# Patient Record
Sex: FEMALE | Race: WHITE | NOT HISPANIC OR LATINO | Employment: OTHER | URBAN - METROPOLITAN AREA
[De-identification: names, ages, dates, MRNs, and addresses within clinical notes are randomized per-mention and may not be internally consistent; named-entity substitution may affect disease eponyms.]

---

## 2017-12-07 ENCOUNTER — TRANSCRIBE ORDERS (OUTPATIENT)
Dept: ADMINISTRATIVE | Facility: HOSPITAL | Age: 67
End: 2017-12-07

## 2017-12-07 ENCOUNTER — APPOINTMENT (OUTPATIENT)
Dept: LAB | Facility: HOSPITAL | Age: 67
End: 2017-12-07
Payer: MEDICARE

## 2017-12-07 DIAGNOSIS — R11.2 NAUSEA AND VOMITING, INTRACTABILITY OF VOMITING NOT SPECIFIED, UNSPECIFIED VOMITING TYPE: ICD-10-CM

## 2017-12-07 DIAGNOSIS — R11.2 NAUSEA AND VOMITING, INTRACTABILITY OF VOMITING NOT SPECIFIED, UNSPECIFIED VOMITING TYPE: Primary | ICD-10-CM

## 2017-12-07 LAB
ALBUMIN SERPL BCP-MCNC: 3.7 G/DL (ref 3.5–5)
ALP SERPL-CCNC: 67 U/L (ref 46–116)
ALT SERPL W P-5'-P-CCNC: 29 U/L (ref 12–78)
ANION GAP SERPL CALCULATED.3IONS-SCNC: 7 MMOL/L (ref 4–13)
AST SERPL W P-5'-P-CCNC: 17 U/L (ref 5–45)
BASOPHILS # BLD AUTO: 0.01 THOUSANDS/ΜL (ref 0–0.1)
BASOPHILS NFR BLD AUTO: 0 % (ref 0–1)
BILIRUB SERPL-MCNC: 0.4 MG/DL (ref 0.2–1)
BUN SERPL-MCNC: 13 MG/DL (ref 5–25)
CALCIUM SERPL-MCNC: 8.9 MG/DL (ref 8.3–10.1)
CHLORIDE SERPL-SCNC: 105 MMOL/L (ref 100–108)
CHOLEST SERPL-MCNC: 276 MG/DL (ref 50–200)
CO2 SERPL-SCNC: 30 MMOL/L (ref 21–32)
CREAT SERPL-MCNC: 0.8 MG/DL (ref 0.6–1.3)
EOSINOPHIL # BLD AUTO: 0.09 THOUSAND/ΜL (ref 0–0.61)
EOSINOPHIL NFR BLD AUTO: 2 % (ref 0–6)
ERYTHROCYTE [DISTWIDTH] IN BLOOD BY AUTOMATED COUNT: 13 % (ref 11.6–15.1)
FOLATE SERPL-MCNC: >20 NG/ML (ref 3.1–17.5)
GFR SERPL CREATININE-BSD FRML MDRD: 77 ML/MIN/1.73SQ M
GLUCOSE P FAST SERPL-MCNC: 95 MG/DL (ref 65–99)
HCT VFR BLD AUTO: 41.9 % (ref 34.8–46.1)
HDLC SERPL-MCNC: 70 MG/DL (ref 40–60)
HGB BLD-MCNC: 13.6 G/DL (ref 11.5–15.4)
LDLC SERPL CALC-MCNC: 188 MG/DL (ref 0–100)
LYMPHOCYTES # BLD AUTO: 1.65 THOUSANDS/ΜL (ref 0.6–4.47)
LYMPHOCYTES NFR BLD AUTO: 31 % (ref 14–44)
MCH RBC QN AUTO: 29.9 PG (ref 26.8–34.3)
MCHC RBC AUTO-ENTMCNC: 32.5 G/DL (ref 31.4–37.4)
MCV RBC AUTO: 92 FL (ref 82–98)
MONOCYTES # BLD AUTO: 0.42 THOUSAND/ΜL (ref 0.17–1.22)
MONOCYTES NFR BLD AUTO: 8 % (ref 4–12)
NEUTROPHILS # BLD AUTO: 3.09 THOUSANDS/ΜL (ref 1.85–7.62)
NEUTS SEG NFR BLD AUTO: 59 % (ref 43–75)
PLATELET # BLD AUTO: 205 THOUSANDS/UL (ref 149–390)
PMV BLD AUTO: 10.7 FL (ref 8.9–12.7)
POTASSIUM SERPL-SCNC: 4.1 MMOL/L (ref 3.5–5.3)
PROT SERPL-MCNC: 7.1 G/DL (ref 6.4–8.2)
RBC # BLD AUTO: 4.55 MILLION/UL (ref 3.81–5.12)
SODIUM SERPL-SCNC: 142 MMOL/L (ref 136–145)
TRIGL SERPL-MCNC: 90 MG/DL
TSH SERPL DL<=0.05 MIU/L-ACNC: 1.87 UIU/ML (ref 0.36–3.74)
VIT B12 SERPL-MCNC: 497 PG/ML (ref 100–900)
WBC # BLD AUTO: 5.26 THOUSAND/UL (ref 4.31–10.16)

## 2017-12-07 PROCEDURE — 82607 VITAMIN B-12: CPT

## 2017-12-07 PROCEDURE — 82306 VITAMIN D 25 HYDROXY: CPT

## 2017-12-07 PROCEDURE — 36415 COLL VENOUS BLD VENIPUNCTURE: CPT | Performed by: FAMILY MEDICINE

## 2017-12-07 PROCEDURE — 80061 LIPID PANEL: CPT

## 2017-12-07 PROCEDURE — 85025 COMPLETE CBC W/AUTO DIFF WBC: CPT

## 2017-12-07 PROCEDURE — 82746 ASSAY OF FOLIC ACID SERUM: CPT | Performed by: FAMILY MEDICINE

## 2017-12-07 PROCEDURE — 80053 COMPREHEN METABOLIC PANEL: CPT

## 2017-12-07 PROCEDURE — 84443 ASSAY THYROID STIM HORMONE: CPT

## 2017-12-11 LAB
25(OH)D2 SERPL-MCNC: <1 NG/ML
25(OH)D3 SERPL-MCNC: 68 NG/ML
25(OH)D3+25(OH)D2 SERPL-MCNC: 68 NG/ML

## 2018-05-01 ENCOUNTER — OFFICE VISIT (OUTPATIENT)
Dept: FAMILY MEDICINE CLINIC | Facility: HOSPITAL | Age: 68
End: 2018-05-01
Payer: MEDICARE

## 2018-05-01 VITALS
BODY MASS INDEX: 34.07 KG/M2 | TEMPERATURE: 97.6 F | OXYGEN SATURATION: 96 % | HEIGHT: 65 IN | SYSTOLIC BLOOD PRESSURE: 130 MMHG | WEIGHT: 204.5 LBS | HEART RATE: 85 BPM | DIASTOLIC BLOOD PRESSURE: 80 MMHG

## 2018-05-01 DIAGNOSIS — R42 VERTIGO: ICD-10-CM

## 2018-05-01 DIAGNOSIS — G89.29 CHRONIC NECK PAIN: ICD-10-CM

## 2018-05-01 DIAGNOSIS — S90.812A ABRASION OF LEFT FOOT, INITIAL ENCOUNTER: ICD-10-CM

## 2018-05-01 DIAGNOSIS — M79.10 MYALGIA: ICD-10-CM

## 2018-05-01 DIAGNOSIS — K21.9 GASTROESOPHAGEAL REFLUX DISEASE WITHOUT ESOPHAGITIS: ICD-10-CM

## 2018-05-01 DIAGNOSIS — M79.672 LEFT FOOT PAIN: ICD-10-CM

## 2018-05-01 DIAGNOSIS — E78.2 MIXED HYPERLIPIDEMIA: ICD-10-CM

## 2018-05-01 DIAGNOSIS — M54.2 CHRONIC NECK PAIN: ICD-10-CM

## 2018-05-01 DIAGNOSIS — M54.42 CHRONIC BILATERAL LOW BACK PAIN WITH BILATERAL SCIATICA: Primary | ICD-10-CM

## 2018-05-01 DIAGNOSIS — G89.29 CHRONIC BILATERAL LOW BACK PAIN WITH BILATERAL SCIATICA: Primary | ICD-10-CM

## 2018-05-01 DIAGNOSIS — M54.41 CHRONIC BILATERAL LOW BACK PAIN WITH BILATERAL SCIATICA: Primary | ICD-10-CM

## 2018-05-01 DIAGNOSIS — N39.41 URGE INCONTINENCE OF URINE: ICD-10-CM

## 2018-05-01 PROCEDURE — 99204 OFFICE O/P NEW MOD 45 MIN: CPT | Performed by: INTERNAL MEDICINE

## 2018-05-01 RX ORDER — HYDROCODONE BITARTRATE AND ACETAMINOPHEN 5; 325 MG/1; MG/1
1 TABLET ORAL EVERY 6 HOURS PRN
COMMUNITY
End: 2018-05-03 | Stop reason: SDUPTHER

## 2018-05-01 RX ORDER — OXYBUTYNIN CHLORIDE 5 MG/1
5 TABLET ORAL 2 TIMES DAILY
COMMUNITY
End: 2018-05-03 | Stop reason: SDUPTHER

## 2018-05-01 RX ORDER — ASCORBIC ACID 500 MG
500 TABLET ORAL 2 TIMES DAILY
COMMUNITY
End: 2018-05-01 | Stop reason: ALTCHOICE

## 2018-05-01 RX ORDER — MULTIVIT WITH MINERALS/LUTEIN
1000 TABLET ORAL DAILY
COMMUNITY

## 2018-05-01 RX ORDER — CELECOXIB 100 MG/1
100 CAPSULE ORAL 2 TIMES DAILY
Qty: 60 CAPSULE | Refills: 5 | Status: SHIPPED | OUTPATIENT
Start: 2018-05-01 | End: 2018-05-02 | Stop reason: SDUPTHER

## 2018-05-01 NOTE — ASSESSMENT & PLAN NOTE
Had labs in December with high cholesterol- eats from [facility] and gained 15 lbs since this winter

## 2018-05-01 NOTE — PROGRESS NOTES
Assessment/Plan:         Problem List Items Addressed This Visit        Digestive    Gastroesophageal reflux disease without esophagitis     Longstanding problem- prevacid controls it if she watches what she eats  Relevant Orders    CBC and differential       Musculoskeletal and Integument    Abrasion of left foot    Relevant Orders    XR foot 3+ vw left       Other    Mixed hyperlipidemia (Chronic)     Had labs in December with high cholesterol- eats from [facility] and gained 15 lbs since this winter         Relevant Orders    Comprehensive metabolic panel    Lipid Panel with Direct LDL reflex    Chronic neck pain     Now has trouble with turning head to right         Chronic bilateral low back pain with bilateral sciatica - Primary     Has wood fireplace and she carries wood for that-  is requiring care  She is using cbd oil and she feels it helps         Relevant Medications    celecoxib (CeleBREX) 100 mg capsule    Left foot pain     In past few weeks has increased pain at base of left  4th metatarsal         Myalgia    Relevant Orders    Vitamin D 25 hydroxy    FRIEDA Screen w/ Reflex to Titer/Pattern    Sedimentation rate, automated    Uric acid    Urge incontinence of urine     Will check urine and have pt see Dr Christianne Kahn         Relevant Orders    Ambulatory referral to Urogynecology            Subjective:      Patient ID: Cookie Sawyer is a 79 y o  female    HPI    The following portions of the patient's history were reviewed and updated as appropriate: allergies, current medications and problem list      Review of Systems   Constitutional: Positive for fatigue  Negative for diaphoresis and fever  HENT: Negative for ear discharge, postnasal drip, sinus pain and sinus pressure  Respiratory: Positive for wheezing  Negative for apnea and shortness of breath  Not recently- some seasonal component- discussed using allegra   Gastrointestinal: Negative for abdominal pain  Genitourinary: Positive for difficulty urinating  Musculoskeletal: Positive for arthralgias, back pain, joint swelling and neck stiffness  Neurological: Positive for headaches  With seasonal alelrgies   All other systems reviewed and are negative  Objective:      Current Outpatient Prescriptions:     Ascorbic Acid (VITAMIN C) 1000 MG tablet, Take 1,000 mg by mouth daily, Disp: , Rfl:     celecoxib (CeleBREX) 100 mg capsule, Take 1 capsule (100 mg total) by mouth 2 (two) times a day, Disp: 60 capsule, Rfl: 5    HYDROcodone-acetaminophen (NORCO) 5-325 mg per tablet, Take 1 tablet by mouth every 6 (six) hours as needed for pain, Disp: , Rfl:     Lansoprazole (PREVACID PO), Take by mouth , Disp: , Rfl:     Meclizine HCl (ANTIVERT PO), Take by mouth , Disp: , Rfl:     oxybutynin (DITROPAN) 5 mg tablet, Take 5 mg by mouth 2 (two) times a day, Disp: , Rfl:     /72   Pulse 90   Resp 16   Ht 5' 3" (1 6 m)   Wt 65 3 kg (144 lb)   BMI 25 51 kg/m²          Physical Exam   Constitutional: She is oriented to person, place, and time  She appears well-developed and well-nourished  HENT:   Head: Normocephalic and atraumatic  Right Ear: External ear normal    Left Ear: External ear normal    Mouth/Throat: Oropharyngeal exudate present  Eyes: Right eye exhibits discharge  Left eye exhibits discharge  Neck: No JVD present  Cardiovascular: Normal rate and regular rhythm  Exam reveals no friction rub  No murmur heard  Pulmonary/Chest: Effort normal and breath sounds normal  No respiratory distress  She has no wheezes  Abdominal: Soft  Bowel sounds are normal    oveweight   Musculoskeletal: She exhibits edema  Some trace ankle edema   Neurological: She is alert and oriented to person, place, and time  She displays normal reflexes  No cranial nerve deficit  She exhibits normal muscle tone  Coordination normal    Skin: Skin is warm and dry     Psychiatric: She has a normal mood and affect  Her behavior is normal  Thought content normal    Nursing note and vitals reviewed

## 2018-05-01 NOTE — ASSESSMENT & PLAN NOTE
Has wood fireplace and she carries wood for that-  is requiring care    She is using cbd oil and she feels it helps

## 2018-05-02 ENCOUNTER — APPOINTMENT (OUTPATIENT)
Dept: LAB | Facility: HOSPITAL | Age: 68
End: 2018-05-02
Attending: INTERNAL MEDICINE
Payer: MEDICARE

## 2018-05-02 ENCOUNTER — HOSPITAL ENCOUNTER (OUTPATIENT)
Dept: RADIOLOGY | Facility: HOSPITAL | Age: 68
Discharge: HOME | End: 2018-05-02
Attending: INTERNAL MEDICINE
Payer: MEDICARE

## 2018-05-02 DIAGNOSIS — M79.10 MYALGIA: ICD-10-CM

## 2018-05-02 DIAGNOSIS — K21.9 GASTROESOPHAGEAL REFLUX DISEASE WITHOUT ESOPHAGITIS: ICD-10-CM

## 2018-05-02 DIAGNOSIS — E78.2 MIXED HYPERLIPIDEMIA: ICD-10-CM

## 2018-05-02 DIAGNOSIS — S90.812A ABRASION OF LEFT FOOT, INITIAL ENCOUNTER: ICD-10-CM

## 2018-05-02 LAB
25(OH)D3 SERPL-MCNC: 100.6 NG/ML (ref 30–100)
ALBUMIN SERPL BCP-MCNC: 3.7 G/DL (ref 3.5–5)
ALP SERPL-CCNC: 63 U/L (ref 46–116)
ALT SERPL W P-5'-P-CCNC: 25 U/L (ref 12–78)
ANION GAP SERPL CALCULATED.3IONS-SCNC: 9 MMOL/L (ref 4–13)
AST SERPL W P-5'-P-CCNC: 19 U/L (ref 5–45)
BASOPHILS # BLD AUTO: 0.01 THOUSANDS/ΜL (ref 0–0.1)
BASOPHILS NFR BLD AUTO: 0 % (ref 0–1)
BILIRUB SERPL-MCNC: 0.5 MG/DL (ref 0.2–1)
BUN SERPL-MCNC: 12 MG/DL (ref 5–25)
CALCIUM SERPL-MCNC: 8.7 MG/DL (ref 8.3–10.1)
CHLORIDE SERPL-SCNC: 105 MMOL/L (ref 100–108)
CHOLEST SERPL-MCNC: 248 MG/DL (ref 50–200)
CO2 SERPL-SCNC: 28 MMOL/L (ref 21–32)
CREAT SERPL-MCNC: 0.88 MG/DL (ref 0.6–1.3)
EOSINOPHIL # BLD AUTO: 0.04 THOUSAND/ΜL (ref 0–0.61)
EOSINOPHIL NFR BLD AUTO: 1 % (ref 0–6)
ERYTHROCYTE [DISTWIDTH] IN BLOOD BY AUTOMATED COUNT: 13.2 % (ref 11.6–15.1)
ERYTHROCYTE [SEDIMENTATION RATE] IN BLOOD: 18 MM/HOUR (ref 0–15)
GFR SERPL CREATININE-BSD FRML MDRD: 68 ML/MIN/1.73SQ M
GLUCOSE P FAST SERPL-MCNC: 105 MG/DL (ref 65–99)
HCT VFR BLD AUTO: 41.1 % (ref 34.8–46.1)
HDLC SERPL-MCNC: 62 MG/DL (ref 40–60)
HGB BLD-MCNC: 13.7 G/DL (ref 11.5–15.4)
LDLC SERPL CALC-MCNC: 172 MG/DL (ref 0–100)
LYMPHOCYTES # BLD AUTO: 1.34 THOUSANDS/ΜL (ref 0.6–4.47)
LYMPHOCYTES NFR BLD AUTO: 26 % (ref 14–44)
MCH RBC QN AUTO: 31.2 PG (ref 26.8–34.3)
MCHC RBC AUTO-ENTMCNC: 33.3 G/DL (ref 31.4–37.4)
MCV RBC AUTO: 94 FL (ref 82–98)
MONOCYTES # BLD AUTO: 0.42 THOUSAND/ΜL (ref 0.17–1.22)
MONOCYTES NFR BLD AUTO: 8 % (ref 4–12)
NEUTROPHILS # BLD AUTO: 3.41 THOUSANDS/ΜL (ref 1.85–7.62)
NEUTS SEG NFR BLD AUTO: 65 % (ref 43–75)
PLATELET # BLD AUTO: 201 THOUSANDS/UL (ref 149–390)
PMV BLD AUTO: 10.9 FL (ref 8.9–12.7)
POTASSIUM SERPL-SCNC: 3.8 MMOL/L (ref 3.5–5.3)
PROT SERPL-MCNC: 7.2 G/DL (ref 6.4–8.2)
RBC # BLD AUTO: 4.39 MILLION/UL (ref 3.81–5.12)
SODIUM SERPL-SCNC: 142 MMOL/L (ref 136–145)
TRIGL SERPL-MCNC: 68 MG/DL
URATE SERPL-MCNC: 5.4 MG/DL (ref 2–6.8)
WBC # BLD AUTO: 5.22 THOUSAND/UL (ref 4.31–10.16)

## 2018-05-02 PROCEDURE — 80061 LIPID PANEL: CPT

## 2018-05-02 PROCEDURE — 84550 ASSAY OF BLOOD/URIC ACID: CPT

## 2018-05-02 PROCEDURE — 73630 X-RAY EXAM OF FOOT: CPT

## 2018-05-02 PROCEDURE — 85025 COMPLETE CBC W/AUTO DIFF WBC: CPT

## 2018-05-02 PROCEDURE — 36415 COLL VENOUS BLD VENIPUNCTURE: CPT

## 2018-05-02 PROCEDURE — 80053 COMPREHEN METABOLIC PANEL: CPT

## 2018-05-02 PROCEDURE — 86038 ANTINUCLEAR ANTIBODIES: CPT

## 2018-05-02 PROCEDURE — 85652 RBC SED RATE AUTOMATED: CPT

## 2018-05-02 PROCEDURE — 82306 VITAMIN D 25 HYDROXY: CPT

## 2018-05-02 RX ORDER — MECLIZINE HYDROCHLORIDE 25 MG/1
25 TABLET ORAL EVERY 8 HOURS PRN
Qty: 180 TABLET | Refills: 3 | Status: SHIPPED | OUTPATIENT
Start: 2018-05-02 | End: 2019-07-29 | Stop reason: SDUPTHER

## 2018-05-02 RX ORDER — CELECOXIB 100 MG/1
100 CAPSULE ORAL 2 TIMES DAILY
Qty: 180 CAPSULE | Refills: 3 | Status: SHIPPED | OUTPATIENT
Start: 2018-05-02 | End: 2019-07-30 | Stop reason: SDUPTHER

## 2018-05-03 DIAGNOSIS — K21.9 GASTROESOPHAGEAL REFLUX DISEASE, ESOPHAGITIS PRESENCE NOT SPECIFIED: Primary | ICD-10-CM

## 2018-05-03 DIAGNOSIS — R52 PAIN: ICD-10-CM

## 2018-05-03 DIAGNOSIS — IMO0002 URINARY COMPLICATION: Primary | ICD-10-CM

## 2018-05-03 RX ORDER — OXYBUTYNIN CHLORIDE 5 MG/1
5 TABLET ORAL 2 TIMES DAILY
Qty: 90 TABLET | Refills: 1 | Status: SHIPPED | OUTPATIENT
Start: 2018-05-03 | End: 2019-07-29 | Stop reason: SDUPTHER

## 2018-05-03 RX ORDER — HYDROCODONE BITARTRATE AND ACETAMINOPHEN 5; 325 MG/1; MG/1
1 TABLET ORAL EVERY 6 HOURS PRN
Qty: 120 TABLET | Refills: 0 | Status: SHIPPED | OUTPATIENT
Start: 2018-05-03 | End: 2018-08-22 | Stop reason: SDUPTHER

## 2018-05-04 LAB — RYE IGE QN: NEGATIVE

## 2018-05-04 RX ORDER — ESOMEPRAZOLE MAGNESIUM 40 MG/1
CAPSULE, DELAYED RELEASE ORAL
Qty: 90 CAPSULE | Refills: 1 | Status: SHIPPED | OUTPATIENT
Start: 2018-05-04 | End: 2018-11-27 | Stop reason: SDUPTHER

## 2018-05-10 ENCOUNTER — TELEPHONE (OUTPATIENT)
Dept: FAMILY MEDICINE CLINIC | Facility: HOSPITAL | Age: 68
End: 2018-05-10

## 2018-05-11 ENCOUNTER — TELEPHONE (OUTPATIENT)
Dept: FAMILY MEDICINE CLINIC | Facility: HOSPITAL | Age: 68
End: 2018-05-11

## 2018-05-11 NOTE — TELEPHONE ENCOUNTER
PT called for a refill of her Shir[facility] Lands Dr Julio Gomez did refill - but could not find PT's name on the PA reg   - I spoke with PT and she said that Dr Gabriela Juarez had prescribed this in the past - and she had it filled at [facility] - [phone]  I called [facility] and verified this and she last got it filled on Feb 26,2018 - at their store for 90 pills and it was the 300/25 - prescribed by Dr Gabriela Juarez  They checked the PA reg  And the also could not find PT on it

## 2018-08-22 DIAGNOSIS — R52 PAIN: ICD-10-CM

## 2018-08-22 RX ORDER — HYDROCODONE BITARTRATE AND ACETAMINOPHEN 5; 325 MG/1; MG/1
1 TABLET ORAL EVERY 6 HOURS PRN
Qty: 120 TABLET | Refills: 0 | Status: SHIPPED | OUTPATIENT
Start: 2018-08-22 | End: 2018-10-31 | Stop reason: SDUPTHER

## 2018-10-31 DIAGNOSIS — R52 PAIN: ICD-10-CM

## 2018-11-01 RX ORDER — HYDROCODONE BITARTRATE AND ACETAMINOPHEN 5; 325 MG/1; MG/1
1 TABLET ORAL EVERY 6 HOURS PRN
Qty: 120 TABLET | Refills: 0 | Status: SHIPPED | OUTPATIENT
Start: 2018-11-01 | End: 2018-11-29 | Stop reason: SDUPTHER

## 2018-11-27 DIAGNOSIS — K21.9 GASTROESOPHAGEAL REFLUX DISEASE, ESOPHAGITIS PRESENCE NOT SPECIFIED: ICD-10-CM

## 2018-11-27 RX ORDER — ESOMEPRAZOLE MAGNESIUM 40 MG/1
CAPSULE, DELAYED RELEASE ORAL
Qty: 90 CAPSULE | Refills: 3 | Status: SHIPPED | OUTPATIENT
Start: 2018-11-27 | End: 2019-07-29 | Stop reason: SDUPTHER

## 2018-11-29 DIAGNOSIS — R52 PAIN: ICD-10-CM

## 2018-12-03 RX ORDER — HYDROCODONE BITARTRATE AND ACETAMINOPHEN 5; 325 MG/1; MG/1
1 TABLET ORAL EVERY 6 HOURS PRN
Qty: 120 TABLET | Refills: 0 | Status: SHIPPED | OUTPATIENT
Start: 2018-12-03 | End: 2019-01-22 | Stop reason: SDUPTHER

## 2018-12-05 ENCOUNTER — DOCUMENTATION (OUTPATIENT)
Dept: FAMILY MEDICINE CLINIC | Facility: HOSPITAL | Age: 68
End: 2018-12-05

## 2018-12-19 ENCOUNTER — HOSPITAL ENCOUNTER (EMERGENCY)
Facility: HOSPITAL | Age: 68
Discharge: HOME | End: 2018-12-19
Attending: EMERGENCY MEDICINE | Admitting: EMERGENCY MEDICINE
Payer: MEDICARE

## 2018-12-19 ENCOUNTER — APPOINTMENT (EMERGENCY)
Dept: CT IMAGING | Facility: HOSPITAL | Age: 68
End: 2018-12-19
Payer: MEDICARE

## 2018-12-19 ENCOUNTER — APPOINTMENT (EMERGENCY)
Dept: NON INVASIVE DIAGNOSTICS | Facility: HOSPITAL | Age: 68
End: 2018-12-19
Payer: MEDICARE

## 2018-12-19 VITALS
OXYGEN SATURATION: 97 % | RESPIRATION RATE: 20 BRPM | HEIGHT: 65 IN | BODY MASS INDEX: 32.99 KG/M2 | TEMPERATURE: 98.3 F | WEIGHT: 198 LBS | HEART RATE: 85 BPM | SYSTOLIC BLOOD PRESSURE: 129 MMHG | DIASTOLIC BLOOD PRESSURE: 69 MMHG

## 2018-12-19 DIAGNOSIS — R20.2 NUMBNESS AND TINGLING IN LEFT HAND: ICD-10-CM

## 2018-12-19 DIAGNOSIS — M79.609 PAIN IN LIMB: ICD-10-CM

## 2018-12-19 DIAGNOSIS — R20.0 NUMBNESS AND TINGLING IN LEFT HAND: ICD-10-CM

## 2018-12-19 DIAGNOSIS — M79.605 LEFT LEG PAIN: Primary | ICD-10-CM

## 2018-12-19 LAB
ALBUMIN SERPL BCP-MCNC: 3.4 G/DL (ref 3.5–5)
ALP SERPL-CCNC: 70 U/L (ref 46–116)
ALT SERPL W P-5'-P-CCNC: 27 U/L (ref 12–78)
ANION GAP SERPL CALCULATED.3IONS-SCNC: 7 MMOL/L (ref 4–13)
APTT PPP: 25 SECONDS (ref 26–38)
AST SERPL W P-5'-P-CCNC: 20 U/L (ref 5–45)
BASOPHILS # BLD AUTO: 0.03 THOUSANDS/ΜL (ref 0–0.1)
BASOPHILS NFR BLD AUTO: 0 % (ref 0–1)
BILIRUB SERPL-MCNC: 0.4 MG/DL (ref 0.2–1)
BUN SERPL-MCNC: 13 MG/DL (ref 5–25)
CALCIUM SERPL-MCNC: 8.5 MG/DL (ref 8.3–10.1)
CHLORIDE SERPL-SCNC: 103 MMOL/L (ref 100–108)
CO2 SERPL-SCNC: 29 MMOL/L (ref 21–32)
CREAT SERPL-MCNC: 0.97 MG/DL (ref 0.6–1.3)
EOSINOPHIL # BLD AUTO: 0.08 THOUSAND/ΜL (ref 0–0.61)
EOSINOPHIL NFR BLD AUTO: 1 % (ref 0–6)
ERYTHROCYTE [DISTWIDTH] IN BLOOD BY AUTOMATED COUNT: 12.7 % (ref 11.6–15.1)
GFR SERPL CREATININE-BSD FRML MDRD: 60 ML/MIN/1.73SQ M
GLUCOSE SERPL-MCNC: 93 MG/DL (ref 65–140)
HCT VFR BLD AUTO: 39.7 % (ref 34.8–46.1)
HGB BLD-MCNC: 13.2 G/DL (ref 11.5–15.4)
IMM GRANULOCYTES # BLD AUTO: 0.01 THOUSAND/UL (ref 0–0.2)
IMM GRANULOCYTES NFR BLD AUTO: 0 % (ref 0–2)
INR PPP: 1.07 (ref 0.86–1.17)
LYMPHOCYTES # BLD AUTO: 1.75 THOUSANDS/ΜL (ref 0.6–4.47)
LYMPHOCYTES NFR BLD AUTO: 24 % (ref 14–44)
MCH RBC QN AUTO: 30.8 PG (ref 26.8–34.3)
MCHC RBC AUTO-ENTMCNC: 33.2 G/DL (ref 31.4–37.4)
MCV RBC AUTO: 93 FL (ref 82–98)
MONOCYTES # BLD AUTO: 0.61 THOUSAND/ΜL (ref 0.17–1.22)
MONOCYTES NFR BLD AUTO: 8 % (ref 4–12)
NEUTROPHILS # BLD AUTO: 4.81 THOUSANDS/ΜL (ref 1.85–7.62)
NEUTS SEG NFR BLD AUTO: 67 % (ref 43–75)
NRBC BLD AUTO-RTO: 0 /100 WBCS
PLATELET # BLD AUTO: 189 THOUSANDS/UL (ref 149–390)
PMV BLD AUTO: 10.1 FL (ref 8.9–12.7)
POTASSIUM SERPL-SCNC: 3.9 MMOL/L (ref 3.5–5.3)
PROT SERPL-MCNC: 7.1 G/DL (ref 6.4–8.2)
PROTHROMBIN TIME: 13.3 SECONDS (ref 11.8–14.2)
RBC # BLD AUTO: 4.28 MILLION/UL (ref 3.81–5.12)
SODIUM SERPL-SCNC: 139 MMOL/L (ref 136–145)
TROPONIN I SERPL-MCNC: <0.02 NG/ML
WBC # BLD AUTO: 7.29 THOUSAND/UL (ref 4.31–10.16)

## 2018-12-19 PROCEDURE — 85730 THROMBOPLASTIN TIME PARTIAL: CPT | Performed by: PHYSICIAN ASSISTANT

## 2018-12-19 PROCEDURE — 93971 EXTREMITY STUDY: CPT

## 2018-12-19 PROCEDURE — 99285 EMERGENCY DEPT VISIT HI MDM: CPT

## 2018-12-19 PROCEDURE — 70450 CT HEAD/BRAIN W/O DYE: CPT

## 2018-12-19 PROCEDURE — 93005 ELECTROCARDIOGRAM TRACING: CPT

## 2018-12-19 PROCEDURE — 93971 EXTREMITY STUDY: CPT | Performed by: INTERNAL MEDICINE

## 2018-12-19 PROCEDURE — 84484 ASSAY OF TROPONIN QUANT: CPT | Performed by: PHYSICIAN ASSISTANT

## 2018-12-19 PROCEDURE — 85025 COMPLETE CBC W/AUTO DIFF WBC: CPT | Performed by: PHYSICIAN ASSISTANT

## 2018-12-19 PROCEDURE — 80053 COMPREHEN METABOLIC PANEL: CPT | Performed by: PHYSICIAN ASSISTANT

## 2018-12-19 PROCEDURE — 36415 COLL VENOUS BLD VENIPUNCTURE: CPT | Performed by: PHYSICIAN ASSISTANT

## 2018-12-19 PROCEDURE — 85610 PROTHROMBIN TIME: CPT | Performed by: PHYSICIAN ASSISTANT

## 2018-12-19 RX ORDER — GINKGO BILOBA LEAF EXTRACT 60 MG
CAPSULE ORAL
COMMUNITY

## 2018-12-19 NOTE — ED NOTES
Patient transported to [facility] Nick Jasmine, [address] [place] [address]  12/19/18 1474

## 2018-12-19 NOTE — ED PROVIDER NOTES
History  Chief Complaint   Patient presents with    CVA/TIA-like Symptoms     Patient c/o left leg and left hand numbness for 1 week  Patient is a 75 y/o F with a h/o chronic low back pain that presents to the ED with left leg pain and weakness for 1 week  She states 1 week ago she thought she twisted her leg getting firewood because she developed pain in her lower leg  She states the pain is worsening and now travels behind her left knee  Movement makes the pain worse  She states today she developed tingling in her left hand  She denies weakness in her left arm  No facial numbness or weakness  She states she does have a headache  No visual changes  No bowel/bladder dysfunction  History provided by:  Patient  CVA/TIA-like Symptoms   Presenting symptoms: headaches and weakness (left leg  )    Presenting symptoms: no change in level of consciousness, no confusion, no disturbances in coordination, no language symptoms, no focal sensory loss and no visual change    Date/time of last known well:  12/12/2018 2:49 PM  Onset quality:  Gradual  Timing:  Constant  Progression:  Worsening  Similar to previous episodes: no    Associated symptoms: paresthesias (left hand)    Associated symptoms: no chest pain, no trouble swallowing, no dizziness, no facial pain, no fall, no fever, no nausea, no neck pain and no vertigo        Prior to Admission Medications   Prescriptions Last Dose Informant Patient Reported? Taking?    Ascorbic Acid (VITAMIN C) 1000 MG tablet   Yes Yes   Sig: Take 1,000 mg by mouth daily   Ginseng 100 MG CAPS   Yes Yes   Sig: Take by mouth   HYDROcodone-acetaminophen (NORCO) 5-325 mg per tablet   No Yes   Sig: Take 1 tablet by mouth every 6 (six) hours as needed for pain Max Daily Amount: 4 tablets   celecoxib (CeleBREX) 100 mg capsule   No Yes   Sig: Take 1 capsule (100 mg total) by mouth 2 (two) times a day   esomeprazole (NexIUM) 40 MG capsule   No Yes   Sig: Take 1 tab daily meclizine (ANTIVERT) 25 mg tablet   No Yes   Sig: Take 1 tablet (25 mg total) by mouth every 8 (eight) hours as needed for dizziness for up to 90 days   oxybutynin (DITROPAN) 5 mg tablet   No Yes   Sig: Take 1 tablet (5 mg total) by mouth 2 (two) times a day      Facility-Administered Medications: None       Past Medical History:   Diagnosis Date    Arthritis     Chronic pain     Diverticulitis     GERD (gastroesophageal reflux disease)     Hypertension     Vertigo        Past Surgical History:   Procedure Laterality Date    TUBAL LIGATION         Family History   Problem Relation Age of Onset    Stroke Mother     Diabetes Mother     Hypertension Mother     Heart disease Father     Hypertension Father     Heart disease Brother     Diabetes Brother     Hypertension Brother      I have reviewed and agree with the history as documented  Social History   Substance Use Topics    Smoking status: Never Smoker    Smokeless tobacco: Never Used    Alcohol use Yes      Comment: 3 times a week        Review of Systems   Constitutional: Negative for chills and fever  HENT: Negative for facial swelling and trouble swallowing  Respiratory: Negative for cough and shortness of breath  Cardiovascular: Negative for chest pain  Gastrointestinal: Negative for nausea  Musculoskeletal: Negative for neck pain  Skin: Negative for color change and wound  Neurological: Positive for weakness (left leg  ), numbness (left hand), headaches and paresthesias (left hand)  Negative for dizziness, vertigo, tremors, syncope, facial asymmetry, speech difficulty, light-headedness and disturbances in coordination  Psychiatric/Behavioral: Negative for confusion  All other systems reviewed and are negative  Physical Exam  Physical Exam   Constitutional: She is oriented to person, place, and time  She appears well-developed and well-nourished  She is cooperative  She does not appear ill  No distress     HENT: Head: Normocephalic and atraumatic  Right Ear: Hearing normal    Left Ear: Hearing normal    Nose: Nose normal    Mouth/Throat: Oropharynx is clear and moist and mucous membranes are normal    Eyes: Pupils are equal, round, and reactive to light  Conjunctivae and EOM are normal    Neck: Normal range of motion  Cardiovascular: Normal rate, regular rhythm and normal heart sounds  No murmur heard  Pulmonary/Chest: Effort normal and breath sounds normal  She has no wheezes  She has no rhonchi  She has no rales  Abdominal: Soft  Normal appearance and bowel sounds are normal  There is no tenderness  Musculoskeletal:        Left knee: Tenderness (posterior left knee) found  Left lower leg: She exhibits tenderness and swelling  She exhibits no deformity  Neurological: She is alert and oriented to person, place, and time  She has normal strength and normal reflexes  She displays no tremor  A sensory deficit (decreased sensation left hand  ) is present  No cranial nerve deficit  She displays a negative Romberg sign  Coordination and gait normal  GCS eye subscore is 4  GCS verbal subscore is 5  GCS motor subscore is 6  Finger to nose normal    Heel to shin limited with left leg due to pain  Patient has pain when lifting left leg up  She is able to keep it raised for over 5 seconds  Skin: Skin is warm and dry  No bruising and no rash noted  She is not diaphoretic  No pallor  Nursing note and vitals reviewed        Vital Signs  ED Triage Vitals   Temperature Pulse Respirations Blood Pressure SpO2   12/19/18 1432 12/19/18 1431 12/19/18 1431 12/19/18 1431 12/19/18 1431   98 3 °F (36 8 °C) 96 20 170/77 97 %      Temp Source Heart Rate Source Patient Position - Orthostatic VS BP Location FiO2 (%)   12/19/18 1432 12/19/18 1431 12/19/18 1431 12/19/18 1431 --   Temporal Monitor Lying Right arm       Pain Score       12/19/18 1433       5           Vitals:    12/19/18 1431 12/19/18 1445 12/19/18 1514 BP: 170/77  129/69   Pulse: 96  85   Patient Position - Orthostatic VS: Lying Lying        Visual Acuity  Visual Acuity      Most Recent Value   L Pupil Size (mm)  2   R Pupil Size (mm)  2          ED Medications  Medications - No data to display    Diagnostic Studies  Results Reviewed     Procedure Component Value Units Date/Time    Troponin I [[id number]]  (Normal) Collected:  12/19/18 1448    Lab Status:  Final result Specimen:  Blood from Arm, Left Updated:  12/19/18 1516     Troponin I <0 02 ng/mL     Protime-INR [[id number]]  (Normal) Collected:  12/19/18 1448    Lab Status:  Final result Specimen:  Blood from Arm, Left Updated:  12/19/18 1514     Protime 13 3 seconds      INR 1 07    APTT [[id number]]  (Abnormal) Collected:  12/19/18 1448    Lab Status:  Final result Specimen:  Blood from Arm, Left Updated:  12/19/18 1514     PTT 25 (L) seconds     Comprehensive metabolic panel [[id number]]  (Abnormal) Collected:  12/19/18 1448    Lab Status:  Final result Specimen:  Blood from Arm, Left Updated:  12/19/18 1514     Sodium 139 mmol/L      Potassium 3 9 mmol/L      Chloride 103 mmol/L      CO2 29 mmol/L      ANION GAP 7 mmol/L      BUN 13 mg/dL      Creatinine 0 97 mg/dL      Glucose 93 mg/dL      Calcium 8 5 mg/dL      AST 20 U/L      ALT 27 U/L      Alkaline Phosphatase 70 U/L      Total Protein 7 1 g/dL      Albumin 3 4 (L) g/dL      Total Bilirubin 0 40 mg/dL      eGFR 60 ml/min/1 73sq m     Narrative:         National Kidney Disease Education Program recommendations are as follows:  GFR calculation is accurate only with a steady state creatinine  Chronic Kidney disease less than 60 ml/min/1 73 sq  meters  Kidney failure less than 15 ml/min/1 73 sq  meters      CBC and differential [[id number]] Collected:  12/19/18 1448    Lab Status:  Final result Specimen:  Blood from Arm, Left Updated:  12/19/18 1457     WBC 7 29 Thousand/uL      RBC 4 28 Million/uL      Hemoglobin 13 2 g/dL      Hematocrit 39 7 %      MCV 93 fL      MCH 30 8 pg      MCHC 33 2 g/dL      RDW 12 7 %      MPV 10 1 fL      Platelets 113 Thousands/uL      nRBC 0 /100 WBCs      Neutrophils Relative 67 %      Immat GRANS % 0 %      Lymphocytes Relative 24 %      Monocytes Relative 8 %      Eosinophils Relative 1 %      Basophils Relative 0 %      Neutrophils Absolute 4 81 Thousands/µL      Immature Grans Absolute 0 01 Thousand/uL      Lymphocytes Absolute 1 75 Thousands/µL      Monocytes Absolute 0 61 Thousand/µL      Eosinophils Absolute 0 08 Thousand/µL      Basophils Absolute 0 03 Thousands/µL                  CT head without contrast   Final Result by Stevie Alonzo DO (12/19 1533)      No acute intracranial abnormality  Workstation performed: [id number]         VAS lower limb venous duplex study, unilateral/limited    (Results Pending)              Procedures  ECG 12 Lead Documentation  Date/Time: 12/19/2018 2:52 PM  Performed by: Emmanuel  by: Shannon Ramirez     Indications / Diagnosis:  Left leg pain and weakness  Patient location:  ED  Previous ECG:     Previous ECG:  Unavailable  Rate:     ECG rate:  89  Rhythm:     Rhythm: sinus rhythm    Conduction:     Conduction: normal    ST segments:     ST segments:  Normal  T waves:     T waves: normal             Phone Contacts  ED Phone Contact    ED Course  ED Course as of Dec 19 1639   Wed Dec 19, 2018   1608 Doppler LLE negative for DVT  No bakers cyst                                  MDM  Number of Diagnoses or Management Options  Left leg pain: established and worsening  Numbness and tingling in left hand: new and requires workup  Diagnosis management comments: Patient with left leg pain and weakness, will order CT scan to r/o CVA, sxs are over 3week old  Patient has no neuro deficits except for decreased sensation to left hand  She has pain and swelling left leg, will order doppler left leg to r/o DVT     Advised patient to f/u with ortho concerning left leg pain  CritCare Time    Disposition  Final diagnoses:   Left leg pain   Numbness and tingling in left hand     Time reflects when diagnosis was documented in both MDM as applicable and the Disposition within this note     Time User Action Codes Description Comment    12/19/2018  4:09 PM Abi Torres Add [M79 609] Pain in limb     12/19/2018  4:19 PM Jose Miguel Draper Left leg pain     12/19/2018  4:19 PM Linda Gold Add [R20 0,  R20 2] Numbness and tingling in left hand       ED Disposition     ED Disposition Condition Comment    Discharge  [address] discharge to home/self care      Condition at discharge: Stable        Follow-up Information     Follow up With Specialties Details Why Yocasta Mckeon [address], DO Internal Medicine In 1 day For recheck [place]  [address] [facility]  [id number] [place] [address] [facility]      Chrystal Spatz, MD Orthopedic Surgery In 1 week For recheck Samreen Callejas [address] [facility]  [phone]            Discharge Medication List as of 12/19/2018  4:21 PM      CONTINUE these medications which have NOT CHANGED    Details   Ascorbic Acid (VITAMIN C) 1000 MG tablet Take 1,000 mg by mouth daily, Historical Med      celecoxib (CeleBREX) 100 mg capsule Take 1 capsule (100 mg total) by mouth 2 (two) times a day, Starting Wed 5/2/2018, Print      esomeprazole (NexIUM) 40 MG capsule Take 1 tab daily, Normal      Ginseng 100 MG CAPS Take by mouth, Historical Med      HYDROcodone-acetaminophen (NORCO) 5-325 mg per tablet Take 1 tablet by mouth every 6 (six) hours as needed for pain Max Daily Amount: 4 tablets, Starting Mon 12/3/2018, Normal      meclizine (ANTIVERT) 25 mg tablet Take 1 tablet (25 mg total) by mouth every 8 (eight) hours as needed for dizziness for up to 90 days, Starting Wed 5/2/2018, Until Wed 12/19/2018, Normal      oxybutynin (DITROPAN) 5 mg tablet Take 1 tablet (5 mg total) by mouth 2 (two) times a day, Starting Thu 5/3/2018, Normal No discharge procedures on file      ED Provider  Electronically Signed by           Kari Kim PA-C  12/19/18 8384

## 2018-12-19 NOTE — DISCHARGE INSTRUCTIONS
Follow up with family doctor in 1-2 days for recheck  Take aspirin daily  Follow up with ortho in 3-5 days for further evaluation of left leg pain  Return to ER if symptoms worsen  Tylenol/motrin for discomfort  Leg Pain   WHAT YOU NEED TO KNOW:   Leg pain may be caused by a variety of health conditions  Your tests did not show any broken bones or blood clots  DISCHARGE INSTRUCTIONS:   Return to the emergency department if:   · You have a fever  · Your leg starts to swell  · Your leg pain gets worse  · You have numbness or tingling in your leg or toes  · You cannot put any weight on or move your leg  Contact your healthcare provider if:   · Your pain does not decrease, even after treatment  · You have questions or concerns about your condition or care  Medicines:   · NSAIDs , such as ibuprofen, help decrease swelling, pain, and fever  This medicine is available with or without a doctor's order  NSAIDs can cause stomach bleeding or kidney problems in certain people  If you take blood thinner medicine, always ask your healthcare provider if NSAIDs are safe for you  Always read the medicine label and follow directions  · Take your medicine as directed  Contact your healthcare provider if you think your medicine is not helping or if you have side effects  Tell him of her if you are allergic to any medicine  Keep a list of the medicines, vitamins, and herbs you take  Include the amounts, and when and why you take them  Bring the list or the pill bottles to follow-up visits  Carry your medicine list with you in case of an emergency  Follow up with your healthcare provider as directed: You may need more tests to find the cause of your leg pain  You may need to see an orthopedic specialist or a physical therapist  Write down your questions so you remember to ask them during your visits    Manage your leg pain:   · Rest  your injured leg so that it can heal  You may need an immobilizer, brace, or splint to limit the movement of your leg  You may need to avoid putting any weight on your leg for at least 48 hours  Return to normal activities as directed  · Ice  the injury for 20 minutes every 4 hours for up to 24 hours, or as directed  Use an ice pack, or put crushed ice in a plastic bag  Cover it with a towel to protect your skin  Ice helps prevent tissue damage and decreases swelling and pain  · Elevate  your injured leg above the level of your heart as often as you can  This will help decrease swelling and pain  If possible, prop your leg on pillows or blankets to keep the area elevated comfortably  · Use assistive devices as directed  You may need to use a cane or crutches  Assistive devices help decrease pain and pressure on your leg when you walk  Ask your healthcare provider for more information about assistive devices and how to use them correctly  · Maintain a healthy weight  Extra body weight can cause pressure and pain in your hip, knee, and ankle joints  Ask your healthcare provider how much you should weigh  Ask him to help you create a weight loss plan if you are overweight  © 2017 [facility] Information is for End User's use only and may not be sold, redistributed or otherwise used for commercial purposes  All illustrations and images included in CareNotes® are the copyrighted property of A D A M , Inc  or Ezequiel Moser  The above information is an  only  It is not intended as medical advice for individual conditions or treatments  Talk to your doctor, nurse or pharmacist before following any medical regimen to see if it is safe and effective for you  Paresthesia   WHAT YOU NEED TO KNOW:   Paresthesia is numbness and tingling  It can happen in any part of your body, but usually occurs in your legs, feet, arms, or hands  There are a large number of conditions that can cause paresthesia   It affects the nerves that provide sensation and happens when there are changes in nerves or nerve pathways  These changes can be temporary, such as if you take certain medicines or you are not getting enough vitamin B  Paresthesia can become permanent when there is nerve damage  Conditions that may cause nerve damage include diabetes, carpel tunnel syndrome, stroke, and multiple sclerosis  The exact cause of your paresthesia may be unknown  DISCHARGE INSTRUCTIONS:   Medicines:  Ask for more information about medicines you may need to treat the condition causing your paresthesias  · NSAIDs  help decrease swelling and pain or fever  This medicine is available with or without a doctor's order  NSAIDs can cause stomach bleeding or kidney problems in certain people  If you take blood thinner medicine, always ask your healthcare provider if NSAIDs are safe for you  Always read the medicine label and follow directions  · Take your medicine as directed  Contact your healthcare provider if you think your medicine is not helping or if you have side effects  Tell him or her if you are allergic to any medicine  Keep a list of the medicines, vitamins, and herbs you take  Include the amounts, and when and why you take them  Bring the list or the pill bottles to follow-up visits  Carry your medicine list with you in case of an emergency  Follow up with your healthcare provider or neurologist as directed:  Write down your questions so you remember to ask them during your visits  Contact your healthcare provider or neurologist if:   · Your symptoms do not improve  · You have symptoms in more than one part of your body  · You have questions about your condition or care    Return to the emergency department if:   · You have any of the following signs of a stroke:      ¨ Numbness or drooping on one side of your face     ¨ Weakness in an arm or leg    ¨ Confusion or difficulty speaking    ¨ Dizziness, a severe headache, or vision loss    · You are not able to control your urine or bowel movements  · You have severe pain along with numbness and tingling  · Your legs suddenly become cold  You have trouble moving your legs, and they ache  · You have increased weakness in a part of your body  · You have uncontrolled movements, or you have a seizure  © 2017 [employer] [employer] Information is for End User's use only and may not be sold, redistributed or otherwise used for commercial purposes  All illustrations and images included in CareNotes® are the copyrighted property of A D A M , Inc  or Ezequiel Moser  The above information is an  only  It is not intended as medical advice for individual conditions or treatments  Talk to your doctor, nurse or pharmacist before following any medical regimen to see if it is safe and effective for you

## 2018-12-21 LAB
ATRIAL RATE: 89 BPM
P AXIS: 65 DEGREES
PR INTERVAL: 176 MS
QRS AXIS: 14 DEGREES
QRSD INTERVAL: 78 MS
QT INTERVAL: 376 MS
QTC INTERVAL: 457 MS
T WAVE AXIS: 31 DEGREES
VENTRICULAR RATE: 89 BPM

## 2018-12-21 PROCEDURE — 93010 ELECTROCARDIOGRAM REPORT: CPT | Performed by: INTERNAL MEDICINE

## 2019-01-02 ENCOUNTER — OFFICE VISIT (OUTPATIENT)
Dept: FAMILY MEDICINE CLINIC | Facility: HOSPITAL | Age: 69
End: 2019-01-02
Payer: MEDICARE

## 2019-01-02 VITALS
WEIGHT: 207 LBS | HEIGHT: 65 IN | TEMPERATURE: 98.1 F | SYSTOLIC BLOOD PRESSURE: 130 MMHG | HEART RATE: 81 BPM | BODY MASS INDEX: 34.49 KG/M2 | DIASTOLIC BLOOD PRESSURE: 82 MMHG

## 2019-01-02 DIAGNOSIS — Z13.820 SCREENING FOR OSTEOPOROSIS: ICD-10-CM

## 2019-01-02 DIAGNOSIS — G89.29 CHRONIC PAIN OF BOTH KNEES: ICD-10-CM

## 2019-01-02 DIAGNOSIS — E78.2 MIXED HYPERLIPIDEMIA: Chronic | ICD-10-CM

## 2019-01-02 DIAGNOSIS — Z12.39 SCREENING FOR BREAST CANCER: ICD-10-CM

## 2019-01-02 DIAGNOSIS — M25.562 CHRONIC PAIN OF BOTH KNEES: ICD-10-CM

## 2019-01-02 DIAGNOSIS — M79.10 MYALGIA: ICD-10-CM

## 2019-01-02 DIAGNOSIS — M54.42 CHRONIC BILATERAL LOW BACK PAIN WITH BILATERAL SCIATICA: ICD-10-CM

## 2019-01-02 DIAGNOSIS — M25.561 CHRONIC PAIN OF BOTH KNEES: ICD-10-CM

## 2019-01-02 DIAGNOSIS — M94.9 DISORDER OF CARTILAGE: ICD-10-CM

## 2019-01-02 DIAGNOSIS — G89.29 CHRONIC BILATERAL LOW BACK PAIN WITH BILATERAL SCIATICA: ICD-10-CM

## 2019-01-02 DIAGNOSIS — M54.41 CHRONIC BILATERAL LOW BACK PAIN WITH BILATERAL SCIATICA: ICD-10-CM

## 2019-01-02 DIAGNOSIS — Z00.00 MEDICARE ANNUAL WELLNESS VISIT, INITIAL: Primary | ICD-10-CM

## 2019-01-02 DIAGNOSIS — R21 RASH OF HANDS: ICD-10-CM

## 2019-01-02 DIAGNOSIS — K21.9 GASTROESOPHAGEAL REFLUX DISEASE WITHOUT ESOPHAGITIS: ICD-10-CM

## 2019-01-02 PROBLEM — E66.811 OBESITY (BMI 30.0-34.9): Chronic | Status: ACTIVE | Noted: 2019-01-02

## 2019-01-02 PROBLEM — E66.9 OBESITY (BMI 30.0-34.9): Chronic | Status: ACTIVE | Noted: 2019-01-02

## 2019-01-02 PROCEDURE — G0438 PPPS, INITIAL VISIT: HCPCS | Performed by: INTERNAL MEDICINE

## 2019-01-02 RX ORDER — CLOBETASOL PROPIONATE 0.5 MG/G
CREAM TOPICAL 2 TIMES DAILY
Qty: 30 G | Refills: 0 | Status: SHIPPED | OUTPATIENT
Start: 2019-01-02 | End: 2020-07-22 | Stop reason: ALTCHOICE

## 2019-01-02 NOTE — ASSESSMENT & PLAN NOTE
Had difficulty with a generic form  That was pink in color and not effective and switched back to white formulation from [employer]

## 2019-01-02 NOTE — ASSESSMENT & PLAN NOTE
Started in 2006 when working at [employer] and used a chemical to [employer]- had seen derm with dr Author Qureshi in past and was on steroid creams

## 2019-01-02 NOTE — PROGRESS NOTES
Assessment and Plan:  Problem List Items Addressed This Visit     None        Health Maintenance Due   Topic Date Due    Hepatitis C Screening  1950    Medicare Annual Wellness Visit (AWV)  1950    CRC Screening: Colonoscopy  1950    DTaP,Tdap,and Td Vaccines (1 - Tdap) 07/01/1971    Pneumococcal PPSV23/PCV13 65+ Years / Low and Medium Risk (1 of 2 - PCV13) 07/01/2015    INFLUENZA VACCINE  07/01/2018         HPI:  Patient Active Problem List   Diagnosis    Chronic neck pain    Chronic bilateral low back pain with bilateral sciatica    Gastroesophageal reflux disease without esophagitis    Mixed hyperlipidemia    Left foot pain    Myalgia    Abrasion of left foot    Urge incontinence of urine     Past Medical History:   Diagnosis Date    Arthritis     Chronic pain     Diverticulitis     GERD (gastroesophageal reflux disease)     Hypertension     Vertigo      Past Surgical History:   Procedure Laterality Date    TUBAL LIGATION       Family History   Problem Relation Age of Onset    Stroke Mother     Diabetes Mother     Hypertension Mother     Heart disease Father     Hypertension Father     Heart disease Brother     Diabetes Brother     Hypertension Brother      History   Smoking Status    Never Smoker   Smokeless Tobacco    Never Used     History   Alcohol Use    Yes     Comment: 3 times a week      History   Drug Use    Types: Marijuana     Comment: rarely         Current Outpatient Prescriptions   Medication Sig Dispense Refill    Ascorbic Acid (VITAMIN C) 1000 MG tablet Take 1,000 mg by mouth daily      celecoxib (CeleBREX) 100 mg capsule Take 1 capsule (100 mg total) by mouth 2 (two) times a day 180 capsule 3    esomeprazole (NexIUM) 40 MG capsule Take 1 tab daily 90 capsule 3    Ginseng 100 MG CAPS Take by mouth      HYDROcodone-acetaminophen (NORCO) 5-325 mg per tablet Take 1 tablet by mouth every 6 (six) hours as needed for pain Max Daily Amount: 4 tablets 120 tablet 0    meclizine (ANTIVERT) 25 mg tablet Take 1 tablet (25 mg total) by mouth every 8 (eight) hours as needed for dizziness for up to 90 days 180 tablet 3    oxybutynin (DITROPAN) 5 mg tablet Take 1 tablet (5 mg total) by mouth 2 (two) times a day 90 tablet 1     No current facility-administered medications for this visit  Allergies   Allergen Reactions    Tramadol        There is no immunization history on file for this patient  Patient Care Team:  Coco Hurtado DO as PCP - General (Internal Medicine)    Medicare Screening Tests and Risk Assessments:  Deana Merida is here for her Subsequent Wellness visit  Last Medicare Wellness visit information reviewed, patient interviewed and updates made to the record today  Health Risk Assessment:  Patient rates overall health as fair  Patient feels that their physical health rating is Slightly worse  Eyesight was rated as Slightly worse  Hearing was rated as Same  Patient feels that their emotional and mental health rating is Same  Pain experienced by patient in the last 7 days has been Some  Patient's pain rating has been 5/10  Patient states that she has experienced weight loss or gain in last 6 months  Emotional/Mental Health:  Patient has been feeling nervous/anxious  PHQ-9 Depression Screening:    Frequency of the following problems over the past two weeks:      1  Little interest or pleasure in doing things: 0 - not at all      2  Feeling down, depressed, or hopeless: 0 - not at all  PHQ-2 Score: 0          Broken Bones/Falls: Fall Risk Assessment:    In the past year, patient has experienced: No history of falling in past year          Bladder/Bowel:  Patient has leaked urine accidently in the last six months  Patient reports loss of bowel control  Immunizations:  Patient has not had a flu vaccination within the last year  Patient has not received a pneumonia shot  Patient has not received a shingles shot    Patient has not received tetanus/diphtheria shot  Home Safety:  Patient does not have trouble with stairs inside or outside of their home  Patient currently reports that there are no safety hazards present in home, working smoke alarms, working carbon monoxide detectors  Preventative Screenings:   No breast cancer screening performed, no colon cancer screen completed, cholesterol screen completed, 5/1/2018        Nutrition:  Current diet: No Added Salt, Regular and Frequent junk food with servings of the following:    Medications:  Patient is currently taking over-the-counter supplements  List of OTC medications includes: vitamins  Patient is able to manage medications  Lifestyle Choices:  Patient reports no tobacco use  Patient has not smoked or used tobacco in the past   Patient reports alcohol use  Alcohol use per week: on occasion  Patient does not drive a vehicle  Patient wears seat belt  Current level of exercise of physical activity described by patient as: low  Activities of Daily Living:  Can get out of bed by his or her self, able to dress self, able to make own meals, able to do own shopping, able to bathe self, can do own laundry/housekeeping, can manage own money, pay bills and track expenses    Previous Hospitalizations:  Hospitalization or ED visit in past 12 months  Number of hospitalizations within the last year: 1-2  Additional Comments: due to leg pain    Advanced Directives:  Patient has decided on a power of   Patient has spoken to designated power of   Patient has not completed advanced directive          Preventative Screening/Counseling:      Cardiovascular:      General: Risks and Benefits Discussed      Counseling: Healthy Weight and Improve Cholesterol     Due for Labs/Analytes/Optional EKG: Lipid Panel          Diabetes:      General: Risks and Benefits Discussed      Due for labs: Blood Glucose          Colorectal Cancer:      General: Risks and Benefits Discussed and Patient Declines          Breast Cancer:      General: Risks and Benefits Discussed      Comments: Has not had for years        Cervical Cancer:      General: Risks and Benefits Discussed      Comments: Never had abnormal ones        Osteoporosis:      General: Risks and Benefits Discussed      Due for studies: DXA Appendicular          AAA:      General: Patient Declines      Comments: No family hx of aneurysm  monther had pacer        Glaucoma:      General: Risks and Benefits Discussed      Referrals: Ophthalmology      Comments: Has been a while since having  appt        HIV:      General: Screening Not Indicated and Risks and Benefits Discussed          Hepatitis C:      General: Risks and Benefits Discussed and Screening Current      Additional Comments: Reports negative screening    Advanced Directives:   Patient has no living will for healthcare, 5 wishes given  No exam data present    Physical Exam:  Review of Systems   Respiratory: Negative for apnea and shortness of breath  Cardiovascular: Negative for chest pain and palpitations  Gastrointestinal: Positive for abdominal pain, bowel incontinence and nausea  Negative for abdominal distention  Midepigastric pain   Psychiatric/Behavioral: The patient is not nervous/anxious  All other systems reviewed and are negative  Vitals:    01/02/19 1313   Weight: 93 9 kg (207 lb)   Height: 5' 5" (1 651 m)   Body mass index is 34 45 kg/m²  Physical Exam   Constitutional: She is oriented to person, place, and time  She appears well-developed and well-nourished  HENT:   Head: Normocephalic and atraumatic  Right Ear: External ear normal    Left Ear: External ear normal    Mouth/Throat: Oropharyngeal exudate present  Eyes: Right eye exhibits discharge  Left eye exhibits discharge  Neck: No JVD present  Cardiovascular: Normal rate and regular rhythm  Exam reveals no friction rub      No murmur heard   Pulmonary/Chest: Effort normal and breath sounds normal  No respiratory distress  She has no wheezes  Abdominal: Soft  Bowel sounds are normal  There is no guarding  oveweight   Musculoskeletal: She exhibits tenderness  She exhibits no edema  Some trace ankle edema   bilateral knee trendenress- mild swelling   Neurological: She is alert and oriented to person, place, and time  She displays normal reflexes  No cranial nerve deficit  She exhibits normal muscle tone  Coordination normal    Skin: Skin is warm and dry  Rash noted  psoriasis appearing miguel on hands with scaling and cracking   Psychiatric: She has a normal mood and affect  Her behavior is normal  Thought content normal    Nursing note and vitals reviewed

## 2019-01-02 NOTE — ASSESSMENT & PLAN NOTE
Has pain for past 3 weeks-- from knees on down- legs felt weak and had to sit down on steps to go into basement  Was forcing fluids- also had left hand felt asleep- went to Er- did CT and had us  On 12/19  And sent home from ER  Was told to see orthopedic physician  Takes celebrex daily but felt it did not help

## 2019-01-02 NOTE — ASSESSMENT & PLAN NOTE
Has been on nexxium for about 2 years- switched to generic and now having episodes of loose stools     never had egd done

## 2019-01-22 DIAGNOSIS — R52 PAIN: ICD-10-CM

## 2019-01-22 RX ORDER — HYDROCODONE BITARTRATE AND ACETAMINOPHEN 5; 325 MG/1; MG/1
1 TABLET ORAL EVERY 6 HOURS PRN
Qty: 120 TABLET | Refills: 0 | Status: SHIPPED | OUTPATIENT
Start: 2019-01-22 | End: 2019-05-08 | Stop reason: SDUPTHER

## 2019-02-11 ENCOUNTER — HOSPITAL ENCOUNTER (OUTPATIENT)
Dept: BONE DENSITY | Facility: IMAGING CENTER | Age: 69
Discharge: HOME | End: 2019-02-11
Payer: MEDICARE

## 2019-02-11 ENCOUNTER — APPOINTMENT (OUTPATIENT)
Dept: LAB | Facility: HOSPITAL | Age: 69
End: 2019-02-11
Attending: INTERNAL MEDICINE
Payer: MEDICARE

## 2019-02-11 ENCOUNTER — HOSPITAL ENCOUNTER (OUTPATIENT)
Dept: RADIOLOGY | Facility: HOSPITAL | Age: 69
Discharge: HOME | End: 2019-02-11
Attending: INTERNAL MEDICINE
Payer: MEDICARE

## 2019-02-11 VITALS — HEIGHT: 66 IN | WEIGHT: 200 LBS | BODY MASS INDEX: 32.14 KG/M2

## 2019-02-11 DIAGNOSIS — M25.562 CHRONIC PAIN OF BOTH KNEES: ICD-10-CM

## 2019-02-11 DIAGNOSIS — M94.9 DISORDER OF CARTILAGE: ICD-10-CM

## 2019-02-11 DIAGNOSIS — M25.561 CHRONIC PAIN OF BOTH KNEES: ICD-10-CM

## 2019-02-11 DIAGNOSIS — Z13.820 SCREENING FOR OSTEOPOROSIS: ICD-10-CM

## 2019-02-11 DIAGNOSIS — E78.2 MIXED HYPERLIPIDEMIA: Chronic | ICD-10-CM

## 2019-02-11 DIAGNOSIS — M79.10 MYALGIA: ICD-10-CM

## 2019-02-11 DIAGNOSIS — Z12.39 SCREENING FOR BREAST CANCER: ICD-10-CM

## 2019-02-11 DIAGNOSIS — G89.29 CHRONIC PAIN OF BOTH KNEES: ICD-10-CM

## 2019-02-11 LAB
25(OH)D3 SERPL-MCNC: 57.6 NG/ML (ref 30–100)
ALBUMIN SERPL BCP-MCNC: 3.9 G/DL (ref 3.5–5)
ALP SERPL-CCNC: 66 U/L (ref 46–116)
ALT SERPL W P-5'-P-CCNC: 21 U/L (ref 12–78)
ANION GAP SERPL CALCULATED.3IONS-SCNC: 10 MMOL/L (ref 4–13)
AST SERPL W P-5'-P-CCNC: 20 U/L (ref 5–45)
BASOPHILS # BLD AUTO: 0.02 THOUSANDS/ΜL (ref 0–0.1)
BASOPHILS NFR BLD AUTO: 0 % (ref 0–1)
BILIRUB SERPL-MCNC: 0.4 MG/DL (ref 0.2–1)
BUN SERPL-MCNC: 15 MG/DL (ref 5–25)
CALCIUM SERPL-MCNC: 9.1 MG/DL (ref 8.3–10.1)
CHLORIDE SERPL-SCNC: 104 MMOL/L (ref 100–108)
CHOLEST SERPL-MCNC: 255 MG/DL (ref 50–200)
CO2 SERPL-SCNC: 28 MMOL/L (ref 21–32)
CREAT SERPL-MCNC: 0.94 MG/DL (ref 0.6–1.3)
EOSINOPHIL # BLD AUTO: 0.04 THOUSAND/ΜL (ref 0–0.61)
EOSINOPHIL NFR BLD AUTO: 1 % (ref 0–6)
ERYTHROCYTE [DISTWIDTH] IN BLOOD BY AUTOMATED COUNT: 12.5 % (ref 11.6–15.1)
ERYTHROCYTE [SEDIMENTATION RATE] IN BLOOD: 13 MM/HOUR (ref 0–15)
GFR SERPL CREATININE-BSD FRML MDRD: 63 ML/MIN/1.73SQ M
GLUCOSE P FAST SERPL-MCNC: 96 MG/DL (ref 65–99)
HCT VFR BLD AUTO: 43.2 % (ref 34.8–46.1)
HDLC SERPL-MCNC: 65 MG/DL (ref 40–60)
HGB BLD-MCNC: 14.3 G/DL (ref 11.5–15.4)
IMM GRANULOCYTES # BLD AUTO: 0.01 THOUSAND/UL (ref 0–0.2)
IMM GRANULOCYTES NFR BLD AUTO: 0 % (ref 0–2)
LDLC SERPL CALC-MCNC: 176 MG/DL (ref 0–100)
LYMPHOCYTES # BLD AUTO: 1.55 THOUSANDS/ΜL (ref 0.6–4.47)
LYMPHOCYTES NFR BLD AUTO: 24 % (ref 14–44)
MCH RBC QN AUTO: 30.8 PG (ref 26.8–34.3)
MCHC RBC AUTO-ENTMCNC: 33.1 G/DL (ref 31.4–37.4)
MCV RBC AUTO: 93 FL (ref 82–98)
MONOCYTES # BLD AUTO: 0.53 THOUSAND/ΜL (ref 0.17–1.22)
MONOCYTES NFR BLD AUTO: 8 % (ref 4–12)
NEUTROPHILS # BLD AUTO: 4.25 THOUSANDS/ΜL (ref 1.85–7.62)
NEUTS SEG NFR BLD AUTO: 67 % (ref 43–75)
NRBC BLD AUTO-RTO: 0 /100 WBCS
PLATELET # BLD AUTO: 211 THOUSANDS/UL (ref 149–390)
PMV BLD AUTO: 10.6 FL (ref 8.9–12.7)
POTASSIUM SERPL-SCNC: 4.3 MMOL/L (ref 3.5–5.3)
PROT SERPL-MCNC: 7.8 G/DL (ref 6.4–8.2)
RBC # BLD AUTO: 4.65 MILLION/UL (ref 3.81–5.12)
SODIUM SERPL-SCNC: 142 MMOL/L (ref 136–145)
TRIGL SERPL-MCNC: 69 MG/DL
TSH SERPL DL<=0.05 MIU/L-ACNC: 1.6 UIU/ML (ref 0.36–3.74)
WBC # BLD AUTO: 6.4 THOUSAND/UL (ref 4.31–10.16)

## 2019-02-11 PROCEDURE — 77067 SCR MAMMO BI INCL CAD: CPT

## 2019-02-11 PROCEDURE — 77080 DXA BONE DENSITY AXIAL: CPT

## 2019-02-11 PROCEDURE — 80061 LIPID PANEL: CPT

## 2019-02-11 PROCEDURE — 85652 RBC SED RATE AUTOMATED: CPT

## 2019-02-11 PROCEDURE — 36415 COLL VENOUS BLD VENIPUNCTURE: CPT

## 2019-02-11 PROCEDURE — 86430 RHEUMATOID FACTOR TEST QUAL: CPT

## 2019-02-11 PROCEDURE — 84443 ASSAY THYROID STIM HORMONE: CPT

## 2019-02-11 PROCEDURE — 85025 COMPLETE CBC W/AUTO DIFF WBC: CPT

## 2019-02-11 PROCEDURE — 73562 X-RAY EXAM OF KNEE 3: CPT

## 2019-02-11 PROCEDURE — 82306 VITAMIN D 25 HYDROXY: CPT

## 2019-02-11 PROCEDURE — 86038 ANTINUCLEAR ANTIBODIES: CPT

## 2019-02-11 PROCEDURE — 77063 BREAST TOMOSYNTHESIS BI: CPT

## 2019-02-11 PROCEDURE — 80053 COMPREHEN METABOLIC PANEL: CPT

## 2019-02-12 LAB — RHEUMATOID FACT SER QL LA: NEGATIVE

## 2019-02-13 ENCOUNTER — TELEPHONE (OUTPATIENT)
Dept: FAMILY MEDICINE CLINIC | Facility: HOSPITAL | Age: 69
End: 2019-02-13

## 2019-02-13 LAB — RYE IGE QN: NEGATIVE

## 2019-02-15 DIAGNOSIS — E78.2 MIXED HYPERLIPIDEMIA: Primary | Chronic | ICD-10-CM

## 2019-05-08 DIAGNOSIS — R52 PAIN: ICD-10-CM

## 2019-05-09 RX ORDER — HYDROCODONE BITARTRATE AND ACETAMINOPHEN 5; 325 MG/1; MG/1
1 TABLET ORAL EVERY 6 HOURS PRN
Qty: 120 TABLET | Refills: 0 | Status: SHIPPED | OUTPATIENT
Start: 2019-05-09 | End: 2019-08-12 | Stop reason: SDUPTHER

## 2019-07-29 DIAGNOSIS — M54.41 CHRONIC BILATERAL LOW BACK PAIN WITH BILATERAL SCIATICA: ICD-10-CM

## 2019-07-29 DIAGNOSIS — M54.42 CHRONIC BILATERAL LOW BACK PAIN WITH BILATERAL SCIATICA: ICD-10-CM

## 2019-07-29 DIAGNOSIS — G89.29 CHRONIC BILATERAL LOW BACK PAIN WITH BILATERAL SCIATICA: ICD-10-CM

## 2019-07-29 DIAGNOSIS — IMO0002 URINARY COMPLICATION: ICD-10-CM

## 2019-07-29 DIAGNOSIS — R42 VERTIGO: ICD-10-CM

## 2019-07-29 DIAGNOSIS — K21.9 GASTROESOPHAGEAL REFLUX DISEASE, ESOPHAGITIS PRESENCE NOT SPECIFIED: ICD-10-CM

## 2019-07-29 RX ORDER — OXYBUTYNIN CHLORIDE 5 MG/1
TABLET ORAL
Qty: 90 TABLET | Refills: 1 | Status: SHIPPED | OUTPATIENT
Start: 2019-07-29 | End: 2021-04-22 | Stop reason: SDUPTHER

## 2019-07-29 RX ORDER — MECLIZINE HYDROCHLORIDE 25 MG/1
TABLET ORAL
Qty: 180 TABLET | Refills: 3 | Status: SHIPPED | OUTPATIENT
Start: 2019-07-29 | End: 2021-04-22 | Stop reason: SDUPTHER

## 2019-07-30 RX ORDER — CELECOXIB 100 MG/1
100 CAPSULE ORAL 2 TIMES DAILY
Qty: 180 CAPSULE | Refills: 3 | Status: SHIPPED | OUTPATIENT
Start: 2019-07-30 | End: 2020-11-18 | Stop reason: SDUPTHER

## 2019-07-30 RX ORDER — ESOMEPRAZOLE MAGNESIUM 40 MG/1
CAPSULE, DELAYED RELEASE ORAL
Qty: 90 CAPSULE | Refills: 3 | Status: SHIPPED | OUTPATIENT
Start: 2019-07-30 | End: 2020-09-21 | Stop reason: SDUPTHER

## 2019-07-31 NOTE — TELEPHONE ENCOUNTER
PT called - she doesn't get a flu shot - will call back in Sept to schedule - she has to work around her 's doctor appts

## 2019-08-12 DIAGNOSIS — R52 PAIN: ICD-10-CM

## 2019-08-12 RX ORDER — HYDROCODONE BITARTRATE AND ACETAMINOPHEN 5; 325 MG/1; MG/1
1 TABLET ORAL EVERY 6 HOURS PRN
Qty: 120 TABLET | Refills: 0 | Status: SHIPPED | OUTPATIENT
Start: 2019-08-12 | End: 2019-10-18 | Stop reason: SDUPTHER

## 2019-10-18 DIAGNOSIS — R52 PAIN: ICD-10-CM

## 2019-10-18 RX ORDER — HYDROCODONE BITARTRATE AND ACETAMINOPHEN 5; 325 MG/1; MG/1
1 TABLET ORAL EVERY 6 HOURS PRN
Qty: 120 TABLET | Refills: 0 | Status: SHIPPED | OUTPATIENT
Start: 2019-10-18 | End: 2020-03-09 | Stop reason: SDUPTHER

## 2020-03-09 DIAGNOSIS — R52 PAIN: ICD-10-CM

## 2020-03-10 RX ORDER — HYDROCODONE BITARTRATE AND ACETAMINOPHEN 5; 325 MG/1; MG/1
1 TABLET ORAL EVERY 6 HOURS PRN
Qty: 120 TABLET | Refills: 0 | Status: SHIPPED | OUTPATIENT
Start: 2020-03-10 | End: 2020-07-22 | Stop reason: SDUPTHER

## 2020-07-10 DIAGNOSIS — R52 PAIN: ICD-10-CM

## 2020-07-10 RX ORDER — HYDROCODONE BITARTRATE AND ACETAMINOPHEN 5; 325 MG/1; MG/1
1 TABLET ORAL EVERY 6 HOURS PRN
Qty: 120 TABLET | Refills: 0 | Status: CANCELLED | OUTPATIENT
Start: 2020-07-10

## 2020-07-22 ENCOUNTER — OFFICE VISIT (OUTPATIENT)
Dept: FAMILY MEDICINE CLINIC | Facility: HOSPITAL | Age: 70
End: 2020-07-22
Payer: MEDICARE

## 2020-07-22 VITALS
RESPIRATION RATE: 16 BRPM | BODY MASS INDEX: 30.86 KG/M2 | HEART RATE: 72 BPM | DIASTOLIC BLOOD PRESSURE: 80 MMHG | WEIGHT: 192 LBS | SYSTOLIC BLOOD PRESSURE: 126 MMHG | HEIGHT: 66 IN | TEMPERATURE: 98.5 F

## 2020-07-22 DIAGNOSIS — M19.042 PRIMARY OSTEOARTHRITIS OF LEFT HAND: Primary | ICD-10-CM

## 2020-07-22 DIAGNOSIS — I83.93 VARICOSE VEINS OF BOTH LOWER EXTREMITIES WITHOUT ULCER OR INFLAMMATION: ICD-10-CM

## 2020-07-22 DIAGNOSIS — M54.41 CHRONIC BILATERAL LOW BACK PAIN WITH BILATERAL SCIATICA: ICD-10-CM

## 2020-07-22 DIAGNOSIS — G89.29 CHRONIC BILATERAL LOW BACK PAIN WITH BILATERAL SCIATICA: ICD-10-CM

## 2020-07-22 DIAGNOSIS — M54.42 CHRONIC BILATERAL LOW BACK PAIN WITH BILATERAL SCIATICA: ICD-10-CM

## 2020-07-22 PROBLEM — M79.672 LEFT FOOT PAIN: Status: RESOLVED | Noted: 2018-05-01 | Resolved: 2020-07-22

## 2020-07-22 PROBLEM — R21 RASH OF HANDS: Status: RESOLVED | Noted: 2019-01-02 | Resolved: 2020-07-22

## 2020-07-22 PROBLEM — S90.812A ABRASION OF LEFT FOOT: Status: RESOLVED | Noted: 2018-05-01 | Resolved: 2020-07-22

## 2020-07-22 PROCEDURE — 1036F TOBACCO NON-USER: CPT | Performed by: INTERNAL MEDICINE

## 2020-07-22 PROCEDURE — 99214 OFFICE O/P EST MOD 30 MIN: CPT | Performed by: INTERNAL MEDICINE

## 2020-07-22 PROCEDURE — 1160F RVW MEDS BY RX/DR IN RCRD: CPT | Performed by: INTERNAL MEDICINE

## 2020-07-22 PROCEDURE — 3008F BODY MASS INDEX DOCD: CPT | Performed by: INTERNAL MEDICINE

## 2020-07-22 RX ORDER — HYDROCODONE BITARTRATE AND ACETAMINOPHEN 5; 325 MG/1; MG/1
1 TABLET ORAL EVERY 8 HOURS PRN
Qty: 60 TABLET | Refills: 0 | Status: SHIPPED | OUTPATIENT
Start: 2020-07-22 | End: 2021-01-07 | Stop reason: SDUPTHER

## 2020-07-22 NOTE — ASSESSMENT & PLAN NOTE
Pt has worsened varicose veins likely due to heat  I recommended elevating the legs and using knee high compression stocking

## 2020-07-22 NOTE — PROGRESS NOTES
Assessment/Plan:    Primary osteoarthritis of left hand  Pt has DIP joint bony enlargement on the left hand  Will check xr to confirm    Varicose veins of both lower extremities without ulcer or inflammation  Pt has worsened varicose veins likely due to heat  I recommended elevating the legs and using knee high compression stocking  Chronic bilateral low back pain with bilateral sciatica  Pt has intermittent low back pain  She takes hydrocodone prn  I [place]MP and pt was not in the system  I spoke with [facility] pharmacy pharmacy[facility] who confirmed that pt last filled hydrocodone in 03/2020! She request refill       Diagnoses and all orders for this visit:    Primary osteoarthritis of left hand  -     XR hand 3+ vw left; Future    Varicose veins of both lower extremities without ulcer or inflammation    Chronic bilateral low back pain with bilateral sciatica  -     HYDROcodone-acetaminophen (NORCO) 5-325 mg per tablet; Take 1 tablet by mouth every 8 (eight) hours as needed for painMax Daily Amount: 3 tablets          Subjective:      Patient ID: Gala Alvarez is a 79 y o  female  Pt noticed enalrgement of the DIP joints of the left hand worsening for months, not much pain    She also noticed swelling of the lower legs worse aver the last 48 hrs        The following portions of the patient's history were reviewed and updated as appropriate: current medications, past family history, past medical history, past social history, past surgical history and problem list     Review of Systems   Constitutional: Negative for fever  Respiratory: Negative for cough and shortness of breath  Cardiovascular: Negative for chest pain  Musculoskeletal: Positive for joint swelling  Skin: Negative for wound           Objective:    /80   Pulse 72   Temp 98 5 °F (36 9 °C) (Tympanic)   Resp 16   Ht 5' 5 5" (1 664 m)   Wt 87 1 kg (192 lb)   BMI 31 46 kg/m²      Physical Exam   Constitutional: She appears well-developed  HENT:   Head: Normocephalic  Cardiovascular: Normal rate and regular rhythm  No murmur heard  Pulmonary/Chest: Effort normal and breath sounds normal  She has no wheezes  She has no rales  Skin: Skin is warm and dry  Varicose veins without cellulitis or ulcers     Psychiatric: She has a normal mood and affect             Juancarlos Lennon MD

## 2020-07-22 NOTE — ASSESSMENT & PLAN NOTE
Pt has intermittent low back pain  She takes hydrocodone prn  I checked [place] and pt was not in the system  I spoke with [facility] pharmacy pharmacyst who confirmed that pt last filled hydrocodone in 03/2020!   She request refill

## 2020-07-30 ENCOUNTER — HOSPITAL ENCOUNTER (OUTPATIENT)
Dept: RADIOLOGY | Facility: HOSPITAL | Age: 70
Discharge: HOME | End: 2020-07-30
Attending: INTERNAL MEDICINE
Payer: MEDICARE

## 2020-07-30 DIAGNOSIS — M19.042 PRIMARY OSTEOARTHRITIS OF LEFT HAND: ICD-10-CM

## 2020-07-30 PROCEDURE — 73130 X-RAY EXAM OF HAND: CPT

## 2020-09-21 DIAGNOSIS — K21.9 GASTROESOPHAGEAL REFLUX DISEASE, ESOPHAGITIS PRESENCE NOT SPECIFIED: ICD-10-CM

## 2020-09-21 RX ORDER — ESOMEPRAZOLE MAGNESIUM 40 MG/1
CAPSULE, DELAYED RELEASE ORAL
Qty: 90 CAPSULE | Refills: 3 | Status: SHIPPED | OUTPATIENT
Start: 2020-09-21 | End: 2021-12-03 | Stop reason: SDUPTHER

## 2020-11-18 DIAGNOSIS — M54.41 CHRONIC BILATERAL LOW BACK PAIN WITH BILATERAL SCIATICA: ICD-10-CM

## 2020-11-18 DIAGNOSIS — G89.29 CHRONIC BILATERAL LOW BACK PAIN WITH BILATERAL SCIATICA: ICD-10-CM

## 2020-11-18 DIAGNOSIS — M54.42 CHRONIC BILATERAL LOW BACK PAIN WITH BILATERAL SCIATICA: ICD-10-CM

## 2020-11-18 RX ORDER — CELECOXIB 100 MG/1
100 CAPSULE ORAL 2 TIMES DAILY
Qty: 180 CAPSULE | Refills: 3 | Status: SHIPPED | OUTPATIENT
Start: 2020-11-18 | End: 2022-05-23 | Stop reason: SDUPTHER

## 2021-01-07 DIAGNOSIS — M54.41 CHRONIC BILATERAL LOW BACK PAIN WITH BILATERAL SCIATICA: ICD-10-CM

## 2021-01-07 DIAGNOSIS — G89.29 CHRONIC BILATERAL LOW BACK PAIN WITH BILATERAL SCIATICA: ICD-10-CM

## 2021-01-07 DIAGNOSIS — M54.42 CHRONIC BILATERAL LOW BACK PAIN WITH BILATERAL SCIATICA: ICD-10-CM

## 2021-01-08 RX ORDER — HYDROCODONE BITARTRATE AND ACETAMINOPHEN 5; 325 MG/1; MG/1
1 TABLET ORAL EVERY 8 HOURS PRN
Qty: 60 TABLET | Refills: 0 | Status: SHIPPED | OUTPATIENT
Start: 2021-01-08 | End: 2021-03-15 | Stop reason: SDUPTHER

## 2021-03-09 DIAGNOSIS — Z23 ENCOUNTER FOR IMMUNIZATION: ICD-10-CM

## 2021-03-11 ENCOUNTER — OFFICE VISIT (OUTPATIENT)
Dept: FAMILY MEDICINE CLINIC | Facility: HOSPITAL | Age: 71
End: 2021-03-11
Payer: MEDICARE

## 2021-03-11 VITALS
OXYGEN SATURATION: 95 % | WEIGHT: 200.2 LBS | DIASTOLIC BLOOD PRESSURE: 90 MMHG | SYSTOLIC BLOOD PRESSURE: 140 MMHG | HEART RATE: 82 BPM | HEIGHT: 66 IN | BODY MASS INDEX: 32.17 KG/M2

## 2021-03-11 DIAGNOSIS — R19.4 CHANGE IN BOWEL HABITS: ICD-10-CM

## 2021-03-11 DIAGNOSIS — R19.4 CHANGE IN BOWEL HABIT: ICD-10-CM

## 2021-03-11 DIAGNOSIS — E78.2 MIXED HYPERLIPIDEMIA: Chronic | ICD-10-CM

## 2021-03-11 DIAGNOSIS — E66.9 OBESITY (BMI 30-39.9): ICD-10-CM

## 2021-03-11 DIAGNOSIS — K21.9 GASTROESOPHAGEAL REFLUX DISEASE WITHOUT ESOPHAGITIS: Primary | ICD-10-CM

## 2021-03-11 DIAGNOSIS — R10.84 GENERALIZED ABDOMINAL PAIN: ICD-10-CM

## 2021-03-11 DIAGNOSIS — R42 VERTIGO: ICD-10-CM

## 2021-03-11 DIAGNOSIS — E66.9 OBESITY (BMI 30.0-34.9): Chronic | ICD-10-CM

## 2021-03-11 DIAGNOSIS — M79.10 MYALGIA: ICD-10-CM

## 2021-03-11 PROCEDURE — 99214 OFFICE O/P EST MOD 30 MIN: CPT | Performed by: PHYSICIAN ASSISTANT

## 2021-03-11 NOTE — PATIENT INSTRUCTIONS
Recommend otc probiotics, ex  Align, or [employer]  Will order US of abd  Wall and abdomen  Weight Management   AMBULATORY CARE:   Why it is important to manage your weight:  Being overweight increases your risk of health conditions such as heart disease, high blood pressure, type 2 diabetes, and certain types of cancer  It can also increase your risk for osteoarthritis, sleep apnea, and other respiratory problems  Aim for a slow, steady weight loss  Even a small amount of weight loss can lower your risk of health problems  How to lose weight safely:  A safe and healthy way to lose weight is to eat fewer calories and get regular exercise  · You can lose up about 1 pound a week by decreasing the number of calories you eat by 500 calories each day  You can decrease calories by eating smaller portion sizes or by cutting out high-calorie foods  Read labels to find out how many calories are in the foods you eat  · You can also burn calories with exercise such as walking, swimming, or biking  You will be more likely to keep weight off if you make these changes part of your lifestyle  Exercise at least 30 minutes per day on most days of the week  You can also fit in more physical activity by taking the stairs instead of the elevator or parking farther away from stores  Ask your healthcare provider about the best exercise plan for you  Healthy meal plan for weight management:  A healthy meal plan includes a variety of foods, contains fewer calories, and helps you stay healthy  A healthy meal plan includes the following:     · Eat whole-grain foods more often  A healthy meal plan should contain fiber  Fiber is the part of grains, fruits, and vegetables that is not broken down by your body  Whole-grain foods are healthy and provide extra fiber in your diet  Some examples of whole-grain foods are whole-wheat breads and pastas, oatmeal, brown rice, and bulgur  · Eat a variety of vegetables every day  Include dark, leafy greens such as spinach, kale, smitha greens, and mustard greens  Eat yellow and orange vegetables such as carrots, sweet potatoes, and winter squash  · Eat a variety of fruits every day  Choose fresh or canned fruit (canned in its own juice or light syrup) instead of juice  Fruit juice has very little or no fiber  · Eat low-fat dairy foods  Drink fat-free (skim) milk or 1% milk  Eat fat-free yogurt and low-fat cottage cheese  Try low-fat cheeses such as mozzarella and other reduced-fat cheeses  · Choose meat and other protein foods that are low in fat  Choose beans or other legumes such as split peas or lentils  Choose fish, skinless poultry (chicken or turkey), or lean cuts of red meat (beef or pork)  Before you cook meat or poultry, cut off any visible fat  · Use less fat and oil  Try baking foods instead of frying them  Add less fat, such as margarine, sour cream, regular salad dressing and mayonnaise to foods  Eat fewer high-fat foods  Some examples of high-fat foods include french fries, doughnuts, ice cream, and cakes  · Eat fewer sweets  Limit foods and drinks that are high in sugar  This includes candy, cookies, regular soda, and sweetened drinks  Ways to decrease calories:   · Eat smaller portions  ? Use a small plate with smaller servings  ? Do not eat second helpings  ? When you eat at a restaurant, ask for a box and place half of your meal in the box before you eat  ? Share an entrée with someone else  · Replace high-calorie snacks with healthy, low-calorie snacks  ? Choose fresh fruit, vegetables, fat-free rice cakes, or air-popped popcorn instead of potato chips, nuts, or chocolate  ? Choose water or calorie-free drinks instead of soda or sweetened drinks  · Do not shop for groceries when you are hungry  You may be more likely to make unhealthy food choices   Take a grocery list of healthy foods and shop after you have eaten     · Eat regular meals  Do not skip meals  Skipping meals can lead to overeating later in the day  This can make it harder for you to lose weight  Eat a healthy snack in place of a meal if you do not have time to eat a regular meal  Talk with a dietitian to help you create a meal plan and schedule that is right for you  Other things to consider as you try to lose weight:   · Be aware of situations that may give you the urge to overeat, such as eating while watching television  Find ways to avoid these situations  For example, read a book, go for a walk, or do crafts  · Meet with a weight loss support group or friends who are also trying to lose weight  This may help you stay motivated to continue working on your weight loss goals  © Copyright [address] Information is for End User's use only and may not be sold, redistributed or otherwise used for commercial purposes  All illustrations and images included in CareNotes® are the copyrighted property of A D A M , Inc  or [facility] Meghan Foley   The above information is an  only  It is not intended as medical advice for individual conditions or treatments  Talk to your doctor, nurse or pharmacist before following any medical regimen to see if it is safe and effective for you  Monitor blood pressure, and get complete/fasting blood test, then return to office in 4 weeks

## 2021-03-11 NOTE — PROGRESS NOTES
Assessment/Plan:         Problem List Items Addressed This Visit        Digestive    Gastroesophageal reflux disease without esophagitis - Primary    Relevant Orders    Ambulatory referral to Gastroenterology    CBC and differential    Comprehensive metabolic panel    Lipid panel    TSH, 3rd generation with Free T4 reflex       Other    Mixed hyperlipidemia (Chronic)    Myalgia    Relevant Orders    US abdomen complete    US abdominal wall    Ambulatory referral to Gastroenterology    Obesity (BMI 30 0-34 9) (Chronic)      Other Visit Diagnoses     Vertigo        Relevant Orders    CBC and differential    Comprehensive metabolic panel    Lipid panel    TSH, 3rd generation with Free T4 reflex    Obesity (BMI 30-39  9)        Relevant Orders    CBC and differential    Comprehensive metabolic panel    Lipid panel    TSH, 3rd generation with Free T4 reflex    Generalized abdominal pain        Relevant Orders    US abdomen complete    US abdominal wall    Ambulatory referral to Gastroenterology    Change in bowel habits        Relevant Orders    Ambulatory referral to Gastroenterology    Change in bowel habit         Relevant Orders    TSH, 3rd generation with Free T4 reflex          Sent to get complete blood test, US of abd  And abd  Wall to   Check for hernia's, and recommend follow up with GI  Monitor blood pressure - due to elevated reading in office    Subjective:      Patient ID: Goldie Urbina is a 79 y o  female  Presents with c/o bloating, and abdominal pain/swelling past few weeks, changes in bowel habits  Was dx  With umbilical hernia??? (no testing or evaluations done),  by Dr Hermila Mcintyre  Caring for ill , easy to take care of, but does have to pull him up, and he gets paracentesis  Taking Nexium regularly  Review of Systems   Constitutional: Positive for fatigue  Negative for chills, diaphoresis and fever  Respiratory: Negative for cough, chest tightness and shortness of breath  Gastrointestinal: Positive for abdominal distention, abdominal pain, constipation and diarrhea  Negative for nausea and vomiting  Has alternating loose stool and constipation  Objective:      /90 (BP Location: Left arm, Patient Position: Sitting, Cuff Size: Standard)   Pulse 82   Ht 5' 5 5" (1 664 m)   Wt 90 8 kg (200 lb 3 2 oz)   SpO2 95%   BMI 32 81 kg/m²          Physical Exam  Vitals signs and nursing note reviewed  Constitutional:       General: She is not in acute distress  Appearance: She is obese  She is not ill-appearing, toxic-appearing or diaphoretic  Cardiovascular:      Rate and Rhythm: Normal rate and regular rhythm  Pulses: Normal pulses  Heart sounds: Normal heart sounds  No murmur  No friction rub  No gallop  Pulmonary:      Effort: Pulmonary effort is normal  No respiratory distress  Breath sounds: Normal breath sounds  No stridor  No wheezing, rhonchi or rales  Chest:      Chest wall: No tenderness  Abdominal:      General: Bowel sounds are normal  There is distension  Palpations: There is no mass  Tenderness: There is abdominal tenderness  There is no right CVA tenderness, left CVA tenderness, guarding or rebound  Hernia: No hernia is present  Comments: Somewhat hard, tender throughout  Musculoskeletal: Normal range of motion  General: No swelling, tenderness, deformity or signs of injury  Right lower leg: No edema  Left lower leg: No edema  Neurological:      General: No focal deficit present  Mental Status: She is alert and oriented to person, place, and time  Cranial Nerves: No cranial nerve deficit  Sensory: No sensory deficit  Motor: No weakness  Coordination: Coordination normal          BMI Counseling: Body mass index is 32 81 kg/m²  The BMI is above normal  Nutrition recommendations include 3-5 servings of fruits/vegetables daily

## 2021-03-15 DIAGNOSIS — M54.42 CHRONIC BILATERAL LOW BACK PAIN WITH BILATERAL SCIATICA: ICD-10-CM

## 2021-03-15 DIAGNOSIS — M54.41 CHRONIC BILATERAL LOW BACK PAIN WITH BILATERAL SCIATICA: ICD-10-CM

## 2021-03-15 DIAGNOSIS — G89.29 CHRONIC BILATERAL LOW BACK PAIN WITH BILATERAL SCIATICA: ICD-10-CM

## 2021-03-16 RX ORDER — HYDROCODONE BITARTRATE AND ACETAMINOPHEN 5; 325 MG/1; MG/1
1 TABLET ORAL EVERY 8 HOURS PRN
Qty: 60 TABLET | Refills: 0 | Status: SHIPPED | OUTPATIENT
Start: 2021-03-16 | End: 2021-06-15 | Stop reason: SDUPTHER

## 2021-03-21 ENCOUNTER — HOSPITAL ENCOUNTER (OUTPATIENT)
Dept: ULTRASOUND IMAGING | Facility: HOSPITAL | Age: 71
Discharge: HOME | End: 2021-03-21
Payer: MEDICARE

## 2021-03-21 DIAGNOSIS — M79.10 MYALGIA: ICD-10-CM

## 2021-03-21 DIAGNOSIS — R10.84 GENERALIZED ABDOMINAL PAIN: ICD-10-CM

## 2021-03-21 PROCEDURE — 76700 US EXAM ABDOM COMPLETE: CPT

## 2021-03-25 ENCOUNTER — TELEPHONE (OUTPATIENT)
Dept: FAMILY MEDICINE CLINIC | Facility: HOSPITAL | Age: 71
End: 2021-03-25

## 2021-03-25 NOTE — TELEPHONE ENCOUNTER
wilfred said to call pt about the u/s complete--which is ok, showing enlarged liver and a gallstone  Nothing to worry about  wilfred said she did email pt via my chart about the u/s complete  I spoke to pt and gave message  Told her u/s ab wall not ready yet  Pt said she will check her my chart for katlyn message    dk

## 2021-04-22 DIAGNOSIS — IMO0002 URINARY COMPLICATION: ICD-10-CM

## 2021-04-22 DIAGNOSIS — R42 VERTIGO: ICD-10-CM

## 2021-04-22 RX ORDER — MECLIZINE HYDROCHLORIDE 25 MG/1
25 TABLET ORAL EVERY 8 HOURS SCHEDULED
Qty: 180 TABLET | Refills: 3 | Status: SHIPPED | OUTPATIENT
Start: 2021-04-22 | End: 2022-02-28 | Stop reason: ALTCHOICE

## 2021-04-22 RX ORDER — OXYBUTYNIN CHLORIDE 5 MG/1
5 TABLET ORAL 2 TIMES DAILY
Qty: 180 TABLET | Refills: 1 | Status: SHIPPED | OUTPATIENT
Start: 2021-04-22 | End: 2022-01-12 | Stop reason: SDUPTHER

## 2021-05-07 ENCOUNTER — IMMUNIZATIONS (OUTPATIENT)
Dept: FAMILY MEDICINE CLINIC | Facility: HOSPITAL | Age: 71
End: 2021-05-07

## 2021-05-07 DIAGNOSIS — Z23 ENCOUNTER FOR IMMUNIZATION: Primary | ICD-10-CM

## 2021-05-07 PROCEDURE — 0001A SARS-COV-2 / COVID-19 MRNA VACCINE (PFIZER-BIONTECH) 30 MCG: CPT

## 2021-05-07 PROCEDURE — 91300 SARS-COV-2 / COVID-19 MRNA VACCINE (PFIZER-BIONTECH) 30 MCG: CPT

## 2021-06-15 ENCOUNTER — IMMUNIZATIONS (OUTPATIENT)
Dept: FAMILY MEDICINE CLINIC | Facility: HOSPITAL | Age: 71
End: 2021-06-15

## 2021-06-15 ENCOUNTER — OFFICE VISIT (OUTPATIENT)
Dept: FAMILY MEDICINE CLINIC | Facility: HOSPITAL | Age: 71
End: 2021-06-15
Payer: MEDICARE

## 2021-06-15 VITALS
HEART RATE: 86 BPM | BODY MASS INDEX: 31.96 KG/M2 | SYSTOLIC BLOOD PRESSURE: 132 MMHG | WEIGHT: 195 LBS | DIASTOLIC BLOOD PRESSURE: 80 MMHG

## 2021-06-15 DIAGNOSIS — I83.93 VARICOSE VEINS OF BOTH LOWER EXTREMITIES WITHOUT ULCER OR INFLAMMATION: ICD-10-CM

## 2021-06-15 DIAGNOSIS — G89.29 CHRONIC BILATERAL LOW BACK PAIN WITH BILATERAL SCIATICA: ICD-10-CM

## 2021-06-15 DIAGNOSIS — E66.9 OBESITY (BMI 30.0-34.9): Chronic | ICD-10-CM

## 2021-06-15 DIAGNOSIS — Z23 ENCOUNTER FOR IMMUNIZATION: Primary | ICD-10-CM

## 2021-06-15 DIAGNOSIS — M54.42 CHRONIC BILATERAL LOW BACK PAIN WITH BILATERAL SCIATICA: ICD-10-CM

## 2021-06-15 DIAGNOSIS — M54.41 CHRONIC BILATERAL LOW BACK PAIN WITH BILATERAL SCIATICA: ICD-10-CM

## 2021-06-15 DIAGNOSIS — R22.43 LOCALIZED SWELLING OF BOTH LOWER LEGS: Primary | ICD-10-CM

## 2021-06-15 DIAGNOSIS — E78.2 MIXED HYPERLIPIDEMIA: Chronic | ICD-10-CM

## 2021-06-15 PROCEDURE — 1123F ACP DISCUSS/DSCN MKR DOCD: CPT | Performed by: INTERNAL MEDICINE

## 2021-06-15 PROCEDURE — 99214 OFFICE O/P EST MOD 30 MIN: CPT | Performed by: INTERNAL MEDICINE

## 2021-06-15 PROCEDURE — 0002A SARS-COV-2 / COVID-19 MRNA VACCINE (PFIZER-BIONTECH) 30 MCG: CPT

## 2021-06-15 PROCEDURE — 91300 SARS-COV-2 / COVID-19 MRNA VACCINE (PFIZER-BIONTECH) 30 MCG: CPT

## 2021-06-15 RX ORDER — HYDROCODONE BITARTRATE AND ACETAMINOPHEN 5; 325 MG/1; MG/1
1 TABLET ORAL EVERY 8 HOURS PRN
Qty: 90 TABLET | Refills: 0 | Status: SHIPPED | OUTPATIENT
Start: 2021-06-15 | End: 2021-07-30 | Stop reason: SDUPTHER

## 2021-06-15 NOTE — PROGRESS NOTES
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  In past 2 weeks with high temps has noted worsening of throbbing pain in her legs  Had salty tv dinner last night and then swelling was worse last night and could not sleep  2  Heat sensitivity-   spends time in pool at home with water walking  3  Episode of vomiting- was feeling nausea after cream of wheat- felt like she was going to pass out- was hot outside- discussed dehydration concerns      The following portions of the patient's history were reviewed and updated as appropriate: allergies, current medications and problem list      Review of Systems   Constitutional: Positive for fatigue  Taking naps lately   Respiratory: Negative for shortness of breath  Cardiovascular: Positive for leg swelling  Negative for chest pain and palpitations  Gastrointestinal: Negative for abdominal pain, constipation and diarrhea  Using probiotics   Genitourinary: Negative for frequency  All other systems reviewed and are negative          Objective:      Current Outpatient Medications:     Ascorbic Acid (VITAMIN C) 1000 MG tablet, Take 1,000 mg by mouth daily, Disp: , Rfl:     celecoxib (CeleBREX) 100 mg capsule, Take 1 capsule (100 mg total) by mouth 2 (two) times a day, Disp: 180 capsule, Rfl: 3    esomeprazole (NexIUM) 40 MG capsule, Take 1 tab daily, Disp: 90 capsule, Rfl: 3    Ginseng 100 MG CAPS, Take by mouth 1 daily, Disp: , Rfl:     HYDROcodone-acetaminophen (NORCO) 5-325 mg per tablet, Take 1 tablet by mouth every 8 (eight) hours as needed for painMax Daily Amount: 3 tablets, Disp: 60 tablet, Rfl: 0    meclizine (ANTIVERT) 25 mg tablet, Take 1 tablet (25 mg total) by mouth every 8 (eight) hours, Disp: 180 tablet, Rfl: 3    oxybutynin (DITROPAN) 5 mg tablet, Take 1 tablet (5 mg total) by mouth 2 (two) times a day, Disp: 180 tablet, Rfl: 1    Blood pressure 132/80, pulse 86, weight 88 5 kg (195 lb)  Physical Exam  Vitals and nursing note reviewed  Constitutional:       General: She is not in acute distress  Appearance: She is not toxic-appearing  HENT:      Nose: Nose normal  No congestion  Eyes:      General: No scleral icterus  Right eye: No discharge  Left eye: No discharge  Extraocular Movements: Extraocular movements intact  Pupils: Pupils are equal, round, and reactive to light  Neck:      Vascular: No carotid bruit  Cardiovascular:      Rate and Rhythm: Normal rate and regular rhythm  Heart sounds: No murmur heard  Pulmonary:      Effort: No respiratory distress  Breath sounds: No wheezing or rhonchi  Abdominal:      General: There is no distension  Tenderness: There is no abdominal tenderness  Musculoskeletal:         General: No tenderness  Cervical back: No tenderness  Comments: Trace edema   Lymphadenopathy:      Cervical: No cervical adenopathy  Skin:     Coloration: Skin is not jaundiced  Findings: No erythema  Neurological:      General: No focal deficit present  Mental Status: She is alert and oriented to person, place, and time  Cranial Nerves: No cranial nerve deficit  Psychiatric:         Mood and Affect: Mood normal          Thought Content:  Thought content normal

## 2021-07-30 DIAGNOSIS — G89.29 CHRONIC BILATERAL LOW BACK PAIN WITH BILATERAL SCIATICA: ICD-10-CM

## 2021-07-30 DIAGNOSIS — M54.42 CHRONIC BILATERAL LOW BACK PAIN WITH BILATERAL SCIATICA: ICD-10-CM

## 2021-07-30 DIAGNOSIS — M54.41 CHRONIC BILATERAL LOW BACK PAIN WITH BILATERAL SCIATICA: ICD-10-CM

## 2021-07-30 RX ORDER — HYDROCODONE BITARTRATE AND ACETAMINOPHEN 5; 325 MG/1; MG/1
1 TABLET ORAL EVERY 8 HOURS PRN
Qty: 90 TABLET | Refills: 0 | Status: SHIPPED | OUTPATIENT
Start: 2021-07-30 | End: 2021-10-13 | Stop reason: SDUPTHER

## 2021-08-27 ENCOUNTER — TELEPHONE (OUTPATIENT)
Dept: OTHER | Facility: OTHER | Age: 71
End: 2021-08-27

## 2021-08-27 NOTE — TELEPHONE ENCOUNTER
The patient would like a call back when possible  She said her  passed away on Wednesday and she has not been able to sleep since and she has been extremely nervous and can't stop shaking  She would like to know if she can be prescribed something to help

## 2021-10-13 DIAGNOSIS — M54.42 CHRONIC BILATERAL LOW BACK PAIN WITH BILATERAL SCIATICA: ICD-10-CM

## 2021-10-13 DIAGNOSIS — M54.41 CHRONIC BILATERAL LOW BACK PAIN WITH BILATERAL SCIATICA: ICD-10-CM

## 2021-10-13 DIAGNOSIS — G89.29 CHRONIC BILATERAL LOW BACK PAIN WITH BILATERAL SCIATICA: ICD-10-CM

## 2021-10-13 RX ORDER — HYDROCODONE BITARTRATE AND ACETAMINOPHEN 5; 325 MG/1; MG/1
1 TABLET ORAL EVERY 8 HOURS PRN
Qty: 90 TABLET | Refills: 0 | Status: SHIPPED | OUTPATIENT
Start: 2021-10-13 | End: 2021-12-03 | Stop reason: SDUPTHER

## 2021-12-03 DIAGNOSIS — K21.00 GASTROESOPHAGEAL REFLUX DISEASE WITH ESOPHAGITIS, UNSPECIFIED WHETHER HEMORRHAGE: ICD-10-CM

## 2021-12-03 DIAGNOSIS — M54.42 CHRONIC BILATERAL LOW BACK PAIN WITH BILATERAL SCIATICA: ICD-10-CM

## 2021-12-03 DIAGNOSIS — K21.9 GASTROESOPHAGEAL REFLUX DISEASE, UNSPECIFIED WHETHER ESOPHAGITIS PRESENT: ICD-10-CM

## 2021-12-03 DIAGNOSIS — M54.41 CHRONIC BILATERAL LOW BACK PAIN WITH BILATERAL SCIATICA: ICD-10-CM

## 2021-12-03 DIAGNOSIS — G89.29 CHRONIC BILATERAL LOW BACK PAIN WITH BILATERAL SCIATICA: ICD-10-CM

## 2021-12-03 RX ORDER — ESOMEPRAZOLE MAGNESIUM 40 MG/1
CAPSULE, DELAYED RELEASE ORAL
Qty: 90 CAPSULE | Refills: 3 | Status: SHIPPED | OUTPATIENT
Start: 2021-12-03 | End: 2022-01-12 | Stop reason: SDUPTHER

## 2021-12-03 RX ORDER — HYDROCODONE BITARTRATE AND ACETAMINOPHEN 5; 325 MG/1; MG/1
1 TABLET ORAL EVERY 8 HOURS PRN
Qty: 90 TABLET | Refills: 0 | Status: SHIPPED | OUTPATIENT
Start: 2021-12-03 | End: 2022-01-11 | Stop reason: SDUPTHER

## 2021-12-05 ENCOUNTER — APPOINTMENT (INPATIENT)
Dept: ULTRASOUND IMAGING | Facility: HOSPITAL | Age: 71
DRG: 418 | End: 2021-12-05
Payer: MEDICARE

## 2021-12-05 ENCOUNTER — APPOINTMENT (EMERGENCY)
Dept: CT IMAGING | Facility: HOSPITAL | Age: 71
DRG: 418 | End: 2021-12-05
Payer: MEDICARE

## 2021-12-05 ENCOUNTER — HOSPITAL ENCOUNTER (INPATIENT)
Facility: HOSPITAL | Age: 71
LOS: 2 days | Discharge: HOME HEALTH | DRG: 418 | End: 2021-12-07
Attending: EMERGENCY MEDICINE | Admitting: INTERNAL MEDICINE
Payer: MEDICARE

## 2021-12-05 DIAGNOSIS — R10.9 ABDOMINAL PAIN: ICD-10-CM

## 2021-12-05 DIAGNOSIS — N39.0 UTI (URINARY TRACT INFECTION): ICD-10-CM

## 2021-12-05 DIAGNOSIS — K81.0 ACUTE CHOLECYSTITIS: Primary | ICD-10-CM

## 2021-12-05 DIAGNOSIS — Z90.49 S/P LAPAROSCOPIC CHOLECYSTECTOMY: ICD-10-CM

## 2021-12-05 PROBLEM — E87.6 HYPOKALEMIA: Status: ACTIVE | Noted: 2021-12-05

## 2021-12-05 PROBLEM — K80.00 ACUTE CHOLECYSTITIS DUE TO BILIARY CALCULUS: Status: ACTIVE | Noted: 2021-12-05

## 2021-12-05 PROBLEM — R82.90 ABNORMAL URINALYSIS: Status: ACTIVE | Noted: 2021-12-05

## 2021-12-05 LAB
ALBUMIN SERPL BCP-MCNC: 3.7 G/DL (ref 3.5–5)
ALP SERPL-CCNC: 99 U/L (ref 46–116)
ALT SERPL W P-5'-P-CCNC: 40 U/L (ref 12–78)
ANION GAP SERPL CALCULATED.3IONS-SCNC: 10 MMOL/L (ref 4–13)
APTT PPP: 27 SECONDS (ref 23–37)
AST SERPL W P-5'-P-CCNC: 28 U/L (ref 5–45)
BACTERIA UR QL AUTO: ABNORMAL /HPF
BASOPHILS # BLD AUTO: 0.03 THOUSANDS/ΜL (ref 0–0.1)
BASOPHILS NFR BLD AUTO: 0 % (ref 0–1)
BILIRUB SERPL-MCNC: 0.6 MG/DL (ref 0.2–1)
BILIRUB UR QL STRIP: NEGATIVE
BUN SERPL-MCNC: 13 MG/DL (ref 5–25)
CALCIUM SERPL-MCNC: 9 MG/DL (ref 8.3–10.1)
CHLORIDE SERPL-SCNC: 103 MMOL/L (ref 100–108)
CLARITY UR: CLEAR
CO2 SERPL-SCNC: 30 MMOL/L (ref 21–32)
COLOR UR: YELLOW
CREAT SERPL-MCNC: 0.83 MG/DL (ref 0.6–1.3)
EOSINOPHIL # BLD AUTO: 0.04 THOUSAND/ΜL (ref 0–0.61)
EOSINOPHIL NFR BLD AUTO: 0 % (ref 0–6)
ERYTHROCYTE [DISTWIDTH] IN BLOOD BY AUTOMATED COUNT: 12.4 % (ref 11.6–15.1)
FLUAV RNA RESP QL NAA+PROBE: NEGATIVE
FLUBV RNA RESP QL NAA+PROBE: NEGATIVE
GFR SERPL CREATININE-BSD FRML MDRD: 71 ML/MIN/1.73SQ M
GLUCOSE SERPL-MCNC: 106 MG/DL (ref 65–140)
GLUCOSE UR STRIP-MCNC: NEGATIVE MG/DL
HCT VFR BLD AUTO: 43.2 % (ref 34.8–46.1)
HGB BLD-MCNC: 14.1 G/DL (ref 11.5–15.4)
HGB UR QL STRIP.AUTO: ABNORMAL
IMM GRANULOCYTES # BLD AUTO: 0.03 THOUSAND/UL (ref 0–0.2)
IMM GRANULOCYTES NFR BLD AUTO: 0 % (ref 0–2)
INR PPP: 1.05 (ref 0.84–1.19)
KETONES UR STRIP-MCNC: ABNORMAL MG/DL
LEUKOCYTE ESTERASE UR QL STRIP: ABNORMAL
LIPASE SERPL-CCNC: 94 U/L (ref 73–393)
LYMPHOCYTES # BLD AUTO: 1.43 THOUSANDS/ΜL (ref 0.6–4.47)
LYMPHOCYTES NFR BLD AUTO: 12 % (ref 14–44)
MCH RBC QN AUTO: 30.5 PG (ref 26.8–34.3)
MCHC RBC AUTO-ENTMCNC: 32.6 G/DL (ref 31.4–37.4)
MCV RBC AUTO: 94 FL (ref 82–98)
MONOCYTES # BLD AUTO: 0.9 THOUSAND/ΜL (ref 0.17–1.22)
MONOCYTES NFR BLD AUTO: 8 % (ref 4–12)
NEUTROPHILS # BLD AUTO: 9.06 THOUSANDS/ΜL (ref 1.85–7.62)
NEUTS SEG NFR BLD AUTO: 80 % (ref 43–75)
NITRITE UR QL STRIP: NEGATIVE
NON-SQ EPI CELLS URNS QL MICRO: ABNORMAL /HPF
NRBC BLD AUTO-RTO: 0 /100 WBCS
PH UR STRIP.AUTO: 7 [PH]
PLATELET # BLD AUTO: 157 THOUSANDS/UL (ref 149–390)
PLATELET # BLD AUTO: 179 THOUSANDS/UL (ref 149–390)
PMV BLD AUTO: 10.7 FL (ref 8.9–12.7)
PMV BLD AUTO: 10.7 FL (ref 8.9–12.7)
POTASSIUM SERPL-SCNC: 3.3 MMOL/L (ref 3.5–5.3)
PROT SERPL-MCNC: 7.4 G/DL (ref 6.4–8.2)
PROT UR STRIP-MCNC: NEGATIVE MG/DL
PROTHROMBIN TIME: 13.6 SECONDS (ref 11.6–14.5)
RBC # BLD AUTO: 4.62 MILLION/UL (ref 3.81–5.12)
RBC #/AREA URNS AUTO: ABNORMAL /HPF
RSV RNA RESP QL NAA+PROBE: NEGATIVE
SARS-COV-2 RNA RESP QL NAA+PROBE: NEGATIVE
SODIUM SERPL-SCNC: 143 MMOL/L (ref 136–145)
SP GR UR STRIP.AUTO: 1.01 (ref 1–1.03)
UROBILINOGEN UR QL STRIP.AUTO: 0.2 E.U./DL
WBC # BLD AUTO: 11.49 THOUSAND/UL (ref 4.31–10.16)
WBC #/AREA URNS AUTO: ABNORMAL /HPF

## 2021-12-05 PROCEDURE — 85025 COMPLETE CBC W/AUTO DIFF WBC: CPT | Performed by: EMERGENCY MEDICINE

## 2021-12-05 PROCEDURE — 87077 CULTURE AEROBIC IDENTIFY: CPT | Performed by: EMERGENCY MEDICINE

## 2021-12-05 PROCEDURE — 83690 ASSAY OF LIPASE: CPT | Performed by: EMERGENCY MEDICINE

## 2021-12-05 PROCEDURE — 87086 URINE CULTURE/COLONY COUNT: CPT | Performed by: EMERGENCY MEDICINE

## 2021-12-05 PROCEDURE — 36415 COLL VENOUS BLD VENIPUNCTURE: CPT | Performed by: EMERGENCY MEDICINE

## 2021-12-05 PROCEDURE — 96374 THER/PROPH/DIAG INJ IV PUSH: CPT

## 2021-12-05 PROCEDURE — 99223 1ST HOSP IP/OBS HIGH 75: CPT | Performed by: INTERNAL MEDICINE

## 2021-12-05 PROCEDURE — 74177 CT ABD & PELVIS W/CONTRAST: CPT

## 2021-12-05 PROCEDURE — 81001 URINALYSIS AUTO W/SCOPE: CPT | Performed by: EMERGENCY MEDICINE

## 2021-12-05 PROCEDURE — 76705 ECHO EXAM OF ABDOMEN: CPT

## 2021-12-05 PROCEDURE — 87186 SC STD MICRODIL/AGAR DIL: CPT | Performed by: EMERGENCY MEDICINE

## 2021-12-05 PROCEDURE — 87040 BLOOD CULTURE FOR BACTERIA: CPT | Performed by: EMERGENCY MEDICINE

## 2021-12-05 PROCEDURE — 96361 HYDRATE IV INFUSION ADD-ON: CPT

## 2021-12-05 PROCEDURE — 99285 EMERGENCY DEPT VISIT HI MDM: CPT

## 2021-12-05 PROCEDURE — 85049 AUTOMATED PLATELET COUNT: CPT | Performed by: INTERNAL MEDICINE

## 2021-12-05 PROCEDURE — 85610 PROTHROMBIN TIME: CPT | Performed by: INTERNAL MEDICINE

## 2021-12-05 PROCEDURE — 99285 EMERGENCY DEPT VISIT HI MDM: CPT | Performed by: EMERGENCY MEDICINE

## 2021-12-05 PROCEDURE — 85730 THROMBOPLASTIN TIME PARTIAL: CPT | Performed by: INTERNAL MEDICINE

## 2021-12-05 PROCEDURE — 0241U HB NFCT DS VIR RESP RNA 4 TRGT: CPT | Performed by: EMERGENCY MEDICINE

## 2021-12-05 PROCEDURE — 80053 COMPREHEN METABOLIC PANEL: CPT | Performed by: EMERGENCY MEDICINE

## 2021-12-05 PROCEDURE — 99223 1ST HOSP IP/OBS HIGH 75: CPT | Performed by: PHYSICIAN ASSISTANT

## 2021-12-05 RX ORDER — DICYCLOMINE HCL 20 MG
20 TABLET ORAL ONCE
Status: COMPLETED | OUTPATIENT
Start: 2021-12-05 | End: 2021-12-05

## 2021-12-05 RX ORDER — ONDANSETRON 2 MG/ML
4 INJECTION INTRAMUSCULAR; INTRAVENOUS EVERY 6 HOURS PRN
Status: DISCONTINUED | OUTPATIENT
Start: 2021-12-05 | End: 2021-12-06

## 2021-12-05 RX ORDER — METOPROLOL TARTRATE 5 MG/5ML
2.5 INJECTION INTRAVENOUS EVERY 6 HOURS PRN
Status: DISCONTINUED | OUTPATIENT
Start: 2021-12-05 | End: 2021-12-07 | Stop reason: HOSPADM

## 2021-12-05 RX ORDER — OXYBUTYNIN CHLORIDE 5 MG/1
5 TABLET ORAL 2 TIMES DAILY
Status: DISCONTINUED | OUTPATIENT
Start: 2021-12-05 | End: 2021-12-07 | Stop reason: HOSPADM

## 2021-12-05 RX ORDER — DEXTROSE, SODIUM CHLORIDE, AND POTASSIUM CHLORIDE 5; .45; .15 G/100ML; G/100ML; G/100ML
100 INJECTION INTRAVENOUS CONTINUOUS
Status: DISCONTINUED | OUTPATIENT
Start: 2021-12-05 | End: 2021-12-07

## 2021-12-05 RX ORDER — PANTOPRAZOLE SODIUM 40 MG/1
40 INJECTION, POWDER, FOR SOLUTION INTRAVENOUS
Status: DISCONTINUED | OUTPATIENT
Start: 2021-12-06 | End: 2021-12-07

## 2021-12-05 RX ORDER — KETOROLAC TROMETHAMINE 30 MG/ML
15 INJECTION, SOLUTION INTRAMUSCULAR; INTRAVENOUS ONCE
Status: COMPLETED | OUTPATIENT
Start: 2021-12-05 | End: 2021-12-05

## 2021-12-05 RX ORDER — HEPARIN SODIUM 5000 [USP'U]/ML
5000 INJECTION, SOLUTION INTRAVENOUS; SUBCUTANEOUS EVERY 8 HOURS SCHEDULED
Status: DISCONTINUED | OUTPATIENT
Start: 2021-12-05 | End: 2021-12-07 | Stop reason: HOSPADM

## 2021-12-05 RX ORDER — HYDROMORPHONE HCL/PF 1 MG/ML
0.5 SYRINGE (ML) INJECTION ONCE
Status: COMPLETED | OUTPATIENT
Start: 2021-12-05 | End: 2021-12-05

## 2021-12-05 RX ORDER — HYDROMORPHONE HCL/PF 1 MG/ML
0.5 SYRINGE (ML) INJECTION
Status: DISCONTINUED | OUTPATIENT
Start: 2021-12-05 | End: 2021-12-07

## 2021-12-05 RX ORDER — ACETAMINOPHEN 650 MG/1
325 SUPPOSITORY RECTAL EVERY 4 HOURS PRN
Status: DISCONTINUED | OUTPATIENT
Start: 2021-12-05 | End: 2021-12-07

## 2021-12-05 RX ADMIN — KETOROLAC TROMETHAMINE 15 MG: 30 INJECTION, SOLUTION INTRAMUSCULAR; INTRAVENOUS at 15:55

## 2021-12-05 RX ADMIN — HYDROMORPHONE HYDROCHLORIDE 0.5 MG: 1 INJECTION, SOLUTION INTRAMUSCULAR; INTRAVENOUS; SUBCUTANEOUS at 18:12

## 2021-12-05 RX ADMIN — DICYCLOMINE HYDROCHLORIDE 20 MG: 20 TABLET ORAL at 17:02

## 2021-12-05 RX ADMIN — HYDROMORPHONE HYDROCHLORIDE 0.5 MG: 1 INJECTION, SOLUTION INTRAMUSCULAR; INTRAVENOUS; SUBCUTANEOUS at 21:50

## 2021-12-05 RX ADMIN — SODIUM CHLORIDE 1000 ML: 0.9 INJECTION, SOLUTION INTRAVENOUS at 16:43

## 2021-12-05 RX ADMIN — DEXTROSE, SODIUM CHLORIDE, AND POTASSIUM CHLORIDE 100 ML/HR: 5; .45; .15 INJECTION INTRAVENOUS at 21:30

## 2021-12-05 RX ADMIN — IOHEXOL 100 ML: 350 INJECTION, SOLUTION INTRAVENOUS at 16:52

## 2021-12-05 RX ADMIN — HEPARIN SODIUM 5000 UNITS: 5000 INJECTION INTRAVENOUS; SUBCUTANEOUS at 21:30

## 2021-12-05 RX ADMIN — ONDANSETRON 4 MG: 2 INJECTION INTRAMUSCULAR; INTRAVENOUS at 21:49

## 2021-12-05 RX ADMIN — PIPERACILLIN SODIUM AND TAZOBACTAM SODIUM 3.38 G: 3; .375 INJECTION, POWDER, LYOPHILIZED, FOR SOLUTION INTRAVENOUS at 18:12

## 2021-12-05 RX ADMIN — OXYBUTYNIN CHLORIDE 5 MG: 5 TABLET ORAL at 21:30

## 2021-12-06 ENCOUNTER — ANESTHESIA (INPATIENT)
Dept: PERIOP | Facility: HOSPITAL | Age: 71
DRG: 418 | End: 2021-12-06
Payer: MEDICARE

## 2021-12-06 ENCOUNTER — ANESTHESIA EVENT (INPATIENT)
Dept: PERIOP | Facility: HOSPITAL | Age: 71
DRG: 418 | End: 2021-12-06
Payer: MEDICARE

## 2021-12-06 PROBLEM — R11.2 PONV (POSTOPERATIVE NAUSEA AND VOMITING): Status: ACTIVE | Noted: 2021-12-06

## 2021-12-06 PROBLEM — Z98.890 PONV (POSTOPERATIVE NAUSEA AND VOMITING): Status: ACTIVE | Noted: 2021-12-06

## 2021-12-06 LAB
ALBUMIN SERPL BCP-MCNC: 3.3 G/DL (ref 3.5–5)
ALP SERPL-CCNC: 111 U/L (ref 46–116)
ALT SERPL W P-5'-P-CCNC: 62 U/L (ref 12–78)
ANION GAP SERPL CALCULATED.3IONS-SCNC: 12 MMOL/L (ref 4–13)
AST SERPL W P-5'-P-CCNC: 38 U/L (ref 5–45)
BILIRUB SERPL-MCNC: 0.6 MG/DL (ref 0.2–1)
BUN SERPL-MCNC: 7 MG/DL (ref 5–25)
CALCIUM ALBUM COR SERPL-MCNC: 9 MG/DL (ref 8.3–10.1)
CALCIUM SERPL-MCNC: 8.4 MG/DL (ref 8.3–10.1)
CHLORIDE SERPL-SCNC: 105 MMOL/L (ref 100–108)
CO2 SERPL-SCNC: 25 MMOL/L (ref 21–32)
CREAT SERPL-MCNC: 0.72 MG/DL (ref 0.6–1.3)
ERYTHROCYTE [DISTWIDTH] IN BLOOD BY AUTOMATED COUNT: 12.3 % (ref 11.6–15.1)
GFR SERPL CREATININE-BSD FRML MDRD: 85 ML/MIN/1.73SQ M
GLUCOSE SERPL-MCNC: 134 MG/DL (ref 65–140)
HCT VFR BLD AUTO: 40.6 % (ref 34.8–46.1)
HGB BLD-MCNC: 13.4 G/DL (ref 11.5–15.4)
MCH RBC QN AUTO: 31.2 PG (ref 26.8–34.3)
MCHC RBC AUTO-ENTMCNC: 33 G/DL (ref 31.4–37.4)
MCV RBC AUTO: 94 FL (ref 82–98)
PLATELET # BLD AUTO: 163 THOUSANDS/UL (ref 149–390)
PMV BLD AUTO: 10.4 FL (ref 8.9–12.7)
POTASSIUM SERPL-SCNC: 3.8 MMOL/L (ref 3.5–5.3)
PROT SERPL-MCNC: 7.1 G/DL (ref 6.4–8.2)
RBC # BLD AUTO: 4.3 MILLION/UL (ref 3.81–5.12)
SODIUM SERPL-SCNC: 142 MMOL/L (ref 136–145)
WBC # BLD AUTO: 11.29 THOUSAND/UL (ref 4.31–10.16)

## 2021-12-06 PROCEDURE — 80053 COMPREHEN METABOLIC PANEL: CPT | Performed by: INTERNAL MEDICINE

## 2021-12-06 PROCEDURE — 47562 LAPAROSCOPIC CHOLECYSTECTOMY: CPT | Performed by: SURGERY

## 2021-12-06 PROCEDURE — 88304 TISSUE EXAM BY PATHOLOGIST: CPT | Performed by: PATHOLOGY

## 2021-12-06 PROCEDURE — 99233 SBSQ HOSP IP/OBS HIGH 50: CPT | Performed by: SURGERY

## 2021-12-06 PROCEDURE — 36415 COLL VENOUS BLD VENIPUNCTURE: CPT | Performed by: INTERNAL MEDICINE

## 2021-12-06 PROCEDURE — 99232 SBSQ HOSP IP/OBS MODERATE 35: CPT | Performed by: PHYSICIAN ASSISTANT

## 2021-12-06 PROCEDURE — 85027 COMPLETE CBC AUTOMATED: CPT | Performed by: INTERNAL MEDICINE

## 2021-12-06 PROCEDURE — 47562 LAPAROSCOPIC CHOLECYSTECTOMY: CPT | Performed by: PHYSICIAN ASSISTANT

## 2021-12-06 PROCEDURE — 0FT44ZZ RESECTION OF GALLBLADDER, PERCUTANEOUS ENDOSCOPIC APPROACH: ICD-10-PCS | Performed by: SURGERY

## 2021-12-06 PROCEDURE — C9113 INJ PANTOPRAZOLE SODIUM, VIA: HCPCS | Performed by: INTERNAL MEDICINE

## 2021-12-06 RX ORDER — LIDOCAINE HYDROCHLORIDE 10 MG/ML
INJECTION, SOLUTION EPIDURAL; INFILTRATION; INTRACAUDAL; PERINEURAL AS NEEDED
Status: DISCONTINUED | OUTPATIENT
Start: 2021-12-06 | End: 2021-12-06

## 2021-12-06 RX ORDER — ONDANSETRON 2 MG/ML
INJECTION INTRAMUSCULAR; INTRAVENOUS AS NEEDED
Status: DISCONTINUED | OUTPATIENT
Start: 2021-12-06 | End: 2021-12-06

## 2021-12-06 RX ORDER — FENTANYL CITRATE 50 UG/ML
INJECTION, SOLUTION INTRAMUSCULAR; INTRAVENOUS AS NEEDED
Status: DISCONTINUED | OUTPATIENT
Start: 2021-12-06 | End: 2021-12-06

## 2021-12-06 RX ORDER — HYDROMORPHONE HCL/PF 1 MG/ML
SYRINGE (ML) INJECTION AS NEEDED
Status: DISCONTINUED | OUTPATIENT
Start: 2021-12-06 | End: 2021-12-06

## 2021-12-06 RX ORDER — METOCLOPRAMIDE HYDROCHLORIDE 5 MG/ML
10 INJECTION INTRAMUSCULAR; INTRAVENOUS ONCE AS NEEDED
Status: DISCONTINUED | OUTPATIENT
Start: 2021-12-06 | End: 2021-12-07 | Stop reason: HOSPADM

## 2021-12-06 RX ORDER — BUPIVACAINE HYDROCHLORIDE 2.5 MG/ML
INJECTION, SOLUTION EPIDURAL; INFILTRATION; INTRACAUDAL AS NEEDED
Status: DISCONTINUED | OUTPATIENT
Start: 2021-12-06 | End: 2021-12-06 | Stop reason: HOSPADM

## 2021-12-06 RX ORDER — ROCURONIUM BROMIDE 10 MG/ML
INJECTION, SOLUTION INTRAVENOUS AS NEEDED
Status: DISCONTINUED | OUTPATIENT
Start: 2021-12-06 | End: 2021-12-06

## 2021-12-06 RX ORDER — MAGNESIUM HYDROXIDE 1200 MG/15ML
LIQUID ORAL AS NEEDED
Status: DISCONTINUED | OUTPATIENT
Start: 2021-12-06 | End: 2021-12-06 | Stop reason: HOSPADM

## 2021-12-06 RX ORDER — FENTANYL CITRATE/PF 50 MCG/ML
25 SYRINGE (ML) INJECTION
Status: DISCONTINUED | OUTPATIENT
Start: 2021-12-06 | End: 2021-12-07 | Stop reason: HOSPADM

## 2021-12-06 RX ORDER — SODIUM CHLORIDE, SODIUM LACTATE, POTASSIUM CHLORIDE, CALCIUM CHLORIDE 600; 310; 30; 20 MG/100ML; MG/100ML; MG/100ML; MG/100ML
125 INJECTION, SOLUTION INTRAVENOUS CONTINUOUS
Status: CANCELLED | OUTPATIENT
Start: 2021-12-06

## 2021-12-06 RX ORDER — DEXAMETHASONE SODIUM PHOSPHATE 10 MG/ML
INJECTION, SOLUTION INTRAMUSCULAR; INTRAVENOUS AS NEEDED
Status: DISCONTINUED | OUTPATIENT
Start: 2021-12-06 | End: 2021-12-06

## 2021-12-06 RX ORDER — ONDANSETRON 2 MG/ML
4 INJECTION INTRAMUSCULAR; INTRAVENOUS ONCE AS NEEDED
Status: DISCONTINUED | OUTPATIENT
Start: 2021-12-06 | End: 2021-12-07 | Stop reason: HOSPADM

## 2021-12-06 RX ORDER — HYDROCODONE BITARTRATE AND ACETAMINOPHEN 5; 325 MG/1; MG/1
1 TABLET ORAL EVERY 4 HOURS PRN
Status: DISCONTINUED | OUTPATIENT
Start: 2021-12-06 | End: 2021-12-07

## 2021-12-06 RX ORDER — ACETAMINOPHEN 325 MG/1
650 TABLET ORAL EVERY 6 HOURS PRN
Status: DISCONTINUED | OUTPATIENT
Start: 2021-12-06 | End: 2021-12-07

## 2021-12-06 RX ORDER — SUCCINYLCHOLINE/SOD CL,ISO/PF 100 MG/5ML
SYRINGE (ML) INTRAVENOUS AS NEEDED
Status: DISCONTINUED | OUTPATIENT
Start: 2021-12-06 | End: 2021-12-06

## 2021-12-06 RX ORDER — ONDANSETRON 2 MG/ML
4 INJECTION INTRAMUSCULAR; INTRAVENOUS EVERY 8 HOURS PRN
Status: DISCONTINUED | OUTPATIENT
Start: 2021-12-06 | End: 2021-12-07 | Stop reason: HOSPADM

## 2021-12-06 RX ORDER — KETOROLAC TROMETHAMINE 30 MG/ML
15 INJECTION, SOLUTION INTRAMUSCULAR; INTRAVENOUS ONCE
Status: COMPLETED | OUTPATIENT
Start: 2021-12-06 | End: 2021-12-06

## 2021-12-06 RX ORDER — PROPOFOL 10 MG/ML
INJECTION, EMULSION INTRAVENOUS AS NEEDED
Status: DISCONTINUED | OUTPATIENT
Start: 2021-12-06 | End: 2021-12-06

## 2021-12-06 RX ORDER — HYDROMORPHONE HCL/PF 1 MG/ML
0.5 SYRINGE (ML) INJECTION
Status: DISCONTINUED | OUTPATIENT
Start: 2021-12-06 | End: 2021-12-07

## 2021-12-06 RX ORDER — SODIUM CHLORIDE, SODIUM LACTATE, POTASSIUM CHLORIDE, CALCIUM CHLORIDE 600; 310; 30; 20 MG/100ML; MG/100ML; MG/100ML; MG/100ML
INJECTION, SOLUTION INTRAVENOUS CONTINUOUS PRN
Status: DISCONTINUED | OUTPATIENT
Start: 2021-12-06 | End: 2021-12-06

## 2021-12-06 RX ADMIN — HYDROMORPHONE HYDROCHLORIDE 1 MG: 1 INJECTION, SOLUTION INTRAMUSCULAR; INTRAVENOUS; SUBCUTANEOUS at 15:09

## 2021-12-06 RX ADMIN — FENTANYL CITRATE 25 MCG: 50 INJECTION, SOLUTION INTRAMUSCULAR; INTRAVENOUS at 14:21

## 2021-12-06 RX ADMIN — SODIUM CHLORIDE, SODIUM LACTATE, POTASSIUM CHLORIDE, AND CALCIUM CHLORIDE: .6; .31; .03; .02 INJECTION, SOLUTION INTRAVENOUS at 15:30

## 2021-12-06 RX ADMIN — HYDROCODONE BITARTRATE AND ACETAMINOPHEN 1 TABLET: 5; 325 TABLET ORAL at 23:03

## 2021-12-06 RX ADMIN — OXYBUTYNIN CHLORIDE 5 MG: 5 TABLET ORAL at 10:22

## 2021-12-06 RX ADMIN — ONDANSETRON 4 MG: 2 INJECTION INTRAMUSCULAR; INTRAVENOUS at 14:32

## 2021-12-06 RX ADMIN — SODIUM CHLORIDE, SODIUM LACTATE, POTASSIUM CHLORIDE, AND CALCIUM CHLORIDE: .6; .31; .03; .02 INJECTION, SOLUTION INTRAVENOUS at 14:17

## 2021-12-06 RX ADMIN — FENTANYL CITRATE 25 MCG: 50 INJECTION, SOLUTION INTRAMUSCULAR; INTRAVENOUS at 15:07

## 2021-12-06 RX ADMIN — DEXTROSE, SODIUM CHLORIDE, AND POTASSIUM CHLORIDE 100 ML/HR: 5; .45; .15 INJECTION INTRAVENOUS at 22:20

## 2021-12-06 RX ADMIN — PIPERACILLIN SODIUM AND TAZOBACTAM SODIUM 3.38 G: 3; .375 INJECTION, POWDER, LYOPHILIZED, FOR SOLUTION INTRAVENOUS at 19:04

## 2021-12-06 RX ADMIN — KETOROLAC TROMETHAMINE 15 MG: 30 INJECTION, SOLUTION INTRAMUSCULAR; INTRAVENOUS at 08:03

## 2021-12-06 RX ADMIN — LIDOCAINE HYDROCHLORIDE 50 MG: 10 INJECTION, SOLUTION EPIDURAL; INFILTRATION; INTRACAUDAL; PERINEURAL at 14:21

## 2021-12-06 RX ADMIN — HYDROMORPHONE HYDROCHLORIDE 0.5 MG: 1 INJECTION, SOLUTION INTRAMUSCULAR; INTRAVENOUS; SUBCUTANEOUS at 19:04

## 2021-12-06 RX ADMIN — ROCURONIUM BROMIDE 30 MG: 10 INJECTION, SOLUTION INTRAVENOUS at 14:32

## 2021-12-06 RX ADMIN — DEXAMETHASONE SODIUM PHOSPHATE 8 MG: 10 INJECTION, SOLUTION INTRAMUSCULAR; INTRAVENOUS at 14:21

## 2021-12-06 RX ADMIN — PROPOFOL 200 MG: 10 INJECTION, EMULSION INTRAVENOUS at 14:21

## 2021-12-06 RX ADMIN — HEPARIN SODIUM 5000 UNITS: 5000 INJECTION INTRAVENOUS; SUBCUTANEOUS at 07:20

## 2021-12-06 RX ADMIN — HYDROMORPHONE HYDROCHLORIDE 0.5 MG: 1 INJECTION, SOLUTION INTRAMUSCULAR; INTRAVENOUS; SUBCUTANEOUS at 10:28

## 2021-12-06 RX ADMIN — OXYBUTYNIN CHLORIDE 5 MG: 5 TABLET ORAL at 19:04

## 2021-12-06 RX ADMIN — SUGAMMADEX 200 MG: 100 INJECTION, SOLUTION INTRAVENOUS at 15:52

## 2021-12-06 RX ADMIN — PIPERACILLIN SODIUM AND TAZOBACTAM SODIUM 3.38 G: 3; .375 INJECTION, POWDER, LYOPHILIZED, FOR SOLUTION INTRAVENOUS at 12:54

## 2021-12-06 RX ADMIN — FENTANYL CITRATE 50 MCG: 50 INJECTION, SOLUTION INTRAMUSCULAR; INTRAVENOUS at 14:44

## 2021-12-06 RX ADMIN — ONDANSETRON 4 MG: 2 INJECTION INTRAMUSCULAR; INTRAVENOUS at 05:01

## 2021-12-06 RX ADMIN — PANTOPRAZOLE SODIUM 40 MG: 40 INJECTION, POWDER, FOR SOLUTION INTRAVENOUS at 08:02

## 2021-12-06 RX ADMIN — Medication 100 MG: at 14:21

## 2021-12-07 VITALS
BODY MASS INDEX: 31.96 KG/M2 | TEMPERATURE: 98.1 F | WEIGHT: 191.8 LBS | HEART RATE: 75 BPM | SYSTOLIC BLOOD PRESSURE: 132 MMHG | DIASTOLIC BLOOD PRESSURE: 60 MMHG | OXYGEN SATURATION: 95 % | HEIGHT: 65 IN | RESPIRATION RATE: 20 BRPM

## 2021-12-07 PROBLEM — K80.00 ACUTE CHOLECYSTITIS DUE TO BILIARY CALCULUS: Status: RESOLVED | Noted: 2021-12-05 | Resolved: 2021-12-07

## 2021-12-07 PROBLEM — E87.6 HYPOKALEMIA: Status: RESOLVED | Noted: 2021-12-05 | Resolved: 2021-12-07

## 2021-12-07 LAB
ALBUMIN SERPL BCP-MCNC: 2.7 G/DL (ref 3.5–5)
ALP SERPL-CCNC: 111 U/L (ref 46–116)
ALT SERPL W P-5'-P-CCNC: 65 U/L (ref 12–78)
ANION GAP SERPL CALCULATED.3IONS-SCNC: 10 MMOL/L (ref 4–13)
AST SERPL W P-5'-P-CCNC: 43 U/L (ref 5–45)
BACTERIA UR CULT: ABNORMAL
BASOPHILS # BLD AUTO: 0.01 THOUSANDS/ΜL (ref 0–0.1)
BASOPHILS NFR BLD AUTO: 0 % (ref 0–1)
BILIRUB SERPL-MCNC: 0.3 MG/DL (ref 0.2–1)
BUN SERPL-MCNC: 10 MG/DL (ref 5–25)
CALCIUM ALBUM COR SERPL-MCNC: 9.3 MG/DL (ref 8.3–10.1)
CALCIUM SERPL-MCNC: 8.3 MG/DL (ref 8.3–10.1)
CHLORIDE SERPL-SCNC: 105 MMOL/L (ref 100–108)
CO2 SERPL-SCNC: 26 MMOL/L (ref 21–32)
CREAT SERPL-MCNC: 0.86 MG/DL (ref 0.6–1.3)
EOSINOPHIL # BLD AUTO: 0 THOUSAND/ΜL (ref 0–0.61)
EOSINOPHIL NFR BLD AUTO: 0 % (ref 0–6)
ERYTHROCYTE [DISTWIDTH] IN BLOOD BY AUTOMATED COUNT: 12.3 % (ref 11.6–15.1)
GFR SERPL CREATININE-BSD FRML MDRD: 68 ML/MIN/1.73SQ M
GLUCOSE SERPL-MCNC: 141 MG/DL (ref 65–140)
HCT VFR BLD AUTO: 37.4 % (ref 34.8–46.1)
HGB BLD-MCNC: 11.9 G/DL (ref 11.5–15.4)
IMM GRANULOCYTES # BLD AUTO: 0.05 THOUSAND/UL (ref 0–0.2)
IMM GRANULOCYTES NFR BLD AUTO: 0 % (ref 0–2)
LYMPHOCYTES # BLD AUTO: 1.1 THOUSANDS/ΜL (ref 0.6–4.47)
LYMPHOCYTES NFR BLD AUTO: 8 % (ref 14–44)
MCH RBC QN AUTO: 30.4 PG (ref 26.8–34.3)
MCHC RBC AUTO-ENTMCNC: 31.8 G/DL (ref 31.4–37.4)
MCV RBC AUTO: 95 FL (ref 82–98)
MONOCYTES # BLD AUTO: 0.85 THOUSAND/ΜL (ref 0.17–1.22)
MONOCYTES NFR BLD AUTO: 6 % (ref 4–12)
NEUTROPHILS # BLD AUTO: 11.73 THOUSANDS/ΜL (ref 1.85–7.62)
NEUTS SEG NFR BLD AUTO: 86 % (ref 43–75)
NRBC BLD AUTO-RTO: 0 /100 WBCS
PLATELET # BLD AUTO: 182 THOUSANDS/UL (ref 149–390)
PMV BLD AUTO: 10.5 FL (ref 8.9–12.7)
POTASSIUM SERPL-SCNC: 4.2 MMOL/L (ref 3.5–5.3)
PROT SERPL-MCNC: 6.5 G/DL (ref 6.4–8.2)
RBC # BLD AUTO: 3.92 MILLION/UL (ref 3.81–5.12)
SODIUM SERPL-SCNC: 141 MMOL/L (ref 136–145)
WBC # BLD AUTO: 13.74 THOUSAND/UL (ref 4.31–10.16)

## 2021-12-07 PROCEDURE — 80053 COMPREHEN METABOLIC PANEL: CPT | Performed by: PHYSICIAN ASSISTANT

## 2021-12-07 PROCEDURE — 99239 HOSP IP/OBS DSCHRG MGMT >30: CPT | Performed by: PHYSICIAN ASSISTANT

## 2021-12-07 PROCEDURE — 85025 COMPLETE CBC W/AUTO DIFF WBC: CPT | Performed by: PHYSICIAN ASSISTANT

## 2021-12-07 PROCEDURE — 99024 POSTOP FOLLOW-UP VISIT: CPT | Performed by: PHYSICIAN ASSISTANT

## 2021-12-07 PROCEDURE — 97165 OT EVAL LOW COMPLEX 30 MIN: CPT

## 2021-12-07 PROCEDURE — 97162 PT EVAL MOD COMPLEX 30 MIN: CPT

## 2021-12-07 RX ORDER — CEFDINIR 300 MG/1
300 CAPSULE ORAL EVERY 12 HOURS SCHEDULED
Status: DISCONTINUED | OUTPATIENT
Start: 2021-12-07 | End: 2021-12-07 | Stop reason: HOSPADM

## 2021-12-07 RX ORDER — HYDROMORPHONE HCL/PF 1 MG/ML
0.5 SYRINGE (ML) INJECTION
Status: DISCONTINUED | OUTPATIENT
Start: 2021-12-07 | End: 2021-12-07 | Stop reason: HOSPADM

## 2021-12-07 RX ORDER — ACETAMINOPHEN 325 MG/1
650 TABLET ORAL EVERY 6 HOURS PRN
Qty: 60 TABLET | Refills: 0
Start: 2021-12-07 | End: 2022-02-28 | Stop reason: ALTCHOICE

## 2021-12-07 RX ORDER — OXYCODONE HYDROCHLORIDE 5 MG/1
5 TABLET ORAL EVERY 4 HOURS PRN
Status: DISCONTINUED | OUTPATIENT
Start: 2021-12-07 | End: 2021-12-07 | Stop reason: HOSPADM

## 2021-12-07 RX ORDER — SIMETHICONE 80 MG
80 TABLET,CHEWABLE ORAL EVERY 6 HOURS PRN
Status: DISCONTINUED | OUTPATIENT
Start: 2021-12-07 | End: 2021-12-07 | Stop reason: HOSPADM

## 2021-12-07 RX ORDER — PANTOPRAZOLE SODIUM 40 MG/1
40 TABLET, DELAYED RELEASE ORAL
Status: DISCONTINUED | OUTPATIENT
Start: 2021-12-07 | End: 2021-12-07 | Stop reason: HOSPADM

## 2021-12-07 RX ORDER — KETOROLAC TROMETHAMINE 30 MG/ML
15 INJECTION, SOLUTION INTRAMUSCULAR; INTRAVENOUS EVERY 6 HOURS SCHEDULED
Status: DISCONTINUED | OUTPATIENT
Start: 2021-12-07 | End: 2021-12-07 | Stop reason: HOSPADM

## 2021-12-07 RX ORDER — ACETAMINOPHEN 325 MG/1
975 TABLET ORAL EVERY 6 HOURS SCHEDULED
Status: DISCONTINUED | OUTPATIENT
Start: 2021-12-07 | End: 2021-12-07 | Stop reason: HOSPADM

## 2021-12-07 RX ORDER — OXYCODONE HYDROCHLORIDE 5 MG/1
10 TABLET ORAL EVERY 4 HOURS PRN
Status: DISCONTINUED | OUTPATIENT
Start: 2021-12-07 | End: 2021-12-07 | Stop reason: HOSPADM

## 2021-12-07 RX ADMIN — HYDROMORPHONE HYDROCHLORIDE 0.5 MG: 1 INJECTION, SOLUTION INTRAMUSCULAR; INTRAVENOUS; SUBCUTANEOUS at 01:25

## 2021-12-07 RX ADMIN — HEPARIN SODIUM 5000 UNITS: 5000 INJECTION INTRAVENOUS; SUBCUTANEOUS at 06:08

## 2021-12-07 RX ADMIN — KETOROLAC TROMETHAMINE 15 MG: 30 INJECTION, SOLUTION INTRAMUSCULAR at 08:37

## 2021-12-07 RX ADMIN — ACETAMINOPHEN 975 MG: 325 TABLET, FILM COATED ORAL at 08:37

## 2021-12-07 RX ADMIN — OXYCODONE HYDROCHLORIDE 5 MG: 5 TABLET ORAL at 10:38

## 2021-12-07 RX ADMIN — CEFDINIR 300 MG: 300 CAPSULE ORAL at 10:32

## 2021-12-07 RX ADMIN — PANTOPRAZOLE SODIUM 40 MG: 40 TABLET, DELAYED RELEASE ORAL at 08:38

## 2021-12-07 RX ADMIN — OXYBUTYNIN CHLORIDE 5 MG: 5 TABLET ORAL at 08:38

## 2021-12-07 RX ADMIN — SIMETHICONE 80 MG: 80 TABLET, CHEWABLE ORAL at 11:57

## 2021-12-08 ENCOUNTER — TRANSITIONAL CARE MANAGEMENT (OUTPATIENT)
Dept: FAMILY MEDICINE CLINIC | Facility: HOSPITAL | Age: 71
End: 2021-12-08

## 2021-12-09 PROBLEM — K81.0 ACUTE CHOLECYSTITIS: Status: ACTIVE | Noted: 2021-12-05

## 2021-12-10 LAB
BACTERIA BLD CULT: NORMAL
BACTERIA BLD CULT: NORMAL

## 2021-12-13 ENCOUNTER — TELEPHONE (OUTPATIENT)
Dept: FAMILY MEDICINE CLINIC | Facility: HOSPITAL | Age: 71
End: 2021-12-13

## 2021-12-14 ENCOUNTER — TELEPHONE (OUTPATIENT)
Dept: FAMILY MEDICINE CLINIC | Facility: HOSPITAL | Age: 71
End: 2021-12-14

## 2021-12-14 ENCOUNTER — LAB REQUISITION (OUTPATIENT)
Dept: LAB | Facility: HOSPITAL | Age: 71
End: 2021-12-14
Payer: MEDICARE

## 2021-12-14 DIAGNOSIS — K81.0 ACUTE CHOLECYSTITIS: Primary | ICD-10-CM

## 2021-12-14 DIAGNOSIS — N39.0 URINARY TRACT INFECTION, SITE NOT SPECIFIED: ICD-10-CM

## 2021-12-14 LAB
BILIRUB UR QL STRIP: NEGATIVE
CLARITY UR: CLEAR
COLOR UR: YELLOW
GLUCOSE UR STRIP-MCNC: NEGATIVE MG/DL
HGB UR QL STRIP.AUTO: NEGATIVE
KETONES UR STRIP-MCNC: NEGATIVE MG/DL
LEUKOCYTE ESTERASE UR QL STRIP: NEGATIVE
NITRITE UR QL STRIP: NEGATIVE
PH UR STRIP.AUTO: 7 [PH]
PROT UR STRIP-MCNC: NEGATIVE MG/DL
SP GR UR STRIP.AUTO: <=1.005 (ref 1–1.03)
UROBILINOGEN UR QL STRIP.AUTO: 0.2 E.U./DL

## 2021-12-14 PROCEDURE — 81003 URINALYSIS AUTO W/O SCOPE: CPT | Performed by: INTERNAL MEDICINE

## 2021-12-14 RX ORDER — CEFDINIR 300 MG/1
300 CAPSULE ORAL EVERY 12 HOURS SCHEDULED
Qty: 10 CAPSULE | Refills: 0 | Status: SHIPPED | OUTPATIENT
Start: 2021-12-14 | End: 2021-12-19

## 2021-12-15 ENCOUNTER — OFFICE VISIT (OUTPATIENT)
Dept: SURGERY | Facility: CLINIC | Age: 71
End: 2021-12-15

## 2021-12-15 VITALS
HEART RATE: 98 BPM | TEMPERATURE: 96.8 F | SYSTOLIC BLOOD PRESSURE: 133 MMHG | DIASTOLIC BLOOD PRESSURE: 84 MMHG | BODY MASS INDEX: 30.71 KG/M2 | WEIGHT: 184.56 LBS

## 2021-12-15 DIAGNOSIS — L98.9 LESION OF FINGER: Primary | ICD-10-CM

## 2021-12-15 DIAGNOSIS — K81.0 ACUTE CHOLECYSTITIS: ICD-10-CM

## 2021-12-15 PROCEDURE — 99024 POSTOP FOLLOW-UP VISIT: CPT | Performed by: SURGERY

## 2021-12-16 LAB
DME PARACHUTE DELIVERY DATE ACTUAL: NORMAL
DME PARACHUTE DELIVERY DATE REQUESTED: NORMAL
DME PARACHUTE ITEM DESCRIPTION: NORMAL
DME PARACHUTE ORDER STATUS: NORMAL
DME PARACHUTE SUPPLIER NAME: NORMAL
DME PARACHUTE SUPPLIER PHONE: NORMAL

## 2021-12-17 ENCOUNTER — TELEPHONE (OUTPATIENT)
Dept: FAMILY MEDICINE CLINIC | Facility: HOSPITAL | Age: 71
End: 2021-12-17

## 2021-12-17 DIAGNOSIS — R11.0 NAUSEA IN ADULT: Primary | ICD-10-CM

## 2021-12-17 RX ORDER — ONDANSETRON 4 MG/1
4 TABLET, ORALLY DISINTEGRATING ORAL EVERY 6 HOURS PRN
Qty: 20 TABLET | Refills: 0 | Status: SHIPPED | OUTPATIENT
Start: 2021-12-17 | End: 2022-02-28 | Stop reason: ALTCHOICE

## 2022-01-04 ENCOUNTER — OFFICE VISIT (OUTPATIENT)
Dept: OBGYN CLINIC | Facility: CLINIC | Age: 72
End: 2022-01-04
Payer: MEDICARE

## 2022-01-04 ENCOUNTER — APPOINTMENT (OUTPATIENT)
Dept: RADIOLOGY | Facility: CLINIC | Age: 72
End: 2022-01-04
Payer: MEDICARE

## 2022-01-04 VITALS
DIASTOLIC BLOOD PRESSURE: 78 MMHG | BODY MASS INDEX: 30.66 KG/M2 | SYSTOLIC BLOOD PRESSURE: 136 MMHG | WEIGHT: 184 LBS | HEIGHT: 65 IN

## 2022-01-04 DIAGNOSIS — M79.89 MASS OF SOFT TISSUE OF HAND: Primary | ICD-10-CM

## 2022-01-04 DIAGNOSIS — M79.642 LEFT HAND PAIN: ICD-10-CM

## 2022-01-04 DIAGNOSIS — L98.9 LESION OF FINGER: ICD-10-CM

## 2022-01-04 PROCEDURE — 73130 X-RAY EXAM OF HAND: CPT

## 2022-01-04 PROCEDURE — 99203 OFFICE O/P NEW LOW 30 MIN: CPT | Performed by: ORTHOPAEDIC SURGERY

## 2022-01-04 NOTE — ASSESSMENT & PLAN NOTE
Findings consistent with soft tissue mass of left ring finger, suspect hemangioma  Imaging and prognosis reviewed with patient  Patient would like to have removed and will refer to hand specialist to discuss surgical excision  She has no formal restrictions  See as needed  All patient's questions were answered to her satisfaction  This note is created using dictation transcription  It may contain typographical errors, grammatical errors, improperly dictated words, background noise and other errors

## 2022-01-11 DIAGNOSIS — M54.41 CHRONIC BILATERAL LOW BACK PAIN WITH BILATERAL SCIATICA: ICD-10-CM

## 2022-01-11 DIAGNOSIS — G89.29 CHRONIC BILATERAL LOW BACK PAIN WITH BILATERAL SCIATICA: ICD-10-CM

## 2022-01-11 DIAGNOSIS — M54.42 CHRONIC BILATERAL LOW BACK PAIN WITH BILATERAL SCIATICA: ICD-10-CM

## 2022-01-12 DIAGNOSIS — R32 INCONTINENCE OF URINE IN FEMALE: ICD-10-CM

## 2022-01-12 DIAGNOSIS — K21.9 GASTROESOPHAGEAL REFLUX DISEASE, UNSPECIFIED WHETHER ESOPHAGITIS PRESENT: ICD-10-CM

## 2022-01-12 RX ORDER — ESOMEPRAZOLE MAGNESIUM 40 MG/1
CAPSULE, DELAYED RELEASE ORAL
Qty: 90 CAPSULE | Refills: 1 | Status: SHIPPED | OUTPATIENT
Start: 2022-01-12 | End: 2022-01-13 | Stop reason: SDUPTHER

## 2022-01-12 RX ORDER — OXYBUTYNIN CHLORIDE 5 MG/1
5 TABLET ORAL 2 TIMES DAILY
Qty: 180 TABLET | Refills: 1 | Status: SHIPPED | OUTPATIENT
Start: 2022-01-12 | End: 2022-01-13 | Stop reason: SDUPTHER

## 2022-01-13 ENCOUNTER — TELEPHONE (OUTPATIENT)
Dept: FAMILY MEDICINE CLINIC | Facility: HOSPITAL | Age: 72
End: 2022-01-13

## 2022-01-13 DIAGNOSIS — R32 INCONTINENCE OF URINE IN FEMALE: ICD-10-CM

## 2022-01-13 DIAGNOSIS — K21.9 GASTROESOPHAGEAL REFLUX DISEASE, UNSPECIFIED WHETHER ESOPHAGITIS PRESENT: ICD-10-CM

## 2022-01-13 RX ORDER — OXYBUTYNIN CHLORIDE 5 MG/1
5 TABLET ORAL 2 TIMES DAILY
Qty: 180 TABLET | Refills: 1 | Status: SHIPPED | OUTPATIENT
Start: 2022-01-13 | End: 2022-04-13

## 2022-01-13 RX ORDER — ESOMEPRAZOLE MAGNESIUM 40 MG/1
CAPSULE, DELAYED RELEASE ORAL
Qty: 90 CAPSULE | Refills: 1 | Status: SHIPPED | OUTPATIENT
Start: 2022-01-13 | End: 2022-05-27 | Stop reason: SDUPTHER

## 2022-01-13 RX ORDER — HYDROCODONE BITARTRATE AND ACETAMINOPHEN 5; 325 MG/1; MG/1
1 TABLET ORAL EVERY 8 HOURS PRN
Qty: 90 TABLET | Refills: 0 | Status: SHIPPED | OUTPATIENT
Start: 2022-01-13 | End: 2022-03-11 | Stop reason: SDUPTHER

## 2022-01-13 NOTE — TELEPHONE ENCOUNTER
DITROPAN AND NEXIUM SENT TO [facility] IN ERROR - PLEASE CANCEL AND SEND TO [facility]

## 2022-01-13 NOTE — TELEPHONE ENCOUNTER
Pt states she has incontinence at night so feels this medication is not working for her  She is going to stop    Pt can be reached at [phone]

## 2022-01-14 NOTE — TELEPHONE ENCOUNTER
Called PT - at this time - she doesn't want to switch to another medication - will call us if her incontinence gets worse

## 2022-02-25 ENCOUNTER — HOSPITAL ENCOUNTER (EMERGENCY)
Facility: HOSPITAL | Age: 72
Discharge: HOME | End: 2022-02-25
Attending: EMERGENCY MEDICINE
Payer: MEDICARE

## 2022-02-25 ENCOUNTER — TELEPHONE (OUTPATIENT)
Dept: OTHER | Facility: OTHER | Age: 72
End: 2022-02-25

## 2022-02-25 ENCOUNTER — APPOINTMENT (EMERGENCY)
Dept: RADIOLOGY | Facility: HOSPITAL | Age: 72
End: 2022-02-25
Payer: MEDICARE

## 2022-02-25 ENCOUNTER — APPOINTMENT (EMERGENCY)
Dept: NON INVASIVE DIAGNOSTICS | Facility: HOSPITAL | Age: 72
End: 2022-02-25
Payer: MEDICARE

## 2022-02-25 ENCOUNTER — OFFICE VISIT (OUTPATIENT)
Dept: URGENT CARE | Facility: CLINIC | Age: 72
End: 2022-02-25
Payer: MEDICARE

## 2022-02-25 VITALS
BODY MASS INDEX: 29.89 KG/M2 | RESPIRATION RATE: 17 BRPM | WEIGHT: 186 LBS | OXYGEN SATURATION: 97 % | SYSTOLIC BLOOD PRESSURE: 120 MMHG | DIASTOLIC BLOOD PRESSURE: 84 MMHG | TEMPERATURE: 98.6 F | HEIGHT: 66 IN | HEART RATE: 96 BPM

## 2022-02-25 VITALS
WEIGHT: 185 LBS | RESPIRATION RATE: 17 BRPM | HEIGHT: 66 IN | HEART RATE: 82 BPM | TEMPERATURE: 98.2 F | SYSTOLIC BLOOD PRESSURE: 152 MMHG | BODY MASS INDEX: 29.73 KG/M2 | OXYGEN SATURATION: 96 % | DIASTOLIC BLOOD PRESSURE: 66 MMHG

## 2022-02-25 DIAGNOSIS — M25.571 RIGHT ANKLE PAIN: Primary | ICD-10-CM

## 2022-02-25 DIAGNOSIS — M79.661 PAIN OF RIGHT CALF: Primary | ICD-10-CM

## 2022-02-25 PROCEDURE — 99284 EMERGENCY DEPT VISIT MOD MDM: CPT

## 2022-02-25 PROCEDURE — 99213 OFFICE O/P EST LOW 20 MIN: CPT | Performed by: FAMILY MEDICINE

## 2022-02-25 PROCEDURE — 73610 X-RAY EXAM OF ANKLE: CPT

## 2022-02-25 PROCEDURE — 99285 EMERGENCY DEPT VISIT HI MDM: CPT | Performed by: EMERGENCY MEDICINE

## 2022-02-25 PROCEDURE — G0463 HOSPITAL OUTPT CLINIC VISIT: HCPCS | Performed by: FAMILY MEDICINE

## 2022-02-25 PROCEDURE — 93971 EXTREMITY STUDY: CPT

## 2022-02-25 RX ORDER — PREDNISONE 20 MG/1
40 TABLET ORAL DAILY
Qty: 10 TABLET | Refills: 0 | Status: SHIPPED | OUTPATIENT
Start: 2022-02-25 | End: 2022-03-02

## 2022-02-25 RX ORDER — MELOXICAM 7.5 MG/1
7.5 TABLET ORAL DAILY
Qty: 60 TABLET | Refills: 0 | Status: SHIPPED | OUTPATIENT
Start: 2022-02-25 | End: 2022-02-28 | Stop reason: ALTCHOICE

## 2022-02-25 NOTE — ED PROVIDER NOTES
History  Chief Complaint   Patient presents with    Leg Pain     patient presents to the ED with c/o right leg pain from toes upper leg, states that it began when she was dancing in a field in september  states that she went to urgent care and was told she needed to get checked for a blood clot      Pt in the ER with c/o right ankle/RLE pain since Sept, now worsening  Symptoms began after a few hours of dancing  She states that pain now radiates into her calf and thigh  Pain is worse with weight bearing  Pt was evaluated at the local urgent care center, and advised to proceed to the ED for a venous doppler of the affected leg to r/o DVT  She denies a hx of DVT/PE  She has a hx of chronic pain, on Vicodin, diverticulitis, HTN, vertigo, GERD  History provided by:  Patient   used: No    Leg Pain  Location:  Ankle and leg  Injury: no    Leg location:  R lower leg  Ankle location:  R ankle  Pain details:     Quality:  Aching    Severity:  Mild    Onset quality:  Gradual    Timing:  Constant    Progression:  Worsening  Chronicity:  New  Dislocation: no    Foreign body present:  No foreign bodies  Tetanus status:  Unknown  Prior injury to area:  No  Relieved by: vicodin  Worsened by:  Bearing weight  Associated symptoms: no back pain, no fever and no neck pain    Risk factors: no obesity        Prior to Admission Medications   Prescriptions Last Dose Informant Patient Reported? Taking?    Ascorbic Acid (VITAMIN C) 1000 MG tablet   Yes No   Sig: Take 1,000 mg by mouth daily   Ginseng 100 MG CAPS   Yes No   Sig: Take by mouth 1 daily   HYDROcodone-acetaminophen (NORCO) 5-325 mg per tablet   No No   Sig: Take 1 tablet by mouth every 8 (eight) hours as needed for pain Max Daily Amount: 3 tablets   acetaminophen (TYLENOL) 325 mg tablet   No No   Sig: Take 2 tablets (650 mg total) by mouth every 6 (six) hours as needed for mild pain   Patient not taking: Reported on 1/4/2022    celecoxib (CeleBREX) 100 mg capsule   No No   Sig: Take 1 capsule (100 mg total) by mouth 2 (two) times a day   clonazePAM (KlonoPIN) 0 5 mg tablet   No No   Si/2-1 tab  Bid prn anxiety   Patient not taking: Reported on 2022    esomeprazole (NexIUM) 40 MG capsule   No No   Sig: Take 1 tab daily   meclizine (ANTIVERT) 25 mg tablet   No No   Sig: Take 1 tablet (25 mg total) by mouth every 8 (eight) hours   Patient not taking: Reported on 2022    ondansetron (Zofran ODT) 4 mg disintegrating tablet   No No   Sig: Take 1 tablet (4 mg total) by mouth every 6 (six) hours as needed for nausea or vomiting   Patient not taking: Reported on 2022    oxybutynin (DITROPAN) 5 mg tablet   No No   Sig: Take 1 tablet (5 mg total) by mouth 2 (two) times a day      Facility-Administered Medications: None       Past Medical History:   Diagnosis Date    Arthritis     Chronic pain     Diverticulitis     GERD (gastroesophageal reflux disease)     Hypertension     Vertigo        Past Surgical History:   Procedure Laterality Date    CHOLECYSTECTOMY LAPAROSCOPIC N/A 2021    Procedure: SUBTOTAL CHOLECYSTECTOMY LAPAROSCOPIC;  Surgeon: Maliha Mathis MD;  Location:  MAIN OR;  Service: General    GALLBLADDER SURGERY  2021    Dr Gertrude Delarosa History   Problem Relation Age of Onset    Stroke Mother     Diabetes Mother     Hypertension Mother     Heart disease Father     Hypertension Father     Heart disease Brother     Diabetes Brother     Hypertension Brother     Substance Abuse Neg Hx     Mental illness Neg Hx      I have reviewed and agree with the history as documented      E-Cigarette/Vaping    E-Cigarette Use Never User      E-Cigarette/Vaping Substances     Social History     Tobacco Use    Smoking status: Never Smoker    Smokeless tobacco: Never Used   Vaping Use    Vaping Use: Never used   Substance Use Topics    Alcohol use: Yes     Comment: 3 times a week    Drug use: Yes     Types: Marijuana     Comment: rarely       Review of Systems   Constitutional: Negative for chills and fever  Respiratory: Negative for cough, chest tightness and shortness of breath  Gastrointestinal: Negative for abdominal pain, diarrhea, nausea and vomiting  Genitourinary: Negative for dysuria, frequency, hematuria and urgency  Musculoskeletal: Positive for gait problem  Negative for back pain, neck pain and neck stiffness  Skin: Negative for color change, pallor, rash and wound  All other systems reviewed and are negative  Physical Exam  Physical Exam  Vitals and nursing note reviewed  Constitutional:       General: She is not in acute distress  Appearance: She is well-developed  She is not diaphoretic  HENT:      Head: Normocephalic and atraumatic  Eyes:      Conjunctiva/sclera: Conjunctivae normal       Pupils: Pupils are equal, round, and reactive to light  Cardiovascular:      Heart sounds: Murmur heard  Pulmonary:      Effort: Pulmonary effort is normal  No respiratory distress  Breath sounds: Normal breath sounds  Abdominal:      General: Abdomen is flat  There is no distension  Tenderness: There is no abdominal tenderness  Musculoskeletal:         General: Tenderness present  No swelling, deformity or signs of injury  Normal range of motion  Cervical back: Normal range of motion and neck supple  Right upper leg: Normal       Left upper leg: Normal       Right knee: Normal       Left knee: Normal       Right lower leg: Normal  No edema  Left lower leg: Normal  No edema  Right ankle: No swelling, deformity or ecchymosis  Tenderness present over the AITF ligament  Normal range of motion  Left ankle: Normal    Skin:     General: Skin is warm and dry  Capillary Refill: Capillary refill takes less than 2 seconds  Coloration: Skin is not pale  Findings: No rash  Neurological:      General: No focal deficit present        Mental Status: She is alert and oriented to person, place, and time  Cranial Nerves: No cranial nerve deficit  Psychiatric:         Mood and Affect: Mood normal          Behavior: Behavior normal          Thought Content: Thought content normal          Judgment: Judgment normal          Vital Signs  ED Triage Vitals   Temperature Pulse Respirations Blood Pressure SpO2   02/25/22 1537 02/25/22 1503 02/25/22 1503 02/25/22 1503 02/25/22 1503   98 2 °F (36 8 °C) 88 18 (!) 185/79 97 %      Temp Source Heart Rate Source Patient Position - Orthostatic VS BP Location FiO2 (%)   02/25/22 1537 02/25/22 1503 -- 02/25/22 1503 --   Temporal Monitor  Right arm       Pain Score       02/25/22 1457       5           Vitals:    02/25/22 1503 02/25/22 1530   BP: (!) 185/79 152/66   Pulse: 88 82         Visual Acuity      ED Medications  Medications - No data to display    Diagnostic Studies  Results Reviewed     None                 XR ankle 3+ views RIGHT   ED Interpretation by John Oates DO (02/25 1602)   nad      VAS lower limb venous duplex study, unilateral/limited    (Results Pending)              Procedures  Procedures         ED Course  ED Course as of 02/25/22 2135 Fri Feb 25, 2022   [facility] report - neg DVT                                             MDM  Number of Diagnoses or Management Options  Right ankle pain: new and requires workup  Diagnosis management comments: Pt with right ankle/leg pain x months  Vascular study neg for DVT  Xray neg for fx  Pt refuses crutches, states that she has a walker at home  Will d/c to home with ortho f/u  Pt agrees with plan          Amount and/or Complexity of Data Reviewed  Tests in the radiology section of CPT®: ordered and reviewed    Risk of Complications, Morbidity, and/or Mortality  Presenting problems: high  Diagnostic procedures: high  Management options: high    Patient Progress  Patient progress: stable      Disposition  Final diagnoses:   Right ankle pain     Time reflects when diagnosis was documented in both MDM as applicable and the Disposition within this note     Time User Action Codes Description Comment    2/25/2022  4:05 PM Myron Swan Add [[id number] [id number]] Right ankle pain       ED Disposition     ED Disposition Condition Date/Time Comment    Discharge Stable Fri Feb 25, 2022  4:05 PM Madeline Saunders discharge to home/self care  Follow-up Information     Follow up With Specialties Details Why Contact Info Additional [address], DO Internal Medicine Schedule an appointment as soon as possible for a visit in 2 days for follow up LUIS EDUARDO Castillo [address] [phone]  [address] [facility] Specialists [facility] Orthopedic Surgery Schedule an appointment as soon as possible for a visit in 2 days for follow up [address] [place] [phone] [address] Specialists [facility], [address] [place] [address], [facility] [address]          Discharge Medication List as of 2/25/2022  4:06 PM      CONTINUE these medications which have NOT CHANGED    Details   acetaminophen (TYLENOL) 325 mg tablet Take 2 tablets (650 mg total) by mouth every 6 (six) hours as needed for mild pain, Starting Tue 12/7/2021, No Print      Ascorbic Acid (VITAMIN C) 1000 MG tablet Take 1,000 mg by mouth daily, Historical Med      celecoxib (CeleBREX) 100 mg capsule Take 1 capsule (100 mg total) by mouth 2 (two) times a day, Starting Wed 11/18/2020, Normal      clonazePAM (KlonoPIN) 0 5 mg tablet 1/2-1 tab   Bid prn anxiety, Normal      esomeprazole (NexIUM) 40 MG capsule Take 1 tab daily, Normal      Ginseng 100 MG CAPS Take by mouth 1 daily, Historical Med      HYDROcodone-acetaminophen (NORCO) 5-325 mg per tablet Take 1 tablet by mouth every 8 (eight) hours as needed for pain Max Daily Amount: 3 tablets, Starting Thu 1/13/2022, Normal      meclizine (ANTIVERT) 25 mg tablet Take 1 tablet (25 mg total) by mouth every 8 (eight) hours, Starting Thu 4/22/2021, Normal      ondansetron (Zofran ODT) 4 mg disintegrating tablet Take 1 tablet (4 mg total) by mouth every 6 (six) hours as needed for nausea or vomiting, Starting Fri 12/17/2021, Normal      oxybutynin (DITROPAN) 5 mg tablet Take 1 tablet (5 mg total) by mouth 2 (two) times a day, Starting Thu 1/13/2022, Normal             No discharge procedures on file      PDMP Review       Value Time User    PDMP Reviewed  Yes 1/13/2022  8:10 AM Van Jaimes DO          ED Provider  Electronically Signed by           Christina Leon DO  02/25/22 6626

## 2022-02-25 NOTE — DISCHARGE INSTRUCTIONS
Return to the ER for further concerns or worsening symptoms  Follow up with your primary care physician in 1-2 days  Use your walker when walking until you are cleared by a physician

## 2022-02-25 NOTE — PROGRESS NOTES
[email or web address]        NAME: Valentin Rothman is a 70 y o  female  : 1950    MRN: [id number]  DATE: 2022  TIME: 2:21 PM    Assessment and Plan   Pain of right calf [M79 661]  1  Pain of right calf           Patient Instructions       Follow up with PCP in 3-5 days  Proceed to  ER if symptoms worsen  Chief Complaint     Chief Complaint   Patient presents with    Leg Pain     Right leg pain that starts from right ankle, back of right leg up to right hip  History of Present Illness       68-year-old female with onset of pain from her right foot and going up her leg  She believes this may have started after dancing in the field  Pain travels to the back of the calf, to behind the knee and to the posterior aspect of her right thigh  She reports no history of blood clots in the past and denies any chest pain or shortness of breath at this time  Review of Systems   Review of Systems   Constitutional: Negative  HENT: Negative  Eyes: Negative  Respiratory: Negative  Cardiovascular: Negative  Gastrointestinal: Negative  Genitourinary: Negative  Musculoskeletal: Positive for arthralgias and myalgias  Negative for joint swelling  Skin: Negative  Allergic/Immunologic: Negative  Neurological: Negative  Hematological: Negative  Psychiatric/Behavioral: Negative            Current Medications       Current Outpatient Medications:     Ascorbic Acid (VITAMIN C) 1000 MG tablet, Take 1,000 mg by mouth daily, Disp: , Rfl:     celecoxib (CeleBREX) 100 mg capsule, Take 1 capsule (100 mg total) by mouth 2 (two) times a day, Disp: 180 capsule, Rfl: 3    esomeprazole (NexIUM) 40 MG capsule, Take 1 tab daily, Disp: 90 capsule, Rfl: 1    Ginseng 100 MG CAPS, Take by mouth 1 daily, Disp: , Rfl:     HYDROcodone-acetaminophen (NORCO) 5-325 mg per tablet, Take 1 tablet by mouth every 8 (eight) hours as needed for pain Max Daily Amount: 3 tablets, Disp: 90 tablet, Rfl: 0    oxybutynin (DITROPAN) 5 mg tablet, Take 1 tablet (5 mg total) by mouth 2 (two) times a day, Disp: 180 tablet, Rfl: 1    acetaminophen (TYLENOL) 325 mg tablet, Take 2 tablets (650 mg total) by mouth every 6 (six) hours as needed for mild pain (Patient not taking: Reported on 1/4/2022 ), Disp: 60 tablet, Rfl: 0    clonazePAM (KlonoPIN) 0 5 mg tablet, 1/2-1 tab  Bid prn anxiety (Patient not taking: Reported on 1/4/2022 ), Disp: 30 tablet, Rfl: 0    meclizine (ANTIVERT) 25 mg tablet, Take 1 tablet (25 mg total) by mouth every 8 (eight) hours (Patient not taking: Reported on 2/25/2022 ), Disp: 180 tablet, Rfl: 3    ondansetron (Zofran ODT) 4 mg disintegrating tablet, Take 1 tablet (4 mg total) by mouth every 6 (six) hours as needed for nausea or vomiting (Patient not taking: Reported on 1/4/2022 ), Disp: 20 tablet, Rfl: 0    Current Allergies     Allergies as of 02/25/2022 - Reviewed 02/25/2022   Allergen Reaction Noted    Tramadol Hives 03/30/2016            The following portions of the patient's history were reviewed and updated as appropriate: allergies, current medications, past family history, past medical history, past social history, past surgical history and problem list      Past Medical History:   Diagnosis Date    Arthritis     Chronic pain     Diverticulitis     GERD (gastroesophageal reflux disease)     Hypertension     Vertigo        Past Surgical History:   Procedure Laterality Date    CHOLECYSTECTOMY LAPAROSCOPIC N/A 12/6/2021    Procedure: SUBTOTAL CHOLECYSTECTOMY LAPAROSCOPIC;  Surgeon:  Zachariah Ratliff MD;  Location:  MAIN OR;  Service: General    GALLBLADDER SURGERY  12/2021    Dr Ricky Deng History   Problem Relation Age of Onset    Stroke Mother     Diabetes Mother     Hypertension Mother     Heart disease Father     Hypertension Father     Heart disease Brother     Diabetes Brother     Hypertension Brother     Substance Abuse Neg Hx     Mental illness Neg Hx          Medications have been verified  Objective   /84   Pulse 96   Temp 98 6 °F (37 °C) (Tympanic)   Resp 17   Ht 5' 5 5" (1 664 m)   Wt 84 4 kg (186 lb)   SpO2 97%   BMI 30 48 kg/m²   No LMP recorded  Patient is postmenopausal        Physical Exam     Physical Exam  Vitals and nursing note reviewed  Constitutional:       Appearance: She is well-developed  HENT:      Head: Normocephalic  Eyes:      Pupils: Pupils are equal, round, and reactive to light  Pulmonary:      Effort: Pulmonary effort is normal    Musculoskeletal:         General: Normal range of motion  Skin:     General: Skin is warm and dry  Neurological:      Mental Status: She is alert and oriented to person, place, and time

## 2022-02-27 PROCEDURE — 93971 EXTREMITY STUDY: CPT | Performed by: SURGERY

## 2022-02-28 ENCOUNTER — OFFICE VISIT (OUTPATIENT)
Dept: FAMILY MEDICINE CLINIC | Facility: HOSPITAL | Age: 72
End: 2022-02-28
Payer: MEDICARE

## 2022-02-28 VITALS
HEART RATE: 90 BPM | OXYGEN SATURATION: 96 % | BODY MASS INDEX: 29.99 KG/M2 | HEIGHT: 66 IN | DIASTOLIC BLOOD PRESSURE: 80 MMHG | WEIGHT: 186.6 LBS | SYSTOLIC BLOOD PRESSURE: 130 MMHG

## 2022-02-28 DIAGNOSIS — B36.9 FUNGAL RASH OF TRUNK: Primary | ICD-10-CM

## 2022-02-28 DIAGNOSIS — E78.2 MIXED HYPERLIPIDEMIA: Chronic | ICD-10-CM

## 2022-02-28 DIAGNOSIS — M25.571 ACUTE RIGHT ANKLE PAIN: ICD-10-CM

## 2022-02-28 DIAGNOSIS — M79.661 PAIN OF RIGHT CALF: ICD-10-CM

## 2022-02-28 DIAGNOSIS — I10 PRIMARY HYPERTENSION: ICD-10-CM

## 2022-02-28 DIAGNOSIS — K91.5 POST-CHOLECYSTECTOMY SYNDROME: ICD-10-CM

## 2022-02-28 PROBLEM — R11.2 PONV (POSTOPERATIVE NAUSEA AND VOMITING): Status: RESOLVED | Noted: 2021-12-06 | Resolved: 2022-02-28

## 2022-02-28 PROBLEM — Z98.890 PONV (POSTOPERATIVE NAUSEA AND VOMITING): Status: RESOLVED | Noted: 2021-12-06 | Resolved: 2022-02-28

## 2022-02-28 PROCEDURE — 99214 OFFICE O/P EST MOD 30 MIN: CPT | Performed by: INTERNAL MEDICINE

## 2022-02-28 RX ORDER — NYSTATIN 100000 [USP'U]/G
POWDER TOPICAL 3 TIMES DAILY
Qty: 45 G | Refills: 2 | Status: SHIPPED | OUTPATIENT
Start: 2022-02-28

## 2022-02-28 RX ORDER — CHOLESTYRAMINE 4 G/9G
4 POWDER, FOR SUSPENSION ORAL 2 TIMES DAILY WITH MEALS
Qty: 60 PACKET | Refills: 5 | Status: SHIPPED | OUTPATIENT
Start: 2022-02-28

## 2022-02-28 NOTE — ASSESSMENT & PLAN NOTE
Seen recently at urgent care- has a cane- she does not drive to go to PT- will see if we can arrange for home PT

## 2022-02-28 NOTE — PROGRESS NOTES
Assessment/Plan:             Problem List Items Addressed This Visit        Cardiovascular and Mediastinum    Hypertension       Other    Mixed hyperlipidemia (Chronic)    Relevant Medications    cholestyramine (QUESTRAN) 4 GM/DOSE powder    Pain of right calf     Seen recently at urgent care- has a cane- she does not drive to go to PT- will see if we can arrange for home PT           Other Visit Diagnoses     Fungal rash of trunk    -  Primary    Relevant Medications    nystatin (MYCOSTATIN) powder    Post-cholecystectomy syndrome        Relevant Medications    cholestyramine (QUESTRAN) 4 GM/DOSE powder            Subjective:      Patient ID: Horace Parker is a 70 y o  female    1  Developed rash under breasts- had treid otc meds  2  Has diarrhea after gallbladder removed-will give trial of questran light to see if that improves       The following portions of the patient's history were reviewed and updated as appropriate: allergies, current medications and problem list      Review of Systems   Musculoskeletal:        Right leg pain- will consult pt    All other systems reviewed and are negative          Objective:      Current Outpatient Medications:     Ascorbic Acid (VITAMIN C) 1000 MG tablet, Take 1,000 mg by mouth daily, Disp: , Rfl:     celecoxib (CeleBREX) 100 mg capsule, Take 1 capsule (100 mg total) by mouth 2 (two) times a day, Disp: 180 capsule, Rfl: 3    esomeprazole (NexIUM) 40 MG capsule, Take 1 tab daily, Disp: 90 capsule, Rfl: 1    Ginseng 100 MG CAPS, Take by mouth 1 daily, Disp: , Rfl:     HYDROcodone-acetaminophen (NORCO) 5-325 mg per tablet, Take 1 tablet by mouth every 8 (eight) hours as needed for pain Max Daily Amount: 3 tablets, Disp: 90 tablet, Rfl: 0    oxybutynin (DITROPAN) 5 mg tablet, Take 1 tablet (5 mg total) by mouth 2 (two) times a day, Disp: 180 tablet, Rfl: 1    predniSONE 20 mg tablet, Take 2 tablets (40 mg total) by mouth daily for 5 days, Disp: 10 tablet, Rfl: 0   cholestyramine (QUESTRAN) 4 GM/DOSE powder, Take 1 packet (4 g total) by mouth 2 (two) times a day with meals, Disp: 60 packet, Rfl: 5    nystatin (MYCOSTATIN) powder, Apply topically 3 (three) times a day, Disp: 45 g, Rfl: 2    Blood pressure 130/80, pulse 90, height 5' 5 5" (1 664 m), weight 84 6 kg (186 lb 9 6 oz), SpO2 96 %  Physical Exam  Vitals and nursing note reviewed  Constitutional:       General: She is not in acute distress  Appearance: She is not ill-appearing  HENT:      Nose: No congestion  Eyes:      General: No scleral icterus  Right eye: No discharge  Left eye: No discharge  Cardiovascular:      Rate and Rhythm: Normal rate and regular rhythm  Heart sounds: No murmur heard  Pulmonary:      Breath sounds: No wheezing or rhonchi  Abdominal:      General: Abdomen is flat  Palpations: Abdomen is soft  Tenderness: There is no abdominal tenderness  Hernia: No hernia is present  Musculoskeletal:         General: Tenderness present  Comments: rigth ankle anterior pain- talofibular region - no instality   Skin:     Comments: 4 cm x 12 cm rash under each breat with irregular border   Neurological:      Mental Status: She is alert

## 2022-03-01 ENCOUNTER — TELEPHONE (OUTPATIENT)
Dept: FAMILY MEDICINE CLINIC | Facility: HOSPITAL | Age: 72
End: 2022-03-01

## 2022-03-01 NOTE — TELEPHONE ENCOUNTER
Patient says Dr Rin Monahan stopped her Meclizine  She states she must have it as she has constant dizziness  She has a supply at home  She wants it removed from the stop med list      She is asking for a nurse call

## 2022-03-02 ENCOUNTER — DOCUMENTATION (OUTPATIENT)
Dept: FAMILY MEDICINE CLINIC | Facility: HOSPITAL | Age: 72
End: 2022-03-02

## 2022-03-02 ENCOUNTER — TELEPHONE (OUTPATIENT)
Dept: FAMILY MEDICINE CLINIC | Facility: HOSPITAL | Age: 72
End: 2022-03-02

## 2022-03-02 ENCOUNTER — TELEPHONE (OUTPATIENT)
Dept: OBGYN CLINIC | Facility: HOSPITAL | Age: 72
End: 2022-03-02

## 2022-03-02 DIAGNOSIS — R42 VERTIGO: Primary | ICD-10-CM

## 2022-03-02 RX ORDER — MECLIZINE HYDROCHLORIDE 25 MG/1
25 TABLET ORAL EVERY 8 HOURS PRN
Qty: 30 TABLET | Refills: 0
Start: 2022-03-02 | End: 2022-05-23 | Stop reason: SDUPTHER

## 2022-03-02 NOTE — TELEPHONE ENCOUNTER
If patient is not able to be seen by [facility] for PT see if we can get her set up with [facility] for in-home PT

## 2022-03-02 NOTE — TELEPHONE ENCOUNTER
Pt said she talked to [facility] and they told her that she doesn't need to see them- she said that they want her to do in house PT     Mehul Vogt

## 2022-03-03 DIAGNOSIS — R26.2 AMBULATORY DYSFUNCTION: ICD-10-CM

## 2022-03-03 DIAGNOSIS — M79.604 PAIN OF RIGHT LOWER EXTREMITY: Primary | ICD-10-CM

## 2022-03-03 DIAGNOSIS — R26.2 AMBULATORY DYSFUNCTION: Primary | ICD-10-CM

## 2022-03-03 NOTE — TELEPHONE ENCOUNTER
Talked to pt gave her number for [facility] told her to try and make an appt with them and if she didn't have luck to call us back and we'll [facility]

## 2022-03-04 ENCOUNTER — TELEPHONE (OUTPATIENT)
Dept: FAMILY MEDICINE CLINIC | Facility: HOSPITAL | Age: 72
End: 2022-03-04

## 2022-03-04 NOTE — TELEPHONE ENCOUNTER
Pt just wanted to let us know that she is following up with [facility] on the 14th of this month Hydroquinone Counseling:  Patient advised that medication may result in skin irritation, lightening (hypopigmentation), dryness, and burning.  In the event of skin irritation, the patient was advised to reduce the amount of the drug applied or use it less frequently.  Rarely, spots that are treated with hydroquinone can become darker (pseudoochronosis).  Should this occur, patient instructed to stop medication and call the office. The patient verbalized understanding of the proper use and possible adverse effects of hydroquinone.  All of the patient's questions and concerns were addressed.

## 2022-03-04 NOTE — TELEPHONE ENCOUNTER
pts cane is old, a wooden cane that she feels is going to snap soon   shes asking if we can write her a script for a new one, expressed [facility]

## 2022-03-11 DIAGNOSIS — G89.29 CHRONIC BILATERAL LOW BACK PAIN WITH BILATERAL SCIATICA: ICD-10-CM

## 2022-03-11 DIAGNOSIS — M54.42 CHRONIC BILATERAL LOW BACK PAIN WITH BILATERAL SCIATICA: ICD-10-CM

## 2022-03-11 DIAGNOSIS — M54.41 CHRONIC BILATERAL LOW BACK PAIN WITH BILATERAL SCIATICA: ICD-10-CM

## 2022-03-11 RX ORDER — HYDROCODONE BITARTRATE AND ACETAMINOPHEN 5; 325 MG/1; MG/1
1 TABLET ORAL EVERY 8 HOURS PRN
Qty: 90 TABLET | Refills: 0 | Status: SHIPPED | OUTPATIENT
Start: 2022-03-11 | End: 2022-07-22 | Stop reason: SDUPTHER

## 2022-03-14 ENCOUNTER — TELEPHONE (OUTPATIENT)
Dept: FAMILY MEDICINE CLINIC | Facility: HOSPITAL | Age: 72
End: 2022-03-14

## 2022-04-12 DIAGNOSIS — R32 INCONTINENCE OF URINE IN FEMALE: ICD-10-CM

## 2022-04-13 RX ORDER — OXYBUTYNIN CHLORIDE 5 MG/1
5 TABLET ORAL 2 TIMES DAILY
Qty: 180 TABLET | Refills: 1 | Status: SHIPPED | OUTPATIENT
Start: 2022-04-13

## 2022-05-19 ENCOUNTER — RA CDI HCC (OUTPATIENT)
Dept: OTHER | Facility: HOSPITAL | Age: 72
End: 2022-05-19

## 2022-05-19 NOTE — PROGRESS NOTES
[address]  coding opportunities       Chart reviewed, no opportunity found: CHART REVIEWED, NO OPPORTUNITY FOUND        Patients Insurance     Medicare Insurance: Medicare

## 2022-05-23 DIAGNOSIS — M54.41 CHRONIC BILATERAL LOW BACK PAIN WITH BILATERAL SCIATICA: ICD-10-CM

## 2022-05-23 DIAGNOSIS — M54.42 CHRONIC BILATERAL LOW BACK PAIN WITH BILATERAL SCIATICA: ICD-10-CM

## 2022-05-23 DIAGNOSIS — R42 VERTIGO: ICD-10-CM

## 2022-05-23 DIAGNOSIS — G89.29 CHRONIC BILATERAL LOW BACK PAIN WITH BILATERAL SCIATICA: ICD-10-CM

## 2022-05-24 RX ORDER — MECLIZINE HYDROCHLORIDE 25 MG/1
25 TABLET ORAL EVERY 8 HOURS PRN
Qty: 90 TABLET | Refills: 3 | Status: SHIPPED | OUTPATIENT
Start: 2022-05-24

## 2022-05-24 RX ORDER — CELECOXIB 100 MG/1
100 CAPSULE ORAL 2 TIMES DAILY
Qty: 180 CAPSULE | Refills: 3 | Status: SHIPPED | OUTPATIENT
Start: 2022-05-24

## 2022-05-25 ENCOUNTER — TELEPHONE (OUTPATIENT)
Dept: FAMILY MEDICINE CLINIC | Facility: HOSPITAL | Age: 72
End: 2022-05-25

## 2022-05-25 NOTE — TELEPHONE ENCOUNTER
Spoke to Jerod hoff at [facility]  Wants to know if dr woodward is related to pt  I will talk to ef, but I don't think she is      dk

## 2022-05-25 NOTE — TELEPHONE ENCOUNTER
Needs to speak with a nurse about a script they received    REF#  [id number]    Phone#:  [phone]

## 2022-05-27 ENCOUNTER — TELEPHONE (OUTPATIENT)
Dept: FAMILY MEDICINE CLINIC | Facility: HOSPITAL | Age: 72
End: 2022-05-27

## 2022-05-27 DIAGNOSIS — K21.9 GASTROESOPHAGEAL REFLUX DISEASE, UNSPECIFIED WHETHER ESOPHAGITIS PRESENT: ICD-10-CM

## 2022-05-27 RX ORDER — ESOMEPRAZOLE MAGNESIUM 40 MG/1
CAPSULE, DELAYED RELEASE ORAL
Qty: 90 CAPSULE | Refills: 1 | Status: SHIPPED | OUTPATIENT
Start: 2022-05-27

## 2022-05-27 NOTE — TELEPHONE ENCOUNTER
Patient is wondering if her medication for nexium and meclozine is being send to [facility] or her local pharmacy? She called a few days ago and this issue has not yet been resolved

## 2022-05-27 NOTE — TELEPHONE ENCOUNTER
Meclizine and Celebrex were sent to [facility]  PT requested Nexium be sent to [facility]  Done   CR

## 2022-07-22 DIAGNOSIS — M54.41 CHRONIC BILATERAL LOW BACK PAIN WITH BILATERAL SCIATICA: ICD-10-CM

## 2022-07-22 DIAGNOSIS — G89.29 CHRONIC BILATERAL LOW BACK PAIN WITH BILATERAL SCIATICA: ICD-10-CM

## 2022-07-22 DIAGNOSIS — M54.42 CHRONIC BILATERAL LOW BACK PAIN WITH BILATERAL SCIATICA: ICD-10-CM

## 2022-07-22 RX ORDER — HYDROCODONE BITARTRATE AND ACETAMINOPHEN 5; 325 MG/1; MG/1
1 TABLET ORAL EVERY 8 HOURS PRN
Qty: 90 TABLET | Refills: 0 | Status: SHIPPED | OUTPATIENT
Start: 2022-07-22

## 2022-09-26 ENCOUNTER — OFFICE VISIT (OUTPATIENT)
Dept: FAMILY MEDICINE CLINIC | Facility: HOSPITAL | Age: 72
End: 2022-09-26
Payer: MEDICARE

## 2022-09-26 VITALS
SYSTOLIC BLOOD PRESSURE: 130 MMHG | DIASTOLIC BLOOD PRESSURE: 80 MMHG | BODY MASS INDEX: 30.53 KG/M2 | OXYGEN SATURATION: 96 % | HEART RATE: 84 BPM | WEIGHT: 190 LBS | HEIGHT: 66 IN

## 2022-09-26 DIAGNOSIS — Z13.29 THYROID DISORDER SCREENING: ICD-10-CM

## 2022-09-26 DIAGNOSIS — M54.42 CHRONIC BILATERAL LOW BACK PAIN WITH BILATERAL SCIATICA: ICD-10-CM

## 2022-09-26 DIAGNOSIS — M54.41 CHRONIC BILATERAL LOW BACK PAIN WITH BILATERAL SCIATICA: ICD-10-CM

## 2022-09-26 DIAGNOSIS — N39.41 URGE INCONTINENCE OF URINE: ICD-10-CM

## 2022-09-26 DIAGNOSIS — G89.29 CHRONIC BILATERAL LOW BACK PAIN WITH BILATERAL SCIATICA: ICD-10-CM

## 2022-09-26 DIAGNOSIS — Z13.0 SCREENING FOR DEFICIENCY ANEMIA: ICD-10-CM

## 2022-09-26 DIAGNOSIS — E78.2 MIXED HYPERLIPIDEMIA: Primary | Chronic | ICD-10-CM

## 2022-09-26 DIAGNOSIS — R42 VERTIGO: ICD-10-CM

## 2022-09-26 DIAGNOSIS — I10 PRIMARY HYPERTENSION: ICD-10-CM

## 2022-09-26 PROCEDURE — 99214 OFFICE O/P EST MOD 30 MIN: CPT | Performed by: NURSE PRACTITIONER

## 2022-09-26 RX ORDER — UREA 10 %
500 LOTION (ML) TOPICAL 2 TIMES DAILY
COMMUNITY

## 2022-09-26 RX ORDER — CEPHRADINE 500 MG
CAPSULE ORAL DAILY
COMMUNITY

## 2022-09-26 RX ORDER — BIOTIN 10000 MCG
CAPSULE ORAL DAILY
COMMUNITY

## 2022-09-26 NOTE — ASSESSMENT & PLAN NOTE
Hx vertigo  Only takes meclizine at HS as she feels fatigue with this  Does not remember ever going to therapy for vestibular rehab  Discussed how magnesium can also cause fatigue, she will stop this

## 2022-09-26 NOTE — PROGRESS NOTES
[address] [facility]    Assessment/Plan:      Diagnosis ICD-10-CM Associated Orders   1  Mixed hyperlipidemia  E78 2 Lipid panel   2  Urge incontinence of urine  N39 41    3  Primary hypertension  I10 Comprehensive metabolic panel   4  Chronic bilateral low back pain with bilateral sciatica  M54 42 Comprehensive metabolic panel    G13 34     G89 29    5  Thyroid disorder screening  Z13 29 TSH, 3rd generation with Free T4 reflex   6  Screening for deficiency anemia  Z13 0 CBC and differential   7  Vertigo  R42 Ambulatory Referral to Physical Therapy      Stop magnesium   Limit processed foods   Referral to Physical therapy for vestibular therapy   Obtain labwork to be completed for yearly physical    Change positions slowly  Check blood pressure with heart rate outside of office and bring readings to next visit  Return in about 1 month (around 10/26/2022) for Annual physical    Patient may call or return to office with any questions or concerns  ______________________________________________________________________  Subjective:     Patient ID: Sung Lundborg is a 67 y o  female  Here for worsening vertigo  Unknown initial onset, however reports increasing occurrence  Associated nausea and ear pain  States sometimes the dizziness lasts up to one day  Unsure if meclizine helps but she only takes it at night as she feels it increases fatigue with oxybutynin and vicodin  She also smokes marijuana for pain control  Uses sweet oil for ear pain which helps  She started taking magnesium a few months ago as a supplementation  She notes increased bowels as well as fatigue  She drinks a lot of ginger ale and ice tea  Trying to drink more water  Eats a lot of processed foods and fast food  Uses [employer]  Checked blood pressure in office +orthostatic    Was ordered an Echocardiogram last year but did not complete this    [facility] Complaint   Patient presents with    Dizziness    Earache                The following portions of the patient's history were reviewed and updated as appropriate: allergies, current medications, past family history, past medical history, past social history, past surgical history and problem list     Review of Systems   Constitutional: Negative  Negative for activity change, appetite change, chills, fatigue and fever  HENT: Positive for ear pain  Negative for congestion, postnasal drip and sinus pain  Eyes: Negative  Respiratory: Negative  Negative for cough and shortness of breath  Cardiovascular: Negative  Negative for chest pain and leg swelling  Gastrointestinal: Negative  Negative for constipation and diarrhea  Endocrine: Negative  Genitourinary: Negative  Negative for dysuria  Musculoskeletal: Positive for gait problem  Skin: Negative  Allergic/Immunologic: Negative  Negative for immunocompromised state  Neurological: Positive for dizziness  Negative for light-headedness  Hematological: Negative  Psychiatric/Behavioral: Negative  Objective:      Vitals:    09/26/22 1501   BP: 130/80   Pulse:    SpO2:       Physical Exam  Vitals and nursing note reviewed  Constitutional:       Appearance: Normal appearance  HENT:      Head: Normocephalic and atraumatic  Right Ear: Tympanic membrane, ear canal and external ear normal       Left Ear: Tympanic membrane, ear canal and external ear normal       Nose: Nose normal       Mouth/Throat:      Mouth: Mucous membranes are moist       Pharynx: Oropharynx is clear  Eyes:      Extraocular Movements: Extraocular movements intact  Conjunctiva/sclera: Conjunctivae normal       Pupils: Pupils are equal, round, and reactive to light  Cardiovascular:      Rate and Rhythm: Normal rate and regular rhythm  Pulses: Normal pulses  Heart sounds: Normal heart sounds     Pulmonary:      Effort: Pulmonary effort is normal       Breath sounds: Normal breath sounds  Abdominal:      General: Bowel sounds are normal       Palpations: Abdomen is soft  Musculoskeletal:         General: Normal range of motion  Cervical back: Normal range of motion and neck supple  Right lower leg: Edema present  Left lower leg: Edema present  Comments: Trace BLE edema   Skin:     General: Skin is warm and dry  Neurological:      General: No focal deficit present  Mental Status: She is alert and oriented to person, place, and time  Psychiatric:         Mood and Affect: Mood normal          Behavior: Behavior normal          Thought Content: Thought content normal          Judgment: Judgment normal            Portions of the record may have been created with voice recognition software  Occasional wrong word or "sound alike" substitutions may have occurred due to the inherent limitations of voice recognition software  Please review the chart carefully and recognize, using context, where substitutions/typographical errors may have occurred

## 2022-09-26 NOTE — ASSESSMENT & PLAN NOTE
Bp elevated in office, however ortho hypotension also noted  Not currently on medication  Does not hydrate  Will take her Bp/HR and document position and symptoms if any and bring to next office visit  Change positions slowly

## 2022-09-26 NOTE — ASSESSMENT & PLAN NOTE
Not taking questran  Gained 9lb since stopping her water aerobics  Will check Lipid panel  Discussed dietary modifications as she eats a lot of processed, fast foods

## 2022-09-27 DIAGNOSIS — K21.9 GASTROESOPHAGEAL REFLUX DISEASE, UNSPECIFIED WHETHER ESOPHAGITIS PRESENT: ICD-10-CM

## 2022-09-27 RX ORDER — ESOMEPRAZOLE MAGNESIUM 40 MG/1
CAPSULE, DELAYED RELEASE ORAL
Qty: 90 CAPSULE | Refills: 1 | Status: SHIPPED | OUTPATIENT
Start: 2022-09-27 | End: 2022-09-27 | Stop reason: SDUPTHER

## 2022-09-27 RX ORDER — ESOMEPRAZOLE MAGNESIUM 40 MG/1
CAPSULE, DELAYED RELEASE ORAL
Qty: 90 CAPSULE | Refills: 1 | Status: SHIPPED | OUTPATIENT
Start: 2022-09-27

## 2022-09-27 NOTE — TELEPHONE ENCOUNTER
Sent to [facility] [place] in error  Patient already cancelled there      [facility]

## 2022-09-28 ENCOUNTER — TELEPHONE (OUTPATIENT)
Dept: FAMILY MEDICINE CLINIC | Facility: HOSPITAL | Age: 72
End: 2022-09-28

## 2022-09-28 NOTE — TELEPHONE ENCOUNTER
Patient called and said she came in on Monday and you wanted her to write her BP down for a month and then let us know patient says she doesn't want to wait a month- she said her   Bp-11A today 9/28 AM- 147/74   BP 1:34PM today 9/28-  144/78    Said she wants something for her bp -

## 2022-09-29 ENCOUNTER — TELEPHONE (OUTPATIENT)
Dept: FAMILY MEDICINE CLINIC | Facility: HOSPITAL | Age: 72
End: 2022-09-29

## 2022-10-05 ENCOUNTER — TELEPHONE (OUTPATIENT)
Dept: LAB | Facility: HOSPITAL | Age: 72
End: 2022-10-05

## 2022-10-07 ENCOUNTER — EVALUATION (OUTPATIENT)
Dept: PHYSICAL THERAPY | Facility: CLINIC | Age: 72
End: 2022-10-07
Payer: MEDICARE

## 2022-10-07 DIAGNOSIS — R42 VERTIGO: Primary | ICD-10-CM

## 2022-10-07 PROCEDURE — 97162 PT EVAL MOD COMPLEX 30 MIN: CPT | Performed by: PHYSICAL THERAPIST
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
## 2022-10-10 ENCOUNTER — APPOINTMENT (OUTPATIENT)
Dept: LAB | Facility: HOSPITAL | Age: 72
End: 2022-10-10
Payer: MEDICARE

## 2022-10-10 DIAGNOSIS — M54.41 CHRONIC BILATERAL LOW BACK PAIN WITH BILATERAL SCIATICA: ICD-10-CM

## 2022-10-10 DIAGNOSIS — Z13.29 THYROID DISORDER SCREENING: ICD-10-CM

## 2022-10-10 DIAGNOSIS — E78.2 MIXED HYPERLIPIDEMIA: Chronic | ICD-10-CM

## 2022-10-10 DIAGNOSIS — M54.42 CHRONIC BILATERAL LOW BACK PAIN WITH BILATERAL SCIATICA: ICD-10-CM

## 2022-10-10 DIAGNOSIS — G89.29 CHRONIC BILATERAL LOW BACK PAIN WITH BILATERAL SCIATICA: ICD-10-CM

## 2022-10-10 DIAGNOSIS — Z13.0 SCREENING FOR DEFICIENCY ANEMIA: ICD-10-CM

## 2022-10-10 DIAGNOSIS — I10 PRIMARY HYPERTENSION: ICD-10-CM

## 2022-10-10 LAB
ALBUMIN SERPL BCP-MCNC: 3.7 G/DL (ref 3.5–5)
ALP SERPL-CCNC: 61 U/L (ref 34–104)
ALT SERPL W P-5'-P-CCNC: 14 U/L (ref 7–52)
ANION GAP SERPL CALCULATED.3IONS-SCNC: 8 MMOL/L (ref 4–13)
AST SERPL W P-5'-P-CCNC: 21 U/L (ref 13–39)
BASOPHILS # BLD AUTO: 0.03 THOUSANDS/ΜL (ref 0–0.1)
BASOPHILS NFR BLD AUTO: 1 % (ref 0–1)
BILIRUB SERPL-MCNC: 0.38 MG/DL (ref 0.2–1)
BUN SERPL-MCNC: 21 MG/DL (ref 5–25)
CALCIUM SERPL-MCNC: 8.6 MG/DL (ref 8.4–10.2)
CHLORIDE SERPL-SCNC: 102 MMOL/L (ref 96–108)
CHOLEST SERPL-MCNC: 197 MG/DL
CO2 SERPL-SCNC: 28 MMOL/L (ref 21–32)
CREAT SERPL-MCNC: 0.96 MG/DL (ref 0.6–1.3)
EOSINOPHIL # BLD AUTO: 0.1 THOUSAND/ΜL (ref 0–0.61)
EOSINOPHIL NFR BLD AUTO: 2 % (ref 0–6)
ERYTHROCYTE [DISTWIDTH] IN BLOOD BY AUTOMATED COUNT: 12.1 % (ref 11.6–15.1)
GFR SERPL CREATININE-BSD FRML MDRD: 59 ML/MIN/1.73SQ M
GLUCOSE P FAST SERPL-MCNC: 79 MG/DL (ref 65–99)
HCT VFR BLD AUTO: 38.3 % (ref 34.8–46.1)
HDLC SERPL-MCNC: 56 MG/DL
HGB BLD-MCNC: 12.9 G/DL (ref 11.5–15.4)
IMM GRANULOCYTES # BLD AUTO: 0.01 THOUSAND/UL (ref 0–0.2)
IMM GRANULOCYTES NFR BLD AUTO: 0 % (ref 0–2)
LDLC SERPL CALC-MCNC: 124 MG/DL (ref 0–100)
LYMPHOCYTES # BLD AUTO: 1.77 THOUSANDS/ΜL (ref 0.6–4.47)
LYMPHOCYTES NFR BLD AUTO: 31 % (ref 14–44)
MCH RBC QN AUTO: 31.9 PG (ref 26.8–34.3)
MCHC RBC AUTO-ENTMCNC: 33.7 G/DL (ref 31.4–37.4)
MCV RBC AUTO: 95 FL (ref 82–98)
MONOCYTES # BLD AUTO: 0.41 THOUSAND/ΜL (ref 0.17–1.22)
MONOCYTES NFR BLD AUTO: 7 % (ref 4–12)
NEUTROPHILS # BLD AUTO: 3.46 THOUSANDS/ΜL (ref 1.85–7.62)
NEUTS SEG NFR BLD AUTO: 59 % (ref 43–75)
NONHDLC SERPL-MCNC: 141 MG/DL
NRBC BLD AUTO-RTO: 0 /100 WBCS
PLATELET # BLD AUTO: 190 THOUSANDS/UL (ref 149–390)
PMV BLD AUTO: 11.2 FL (ref 8.9–12.7)
POTASSIUM SERPL-SCNC: 4.2 MMOL/L (ref 3.5–5.3)
PROT SERPL-MCNC: 6.3 G/DL (ref 6.4–8.4)
RBC # BLD AUTO: 4.04 MILLION/UL (ref 3.81–5.12)
SODIUM SERPL-SCNC: 138 MMOL/L (ref 135–147)
TRIGL SERPL-MCNC: 85 MG/DL
TSH SERPL DL<=0.05 MIU/L-ACNC: 2.55 UIU/ML (ref 0.45–4.5)
WBC # BLD AUTO: 5.78 THOUSAND/UL (ref 4.31–10.16)

## 2022-10-10 PROCEDURE — 80053 COMPREHEN METABOLIC PANEL: CPT

## 2022-10-10 PROCEDURE — 84443 ASSAY THYROID STIM HORMONE: CPT

## 2022-10-10 PROCEDURE — 36415 COLL VENOUS BLD VENIPUNCTURE: CPT

## 2022-10-10 PROCEDURE — 85025 COMPLETE CBC W/AUTO DIFF WBC: CPT

## 2022-10-10 PROCEDURE — 80061 LIPID PANEL: CPT

## 2022-10-11 ENCOUNTER — TELEPHONE (OUTPATIENT)
Dept: FAMILY MEDICINE CLINIC | Facility: HOSPITAL | Age: 72
End: 2022-10-11

## 2022-10-11 DIAGNOSIS — R42 VERTIGO: Primary | ICD-10-CM

## 2022-11-11 DIAGNOSIS — R42 VERTIGO: ICD-10-CM

## 2022-11-11 RX ORDER — MECLIZINE HYDROCHLORIDE 25 MG/1
25 TABLET ORAL EVERY 8 HOURS PRN
Qty: 90 TABLET | Refills: 3 | Status: SHIPPED | OUTPATIENT
Start: 2022-11-11

## 2022-12-01 ENCOUNTER — OFFICE VISIT (OUTPATIENT)
Dept: FAMILY MEDICINE CLINIC | Facility: HOSPITAL | Age: 72
End: 2022-12-01

## 2022-12-01 VITALS
SYSTOLIC BLOOD PRESSURE: 140 MMHG | HEART RATE: 83 BPM | BODY MASS INDEX: 31.02 KG/M2 | WEIGHT: 193 LBS | HEIGHT: 66 IN | DIASTOLIC BLOOD PRESSURE: 84 MMHG | OXYGEN SATURATION: 98 %

## 2022-12-01 DIAGNOSIS — L24.9 IRRITANT HAND DERMATITIS: ICD-10-CM

## 2022-12-01 DIAGNOSIS — Z12.11 SCREENING FOR COLON CANCER: ICD-10-CM

## 2022-12-01 DIAGNOSIS — M54.42 CHRONIC BILATERAL LOW BACK PAIN WITH BILATERAL SCIATICA: ICD-10-CM

## 2022-12-01 DIAGNOSIS — I10 PRIMARY HYPERTENSION: ICD-10-CM

## 2022-12-01 DIAGNOSIS — M54.41 CHRONIC BILATERAL LOW BACK PAIN WITH BILATERAL SCIATICA: ICD-10-CM

## 2022-12-01 DIAGNOSIS — R42 VERTIGO: ICD-10-CM

## 2022-12-01 DIAGNOSIS — G89.29 CHRONIC BILATERAL LOW BACK PAIN WITH BILATERAL SCIATICA: ICD-10-CM

## 2022-12-01 DIAGNOSIS — Z00.00 MEDICARE ANNUAL WELLNESS VISIT, SUBSEQUENT: Primary | ICD-10-CM

## 2022-12-01 PROBLEM — M79.661 PAIN OF RIGHT CALF: Status: RESOLVED | Noted: 2022-02-25 | Resolved: 2022-12-01

## 2022-12-01 PROBLEM — R82.90 ABNORMAL URINALYSIS: Status: RESOLVED | Noted: 2021-12-05 | Resolved: 2022-12-01

## 2022-12-01 NOTE — PATIENT INSTRUCTIONS
1   Use mild soap and water  Pat dry  Use triamcinolone ointment on hands twice a day, cover with cotton gloves/socks for 2 weeks  2    Medicare Preventive Visit Patient Instructions  Thank you for completing your Welcome to Medicare Visit or Medicare Annual Wellness Visit today  Your next wellness visit will be due in one year (12/2/2023)  The screening/preventive services that you may require over the next 5-10 years are detailed below  Some tests may not apply to you based off risk factors and/or age  Screening tests ordered at today's visit but not completed yet may show as past due  Also, please note that scanned in results may not display below  Preventive Screenings:  Service Recommendations Previous Testing/Comments   Colorectal Cancer Screening  * Colonoscopy    * Fecal Occult Blood Test (FOBT)/Fecal Immunochemical Test (FIT)  * Fecal DNA/Cologuard Test  * Flexible Sigmoidoscopy Age: 39-70 years old   Colonoscopy: every 10 years (may be performed more frequently if at higher risk)  OR  FOBT/FIT: every 1 year  OR  Cologuard: every 3 years  OR  Sigmoidoscopy: every 5 years  Screening may be recommended earlier than age 39 if at higher risk for colorectal cancer  Also, an individualized decision between you and your healthcare provider will decide whether screening between the ages of 74-80 would be appropriate  Colonoscopy: Not on file  FOBT/FIT: Not on file  Cologuard: Not on file  Sigmoidoscopy: Not on file          Breast Cancer Screening Age: 36 years old  Frequency: every 1-2 years  Not required if history of left and right mastectomy Mammogram: 02/11/2019        Cervical Cancer Screening Between the ages of 21-29, pap smear recommended once every 3 years  Between the ages of 33-67, can perform pap smear with HPV co-testing every 5 years     Recommendations may differ for women with a history of total hysterectomy, cervical cancer, or abnormal pap smears in past  Pap Smear: Not on file    Screening Not Indicated   Hepatitis C Screening Once for adults born between 1945 and 1965  More frequently in patients at high risk for Hepatitis C Hep C Antibody: Not on file        Diabetes Screening 1-2 times per year if you're at risk for diabetes or have pre-diabetes Fasting glucose: 79 mg/dL (10/10/2022)  A1C: No results in last 5 years (No results in last 5 years)  Screening Current   Cholesterol Screening Once every 5 years if you don't have a lipid disorder  May order more often based on risk factors  Lipid panel: 10/10/2022    Screening Not Indicated  History Lipid Disorder     Other Preventive Screenings Covered by Medicare:  1  Abdominal Aortic Aneurysm (AAA) Screening: covered once if your at risk  You're considered to be at risk if you have a family history of AAA  2  Lung Cancer Screening: covers low dose CT scan once per year if you meet all of the following conditions: (1) Age 50-69; (2) No signs or symptoms of lung cancer; (3) Current smoker or have quit smoking within the last 15 years; (4) You have a tobacco smoking history of at least 20 pack years (packs per day multiplied by number of years you smoked); (5) You get a written order from a healthcare provider  3  Glaucoma Screening: covered annually if you're considered high risk: (1) You have diabetes OR (2) Family history of glaucoma OR (3)  aged 48 and older OR (3)  American aged 72 and older  3  Osteoporosis Screening: covered every 2 years if you meet one of the following conditions: (1) You're estrogen deficient and at risk for osteoporosis based off medical history and other findings; (2) Have a vertebral abnormality; (3) On glucocorticoid therapy for more than 3 months; (4) Have primary hyperparathyroidism; (5) On osteoporosis medications and need to assess response to drug therapy  · Last bone density test (DXA Scan): 02/11/2019   5  HIV Screening: covered annually if you're between the age of 15-65  Also covered annually if you are younger than 13 and older than 72 with risk factors for HIV infection  For pregnant patients, it is covered up to 3 times per pregnancy  Immunizations:  Immunization Recommendations   Influenza Vaccine Annual influenza vaccination during flu season is recommended for all persons aged >= 6 months who do not have contraindications   Pneumococcal Vaccine   * Pneumococcal conjugate vaccine = PCV13 (Prevnar 13), PCV15 (Vaxneuvance), PCV20 (Prevnar 20)  * Pneumococcal polysaccharide vaccine = PPSV23 (Pneumovax) Adults 25-60 years old: 1-3 doses may be recommended based on certain risk factors  Adults 72 years old: 1-2 doses may be recommended based off what pneumonia vaccine you previously received   Hepatitis B Vaccine 3 dose series if at intermediate or high risk (ex: diabetes, end stage renal disease, liver disease)   Tetanus (Td) Vaccine - COST NOT COVERED BY MEDICARE PART B Following completion of primary series, a booster dose should be given every 10 years to maintain immunity against tetanus  Td may also be given as tetanus wound prophylaxis  Tdap Vaccine - COST NOT COVERED BY MEDICARE PART B Recommended at least once for all adults  For pregnant patients, recommended with each pregnancy  Shingles Vaccine (Shingrix) - COST NOT COVERED BY MEDICARE PART B  2 shot series recommended in those aged 48 and above     Health Maintenance Due:      Topic Date Due   • Hepatitis C Screening  Never done   • Colorectal Cancer Screening  Never done   • Breast Cancer Screening: Mammogram  02/11/2020     Immunizations Due:      Topic Date Due   • Hepatitis B Vaccine (1 of 3 - 3-dose series) Never done   • COVID-19 Vaccine (3 - Booster for Cortés Peter series) 11/15/2021     Advance Directives   What are advance directives? Advance directives are legal documents that state your wishes and plans for medical care   These plans are made ahead of time in case you lose your ability to make decisions for yourself  Advance directives can apply to any medical decision, such as the treatments you want, and if you want to donate organs  What are the types of advance directives? There are many types of advance directives, and each state has rules about how to use them  You may choose a combination of any of the following:  · Living will: This is a written record of the treatment you want  You can also choose which treatments you do not want, which to limit, and which to stop at a certain time  This includes surgery, medicine, IV fluid, and tube feedings  · Durable power of  for healthcare [facility]): This is a written record that states who you want to make healthcare choices for you when you are unable to make them for yourself  This person, called a proxy, is usually a family member or a friend  You may choose more than 1 proxy  · Do not resuscitate (DNR) order:  A DNR order is used in case your heart stops beating or you stop breathing  It is a request not to have certain forms of treatment, such as CPR  A DNR order may be included in other types of advance directives  · Medical directive: This covers the care that you want if you are in a coma, near death, or unable to make decisions for yourself  You can list the treatments you want for each condition  Treatment may include pain medicine, surgery, blood transfusions, dialysis, IV or tube feedings, and a ventilator (breathing machine)  · Values history: This document has questions about your views, beliefs, and how you feel and think about life  This information can help others choose the care that you would choose  Why are advance directives important? An advance directive helps you control your care  Although spoken wishes may be used, it is better to have your wishes written down  Spoken wishes can be misunderstood, or not followed  Treatments may be given even if you do not want them   An advance directive may make it easier for your family to make difficult choices about your care  Urinary Incontinence   Urinary incontinence (UI)  is when you lose control of your bladder  UI develops because your bladder cannot store or empty urine properly  The 3 most common types of UI are stress incontinence, urge incontinence, or both  Medicines:   · May be given to help strengthen your bladder control  Report any side effects of medication to your healthcare provider  Do pelvic muscle exercises often:  Your pelvic muscles help you stop urinating  Squeeze these muscles tight for 5 seconds, then relax for 5 seconds  Gradually work up to squeezing for 10 seconds  Do 3 sets of 15 repetitions a day, or as directed  This will help strengthen your pelvic muscles and improve bladder control  Train your bladder:  Go to the bathroom at set times, such as every 2 hours, even if you do not feel the urge to go  You can also try to hold your urine when you feel the urge to go  For example, hold your urine for 5 minutes when you feel the urge to go  As that becomes easier, hold your urine for 10 minutes  Self-care:   · Keep a UI record  Write down how often you leak urine and how much you leak  Make a note of what you were doing when you leaked urine  · Drink liquids as directed  You may need to limit the amount of liquid you drink to help control your urine leakage  Do not drink any liquid right before you go to bed  Limit or do not have drinks that contain caffeine or alcohol  · Prevent constipation  Eat a variety of high-fiber foods  Good examples are high-fiber cereals, beans, vegetables, and whole-grain breads  Walking is the best way to trigger your intestines to have a bowel movement  · Exercise regularly and maintain a healthy weight  Weight loss and exercise will decrease pressure on your bladder and help you control your leakage  · Use a catheter as directed  to help empty your bladder   A catheter is a tiny, plastic tube that is put into your bladder to drain your urine  · Go to behavior therapy as directed  Behavior therapy may be used to help you learn to control your urge to urinate  Weight Management   Why it is important to manage your weight:  Being overweight increases your risk of health conditions such as heart disease, high blood pressure, type 2 diabetes, and certain types of cancer  It can also increase your risk for osteoarthritis, sleep apnea, and other respiratory problems  Aim for a slow, steady weight loss  Even a small amount of weight loss can lower your risk of health problems  How to lose weight safely:  A safe and healthy way to lose weight is to eat fewer calories and get regular exercise  You can lose up about 1 pound a week by decreasing the number of calories you eat by 500 calories each day  Healthy meal plan for weight management:  A healthy meal plan includes a variety of foods, contains fewer calories, and helps you stay healthy  A healthy meal plan includes the following:  · Eat whole-grain foods more often  A healthy meal plan should contain fiber  Fiber is the part of grains, fruits, and vegetables that is not broken down by your body  Whole-grain foods are healthy and provide extra fiber in your diet  Some examples of whole-grain foods are whole-wheat breads and pastas, oatmeal, brown rice, and bulgur  · Eat a variety of vegetables every day  Include dark, leafy greens such as spinach, kale, smitha greens, and mustard greens  Eat yellow and orange vegetables such as carrots, sweet potatoes, and winter squash  · Eat a variety of fruits every day  Choose fresh or canned fruit (canned in its own juice or light syrup) instead of juice  Fruit juice has very little or no fiber  · Eat low-fat dairy foods  Drink fat-free (skim) milk or 1% milk  Eat fat-free yogurt and low-fat cottage cheese  Try low-fat cheeses such as mozzarella and other reduced-fat cheeses  · Choose meat and other protein foods that are low in fat  Choose beans or other legumes such as split peas or lentils  Choose fish, skinless poultry (chicken or turkey), or lean cuts of red meat (beef or pork)  Before you cook meat or poultry, cut off any visible fat  · Use less fat and oil  Try baking foods instead of frying them  Add less fat, such as margarine, sour cream, regular salad dressing and mayonnaise to foods  Eat fewer high-fat foods  Some examples of high-fat foods include french fries, doughnuts, ice cream, and cakes  · Eat fewer sweets  Limit foods and drinks that are high in sugar  This includes candy, cookies, regular soda, and sweetened drinks  Exercise:  Exercise at least 30 minutes per day on most days of the week  Some examples of exercise include walking, biking, dancing, and swimming  You can also fit in more physical activity by taking the stairs instead of the elevator or parking farther away from stores  Ask your healthcare provider about the best exercise plan for you  © Copyright [employer] 2018 Information is for End User's use only and may not be sold, redistributed or otherwise used for commercial purposes  All illustrations and images included in CareNotes® are the copyrighted property of [employer]  or [employer]   AMBULATORY CARE:   A heart healthy diet  is an eating plan low in unhealthy fats and sodium (salt)  The plan is high in healthy fats and fiber  A heart healthy diet helps improve your cholesterol levels and lowers your risk for heart disease and stroke  A dietitian will teach you how to read and understand food labels  Heart healthy diet guidelines to follow:   · Choose foods that contain healthy fats  ? Unsaturated fats  include monounsaturated and polyunsaturated fats  Unsaturated fat is found in foods such as soybean, canola, olive, corn, and safflower oils  It is also found in soft tub margarine that is made with liquid vegetable oil      ? Omega-3 fat  is found in certain fish, such as salmon, tuna, and trout, and in walnuts and flaxseed  Eat fish high in omega-3 fats at least 2 times a week  · Get 20 to 30 grams of fiber each day  Fruits, vegetables, whole-grain foods, and legumes (cooked beans) are good sources of fiber  · Limit or do not have unhealthy fats  ? Cholesterol  is found in animal foods, such as eggs and lobster, and in dairy products made from whole milk  Limit cholesterol to less than 200 mg each day  ? Saturated fat  is found in meats, such as lal and hamburger  It is also found in chicken or turkey skin, whole milk, and butter  Limit saturated fat to less than 7% of your total daily calories  ? Trans fat  is found in packaged foods, such as potato chips and cookies  It is also in hard margarine, some fried foods, and shortening  Do not eat foods that contain trans fats  · Limit sodium as directed  You may be told to limit sodium to 2,000 to 2,300 mg each day  Choose low-sodium or no-salt-added foods  Add little or no salt to food you prepare  Use herbs and spices in place of salt  Include the following in your heart healthy plan:  Ask your dietitian or healthcare provider how many servings to have from each of the following food groups:  · Grains:      ? Whole-wheat breads, cereals, and pastas, and brown rice    ? Low-fat, low-sodium crackers and chips    · Vegetables:      ? Broccoli, green beans, green peas, and spinach    ? Collards, kale, and lima beans    ? Carrots, sweet potatoes, tomatoes, and peppers    ? Canned vegetables with no salt added    · Fruits:      ? Bananas, peaches, pears, and pineapple    ? Grapes, raisins, and dates    ? Oranges, tangerines, grapefruit, orange juice, and grapefruit juice    ? Apricots, mangoes, melons, and papaya    ? Raspberries and strawberries    ? Canned fruit with no added sugar    · Low-fat dairy:      ?  Nonfat (skim) milk, 1% milk, and low-fat almond, cashew, or soy milks fortified with calcium    ? Low-fat cheese, regular or frozen yogurt, and cottage cheese    · Meats and proteins:      ? Lean cuts of beef and pork (loin, leg, round), skinless chicken and turkey    ? Legumes, soy products, egg whites, or nuts    Limit or do not include the following in your heart healthy plan:   · Unhealthy fats and oils:      ? Whole or 2% milk, cream cheese, sour cream, or cheese    ? High-fat cuts of beef (T-bone steaks, ribs), chicken or turkey with skin, and organ meats such as liver    ? Butter, stick margarine, shortening, and cooking oils such as coconut or palm oil    · Foods and liquids high in sodium:      ? Packaged foods, such as frozen dinners, cookies, macaroni and cheese, and cereals with more than 300 mg of sodium per serving    ? Vegetables with added sodium, such as instant potatoes, vegetables with added sauces, or regular canned vegetables    ? Cured or smoked meats, such as hot dogs, lal, and sausage    ? High-sodium ketchup, barbecue sauce, salad dressing, pickles, olives, soy sauce, or miso    · Foods and liquids high in sugar:      ? Candy, cake, cookies, pies, or doughnuts    ? Soft drinks (soda), sports drinks, or sweetened tea    ? Canned or dry mixes for cakes, soups, sauces, or gravies    Other healthy heart guidelines:   · Do not smoke  Nicotine and other chemicals in cigarettes and cigars can cause lung and heart damage  Ask your healthcare provider for information if you currently smoke and need help to quit  E-cigarettes or smokeless tobacco still contain nicotine  Talk to your healthcare provider before you use these products  · Limit or do not drink alcohol as directed  Alcohol can damage your heart and raise your blood pressure  Your healthcare provider may give you specific daily and weekly limits  The general recommended limit is 1 drink a day for women 21 or older and for men 72 or older  Do not have more than 3 drinks in a day or 7 in a week   The recommended limit is 2 drinks a day for men 24to 59years of age  Do not have more than 4 drinks in a day or 14 in a week  A drink of alcohol is 12 ounces of beer, 5 ounces of wine, or 1½ ounces of liquor  · Exercise regularly  Exercise can help you maintain a healthy weight and improve your blood pressure and cholesterol levels  Regular exercise can also decrease your risk for heart problems  Ask your healthcare provider about the best exercise plan for you  Do not start an exercise program without asking your healthcare provider  Follow up with your doctor or cardiologist as directed:  Write down your questions so you remember to ask them during your visits  © Copyright [employer] 2022 Information is for End User's use only and may not be sold, redistributed or otherwise used for commercial purposes  All illustrations and images included in CareNotes® are the copyrighted property of [employer]  or Radha Foley   The above information is an  only  It is not intended as medical advice for individual conditions or treatments  Talk to your doctor, nurse or pharmacist before following any medical regimen to see if it is safe and effective for you

## 2022-12-01 NOTE — PROGRESS NOTES
Assessment and Plan:     Problem List Items Addressed This Visit        Cardiovascular and Mediastinum    Hypertension       Nervous and Auditory    Chronic bilateral low back pain with bilateral sciatica       Musculoskeletal and Integument    Irritant hand dermatitis    Relevant Medications    triamcinolone (KENALOG) 0 1 % ointment       Other    Vertigo   Other Visit Diagnoses     Medicare annual wellness visit, subsequent    -  Primary    Screening for colon cancer        Relevant Orders    Cologuard    BMI 31 0-31 9,adult              Depression Screening and Follow-up Plan: Patient was screened for depression during today's encounter  They screened negative with a PHQ-2 score of 0  Preventive health issues were discussed with patient, and age appropriate screening tests were ordered as noted in patient's After Visit Summary  Personalized health advice and appropriate referrals for health education or preventive services given if needed, as noted in patient's After Visit Summary  History of Present Illness:     Patient presents for a Medicare Wellness Visit      Gaston Madrid is here for her wellness visit  Since our last visit she cut out a lot of processed foods and stopped taking magnesium  Her dizziness has improved via optimizing hydration and physical activity  Went to Vestibular evaluation but hasn't gone back  Her blood pressure was elevated last office visit, but she was orthostatic  Brought blood pressure log in for review  Bp ranges from 120-140/70-80s at home  Routine blood work recently completed last month WNL  Patient Care Team:  Valdez Garcia as PCP - General (Family Medicine)     Review of Systems:     Review of Systems   Constitutional: Negative  Negative for activity change, appetite change, chills, fatigue and fever  HENT: Negative  Negative for congestion, ear pain, postnasal drip and sinus pain  Eyes: Negative  Respiratory: Negative    Negative for cough and shortness of breath  Cardiovascular: Negative  Negative for chest pain and leg swelling  Gastrointestinal: Negative  Negative for constipation and diarrhea  Endocrine: Negative  Genitourinary: Negative  Negative for dysuria  Musculoskeletal: Negative  Skin: Negative  Allergic/Immunologic: Negative  Negative for immunocompromised state  Neurological: Positive for dizziness  Negative for light-headedness  Dizziness but much improved   Hematological: Negative  Psychiatric/Behavioral: Negative  Negative for sleep disturbance  Problem List:     Patient Active Problem List   Diagnosis   • Chronic neck pain   • Chronic bilateral low back pain with bilateral sciatica   • Gastroesophageal reflux disease without esophagitis   • Mixed hyperlipidemia   • Myalgia   • Urge incontinence of urine   • Obesity (BMI 30 0-34  9)   • Primary osteoarthritis of left hand   • Varicose veins of both lower extremities without ulcer or inflammation   • Acute cholecystitis   • Hypertension   • Mass of soft tissue of hand   • Vertigo   • Irritant hand dermatitis      Past Medical and Surgical History:     Past Medical History:   Diagnosis Date   • Arthritis    • Chronic pain    • Diverticulitis    • GERD (gastroesophageal reflux disease)    • Hypertension    • Vertigo      Past Surgical History:   Procedure Laterality Date   • CHOLECYSTECTOMY LAPAROSCOPIC N/A 12/6/2021    Procedure: SUBTOTAL CHOLECYSTECTOMY LAPAROSCOPIC;  Surgeon:  Abeba Bermudez MD;  Location:  MAIN OR;  Service: General   • GALLBLADDER SURGERY  12/2021    Dr Timothy Barcenas   • TUBAL LIGATION        Family History:     Family History   Problem Relation Age of Onset   • Stroke Mother    • Diabetes Mother    • Hypertension Mother    • Heart disease Father    • Hypertension Father    • Heart disease Brother    • Diabetes Brother    • Hypertension Brother    • Substance Abuse Neg Hx    • Mental illness Neg Hx       Social History:     Social History     Socioeconomic History   • Marital status:      Spouse name: None   • Number of children: None   • Years of education: None   • Highest education level: None   Occupational History   • None   Tobacco Use   • Smoking status: Never   • Smokeless tobacco: Never   Vaping Use   • Vaping Use: Never used   Substance and Sexual Activity   • Alcohol use: Yes     Comment: 3 times a week   • Drug use: Yes     Types: Marijuana     Comment: rarely   • Sexual activity: Not Currently   Other Topics Concern   • None   Social History Narrative    Lives at home with     Feels safe at home    No living will    Rare dental care     Social Determinants of Health     Financial Resource Strain: Low Risk    • Difficulty of Paying Living Expenses: Not hard at all   Food Insecurity: Not on file   Transportation Needs: No Transportation Needs   • Lack of Transportation (Medical): No   • Lack of Transportation (Non-Medical): No   Physical Activity: Not on file   Stress: Not on file   Social Connections: Not on file   Intimate Partner Violence: Not on file   Housing Stability: Not on file      Medications and Allergies:     Current Outpatient Medications   Medication Sig Dispense Refill   • Alpha-Lipoic Acid 200 MG CAPS Take by mouth daily     • Ascorbic Acid (VITAMIN C) 1000 MG tablet Take 1,000 mg by mouth daily     • Biotin 10 MG CAPS Take by mouth in the morning     • celecoxib (CeleBREX) 100 mg capsule Take 1 capsule (100 mg total) by mouth in the morning and 1 capsule (100 mg total) in the evening   180 capsule 3   • esomeprazole (NexIUM) 40 MG capsule Take 1 tab daily 90 capsule 1   • HYDROcodone-acetaminophen (NORCO) 5-325 mg per tablet Take 1 tablet by mouth every 8 (eight) hours as needed for pain Max Daily Amount: 3 tablets 90 tablet 0   • meclizine (ANTIVERT) 25 mg tablet Take 1 tablet (25 mg total) by mouth every 8 (eight) hours as needed for dizziness 90 tablet 3   • Misc Natural Products (WHITE [place]OW BARK PO) Take by mouth in the morning     • Olive Leaf Extract 500 MG CAPS Take by mouth in the morning     • oxybutynin (DITROPAN) 5 mg tablet Take 1 tablet (5 mg total) by mouth 2 (two) times a day 180 tablet 1   • triamcinolone (KENALOG) 0 1 % ointment Apply topically 2 (two) times a day for 14 days 30 g 0   • cholestyramine (QUESTRAN) 4 GM/DOSE powder Take 1 packet (4 g total) by mouth 2 (two) times a day with meals (Patient not taking: Reported on 9/26/2022) 60 packet 5     No current facility-administered medications for this visit  Allergies   Allergen Reactions   • Tramadol Hives      Immunizations:     Immunization History   Administered Date(s) Administered   • COVID-19 PFIZER VACCINE 0 3 ML IM 05/07/2021, 06/15/2021      Health Maintenance:         Topic Date Due   • Hepatitis C Screening  Never done   • Colorectal Cancer Screening  Never done   • Breast Cancer Screening: Mammogram  12/01/2023 (Originally 2/11/2020)         Topic Date Due   • Hepatitis B Vaccine (1 of 3 - 3-dose series) Never done   • COVID-19 Vaccine (3 - Booster for Pfizer series) 11/15/2021      Medicare Screening Tests and Risk Assessments:     Alma Vann is here for her Subsequent Wellness visit  Health Risk Assessment:   Patient rates overall health as good  Patient feels that their physical health rating is same  Patient is very satisfied with their life  Eyesight was rated as slightly worse  Hearing was rated as same  Patient feels that their emotional and mental health rating is slightly better  Patients states they are sometimes angry  Patient states they are often unusually tired/fatigued  Pain experienced in the last 7 days has been a lot  Patient's pain rating has been 4/10  Patient states that she has experienced no weight loss or gain in last 6 months  Recently cut wood to heat her home which exacerbated chronic pain    Depression Screening:   PHQ-2 Score: 0      Fall Risk Screening:    In the past year, patient has experienced: no history of falling in past year      Urinary Incontinence Screening:   Patient has leaked urine accidently in the last six months  Home Safety:  Patient does not have trouble with stairs inside or outside of their home  Patient has working smoke alarms and has working carbon monoxide detector  Home safety hazards include: none  Nutrition:   Current diet is Regular  Medications:   Patient is currently taking over-the-counter supplements  OTC medications include: see medication list  Patient is able to manage medications  Activities of Daily Living (ADLs)/Instrumental Activities of Daily Living (IADLs):   Walk and transfer into and out of bed and chair?: Yes  Dress and groom yourself?: Yes    Bathe or shower yourself?: Yes    Feed yourself?  Yes  Do your laundry/housekeeping?: Yes  Manage your money, pay your bills and track your expenses?: Yes  Make your own meals?: Yes    Do your own shopping?: Yes    Previous Hospitalizations:   Any hospitalizations or ED visits within the last 12 months?: Yes    How many hospitalizations have you had in the last year?: 1-2    Advance Care Planning:     Advanced directive: No    Advanced directive counseling given: Yes      PREVENTIVE SCREENINGS      Cardiovascular Screening:    General: Screening Not Indicated and History Lipid Disorder      Diabetes Screening:     General: Screening Current      Colorectal Cancer Screening:     General: Risks and Benefits Discussed    Due for: Cologuard      Breast Cancer Screening:     General: Patient Declines      Cervical Cancer Screening:    General: Screening Not Indicated      Osteoporosis Screening:    General: Screening Current and Risks and Benefits Discussed      Lung Cancer Screening:     General: Screening Not Indicated    Screening, Brief Intervention, and Referral to Treatment (SBIRT)    Screening    Typical number of drinks in a week: 2    Single Item Drug Screening:  How often have you used an illegal drug (including marijuana) or a prescription medication for non-medical reasons in the past year? never    Single Item Drug Screen Score: 0  Interpretation: Negative screen for possible drug use disorder    No results found  Physical Exam:     /84   Pulse 83   Ht 5' 5 5" (1 664 m)   Wt 87 5 kg (193 lb)   SpO2 98%   BMI 31 63 kg/m²     Physical Exam  Vitals and nursing note reviewed  Constitutional:       General: She is not in acute distress  Appearance: She is well-developed  She is obese  HENT:      Head: Normocephalic and atraumatic  Eyes:      Conjunctiva/sclera: Conjunctivae normal    Cardiovascular:      Rate and Rhythm: Normal rate and regular rhythm  Heart sounds: No murmur heard  Pulmonary:      Effort: Pulmonary effort is normal  No respiratory distress  Breath sounds: Normal breath sounds  Abdominal:      Palpations: Abdomen is soft  Tenderness: There is no abdominal tenderness  Musculoskeletal:         General: No swelling  Cervical back: Neck supple  Right lower leg: No edema  Left lower leg: No edema  Skin:     General: Skin is warm and dry  Capillary Refill: Capillary refill takes less than 2 seconds  Coloration: Skin is pale  Comments: Mccracken surface of bilateral hands with scaling, pruritic, with fissures   Neurological:      Mental Status: She is alert  Psychiatric:         Mood and Affect: Mood normal           LADONNA Magallon  BMI Counseling: Body mass index is 31 63 kg/m²  The BMI is above normal  Nutrition recommendations include reducing portion sizes, 3-5 servings of fruits/vegetables daily and reducing intake of saturated fat and trans fat  Exercise recommendations include exercising 3-5 times per week

## 2023-02-23 DIAGNOSIS — K21.9 GASTROESOPHAGEAL REFLUX DISEASE, UNSPECIFIED WHETHER ESOPHAGITIS PRESENT: ICD-10-CM

## 2023-02-23 RX ORDER — ESOMEPRAZOLE MAGNESIUM 40 MG/1
CAPSULE, DELAYED RELEASE ORAL
Qty: 90 CAPSULE | Refills: 3 | Status: SHIPPED | OUTPATIENT
Start: 2023-02-23

## 2023-04-21 ENCOUNTER — TELEPHONE (OUTPATIENT)
Dept: FAMILY MEDICINE CLINIC | Facility: HOSPITAL | Age: 73
End: 2023-04-21

## 2023-04-21 NOTE — TELEPHONE ENCOUNTER
Pt is not happy with 10mg tabs and does not want to cut them in half  Pt states she would like a new rx for her original mg sent to [email or web address]   Please advise

## 2023-04-24 DIAGNOSIS — G89.29 CHRONIC BILATERAL LOW BACK PAIN WITH BILATERAL SCIATICA: ICD-10-CM

## 2023-04-24 DIAGNOSIS — M54.42 CHRONIC BILATERAL LOW BACK PAIN WITH BILATERAL SCIATICA: ICD-10-CM

## 2023-04-24 DIAGNOSIS — M54.41 CHRONIC BILATERAL LOW BACK PAIN WITH BILATERAL SCIATICA: ICD-10-CM

## 2023-04-24 RX ORDER — HYDROCODONE BITARTRATE AND ACETAMINOPHEN 5; 325 MG/1; MG/1
1 TABLET ORAL EVERY 8 HOURS PRN
Qty: 90 TABLET | Refills: 0 | Status: SHIPPED | OUTPATIENT
Start: 2023-04-24

## 2023-04-25 ENCOUNTER — TELEPHONE (OUTPATIENT)
Dept: FAMILY MEDICINE CLINIC | Facility: HOSPITAL | Age: 73
End: 2023-04-25

## 2023-04-25 DIAGNOSIS — N32.81 OAB (OVERACTIVE BLADDER): ICD-10-CM

## 2023-05-09 ENCOUNTER — RA CDI HCC (OUTPATIENT)
Dept: OTHER | Facility: HOSPITAL | Age: 73
End: 2023-05-09

## 2023-05-28 ENCOUNTER — NURSE TRIAGE (OUTPATIENT)
Dept: OTHER | Facility: OTHER | Age: 73
End: 2023-05-28

## 2023-05-28 NOTE — TELEPHONE ENCOUNTER
"Regarding: UTI  ----- Message from Mendel Acevedo sent at 5/28/2023  9:54 AM EDT -----  \"I seem to have a UTI again  \"    "

## 2023-05-28 NOTE — TELEPHONE ENCOUNTER
"Patient requesting appointment Tuesday morning for uti symptoms  Advised to go to urgent care, patient does not have transportation  Reason for Disposition  • Dribbling (losing urine) just after finishing urination (i e , post-void dribbling)    Answer Assessment - Initial Assessment Questions  1  SYMPTOM: \"What's the main symptom you're concerned about? \" (e g , frequency, incontinence)      Abdominal pain, urgency, burning    2  ONSET: \"When did the symptoms start? \"      Last night    3  PAIN: \"Is there any pain? \" If Yes, ask: \"How bad is it? \" (Scale: 1-10; mild, moderate, severe)      Moderate    4  CAUSE: \"What do you think is causing the symptoms? \"      Possible UTI    5  OTHER SYMPTOMS: \"Do you have any other symptoms? \" (e g , fever, flank pain, blood in urine, pain with urination)      Denies    6  PREGNANCY: \"Is there any chance you are pregnant? \" \"When was your last menstrual period? \"      N/A    Protocols used: URINARY SYMPTOMS-ADULT-    "

## 2023-10-04 ENCOUNTER — APPOINTMENT (OUTPATIENT)
Dept: LAB | Facility: HOSPITAL | Age: 73
End: 2023-10-04
Payer: MEDICARE

## 2023-10-04 ENCOUNTER — OFFICE VISIT (OUTPATIENT)
Dept: FAMILY MEDICINE CLINIC | Facility: HOSPITAL | Age: 73
End: 2023-10-04
Payer: MEDICARE

## 2023-10-04 VITALS
HEIGHT: 66 IN | DIASTOLIC BLOOD PRESSURE: 80 MMHG | BODY MASS INDEX: 29.83 KG/M2 | HEART RATE: 91 BPM | OXYGEN SATURATION: 97 % | SYSTOLIC BLOOD PRESSURE: 132 MMHG | WEIGHT: 185.6 LBS

## 2023-10-04 DIAGNOSIS — R60.0 PEDAL EDEMA: ICD-10-CM

## 2023-10-04 DIAGNOSIS — R60.0 LOCALIZED EDEMA: ICD-10-CM

## 2023-10-04 DIAGNOSIS — I10 PRIMARY HYPERTENSION: ICD-10-CM

## 2023-10-04 DIAGNOSIS — N18.31 STAGE 3A CHRONIC KIDNEY DISEASE (HCC): ICD-10-CM

## 2023-10-04 DIAGNOSIS — B35.9 RINGWORM: Primary | ICD-10-CM

## 2023-10-04 DIAGNOSIS — M54.42 CHRONIC BILATERAL LOW BACK PAIN WITH BILATERAL SCIATICA: ICD-10-CM

## 2023-10-04 DIAGNOSIS — E78.2 MIXED HYPERLIPIDEMIA: Chronic | ICD-10-CM

## 2023-10-04 DIAGNOSIS — M54.41 CHRONIC BILATERAL LOW BACK PAIN WITH BILATERAL SCIATICA: ICD-10-CM

## 2023-10-04 DIAGNOSIS — G89.29 CHRONIC BILATERAL LOW BACK PAIN WITH BILATERAL SCIATICA: ICD-10-CM

## 2023-10-04 LAB
ALBUMIN SERPL BCP-MCNC: 4.3 G/DL (ref 3.5–5)
ALP SERPL-CCNC: 68 U/L (ref 34–104)
ALT SERPL W P-5'-P-CCNC: 13 U/L (ref 7–52)
ANION GAP SERPL CALCULATED.3IONS-SCNC: 10 MMOL/L
AST SERPL W P-5'-P-CCNC: 20 U/L (ref 13–39)
BASOPHILS # BLD AUTO: 0.02 THOUSANDS/ÂΜL (ref 0–0.1)
BASOPHILS NFR BLD AUTO: 0 % (ref 0–1)
BILIRUB SERPL-MCNC: 0.51 MG/DL (ref 0.2–1)
BUN SERPL-MCNC: 15 MG/DL (ref 5–25)
CALCIUM SERPL-MCNC: 9.7 MG/DL (ref 8.4–10.2)
CHLORIDE SERPL-SCNC: 105 MMOL/L (ref 96–108)
CHOLEST SERPL-MCNC: 206 MG/DL
CO2 SERPL-SCNC: 27 MMOL/L (ref 21–32)
CREAT SERPL-MCNC: 0.85 MG/DL (ref 0.6–1.3)
EOSINOPHIL # BLD AUTO: 0.07 THOUSAND/ÂΜL (ref 0–0.61)
EOSINOPHIL NFR BLD AUTO: 1 % (ref 0–6)
ERYTHROCYTE [DISTWIDTH] IN BLOOD BY AUTOMATED COUNT: 12.5 % (ref 11.6–15.1)
GFR SERPL CREATININE-BSD FRML MDRD: 68 ML/MIN/1.73SQ M
GLUCOSE P FAST SERPL-MCNC: 106 MG/DL (ref 65–99)
HCT VFR BLD AUTO: 43.4 % (ref 34.8–46.1)
HDLC SERPL-MCNC: 70 MG/DL
HGB BLD-MCNC: 14.3 G/DL (ref 11.5–15.4)
IMM GRANULOCYTES # BLD AUTO: 0.01 THOUSAND/UL (ref 0–0.2)
IMM GRANULOCYTES NFR BLD AUTO: 0 % (ref 0–2)
LDLC SERPL CALC-MCNC: 120 MG/DL (ref 0–100)
LYMPHOCYTES # BLD AUTO: 1.9 THOUSANDS/ÂΜL (ref 0.6–4.47)
LYMPHOCYTES NFR BLD AUTO: 29 % (ref 14–44)
MCH RBC QN AUTO: 31.3 PG (ref 26.8–34.3)
MCHC RBC AUTO-ENTMCNC: 32.9 G/DL (ref 31.4–37.4)
MCV RBC AUTO: 95 FL (ref 82–98)
MONOCYTES # BLD AUTO: 0.47 THOUSAND/ÂΜL (ref 0.17–1.22)
MONOCYTES NFR BLD AUTO: 7 % (ref 4–12)
NEUTROPHILS # BLD AUTO: 4.05 THOUSANDS/ÂΜL (ref 1.85–7.62)
NEUTS SEG NFR BLD AUTO: 63 % (ref 43–75)
NRBC BLD AUTO-RTO: 0 /100 WBCS
PLATELET # BLD AUTO: 206 THOUSANDS/UL (ref 149–390)
PMV BLD AUTO: 11.3 FL (ref 8.9–12.7)
POTASSIUM SERPL-SCNC: 3.9 MMOL/L (ref 3.5–5.3)
PROT SERPL-MCNC: 6.8 G/DL (ref 6.4–8.4)
RBC # BLD AUTO: 4.57 MILLION/UL (ref 3.81–5.12)
SODIUM SERPL-SCNC: 142 MMOL/L (ref 135–147)
TRIGL SERPL-MCNC: 82 MG/DL
WBC # BLD AUTO: 6.52 THOUSAND/UL (ref 4.31–10.16)

## 2023-10-04 PROCEDURE — 99214 OFFICE O/P EST MOD 30 MIN: CPT | Performed by: INTERNAL MEDICINE

## 2023-10-04 PROCEDURE — 36415 COLL VENOUS BLD VENIPUNCTURE: CPT

## 2023-10-04 PROCEDURE — 80053 COMPREHEN METABOLIC PANEL: CPT

## 2023-10-04 PROCEDURE — 80061 LIPID PANEL: CPT

## 2023-10-04 PROCEDURE — 85025 COMPLETE CBC W/AUTO DIFF WBC: CPT

## 2023-10-04 RX ORDER — FUROSEMIDE 20 MG/1
20 TABLET ORAL DAILY
Qty: 30 TABLET | Refills: 5 | Status: SHIPPED | OUTPATIENT
Start: 2023-10-04

## 2023-10-04 RX ORDER — CLOTRIMAZOLE AND BETAMETHASONE DIPROPIONATE 10; .64 MG/G; MG/G
CREAM TOPICAL 2 TIMES DAILY
Qty: 45 G | Refills: 1 | Status: SHIPPED | OUTPATIENT
Start: 2023-10-04

## 2023-10-04 RX ORDER — HYDROCODONE BITARTRATE AND ACETAMINOPHEN 5; 325 MG/1; MG/1
1 TABLET ORAL EVERY 8 HOURS PRN
Qty: 90 TABLET | Refills: 0 | Status: SHIPPED | OUTPATIENT
Start: 2023-10-04

## 2023-10-04 NOTE — ASSESSMENT & PLAN NOTE
Lab Results   Component Value Date    EGFR 59 10/10/2022    EGFR 68 12/07/2021    EGFR 85 12/06/2021    CREATININE 0.96 10/10/2022    CREATININE 0.86 12/07/2021    CREATININE 0.72 12/06/2021   Will  repeat labs

## 2023-10-20 ENCOUNTER — HOSPITAL ENCOUNTER (OUTPATIENT)
Dept: NON INVASIVE DIAGNOSTICS | Age: 73
Discharge: HOME | End: 2023-10-20
Payer: MEDICARE

## 2023-10-20 VITALS
WEIGHT: 185 LBS | BODY MASS INDEX: 29.73 KG/M2 | HEART RATE: 79 BPM | SYSTOLIC BLOOD PRESSURE: 132 MMHG | DIASTOLIC BLOOD PRESSURE: 80 MMHG | HEIGHT: 66 IN

## 2023-10-20 DIAGNOSIS — R60.0 PEDAL EDEMA: ICD-10-CM

## 2023-10-20 LAB
AORTIC ROOT: 3.5 CM
APICAL FOUR CHAMBER EJECTION FRACTION: 58 %
ASCENDING AORTA: 3.1 CM
E WAVE DECELERATION TIME: 245 MS
E/A RATIO: 0.8
FRACTIONAL SHORTENING: 36 (ref 28–44)
INTERVENTRICULAR SEPTUM IN DIASTOLE (PARASTERNAL SHORT AXIS VIEW): 1.2 CM
INTERVENTRICULAR SEPTUM: 1.2 CM (ref 0.6–1.1)
LAAS-AP2: 13.3 CM2
LAAS-AP4: 15.4 CM2
LEFT ATRIUM SIZE: 3.8 CM
LEFT ATRIUM VOLUME (MOD BIPLANE): 44 ML
LEFT ATRIUM VOLUME INDEX (MOD BIPLANE): 22.8 ML/M2
LEFT INTERNAL DIMENSION IN SYSTOLE: 2.9 CM (ref 2.1–4)
LEFT VENTRICLE DIASTOLIC VOLUME (MOD BIPLANE): 131 ML
LEFT VENTRICLE SYSTOLIC VOLUME (MOD BIPLANE): 47 ML
LEFT VENTRICULAR INTERNAL DIMENSION IN DIASTOLE: 4.5 CM (ref 3.5–6)
LEFT VENTRICULAR POSTERIOR WALL IN END DIASTOLE: 1.2 CM
LEFT VENTRICULAR STROKE VOLUME: 59 ML
LV EF: 64 %
LVSV (TEICH): 59 ML
MV E'TISSUE VEL-LAT: 8 CM/S
MV E'TISSUE VEL-SEP: 10 CM/S
MV PEAK A VEL: 0.8 M/S
MV PEAK E VEL: 64 CM/S
MV STENOSIS PRESSURE HALF TIME: 71 MS
MV VALVE AREA P 1/2 METHOD: 3.1
RA PRESSURE ESTIMATED: 3 MMHG
RIGHT ATRIUM AREA SYSTOLE A4C: 14.1 CM2
RIGHT VENTRICLE ID DIMENSION: 3.2 CM
RV PSP: 23 MMHG
SL CV LEFT ATRIUM LENGTH A2C: 3.6 CM
SL CV LV EF: 55
SL CV PED ECHO LEFT VENTRICLE DIASTOLIC VOLUME (MOD BIPLANE) 2D: 90 ML
SL CV PED ECHO LEFT VENTRICLE SYSTOLIC VOLUME (MOD BIPLANE) 2D: 31 ML
TR MAX PG: 20 MMHG
TR PEAK VELOCITY: 2.3 M/S
TRICUSPID ANNULAR PLANE SYSTOLIC EXCURSION: 1.9 CM
TRICUSPID VALVE PEAK REGURGITATION VELOCITY: 2.25 M/S

## 2023-10-20 PROCEDURE — 93306 TTE W/DOPPLER COMPLETE: CPT

## 2023-11-02 DIAGNOSIS — B35.9 RINGWORM: ICD-10-CM

## 2023-11-03 RX ORDER — CLOTRIMAZOLE AND BETAMETHASONE DIPROPIONATE 10; .64 MG/G; MG/G
CREAM TOPICAL 2 TIMES DAILY
Qty: 45 G | Refills: 1 | Status: SHIPPED | OUTPATIENT
Start: 2023-11-03

## 2024-01-15 DIAGNOSIS — M54.41 CHRONIC BILATERAL LOW BACK PAIN WITH BILATERAL SCIATICA: ICD-10-CM

## 2024-01-15 DIAGNOSIS — M54.42 CHRONIC BILATERAL LOW BACK PAIN WITH BILATERAL SCIATICA: ICD-10-CM

## 2024-01-15 DIAGNOSIS — G89.29 CHRONIC BILATERAL LOW BACK PAIN WITH BILATERAL SCIATICA: ICD-10-CM

## 2024-01-15 RX ORDER — CELECOXIB 200 MG/1
200 CAPSULE ORAL DAILY
Qty: 90 CAPSULE | Refills: 3 | Status: SHIPPED | OUTPATIENT
Start: 2024-01-15

## 2024-01-16 DIAGNOSIS — K21.9 GASTROESOPHAGEAL REFLUX DISEASE, UNSPECIFIED WHETHER ESOPHAGITIS PRESENT: ICD-10-CM

## 2024-01-16 RX ORDER — ESOMEPRAZOLE MAGNESIUM 40 MG/1
CAPSULE, DELAYED RELEASE ORAL
Qty: 90 CAPSULE | Refills: 3 | Status: SHIPPED | OUTPATIENT
Start: 2024-01-16

## 2024-02-08 ENCOUNTER — OFFICE VISIT (OUTPATIENT)
Dept: FAMILY MEDICINE CLINIC | Facility: HOSPITAL | Age: 74
End: 2024-02-08
Payer: MEDICARE

## 2024-02-08 VITALS
WEIGHT: 187 LBS | OXYGEN SATURATION: 93 % | HEART RATE: 94 BPM | BODY MASS INDEX: 30.05 KG/M2 | SYSTOLIC BLOOD PRESSURE: 126 MMHG | HEIGHT: 66 IN | DIASTOLIC BLOOD PRESSURE: 80 MMHG

## 2024-02-08 DIAGNOSIS — F41.8 SITUATIONAL ANXIETY: Primary | ICD-10-CM

## 2024-02-08 DIAGNOSIS — G89.29 CHRONIC BILATERAL LOW BACK PAIN WITH BILATERAL SCIATICA: ICD-10-CM

## 2024-02-08 DIAGNOSIS — M54.42 CHRONIC BILATERAL LOW BACK PAIN WITH BILATERAL SCIATICA: ICD-10-CM

## 2024-02-08 DIAGNOSIS — Z83.3 FAMILY HISTORY OF DIABETES MELLITUS IN BROTHER: ICD-10-CM

## 2024-02-08 DIAGNOSIS — N18.31 STAGE 3A CHRONIC KIDNEY DISEASE (HCC): ICD-10-CM

## 2024-02-08 DIAGNOSIS — E78.2 MIXED HYPERLIPIDEMIA: Chronic | ICD-10-CM

## 2024-02-08 DIAGNOSIS — N18.2 CKD (CHRONIC KIDNEY DISEASE) STAGE 2, GFR 60-89 ML/MIN: ICD-10-CM

## 2024-02-08 DIAGNOSIS — M54.41 CHRONIC BILATERAL LOW BACK PAIN WITH BILATERAL SCIATICA: ICD-10-CM

## 2024-02-08 PROCEDURE — 99214 OFFICE O/P EST MOD 30 MIN: CPT | Performed by: INTERNAL MEDICINE

## 2024-02-08 RX ORDER — HYDROCODONE BITARTRATE AND ACETAMINOPHEN 5; 325 MG/1; MG/1
1 TABLET ORAL EVERY 8 HOURS PRN
Qty: 90 TABLET | Refills: 0 | Status: SHIPPED | OUTPATIENT
Start: 2024-02-08 | End: 2024-02-13 | Stop reason: SDUPTHER

## 2024-02-08 NOTE — PROGRESS NOTES
Assessment/Plan:     Diagnosis ICD-10-CM Associated Orders   1. Situational anxiety  F41.8 CBC and differential      2. Stage 3a chronic kidney disease (HCC)  N18.31 CBC and differential     Comprehensive metabolic panel      3. CKD (chronic kidney disease) stage 2, GFR 60-89 ml/min  N18.2       4. Mixed hyperlipidemia  E78.2 Lipid Panel with Direct LDL reflex      5. Chronic bilateral low back pain with bilateral sciatica  M54.42 Comprehensive metabolic panel    M54.41 Ambulatory referral to Physical Therapy    G89.29 HYDROcodone-acetaminophen (Norco) 5-325 mg per tablet      6. Family history of diabetes mellitus in brother  Z83.3 Hemoglobin A1C          Problem List Items Addressed This Visit          Nervous and Auditory    Chronic bilateral low back pain with bilateral sciatica    Relevant Medications    HYDROcodone-acetaminophen (Norco) 5-325 mg per tablet    Other Relevant Orders    Comprehensive metabolic panel    Ambulatory referral to Physical Therapy       Genitourinary    CKD (chronic kidney disease) stage 2, GFR 60-89 ml/min     Lab Results   Component Value Date    EGFR 68 10/04/2023    EGFR 59 10/10/2022    EGFR 68 12/07/2021    CREATININE 0.85 10/04/2023    CREATININE 0.96 10/10/2022    CREATININE 0.86 12/07/2021   Improving   Will have pt do repeat labs         RESOLVED: Stage 3a chronic kidney disease (HCC)     Lab Results   Component Value Date    EGFR 68 10/04/2023    EGFR 59 10/10/2022    EGFR 68 12/07/2021    CREATININE 0.85 10/04/2023    CREATININE 0.96 10/10/2022    CREATININE 0.86 12/07/2021            Relevant Orders    CBC and differential    Comprehensive metabolic panel       Other    Mixed hyperlipidemia (Chronic)    Relevant Orders    Lipid Panel with Direct LDL reflex    Situational anxiety - Primary     Trouble sleeping and feels anxious  -had a leak in basement and had clean out of basement   Ongoing aching in legs   Has never been on meds for anxiety- had been able to handle stress  in past  Due  to leg pain has not been out to walk         Relevant Orders    CBC and differential     Other Visit Diagnoses       Family history of diabetes mellitus in brother        Relevant Orders    Hemoglobin A1C              No follow-ups on file.      Subjective:    Patient ID: Brianna Baum is a 73 y.o. female    1 choric back pain and radiation down right leg posteriorly  Is on norco for this- - using 3 prong cane- will refer PT  2. Hyperlipidemia- willr epat labs  3 memory concerns- had stressors with loss of  - dara schmidt - now had tons of supplies that she is trying to get rid of   Ahs occasional dh shadows in her vision    Dizziness  This is a new problem. The problem occurs intermittently. The problem has been waxing and waning. Associated symptoms include fatigue and vertigo. Pertinent negatives include no fever or visual change.       The following portions of the patient's history were reviewed and updated as appropriate: allergies, current medications and problem list.     Review of Systems   Constitutional:  Positive for fatigue. Negative for fever.   Neurological:  Positive for dizziness and vertigo.         Objective:      Current Outpatient Medications:     Ascorbic Acid (VITAMIN C) 1000 MG tablet, Take 1,000 mg by mouth daily, Disp: , Rfl:     Biotin 10 MG CAPS, Take by mouth in the morning, Disp: , Rfl:     celecoxib (CeleBREX) 200 mg capsule, TAKE 1 CAPSULE DAILY, Disp: 90 capsule, Rfl: 3    esomeprazole (NexIUM) 40 MG capsule, TAKE 1 CAPSULE DAILY, Disp: 90 capsule, Rfl: 3    furosemide (LASIX) 20 mg tablet, Take 1 tablet (20 mg total) by mouth daily, Disp: 30 tablet, Rfl: 5    HYDROcodone-acetaminophen (Norco) 5-325 mg per tablet, Take 1 tablet by mouth every 8 (eight) hours as needed for pain Max Daily Amount: 3 tablets, Disp: 90 tablet, Rfl: 0    Olive Leaf Extract 500 MG CAPS, Take by mouth in the morning, Disp: , Rfl:     clotrimazole-betamethasone (LOTRISONE) 1-0.05 %  "cream, Apply topically 2 (two) times a day (Patient not taking: Reported on 2/8/2024), Disp: 45 g, Rfl: 1    Blood pressure 126/80, pulse 94, height 5' 5.5\" (1.664 m), weight 84.8 kg (187 lb), SpO2 93%.     Physical Exam  Vitals and nursing note reviewed.   Constitutional:       General: She is not in acute distress.  HENT:      Head: Normocephalic and atraumatic.      Nose: No congestion.      Mouth/Throat:      Pharynx: No posterior oropharyngeal erythema.   Eyes:      General:         Right eye: No discharge.         Left eye: No discharge.      Comments: nonystasgmus   Cardiovascular:      Rate and Rhythm: Normal rate and regular rhythm.      Heart sounds: No murmur heard.  Pulmonary:      Breath sounds: No wheezing or rhonchi.   Abdominal:      Tenderness: There is no abdominal tenderness.      Hernia: No hernia is present.   Musculoskeletal:         General: Tenderness present. No swelling.      Cervical back: No rigidity.      Comments: Some right knee posterior tendenress   Neurological:      General: No focal deficit present.      Mental Status: She is alert.   Psychiatric:         Mood and Affect: Mood normal.         Thought Content: Thought content normal.        "

## 2024-02-08 NOTE — ASSESSMENT & PLAN NOTE
Lab Results   Component Value Date    EGFR 68 10/04/2023    EGFR 59 10/10/2022    EGFR 68 12/07/2021    CREATININE 0.85 10/04/2023    CREATININE 0.96 10/10/2022    CREATININE 0.86 12/07/2021

## 2024-02-08 NOTE — ASSESSMENT & PLAN NOTE
Lab Results   Component Value Date    EGFR 68 10/04/2023    EGFR 59 10/10/2022    EGFR 68 12/07/2021    CREATININE 0.85 10/04/2023    CREATININE 0.96 10/10/2022    CREATININE 0.86 12/07/2021   Improving   Will have pt do repeat labs

## 2024-02-08 NOTE — ASSESSMENT & PLAN NOTE
Trouble sleeping and feels anxious  -had a leak in basement and had clean out of basement   Ongoing aching in legs   Has never been on meds for anxiety- had been able to handle stress in past  Due  to leg pain has not been out to walk

## 2024-02-12 ENCOUNTER — TELEPHONE (OUTPATIENT)
Dept: FAMILY MEDICINE CLINIC | Facility: HOSPITAL | Age: 74
End: 2024-02-12

## 2024-02-13 DIAGNOSIS — M54.41 CHRONIC BILATERAL LOW BACK PAIN WITH BILATERAL SCIATICA: ICD-10-CM

## 2024-02-13 DIAGNOSIS — M54.42 CHRONIC BILATERAL LOW BACK PAIN WITH BILATERAL SCIATICA: ICD-10-CM

## 2024-02-13 DIAGNOSIS — G89.29 CHRONIC BILATERAL LOW BACK PAIN WITH BILATERAL SCIATICA: ICD-10-CM

## 2024-02-13 DIAGNOSIS — R60.0 LOCALIZED EDEMA: ICD-10-CM

## 2024-02-13 RX ORDER — HYDROCODONE BITARTRATE AND ACETAMINOPHEN 5; 325 MG/1; MG/1
1 TABLET ORAL EVERY 8 HOURS PRN
Qty: 21 TABLET | Refills: 0 | Status: SHIPPED | OUTPATIENT
Start: 2024-02-13

## 2024-02-13 RX ORDER — FUROSEMIDE 20 MG/1
20 TABLET ORAL DAILY
Qty: 90 TABLET | Refills: 1 | Status: SHIPPED | OUTPATIENT
Start: 2024-02-13

## 2024-02-13 NOTE — TELEPHONE ENCOUNTER
This is [facility]. We can be reached at [phone]. When calling back please give reference to [phone]. Brianna Fly 7150, Norco 5325 every eight hours giving quantity of 90. According to our records, patient has not had any sort of an opioid filled and over a year. With that we treat patients as if they are opiate naive and we are only able to do 7 day supply and initial fills. So I can leave this with two different options. We can go ahead and fill it for the seven days supply here, keeping in mind it does take about 5:00 or so days for the patient to get the medication. Alternatively, you can send this to the local pharmacy who would then be able to fill what quantity they can and then if the patient is still in need of the medicine after that trial period, then we can entertain higher quantities as needed. Again, my number here is [phone]. Thank you and have a wonderful day.

## 2024-02-23 ENCOUNTER — HOSPITAL ENCOUNTER (EMERGENCY)
Facility: HOSPITAL | Age: 74
Discharge: HOME | End: 2024-02-23
Attending: EMERGENCY MEDICINE
Payer: MEDICARE

## 2024-02-23 ENCOUNTER — APPOINTMENT (EMERGENCY)
Dept: CT IMAGING | Facility: HOSPITAL | Age: 74
End: 2024-02-23
Payer: MEDICARE

## 2024-02-23 ENCOUNTER — TELEPHONE (OUTPATIENT)
Dept: FAMILY MEDICINE CLINIC | Facility: HOSPITAL | Age: 74
End: 2024-02-23

## 2024-02-23 VITALS
DIASTOLIC BLOOD PRESSURE: 65 MMHG | TEMPERATURE: 98.1 F | SYSTOLIC BLOOD PRESSURE: 141 MMHG | RESPIRATION RATE: 17 BRPM | OXYGEN SATURATION: 97 % | HEART RATE: 77 BPM

## 2024-02-23 DIAGNOSIS — R91.1 PULMONARY NODULE: ICD-10-CM

## 2024-02-23 DIAGNOSIS — N39.0 UTI (URINARY TRACT INFECTION): ICD-10-CM

## 2024-02-23 DIAGNOSIS — R19.7 DIARRHEA: ICD-10-CM

## 2024-02-23 DIAGNOSIS — R11.2 NAUSEA AND VOMITING: ICD-10-CM

## 2024-02-23 DIAGNOSIS — R10.9 ABDOMINAL PAIN: Primary | ICD-10-CM

## 2024-02-23 LAB
ALBUMIN SERPL BCP-MCNC: 4.3 G/DL (ref 3.5–5)
ALP SERPL-CCNC: 67 U/L (ref 34–104)
ALT SERPL W P-5'-P-CCNC: 14 U/L (ref 7–52)
ANION GAP SERPL CALCULATED.3IONS-SCNC: 7 MMOL/L
AST SERPL W P-5'-P-CCNC: 18 U/L (ref 13–39)
BACTERIA UR QL AUTO: ABNORMAL /HPF
BASOPHILS # BLD AUTO: 0.03 THOUSANDS/ÂΜL (ref 0–0.1)
BASOPHILS NFR BLD AUTO: 1 % (ref 0–1)
BILIRUB SERPL-MCNC: 0.47 MG/DL (ref 0.2–1)
BILIRUB UR QL STRIP: NEGATIVE
BUN SERPL-MCNC: 12 MG/DL (ref 5–25)
CALCIUM SERPL-MCNC: 9.7 MG/DL (ref 8.4–10.2)
CARDIAC TROPONIN I PNL SERPL HS: <2 NG/L
CHLORIDE SERPL-SCNC: 104 MMOL/L (ref 96–108)
CLARITY UR: ABNORMAL
CO2 SERPL-SCNC: 28 MMOL/L (ref 21–32)
COLOR UR: YELLOW
CREAT SERPL-MCNC: 0.84 MG/DL (ref 0.6–1.3)
EOSINOPHIL # BLD AUTO: 0.05 THOUSAND/ÂΜL (ref 0–0.61)
EOSINOPHIL NFR BLD AUTO: 1 % (ref 0–6)
ERYTHROCYTE [DISTWIDTH] IN BLOOD BY AUTOMATED COUNT: 12.3 % (ref 11.6–15.1)
GFR SERPL CREATININE-BSD FRML MDRD: 69 ML/MIN/1.73SQ M
GLUCOSE SERPL-MCNC: 115 MG/DL (ref 65–140)
GLUCOSE UR STRIP-MCNC: NEGATIVE MG/DL
HCT VFR BLD AUTO: 41.4 % (ref 34.8–46.1)
HGB BLD-MCNC: 14 G/DL (ref 11.5–15.4)
HGB UR QL STRIP.AUTO: NEGATIVE
IMM GRANULOCYTES # BLD AUTO: 0.02 THOUSAND/UL (ref 0–0.2)
IMM GRANULOCYTES NFR BLD AUTO: 0 % (ref 0–2)
KETONES UR STRIP-MCNC: NEGATIVE MG/DL
LEUKOCYTE ESTERASE UR QL STRIP: ABNORMAL
LIPASE SERPL-CCNC: 17 U/L (ref 11–82)
LYMPHOCYTES # BLD AUTO: 1.63 THOUSANDS/ÂΜL (ref 0.6–4.47)
LYMPHOCYTES NFR BLD AUTO: 28 % (ref 14–44)
MCH RBC QN AUTO: 31.5 PG (ref 26.8–34.3)
MCHC RBC AUTO-ENTMCNC: 33.8 G/DL (ref 31.4–37.4)
MCV RBC AUTO: 93 FL (ref 82–98)
MONOCYTES # BLD AUTO: 0.44 THOUSAND/ÂΜL (ref 0.17–1.22)
MONOCYTES NFR BLD AUTO: 7 % (ref 4–12)
NEUTROPHILS # BLD AUTO: 3.76 THOUSANDS/ÂΜL (ref 1.85–7.62)
NEUTS SEG NFR BLD AUTO: 63 % (ref 43–75)
NITRITE UR QL STRIP: POSITIVE
NON-SQ EPI CELLS URNS QL MICRO: ABNORMAL /HPF
NRBC BLD AUTO-RTO: 0 /100 WBCS
PH UR STRIP.AUTO: 6.5 [PH]
PLATELET # BLD AUTO: 194 THOUSANDS/UL (ref 149–390)
PMV BLD AUTO: 9.9 FL (ref 8.9–12.7)
POTASSIUM SERPL-SCNC: 4 MMOL/L (ref 3.5–5.3)
PROT SERPL-MCNC: 7.2 G/DL (ref 6.4–8.4)
PROT UR STRIP-MCNC: NEGATIVE MG/DL
RBC # BLD AUTO: 4.44 MILLION/UL (ref 3.81–5.12)
RBC #/AREA URNS AUTO: ABNORMAL /HPF
SODIUM SERPL-SCNC: 139 MMOL/L (ref 135–147)
SP GR UR STRIP.AUTO: <1.005 (ref 1–1.03)
UROBILINOGEN UR STRIP-ACNC: <2 MG/DL
WBC # BLD AUTO: 5.93 THOUSAND/UL (ref 4.31–10.16)
WBC #/AREA URNS AUTO: ABNORMAL /HPF

## 2024-02-23 PROCEDURE — 74177 CT ABD & PELVIS W/CONTRAST: CPT

## 2024-02-23 PROCEDURE — 87186 SC STD MICRODIL/AGAR DIL: CPT | Performed by: EMERGENCY MEDICINE

## 2024-02-23 PROCEDURE — 99284 EMERGENCY DEPT VISIT MOD MDM: CPT

## 2024-02-23 PROCEDURE — 84484 ASSAY OF TROPONIN QUANT: CPT | Performed by: PHYSICIAN ASSISTANT

## 2024-02-23 PROCEDURE — 96365 THER/PROPH/DIAG IV INF INIT: CPT

## 2024-02-23 PROCEDURE — 81001 URINALYSIS AUTO W/SCOPE: CPT | Performed by: EMERGENCY MEDICINE

## 2024-02-23 PROCEDURE — 36415 COLL VENOUS BLD VENIPUNCTURE: CPT

## 2024-02-23 PROCEDURE — 83690 ASSAY OF LIPASE: CPT | Performed by: EMERGENCY MEDICINE

## 2024-02-23 PROCEDURE — 96361 HYDRATE IV INFUSION ADD-ON: CPT

## 2024-02-23 PROCEDURE — 87086 URINE CULTURE/COLONY COUNT: CPT | Performed by: EMERGENCY MEDICINE

## 2024-02-23 PROCEDURE — 85025 COMPLETE CBC W/AUTO DIFF WBC: CPT | Performed by: EMERGENCY MEDICINE

## 2024-02-23 PROCEDURE — 93005 ELECTROCARDIOGRAM TRACING: CPT

## 2024-02-23 PROCEDURE — 80053 COMPREHEN METABOLIC PANEL: CPT | Performed by: EMERGENCY MEDICINE

## 2024-02-23 PROCEDURE — 99285 EMERGENCY DEPT VISIT HI MDM: CPT | Performed by: PHYSICIAN ASSISTANT

## 2024-02-23 PROCEDURE — 87077 CULTURE AEROBIC IDENTIFY: CPT | Performed by: EMERGENCY MEDICINE

## 2024-02-23 RX ORDER — CEPHALEXIN 500 MG/1
500 CAPSULE ORAL EVERY 12 HOURS SCHEDULED
Qty: 14 CAPSULE | Refills: 0 | Status: SHIPPED | OUTPATIENT
Start: 2024-02-23 | End: 2024-03-01

## 2024-02-23 RX ORDER — CEFTRIAXONE 1 G/50ML
1000 INJECTION, SOLUTION INTRAVENOUS ONCE
Status: COMPLETED | OUTPATIENT
Start: 2024-02-23 | End: 2024-02-23

## 2024-02-23 RX ADMIN — IOHEXOL 100 ML: 350 INJECTION, SOLUTION INTRAVENOUS at 16:46

## 2024-02-23 RX ADMIN — CEFTRIAXONE 1000 MG: 1 INJECTION, SOLUTION INTRAVENOUS at 17:07

## 2024-02-23 RX ADMIN — SODIUM CHLORIDE 1000 ML: 0.9 INJECTION, SOLUTION INTRAVENOUS at 15:37

## 2024-02-23 NOTE — TELEPHONE ENCOUNTER
Patient calling about her [facility]co - [facility] telling her it was cancelled by this office?    Pls call back

## 2024-02-23 NOTE — TELEPHONE ENCOUNTER
[facility] called the office and left a message. According to their records the patient had not had an opioid medication in over a year. So they are unable to fill a 90 day supply. Patient must start with a smaller 7 day prescription. In their message they said it would take 5 or 6 days to get the medication to her and suggested that we send it to a local pharmacy instead. A 7 day prescription was sent to [facility] pharmacy. I left a message for the patient letting her know that the prescription had been sent to [facility]. Once she fills that prescription we can try to send another script to [facility] for a higher quantity.

## 2024-02-23 NOTE — TELEPHONE ENCOUNTER
Bee. This is Brianna Baum. Somebody named Araceli was supposed to call me. Anyway, I think I'm gonna go to the hospital. My stomach really hurts and I think I'm gonna go to the hospital. Bye.  You received a voice mail from Bautista Reed.

## 2024-02-23 NOTE — DISCHARGE INSTRUCTIONS
Rest, increase fluids.  Colby diet.  Take keflex twice a day for next 7 days.  Follow up with PCP for recheck in 3-4 days.  Return to ER if symptoms worsen.  Follow up with family doctor for monitoring of pulmonary nodules.

## 2024-02-23 NOTE — ED PROVIDER NOTES
"History  Chief Complaint   Patient presents with    Abdominal Pain     Pt to er via ems from home with reports of abdominal pain on and off since Monday after having a bout of diarrhea. No other complaints. \"Feels like someone punched me\"      Brianna is a 74 y/o F with PMH of HTN, diverticulitis, GERD, and chronic pain presenting to the ER with the c/o worsening abdominal pain x 3 days. Pt reports having a \"stomach bug-like\" illness for about a week with abdominal pain, diarrhea, and nausea. She has been eating a bland diet and trying to stay hydrated. She states that 3 days ago she noticed what she thinks is swelling on the R side of her abdomen along with increased pain that is worsened by movement. She describes it as feeling like someone punched her in the stomach, rates it as a 6/10, and describes it as constant. States it feels similar to the pain she had before her gallbladder was removed a few years ago. Last BM was diarrhea 2 days ago. No blood in her stool. Had an episode of chest pain yesterday, has been feeling dizzy, and has chills. Denies fever or vomiting. Drinks 2 vodka/iced teas every few days- last drink 2 days ago and is a marijuana user. Pt has been taking Imodium and Pepto.       History provided by:  Patient  Abdominal Pain  Associated symptoms: chest pain, chills, diarrhea and nausea    Associated symptoms: no cough, no dysuria, no fever, no hematuria, no shortness of breath, no sore throat and no vomiting        Prior to Admission Medications   Prescriptions Last Dose Informant Patient Reported? Taking?   Ascorbic Acid (VITAMIN C) 1000 MG tablet   Yes No   Sig: Take 1,000 mg by mouth daily   Biotin 10 MG CAPS   Yes No   Sig: Take by mouth in the morning   HYDROcodone-acetaminophen (Norco) 5-325 mg per tablet   No No   Sig: Take 1 tablet by mouth every 8 (eight) hours as needed for pain Max Daily Amount: 3 tablets   Olive Leaf Extract 500 MG CAPS   Yes No   Sig: Take by mouth in the morning "   celecoxib (CeleBREX) 200 mg capsule   No No   Sig: TAKE 1 CAPSULE DAILY   clotrimazole-betamethasone (LOTRISONE) 1-0.05 % cream   No No   Sig: Apply topically 2 (two) times a day   Patient not taking: Reported on 2/8/2024   esomeprazole (NexIUM) 40 MG capsule   No No   Sig: TAKE 1 CAPSULE DAILY   furosemide (LASIX) 20 mg tablet   No No   Sig: Take 1 tablet (20 mg total) by mouth daily      Facility-Administered Medications: None       Past Medical History:   Diagnosis Date    Arthritis     Chronic pain     Diverticulitis     GERD (gastroesophageal reflux disease)     Hypertension     Vertigo        Past Surgical History:   Procedure Laterality Date    CHOLECYSTECTOMY LAPAROSCOPIC N/A 12/6/2021    Procedure: SUBTOTAL CHOLECYSTECTOMY LAPAROSCOPIC;  Surgeon: Víctor Viveros MD;  Location:  MAIN OR;  Service: General    GALLBLADDER SURGERY  12/2021    Dr. Viveros    TUBAL LIGATION         Family History   Problem Relation Age of Onset    Stroke Mother     Diabetes Mother     Hypertension Mother     Heart disease Father     Hypertension Father     Heart disease Brother     Diabetes Brother     Hypertension Brother     Substance Abuse Neg Hx     Mental illness Neg Hx      I have reviewed and agree with the history as documented.    E-Cigarette/Vaping    E-Cigarette Use Never User      E-Cigarette/Vaping Substances     Social History     Tobacco Use    Smoking status: Never    Smokeless tobacco: Never   Vaping Use    Vaping status: Never Used   Substance Use Topics    Alcohol use: Yes     Comment: 3 times a week    Drug use: Yes     Types: Marijuana     Comment: rarely       Review of Systems   Constitutional:  Positive for chills. Negative for fever.   HENT:  Negative for ear pain and sore throat.    Eyes:  Negative for pain and visual disturbance.   Respiratory:  Negative for cough and shortness of breath.    Cardiovascular:  Positive for chest pain. Negative for palpitations.   Gastrointestinal:  Positive for  abdominal pain, diarrhea and nausea. Negative for blood in stool and vomiting.   Genitourinary:  Negative for dysuria, hematuria and urgency.   Musculoskeletal:  Negative for arthralgias and back pain.   Skin:  Negative for color change, pallor and rash.   Neurological:  Negative for seizures, syncope, light-headedness and headaches.   All other systems reviewed and are negative.      Physical Exam  Physical Exam  Vitals and nursing note reviewed.   Constitutional:       General: She is not in acute distress.     Appearance: She is well-developed. She is not ill-appearing or toxic-appearing.   HENT:      Head: Normocephalic and atraumatic.      Jaw: There is normal jaw occlusion.      Nose: Nose normal.      Mouth/Throat:      Mouth: Mucous membranes are moist.   Eyes:      Extraocular Movements: Extraocular movements intact.      Conjunctiva/sclera: Conjunctivae normal.   Cardiovascular:      Rate and Rhythm: Normal rate and regular rhythm.      Chest Wall: PMI is not displaced.      Pulses:           Radial pulses are 2+ on the right side and 2+ on the left side.      Heart sounds: No murmur heard.  Pulmonary:      Effort: Pulmonary effort is normal. No respiratory distress.      Breath sounds: Normal breath sounds and air entry.   Abdominal:      General: Bowel sounds are normal.      Palpations: Abdomen is soft.      Tenderness: There is abdominal tenderness in the right upper quadrant and right lower quadrant.   Musculoskeletal:         General: No swelling.      Cervical back: Normal range of motion and neck supple.   Skin:     General: Skin is warm and dry.      Capillary Refill: Capillary refill takes less than 2 seconds.      Coloration: Skin is not jaundiced or pale.   Neurological:      Mental Status: She is alert and oriented to person, place, and time.      GCS: GCS eye subscore is 4. GCS verbal subscore is 5. GCS motor subscore is 6.   Psychiatric:         Mood and Affect: Mood normal.          Behavior: Behavior is cooperative.         Vital Signs  ED Triage Vitals   Temperature Pulse Respirations Blood Pressure SpO2   02/23/24 1323 02/23/24 1323 02/23/24 1323 02/23/24 1323 02/23/24 1323   98.1 °F (36.7 °C) 86 18 162/84 98 %      Temp Source Heart Rate Source Patient Position - Orthostatic VS BP Location FiO2 (%)   02/23/24 1323 02/23/24 1323 02/23/24 1323 02/23/24 1323 --   Temporal Monitor Sitting Left arm       Pain Score       02/23/24 1658       5           Vitals:    02/23/24 1323 02/23/24 1545   BP: 162/84 145/80   Pulse: 86 78   Patient Position - Orthostatic VS: Sitting Sitting         Visual Acuity      ED Medications  Medications   sodium chloride 0.9 % bolus 1,000 mL (0 mL Intravenous Stopped 2/23/24 1741)   cefTRIAXone (ROCEPHIN) IVPB (premix in dextrose) 1,000 mg 50 mL (0 mg Intravenous Stopped 2/23/24 1741)   iohexol (OMNIPAQUE) 350 MG/ML injection (MULTI-DOSE) 100 mL (100 mL Intravenous Given 2/23/24 1646)       Diagnostic Studies  Results Reviewed       Procedure Component Value Units Date/Time    Urine Microscopic [[id number]]  (Abnormal) Collected: 02/23/24 1509    Lab Status: Final result Specimen: Urine, Clean Catch Updated: 02/23/24 1619     RBC, UA 0-1 /hpf      WBC, UA Innumerable /hpf      Epithelial Cells Occasional /hpf      Bacteria, UA Innumerable /hpf     Narrative:      Microscopic performed by Claudia Buchanan.     Urine culture [[id number]] Collected: 02/23/24 1509    Lab Status: In process Specimen: Urine, Clean Catch Updated: 02/23/24 1619    HS Troponin 0hr (reflex protocol) [[id number]]  (Normal) Collected: 02/23/24 1538    Lab Status: Final result Specimen: Blood from Arm, Right Updated: 02/23/24 1559     hs TnI 0hr <2 ng/L     UA w Reflex to Microscopic w Reflex to Culture [[id number]]  (Abnormal) Collected: 02/23/24 1509    Lab Status: Final result Specimen: Urine, Clean Catch Updated: 02/23/24 1544     Color, UA Yellow     Clarity, UA Cloudy     Specific Gravity, UA  <1.005     pH, UA 6.5     Leukocytes, UA Large     Nitrite, UA Positive     Protein, UA Negative mg/dl      Glucose, UA Negative mg/dl      Ketones, UA Negative mg/dl      Urobilinogen, UA <2.0 mg/dl      Bilirubin, UA Negative     Occult Blood, UA Negative    Comprehensive metabolic panel [[id number]] Collected: 02/23/24 1325    Lab Status: Final result Specimen: Blood from Arm, Left Updated: 02/23/24 1346     Sodium 139 mmol/L      Potassium 4.0 mmol/L      Chloride 104 mmol/L      CO2 28 mmol/L      ANION GAP 7 mmol/L      BUN 12 mg/dL      Creatinine 0.84 mg/dL      Glucose 115 mg/dL      Calcium 9.7 mg/dL      AST 18 U/L      ALT 14 U/L      Alkaline Phosphatase 67 U/L      Total Protein 7.2 g/dL      Albumin 4.3 g/dL      Total Bilirubin 0.47 mg/dL      eGFR 69 ml/min/1.73sq m     Narrative:      National Kidney Disease Foundation guidelines for Chronic Kidney Disease (CKD):     Stage 1 with normal or high GFR (GFR > 90 mL/min/1.73 square meters)    Stage 2 Mild CKD (GFR = 60-89 mL/min/1.73 square meters)    Stage 3A Moderate CKD (GFR = 45-59 mL/min/1.73 square meters)    Stage 3B Moderate CKD (GFR = 30-44 mL/min/1.73 square meters)    Stage 4 Severe CKD (GFR = 15-29 mL/min/1.73 square meters)    Stage 5 End Stage CKD (GFR <15 mL/min/1.73 square meters)  Note: GFR calculation is accurate only with a steady state creatinine    Lipase [[id number]]  (Normal) Collected: 02/23/24 1325    Lab Status: Final result Specimen: Blood from Arm, Left Updated: 02/23/24 1346     Lipase 17 u/L     CBC and differential [[id number]] Collected: 02/23/24 1325    Lab Status: Final result Specimen: Blood from Arm, Left Updated: 02/23/24 1331     WBC 5.93 Thousand/uL      RBC 4.44 Million/uL      Hemoglobin 14.0 g/dL      Hematocrit 41.4 %      MCV 93 fL      MCH 31.5 pg      MCHC 33.8 g/dL      RDW 12.3 %      MPV 9.9 fL      Platelets 194 Thousands/uL      nRBC 0 /100 WBCs      Neutrophils Relative 63 %      Immat GRANS % 0 %       Lymphocytes Relative 28 %      Monocytes Relative 7 %      Eosinophils Relative 1 %      Basophils Relative 1 %      Neutrophils Absolute 3.76 Thousands/µL      Immature Grans Absolute 0.02 Thousand/uL      Lymphocytes Absolute 1.63 Thousands/µL      Monocytes Absolute 0.44 Thousand/µL      Eosinophils Absolute 0.05 Thousand/µL      Basophils Absolute 0.03 Thousands/µL                    CT abdomen pelvis with contrast   Final Result by Israel Maya DO (02/23 1720)      No definite acute findings in the abdomen or pelvis.      Underdistended urinary bladder with resultant mild wall prominence. If there is clinical concern for cystitis, correlate with urinalysis.      Diffuse colonic diverticulosis without diverticulitis.      3 mm right lower lobe nodule (2/13), not clearly visualized on prior study. Based on current Fleischner Society 2017 Guidelines on incidental pulmonary nodule, no routine follow-up is needed if the patient is low risk. If the patient is high risk,    optional follow-up chest CT at 12 months can be considered.            Workstation performed: [id number]                    Procedures  ECG 12 Lead Documentation Only    Date/Time: 2/23/2024 3:50 PM    Performed by: Libertad Subramanian PA-C  Authorized by: Libertad Subramanian PA-C    Indications / Diagnosis:  Chest pain  ECG reviewed by me, the ED Provider: yes    Patient location:  ED  Previous ECG:     Previous ECG:  Compared to current    Similarity:  No change  Quality:     Tracing quality:  Limited by artifact  Rate:     ECG rate:  78  Rhythm:     Rhythm: sinus rhythm    Conduction:     Conduction: normal    ST segments:     ST segments:  Normal           ED Course  ED Course as of 02/23/24 1741   Fri Feb 23, 2024   1731 Patient states she was already aware of her pulmonary nodules.              HEART Risk Score      Flowsheet Row Most Recent Value   Heart Score Risk Calculator    History 0 Filed at: 02/23/2024 1649   ECG 0  Filed at: 02/23/2024 1649   Age 2 Filed at: 02/23/2024 1649   Risk Factors 1 Filed at: 02/23/2024 1649   Troponin 0 Filed at: 02/23/2024 1649   HEART Score 3 Filed at: 02/23/2024 1649                          SBIRT 20yo+      Flowsheet Row Most Recent Value   Initial Alcohol Screen: US AUDIT-C     1. How often do you have a drink containing alcohol? 0 Filed at: 02/23/2024 1324   2. How many drinks containing alcohol do you have on a typical day you are drinking?  0 Filed at: 02/23/2024 1324   3a. Male UNDER 65: How often do you have five or more drinks on one occasion? 0 Filed at: 02/23/2024 1324   3b. FEMALE Any Age, or MALE 65+: How often do you have 4 or more drinks on one occassion? 0 Filed at: 02/23/2024 1324   Audit-C Score 0 Filed at: 02/23/2024 1324   ANGELA: How many times in the past year have you...    Used an illegal drug or used a prescription medication for non-medical reasons? Never Filed at: 02/23/2024 1324                      Medical Decision Making  Ddx: gastroenteritis, colitis, appendicitis, UTI, gastritis, GERD, less likely ACS     Order CBC, CMP, lipase, UA. Order CT abd/pelvis. EKG/troponin to r/o cardiac etiology.  Patient with UTI on UA and CT scan, will treat with abx, advised f/u with PCP for recheck and further monitoring of pulmonary nodule.     Amount and/or Complexity of Data Reviewed  External Data Reviewed: ECG.  Labs: ordered.  Radiology: ordered.  ECG/medicine tests: ordered and independent interpretation performed.    Risk  Prescription drug management.             Disposition  Final diagnoses:   Abdominal pain   Nausea and vomiting   Diarrhea   UTI (urinary tract infection)   Pulmonary nodule     Time reflects when diagnosis was documented in both MDM as applicable and the Disposition within this note       Time User Action Codes Description Comment    2/23/2024  4:59 PM Libertad Subramanian Add [R10.9] Abdominal pain     2/23/2024  4:59 PM Libertad Subramanian Add [R11.2] Nausea  and vomiting     2/23/2024  4:59 PM Libertad Subramanian [R19.7] Diarrhea     2/23/2024  4:59 PM Libertad Subramanian [N39.0] UTI (urinary tract infection)     2/23/2024  5:24 PM Libertad Subramanian Add [R91.1] Pulmonary nodule           ED Disposition       ED Disposition   Discharge    Condition   Stable    Date/Time   Fri Feb 23, 2024 1724    Comment   Brianna Baum discharge to home/self care.                   Follow-up Information       Follow up With Specialties Details Why Contact Info    [facility] Schedule an appointment as soon as possible for a visit in 3 days For recheck and monitoring of pulmonary nodule. [address] [facility] [address]  [address]  [place] [address]  [phone]              Patient's Medications   Discharge Prescriptions    CEPHALEXIN (KEFLEX) 500 MG CAPSULE    Take 1 capsule (500 mg total) by mouth every 12 (twelve) hours for 7 days       Start Date: 2/23/2024 End Date: 3/1/2024       Order Dose: 500 mg       Quantity: 14 capsule    Refills: 0       No discharge procedures on file.    PDMP Review         Value Time User    PDMP Reviewed  Yes 2/13/2024  4:26 PM Ladan Baum DO            ED Provider  Electronically Signed by             Libertad Subramanian PA-C  02/23/24 6940

## 2024-02-25 LAB
ATRIAL RATE: 78 BPM
BACTERIA UR CULT: ABNORMAL
P AXIS: 66 DEGREES
PR INTERVAL: 174 MS
QRS AXIS: 3 DEGREES
QRSD INTERVAL: 66 MS
QT INTERVAL: 398 MS
QTC INTERVAL: 453 MS
T WAVE AXIS: 21 DEGREES
VENTRICULAR RATE: 78 BPM

## 2024-02-25 PROCEDURE — 93010 ELECTROCARDIOGRAM REPORT: CPT | Performed by: INTERNAL MEDICINE

## 2024-02-25 RX ORDER — AMOXICILLIN AND CLAVULANATE POTASSIUM 875; 125 MG/1; MG/1
1 TABLET, FILM COATED ORAL EVERY 12 HOURS
Qty: 14 TABLET | Refills: 0 | Status: SHIPPED | OUTPATIENT
Start: 2024-02-25 | End: 2024-03-03

## 2024-02-25 NOTE — RESULT ENCOUNTER NOTE
Patient returned call, I informed her of UTI and to stop keflex and start taking augmentin.  I sent script of augmentin 875/125 I po BID x 7 days to [facility] pharmacy.

## 2024-02-26 DIAGNOSIS — M54.41 CHRONIC BILATERAL LOW BACK PAIN WITH BILATERAL SCIATICA: ICD-10-CM

## 2024-02-26 DIAGNOSIS — G89.29 CHRONIC BILATERAL LOW BACK PAIN WITH BILATERAL SCIATICA: ICD-10-CM

## 2024-02-26 DIAGNOSIS — M54.42 CHRONIC BILATERAL LOW BACK PAIN WITH BILATERAL SCIATICA: ICD-10-CM

## 2024-02-26 RX ORDER — CELECOXIB 200 MG/1
200 CAPSULE ORAL DAILY
Qty: 90 CAPSULE | Refills: 3 | Status: SHIPPED | OUTPATIENT
Start: 2024-02-26

## 2024-02-26 NOTE — TELEPHONE ENCOUNTER
Needs a refill for     celecoxib (CeleBREX) 200 mg capsule     She would like a 90 day supply if possible

## 2024-02-28 LAB — BACTERIA UR CULT: ABNORMAL

## 2024-03-06 ENCOUNTER — TELEPHONE (OUTPATIENT)
Dept: FAMILY MEDICINE CLINIC | Facility: HOSPITAL | Age: 74
End: 2024-03-06

## 2024-03-06 NOTE — TELEPHONE ENCOUNTER
Left detailed message for patient. Hydrocodone cannot be a 90 day prescription because it is a controlled substance. Original prescription sent was for 30 days but [facility] would not fill that large of an amount as a first fill, that is why we started with 7 days to the local pharmacy. I advised the patient that we can send a 30 day script now to either mail order or local. The choice is hers, she just needs to call me back to tell me which pharmacy to send it to.

## 2024-03-06 NOTE — TELEPHONE ENCOUNTER
Patient left the following message asking for a refill of     HYDROcodone-acetaminophen (Norco) 5-325 mg per tablet     I used up the hydrocodone acetaminophen that I got from [facility] pharmacy that thing for a week because there's a problem with [facility]. Now if you would send the order for the three months apply to [facility], I would truly appreciate it because I need them. OK, thank you Christiano hylton. What else [phone]. I won't be home later this afternoon, but I'll be home for a while yet. OK. Thank you.

## 2024-03-12 ENCOUNTER — TELEPHONE (OUTPATIENT)
Dept: FAMILY MEDICINE CLINIC | Facility: HOSPITAL | Age: 74
End: 2024-03-12

## 2024-03-12 DIAGNOSIS — R60.0 LOCALIZED EDEMA: ICD-10-CM

## 2024-03-12 DIAGNOSIS — R91.1 SOLID NODULE OF LUNG LESS THAN 6 MM IN DIAMETER: ICD-10-CM

## 2024-03-12 DIAGNOSIS — N30.00 ACUTE CYSTITIS WITHOUT HEMATURIA: Primary | ICD-10-CM

## 2024-03-12 RX ORDER — FUROSEMIDE 20 MG/1
20 TABLET ORAL DAILY
Qty: 30 TABLET | Refills: 5 | Status: SHIPPED | OUTPATIENT
Start: 2024-03-12

## 2024-03-12 RX ORDER — AMOXICILLIN AND CLAVULANATE POTASSIUM 875; 125 MG/1; MG/1
1 TABLET, FILM COATED ORAL 2 TIMES DAILY
Qty: 14 TABLET | Refills: 0 | Status: SHIPPED | OUTPATIENT
Start: 2024-03-12 | End: 2024-03-19

## 2024-03-12 NOTE — TELEPHONE ENCOUNTER
LM ON OUR VM @ 432AM    WOULD LIKE JANICE CALLED INTO [facility] FOR HER UTI    SHE IS IN ALOT OF PAIN    WILL KEEP TOMORROWS APPT    PCB

## 2024-03-12 NOTE — TELEPHONE ENCOUNTER
Please contact patient:    Bee, it's Brianna Baum again. I called at 9:00 this morning and asked if Doctor Bautista would write me a prescription for Augmentin. However you say it because my bladder infection is back in full force. My UTI and I still haven't heard anything. I called [facility]. They said you never called them even though they sent you a message. So anyway, I would like it and appreciate it as somebody do something for me because I am in pain. Thank you. Do you want what is it [phone]?  You received a voice mail from Bautista Reed.

## 2024-03-13 ENCOUNTER — OFFICE VISIT (OUTPATIENT)
Dept: FAMILY MEDICINE CLINIC | Facility: HOSPITAL | Age: 74
End: 2024-03-13
Payer: MEDICARE

## 2024-03-13 VITALS
BODY MASS INDEX: 31.65 KG/M2 | WEIGHT: 190 LBS | HEIGHT: 65 IN | HEART RATE: 86 BPM | DIASTOLIC BLOOD PRESSURE: 88 MMHG | SYSTOLIC BLOOD PRESSURE: 150 MMHG | OXYGEN SATURATION: 96 %

## 2024-03-13 DIAGNOSIS — M54.42 CHRONIC BILATERAL LOW BACK PAIN WITH BILATERAL SCIATICA: ICD-10-CM

## 2024-03-13 DIAGNOSIS — R10.31 ABDOMINAL PAIN, RLQ (RIGHT LOWER QUADRANT): ICD-10-CM

## 2024-03-13 DIAGNOSIS — M54.41 CHRONIC BILATERAL LOW BACK PAIN WITH BILATERAL SCIATICA: ICD-10-CM

## 2024-03-13 DIAGNOSIS — G89.29 CHRONIC BILATERAL LOW BACK PAIN WITH BILATERAL SCIATICA: ICD-10-CM

## 2024-03-13 DIAGNOSIS — N39.0 URINARY TRACT INFECTION WITHOUT HEMATURIA, SITE UNSPECIFIED: Primary | ICD-10-CM

## 2024-03-13 DIAGNOSIS — N30.90 CYSTITIS: ICD-10-CM

## 2024-03-13 DIAGNOSIS — F11.20 CONTINUOUS OPIOID DEPENDENCE (HCC): ICD-10-CM

## 2024-03-13 LAB
SL AMB  POCT GLUCOSE, UA: ABNORMAL
SL AMB LEUKOCYTE ESTERASE,UA: ABNORMAL
SL AMB POCT BILIRUBIN,UA: ABNORMAL
SL AMB POCT BLOOD,UA: ABNORMAL
SL AMB POCT CLARITY,UA: CLEAR
SL AMB POCT COLOR,UA: ABNORMAL
SL AMB POCT KETONES,UA: ABNORMAL
SL AMB POCT NITRITE,UA: ABNORMAL
SL AMB POCT PH,UA: 5
SL AMB POCT SPECIFIC GRAVITY,UA: 1
SL AMB POCT URINE PROTEIN: ABNORMAL
SL AMB POCT UROBILINOGEN: ABNORMAL

## 2024-03-13 PROCEDURE — 99214 OFFICE O/P EST MOD 30 MIN: CPT | Performed by: INTERNAL MEDICINE

## 2024-03-13 PROCEDURE — 87086 URINE CULTURE/COLONY COUNT: CPT | Performed by: INTERNAL MEDICINE

## 2024-03-13 PROCEDURE — 87186 SC STD MICRODIL/AGAR DIL: CPT | Performed by: INTERNAL MEDICINE

## 2024-03-13 PROCEDURE — G2211 COMPLEX E/M VISIT ADD ON: HCPCS | Performed by: INTERNAL MEDICINE

## 2024-03-13 PROCEDURE — 87077 CULTURE AEROBIC IDENTIFY: CPT | Performed by: INTERNAL MEDICINE

## 2024-03-13 PROCEDURE — 81003 URINALYSIS AUTO W/O SCOPE: CPT | Performed by: INTERNAL MEDICINE

## 2024-03-13 RX ORDER — HYDROCODONE BITARTRATE AND ACETAMINOPHEN 5; 325 MG/1; MG/1
1 TABLET ORAL EVERY 8 HOURS PRN
Qty: 21 TABLET | Refills: 0 | Status: CANCELLED | OUTPATIENT
Start: 2024-03-13

## 2024-03-13 NOTE — PROGRESS NOTES
Assessment/Plan:     Diagnosis ICD-10-CM Associated Orders   1. Urinary tract infection without hematuria, site unspecified  N39.0 POCT urine dip auto non-scope     Urine culture      2. Chronic bilateral low back pain with bilateral sciatica  M54.42 POCT urine dip auto non-scope    M54.41 Urine culture    G89.29       3. Abdominal pain, RLQ (right lower quadrant)  R10.31 Ambulatory referral to Gastroenterology      4. Cystitis  N30.90 Ambulatory referral to Urology      5. Continuous opioid dependence (HCC)  F11.20           Problem List Items Addressed This Visit        Nervous and Auditory    Chronic bilateral low back pain with bilateral sciatica    Relevant Orders    POCT urine dip auto non-scope (Completed)    Urine culture       Behavioral Health    Continuous opioid dependence (HCC)     Ongoing back pain   Now doing PT        Other Visit Diagnoses     Urinary tract infection without hematuria, site unspecified    -  Primary    Relevant Orders    POCT urine dip auto non-scope (Completed)    Urine culture    Abdominal pain, RLQ (right lower quadrant)        Relevant Orders    Ambulatory referral to Gastroenterology    Cystitis        Relevant Orders    Ambulatory referral to Urology            No follow-ups on file.      Subjective:    Patient ID: Brianna Baum is a 73 y.o. female    Uti-had ebsl --was in ED and had CT -still having  rlq pain-  Will refer to GI- may have had hx of polyps  Will refer to urology- given recurrent uti  with resistant  organism      Urinary Tract Infection   Pertinent negatives include no chills.   Abdominal Pain  Pertinent negatives include no fever.       The following portions of the patient's history were reviewed and updated as appropriate: allergies, current medications and problem list.     Review of Systems   Constitutional:  Negative for chills and fever.   HENT:  Negative for congestion.    Respiratory:  Negative for shortness of breath.    Cardiovascular:  Negative for  "chest pain and palpitations.   Gastrointestinal:  Positive for abdominal pain.   Musculoskeletal:  Positive for back pain.   All other systems reviewed and are negative.        Objective:      Current Outpatient Medications:   •  amoxicillin-clavulanate (AUGMENTIN) 875-125 mg per tablet, Take 1 tablet by mouth 2 (two) times a day for 7 days, Disp: 14 tablet, Rfl: 0  •  Ascorbic Acid (VITAMIN C) 1000 MG tablet, Take 1,000 mg by mouth daily, Disp: , Rfl:   •  Biotin 10 MG CAPS, Take by mouth in the morning, Disp: , Rfl:   •  celecoxib (CeleBREX) 200 mg capsule, Take 1 capsule (200 mg total) by mouth daily, Disp: 90 capsule, Rfl: 3  •  esomeprazole (NexIUM) 40 MG capsule, TAKE 1 CAPSULE DAILY, Disp: 90 capsule, Rfl: 3  •  furosemide (LASIX) 20 mg tablet, Take 1 tablet (20 mg total) by mouth daily, Disp: 30 tablet, Rfl: 5  •  HYDROcodone-acetaminophen (Norco) 5-325 mg per tablet, Take 1 tablet by mouth every 8 (eight) hours as needed for pain Max Daily Amount: 3 tablets, Disp: 21 tablet, Rfl: 0  •  Olive Leaf Extract 500 MG CAPS, Take by mouth in the morning, Disp: , Rfl:     Blood pressure 150/88, pulse 86, height 5' 5\" (1.651 m), weight 86.2 kg (190 lb), SpO2 96%.     Physical Exam  Vitals and nursing note reviewed.   Constitutional:       General: She is not in acute distress.     Appearance: She is not ill-appearing.   HENT:      Head: Normocephalic.      Mouth/Throat:      Pharynx: No pharyngeal swelling.   Eyes:      General: No scleral icterus.  Cardiovascular:      Rate and Rhythm: Normal rate and regular rhythm.      Heart sounds: No murmur heard.  Pulmonary:      Breath sounds: No wheezing or rhonchi.   Abdominal:      General: There is no distension.      Palpations: There is no hepatomegaly or splenomegaly.      Tenderness: There is abdominal tenderness in the right upper quadrant and right lower quadrant.      Hernia: There is no hernia in the left inguinal area or right inguinal area.   Genitourinary:     " Adnexa:         Left: No fullness.        Rectum: No tenderness.   Neurological:      Mental Status: She is alert.      Cranial Nerves: No cranial nerve deficit.      Motor: No weakness.   Psychiatric:         Mood and Affect: Mood is not anxious.

## 2024-03-14 ENCOUNTER — RA CDI HCC (OUTPATIENT)
Dept: OTHER | Facility: HOSPITAL | Age: 74
End: 2024-03-14

## 2024-03-14 RX ORDER — HYDROCODONE BITARTRATE AND ACETAMINOPHEN 5; 325 MG/1; MG/1
1 TABLET ORAL EVERY 8 HOURS PRN
Qty: 90 TABLET | Refills: 0 | Status: SHIPPED | OUTPATIENT
Start: 2024-03-14

## 2024-03-15 LAB — BACTERIA UR CULT: ABNORMAL

## 2024-03-18 ENCOUNTER — TELEPHONE (OUTPATIENT)
Dept: FAMILY MEDICINE CLINIC | Facility: HOSPITAL | Age: 74
End: 2024-03-18

## 2024-03-18 NOTE — TELEPHONE ENCOUNTER
Yes, this is [facility] calling a question on a patient's prescription Brianna Baum and we have her YOB: 1951. Looks like in your system you have it as [id number], but the question was on the hydrocodone acetaminophen 3497 and the patients not had that in quite some time. It looks like it was July of 22 that the patient got 90. So wanted to see if we could send a little bit smaller quantity on that to reflect what they're using in a 90 day period, perhaps just #30 tablets. So if you could please give us a call back at [phone] and reference invoice number [id number] dash. 59. Hours of operation are 9:00 AM until 4:30 PM Eastern Time. Thank you.  You received a voice mail from babbel.

## 2024-03-19 ENCOUNTER — TELEPHONE (OUTPATIENT)
Dept: LAB | Facility: HOSPITAL | Age: 74
End: 2024-03-19

## 2024-03-19 NOTE — TELEPHONE ENCOUNTER
This message is for Araceli. This is for Ashlee Baum [facility] called me again about my hydrocodone. Like a set of men or whatever. I don't know. They have questions. I don't know what the story is, but if they keep it up, would you just send it to [facility]? Because I need these things. OK, so thank you. OK. Christiano [phone].  You received a voice mail from Bautista Reed.

## 2024-03-19 NOTE — TELEPHONE ENCOUNTER
See previous message, [facility] aguillon'jose #30, verbal ok given to [facility]. Lm on pt vm advising her of lesser Quantity. CR

## 2024-03-20 ENCOUNTER — TELEPHONE (OUTPATIENT)
Dept: FAMILY MEDICINE CLINIC | Facility: HOSPITAL | Age: 74
End: 2024-03-20

## 2024-03-20 NOTE — TELEPHONE ENCOUNTER
Patient left the following message      . I understand [facility] cut down my Vicodin. I don't quite understand what was said. I was out on a little walk up my driveway to try to strengthen my legs. So if you could get back to me and I need to cancel my appointment tomorrow with [facility] because there's no reason for me to go there because until after I get my blood work done. So I'm not getting that done till the 28th. So I will reschedule that appointment, but for now I don't seem to see the sense I don't need anything. So if you get call me back, I'd appreciate it and tell me what's happening with the Vicodin. I went from three a day down to what one a day. That's really not enough and I would appreciate somebody telling me what's going on. Thank you.

## 2024-03-25 NOTE — TELEPHONE ENCOUNTER
Left another message for pt to call back. Mail order company requested #30 instead of #90 due to pt appearing is not using that many. [facility] ok'd #30. Left message for pt to call back If she is using more or needs refill or is using another pharmacy.

## 2024-03-26 ENCOUNTER — CONSULT (OUTPATIENT)
Dept: GASTROENTEROLOGY | Facility: CLINIC | Age: 74
End: 2024-03-26
Payer: MEDICARE

## 2024-03-26 ENCOUNTER — TELEPHONE (OUTPATIENT)
Dept: GASTROENTEROLOGY | Facility: CLINIC | Age: 74
End: 2024-03-26

## 2024-03-26 ENCOUNTER — TELEPHONE (OUTPATIENT)
Dept: FAMILY MEDICINE CLINIC | Facility: HOSPITAL | Age: 74
End: 2024-03-26

## 2024-03-26 VITALS
DIASTOLIC BLOOD PRESSURE: 78 MMHG | BODY MASS INDEX: 31.32 KG/M2 | SYSTOLIC BLOOD PRESSURE: 140 MMHG | WEIGHT: 188 LBS | HEIGHT: 65 IN

## 2024-03-26 DIAGNOSIS — R10.31 ABDOMINAL PAIN, RLQ (RIGHT LOWER QUADRANT): Primary | ICD-10-CM

## 2024-03-26 DIAGNOSIS — Z86.010 PERSONAL HISTORY OF COLONIC POLYPS: ICD-10-CM

## 2024-03-26 DIAGNOSIS — K21.9 GASTROESOPHAGEAL REFLUX DISEASE, UNSPECIFIED WHETHER ESOPHAGITIS PRESENT: ICD-10-CM

## 2024-03-26 DIAGNOSIS — K59.03 THERAPEUTIC OPIOID INDUCED CONSTIPATION: ICD-10-CM

## 2024-03-26 DIAGNOSIS — T40.2X5A THERAPEUTIC OPIOID INDUCED CONSTIPATION: ICD-10-CM

## 2024-03-26 PROCEDURE — 99204 OFFICE O/P NEW MOD 45 MIN: CPT | Performed by: INTERNAL MEDICINE

## 2024-03-26 RX ORDER — POLYETHYLENE GLYCOL 3350, SODIUM SULFATE ANHYDROUS, SODIUM BICARBONATE, SODIUM CHLORIDE, POTASSIUM CHLORIDE 236; 22.74; 6.74; 5.86; 2.97 G/4L; G/4L; G/4L; G/4L; G/4L
4000 POWDER, FOR SOLUTION ORAL ONCE
Qty: 4000 ML | Refills: 0 | Status: SHIPPED | OUTPATIENT
Start: 2024-03-26 | End: 2024-03-26

## 2024-03-26 RX ORDER — POLYETHYLENE GLYCOL 3350 17 G/17G
17 POWDER, FOR SOLUTION ORAL 2 TIMES DAILY
Qty: 255 G | Refills: 1 | Status: SHIPPED | OUTPATIENT
Start: 2024-03-26

## 2024-03-26 NOTE — PROGRESS NOTES
[facility] - Outpatient Consultation  Brianna Baum 73 y.o. female MRN: [id number]  Encounter: [id number]    ASSESSMENT AND PLAN:      1. Abdominal pain, RLQ (right lower quadrant)  Believe this is due to constipation, she does not have a bowel movement every day and even when she does, she does not have a complete evacuation.  I will start MiraLAX now  - Ambulatory referral to Gastroenterology  - polyethylene glycol (GLYCOLAX) 17 GM/SCOOP powder; Take 17 g by mouth 2 (two) times a day  Dispense: 255 g; Refill: 1    2. Gastroesophageal reflux disease, unspecified whether esophagitis present  She has a longstanding history of chronic PPI use and has never had an upper endoscopy to check for erosive disease or Currie's esophagus.  I will proceed with 1 at this point.  Procedure risks and preparation discussed in detail.  Consent signed in the office  - EGD; Future    3. Therapeutic opioid induced constipation  Likely the culprit of her chronic constipation.  I will proceed with MiraLAX as above    4. Personal history of colonic polyps  She seemingly had a colonoscopy years ago and was told that she had polyps.  She is under the impression that they were never removed.  In any event, she is way overdue for screening purposes.  I will proceed with colonoscopy at this time.  Procedure risks and preparation discussed in detail.  Consent signed in the office.  - Colonoscopy; Future  - polyethylene glycol (Golytely) 4000 mL solution; Take 4,000 mL by mouth once for 1 dose Take 4000 mL by mouth once for 1 dose. Use as directed  Dispense: 4000 mL; Refill: 0      Follow up Appointment: For upper endoscopy and colonoscopy    _______________________      Chief Complaint   Patient presents with    Abdominal Pain     Whole abdomen hurts, diarrhea       HPI:   Brianna Baum is a 73 y.o. year old female with a PMH significant for chronic GERD and chronic constipation who presents from a  consultation from PCP for worsening issues with generalized abdominal pain.  Last month she went to the emergency department for abdominal pain and was diagnosed with a UTI.  A CAT scan was performed, she was told that it did not show anything.  She was treated for a UTI with multiple rounds of antibiotics, but she has not felt improved at all.  She admits to having chronic issues with constipation, never going every day.  She does take Vicodin chronically as well as marijuana for pain.  She states that even when she does go, the bowel movement feels incomplete.  She does not see blood or mucus in the.  She states that she has had a history of diverticulitis and had a colonoscopy many years ago.  She thinks that she was told that she had colon polyps, but they were not removed.  She has not had a colonoscopy since.    Historical Information   Past Medical History:   Diagnosis Date    Arthritis     Chronic pain     Diverticulitis     GERD (gastroesophageal reflux disease)     Hypertension     Vertigo      Past Surgical History:   Procedure Laterality Date    CHOLECYSTECTOMY LAPAROSCOPIC N/A 12/6/2021    Procedure: SUBTOTAL CHOLECYSTECTOMY LAPAROSCOPIC;  Surgeon: Víctor Viveros MD;  Location:  MAIN OR;  Service: General    GALLBLADDER SURGERY  12/2021    Dr. Viveros    TUBAL LIGATION       Social History     Substance and Sexual Activity   Alcohol Use Yes    Comment: 3 times a week     Social History     Substance and Sexual Activity   Drug Use Yes    Types: Marijuana    Comment: rarely     Social History     Tobacco Use   Smoking Status Never   Smokeless Tobacco Never     Family History   Problem Relation Age of Onset    Stroke Mother     Diabetes Mother     Hypertension Mother     Heart disease Father     Hypertension Father     Heart disease Brother     Diabetes Brother     Hypertension Brother     Substance Abuse Neg Hx     Mental illness Neg Hx        Meds/Allergies     Current Outpatient Medications:      "Ascorbic Acid (VITAMIN C) 1000 MG tablet    Biotin 10 MG CAPS    celecoxib (CeleBREX) 200 mg capsule    esomeprazole (NexIUM) 40 MG capsule    furosemide (LASIX) 20 mg tablet    HYDROcodone-acetaminophen (Norco) 5-325 mg per tablet    Olive Leaf Extract 500 MG CAPS    polyethylene glycol (GLYCOLAX) 17 GM/SCOOP powder    polyethylene glycol (Golytely) 4000 mL solution    Allergies   Allergen Reactions    Tramadol Hives       PHYSICAL EXAM:    Blood pressure 140/78, height 5' 5\" (1.651 m), weight 85.3 kg (188 lb). Body mass index is 31.28 kg/m².  General Appearance: NAD, cooperative, alert  Eyes: Anicteric  GI:  Soft, non-tender, non-distended; normal bowel sounds; no masses, no organomegaly   Rectal: Deferred  Musculoskeletal: No edema.  Skin:  No jaundice    Lab Results:   Lab Results   Component Value Date    WBC 5.93 02/23/2024    WBC 6.52 10/04/2023    WBC 5.78 10/10/2022    HGB 14.0 02/23/2024    HGB 14.3 10/04/2023    HGB 12.9 10/10/2022    MCV 93 02/23/2024     02/23/2024     10/04/2023     10/10/2022    INR 1.05 12/05/2021    INR 1.07 12/19/2018    INR 1.12 04/23/2014     Lab Results   Component Value Date     04/23/2014    K 4.0 02/23/2024     02/23/2024    CO2 28 02/23/2024    ANIONGAP 7 04/23/2014    BUN 12 02/23/2024    CREATININE 0.84 02/23/2024    GLUCOSE 93 04/23/2014    GLUF 106 (H) 10/04/2023    CALCIUM 9.7 02/23/2024    CORRECTEDCA 9.3 12/07/2021    AST 18 02/23/2024    AST 20 10/04/2023    AST 21 10/10/2022    ALT 14 02/23/2024    ALT 13 10/04/2023    ALT 14 10/10/2022    ALKPHOS 67 02/23/2024    ALKPHOS 68 10/04/2023    ALKPHOS 61 10/10/2022    PROT 6.5 04/23/2014    BILITOT 0.4 04/23/2014    EGFR 69 02/23/2024     No results found for: \"IRON\", \"TIBC\", \"FERRITIN\"  Lab Results   Component Value Date    LIPASE 17 02/23/2024       Radiology Results:   No results found.  "

## 2024-03-26 NOTE — TELEPHONE ENCOUNTER
Scheduled date of combo (as of today):04/29/24  Physician performing combo:Carisa  [facility]  Bowel prep reviewed with patient:Yolette  Instructions reviewed with patient by:RANDALL  Clearances: NONE

## 2024-03-28 ENCOUNTER — APPOINTMENT (OUTPATIENT)
Dept: LAB | Facility: HOSPITAL | Age: 74
End: 2024-03-28
Attending: INTERNAL MEDICINE
Payer: MEDICARE

## 2024-03-28 DIAGNOSIS — M54.42 CHRONIC BILATERAL LOW BACK PAIN WITH BILATERAL SCIATICA: ICD-10-CM

## 2024-03-28 DIAGNOSIS — E78.2 MIXED HYPERLIPIDEMIA: Chronic | ICD-10-CM

## 2024-03-28 DIAGNOSIS — M54.41 CHRONIC BILATERAL LOW BACK PAIN WITH BILATERAL SCIATICA: ICD-10-CM

## 2024-03-28 DIAGNOSIS — F41.8 SITUATIONAL ANXIETY: ICD-10-CM

## 2024-03-28 DIAGNOSIS — N18.31 STAGE 3A CHRONIC KIDNEY DISEASE (HCC): ICD-10-CM

## 2024-03-28 DIAGNOSIS — G89.29 CHRONIC BILATERAL LOW BACK PAIN WITH BILATERAL SCIATICA: ICD-10-CM

## 2024-03-28 DIAGNOSIS — Z83.3 FAMILY HISTORY OF DIABETES MELLITUS IN BROTHER: ICD-10-CM

## 2024-03-28 LAB
ALBUMIN SERPL BCP-MCNC: 3.9 G/DL (ref 3.5–5)
ALP SERPL-CCNC: 63 U/L (ref 34–104)
ALT SERPL W P-5'-P-CCNC: 14 U/L (ref 7–52)
ANION GAP SERPL CALCULATED.3IONS-SCNC: 10 MMOL/L (ref 4–13)
AST SERPL W P-5'-P-CCNC: 20 U/L (ref 13–39)
BASOPHILS # BLD AUTO: 0.02 THOUSANDS/ÂΜL (ref 0–0.1)
BASOPHILS NFR BLD AUTO: 0 % (ref 0–1)
BILIRUB SERPL-MCNC: 0.44 MG/DL (ref 0.2–1)
BUN SERPL-MCNC: 11 MG/DL (ref 5–25)
CALCIUM SERPL-MCNC: 8.7 MG/DL (ref 8.4–10.2)
CHLORIDE SERPL-SCNC: 105 MMOL/L (ref 96–108)
CHOLEST SERPL-MCNC: 199 MG/DL
CO2 SERPL-SCNC: 26 MMOL/L (ref 21–32)
CREAT SERPL-MCNC: 0.71 MG/DL (ref 0.6–1.3)
EOSINOPHIL # BLD AUTO: 0.07 THOUSAND/ÂΜL (ref 0–0.61)
EOSINOPHIL NFR BLD AUTO: 1 % (ref 0–6)
ERYTHROCYTE [DISTWIDTH] IN BLOOD BY AUTOMATED COUNT: 12.3 % (ref 11.6–15.1)
EST. AVERAGE GLUCOSE BLD GHB EST-MCNC: 108 MG/DL
GFR SERPL CREATININE-BSD FRML MDRD: 84 ML/MIN/1.73SQ M
GLUCOSE P FAST SERPL-MCNC: 102 MG/DL (ref 65–99)
HBA1C MFR BLD: 5.4 %
HCT VFR BLD AUTO: 40.3 % (ref 34.8–46.1)
HDLC SERPL-MCNC: 66 MG/DL
HGB BLD-MCNC: 13.2 G/DL (ref 11.5–15.4)
IMM GRANULOCYTES # BLD AUTO: 0.01 THOUSAND/UL (ref 0–0.2)
IMM GRANULOCYTES NFR BLD AUTO: 0 % (ref 0–2)
LDLC SERPL CALC-MCNC: 119 MG/DL (ref 0–100)
LYMPHOCYTES # BLD AUTO: 1.86 THOUSANDS/ÂΜL (ref 0.6–4.47)
LYMPHOCYTES NFR BLD AUTO: 31 % (ref 14–44)
MCH RBC QN AUTO: 31.1 PG (ref 26.8–34.3)
MCHC RBC AUTO-ENTMCNC: 32.8 G/DL (ref 31.4–37.4)
MCV RBC AUTO: 95 FL (ref 82–98)
MONOCYTES # BLD AUTO: 0.46 THOUSAND/ÂΜL (ref 0.17–1.22)
MONOCYTES NFR BLD AUTO: 8 % (ref 4–12)
NEUTROPHILS # BLD AUTO: 3.57 THOUSANDS/ÂΜL (ref 1.85–7.62)
NEUTS SEG NFR BLD AUTO: 60 % (ref 43–75)
NRBC BLD AUTO-RTO: 0 /100 WBCS
PLATELET # BLD AUTO: 197 THOUSANDS/UL (ref 149–390)
PMV BLD AUTO: 11.2 FL (ref 8.9–12.7)
POTASSIUM SERPL-SCNC: 4 MMOL/L (ref 3.5–5.3)
PROT SERPL-MCNC: 6.3 G/DL (ref 6.4–8.4)
RBC # BLD AUTO: 4.25 MILLION/UL (ref 3.81–5.12)
SODIUM SERPL-SCNC: 141 MMOL/L (ref 135–147)
TRIGL SERPL-MCNC: 72 MG/DL
WBC # BLD AUTO: 5.99 THOUSAND/UL (ref 4.31–10.16)

## 2024-03-28 PROCEDURE — 83036 HEMOGLOBIN GLYCOSYLATED A1C: CPT

## 2024-03-28 PROCEDURE — 85025 COMPLETE CBC W/AUTO DIFF WBC: CPT

## 2024-03-28 PROCEDURE — 80053 COMPREHEN METABOLIC PANEL: CPT

## 2024-03-28 PROCEDURE — 80061 LIPID PANEL: CPT

## 2024-03-28 PROCEDURE — 36415 COLL VENOUS BLD VENIPUNCTURE: CPT

## 2024-04-01 ENCOUNTER — TELEPHONE (OUTPATIENT)
Age: 74
End: 2024-04-01

## 2024-04-01 NOTE — TELEPHONE ENCOUNTER
Patients GI provider:  Dr. Younger     Number to return call: [phone]     Reason for call: Pt called and stated she has been doing miralax once a day instead of twice a day, she is really sorry she didn't see the instructions in time , she does want to know if she should continue drinking miralax ? Please advise and reach out thank you   Scheduled procedure/appointment date if applicable: procedure 04/29/2024

## 2024-04-15 ENCOUNTER — ANESTHESIA EVENT (OUTPATIENT)
Dept: ANESTHESIOLOGY | Facility: AMBULATORY SURGERY CENTER | Age: 74
End: 2024-04-15

## 2024-04-15 ENCOUNTER — ANESTHESIA (OUTPATIENT)
Dept: ANESTHESIOLOGY | Facility: AMBULATORY SURGERY CENTER | Age: 74
End: 2024-04-15

## 2024-04-16 ENCOUNTER — TELEPHONE (OUTPATIENT)
Age: 74
End: 2024-04-16

## 2024-04-16 ENCOUNTER — NURSE TRIAGE (OUTPATIENT)
Age: 74
End: 2024-04-16

## 2024-04-16 NOTE — TELEPHONE ENCOUNTER
"SPOKE WITH PT, SCHEDULED FOR EGD/COLONOSCOPY 4/29/24. PT IS TAKING MIRALAX BID, HAVING 4-5 LOOSE BM'S DAILY. PT ADVISED TO CONTINUE MIRALAX, WITH ADEQUATE HYDRATION UNTIL PROCEDURE. ALL QUESTIONS ANSWERED.           Reason for Disposition   Information only question and nurse able to answer    Answer Assessment - Initial Assessment Questions  1. REASON FOR CALL or QUESTION: \"What is your reason for calling today?\" or \"How can I best help you?\" or \"What question do you have that I can help answer?\"      SPOKE WITH PT, SCHEDULED FOR EGD/COLONOSCOPY 4/29/24. PT IS TAKING MIRALAX BID, HAVING 4-5 LOOSE BM'S DAILY. PT ADVISED TO CONTINUE MIRALAX, WITH ADEQUATE HYDRATION UNTIL PROCEDURE. ALL QUESTIONS ANSWERED.    Protocols used: Information Only Call - No Triage-ADULT-[place]    "

## 2024-04-16 NOTE — TELEPHONE ENCOUNTER
Patients GI provider:  Dr. Younger    Number to return call: [phone]    Reason for call: Pt calling to confirm colonoscopy. Would also like to know if she needs to continue taking the glycolax 2x daily. She is having very frequent BMs. Transferred to [facility] in triage for further assistance.     Scheduled procedure/appointment date if applicable: Apt 04/29/2024

## 2024-04-18 NOTE — TELEPHONE ENCOUNTER
Pt wanted to inform provider she will be stopping Miralax. States she passed out from having so many BM's yesterday. Passerby helped her home. Pt had episodes of fecal incontinence. No further syncopal episodes.     Advised pt Miralax is titratable however pt plans to hold for now. Pt was advised to re-hydrate with electrolytes. Pt verbalized understanding and will call if she has further concerns.. Pt will proceed with Colonoscopy 4/29/24.

## 2024-04-25 ENCOUNTER — TELEPHONE (OUTPATIENT)
Dept: GASTROENTEROLOGY | Facility: AMBULATORY SURGERY CENTER | Age: 74
End: 2024-04-25

## 2024-04-25 NOTE — TELEPHONE ENCOUNTER
Pt returned call in regard to earlier arrival time for colonoscopy. Call was transferred to [facility] pt.

## 2024-04-29 ENCOUNTER — ANESTHESIA (OUTPATIENT)
Dept: GASTROENTEROLOGY | Facility: AMBULATORY SURGERY CENTER | Age: 74
End: 2024-04-29

## 2024-04-29 ENCOUNTER — ANESTHESIA (OUTPATIENT)
Dept: ANESTHESIOLOGY | Facility: AMBULATORY SURGERY CENTER | Age: 74
End: 2024-04-29

## 2024-04-29 ENCOUNTER — HOSPITAL ENCOUNTER (OUTPATIENT)
Dept: GASTROENTEROLOGY | Facility: AMBULATORY SURGERY CENTER | Age: 74
Discharge: HOME | End: 2024-04-29
Attending: INTERNAL MEDICINE
Payer: MEDICARE

## 2024-04-29 ENCOUNTER — ANESTHESIA EVENT (OUTPATIENT)
Dept: ANESTHESIOLOGY | Facility: AMBULATORY SURGERY CENTER | Age: 74
End: 2024-04-29

## 2024-04-29 ENCOUNTER — ANESTHESIA EVENT (OUTPATIENT)
Dept: GASTROENTEROLOGY | Facility: AMBULATORY SURGERY CENTER | Age: 74
End: 2024-04-29

## 2024-04-29 VITALS
TEMPERATURE: 97.4 F | OXYGEN SATURATION: 99 % | WEIGHT: 188 LBS | HEIGHT: 65 IN | SYSTOLIC BLOOD PRESSURE: 146 MMHG | RESPIRATION RATE: 18 BRPM | HEART RATE: 78 BPM | DIASTOLIC BLOOD PRESSURE: 65 MMHG | BODY MASS INDEX: 31.32 KG/M2

## 2024-04-29 DIAGNOSIS — K21.9 GASTROESOPHAGEAL REFLUX DISEASE, UNSPECIFIED WHETHER ESOPHAGITIS PRESENT: ICD-10-CM

## 2024-04-29 DIAGNOSIS — Z86.010 PERSONAL HISTORY OF COLONIC POLYPS: ICD-10-CM

## 2024-04-29 PROCEDURE — 43239 EGD BIOPSY SINGLE/MULTIPLE: CPT | Performed by: INTERNAL MEDICINE

## 2024-04-29 PROCEDURE — G0105 COLORECTAL SCRN; HI RISK IND: HCPCS | Performed by: INTERNAL MEDICINE

## 2024-04-29 PROCEDURE — 88305 TISSUE EXAM BY PATHOLOGIST: CPT | Performed by: PATHOLOGY

## 2024-04-29 RX ORDER — SODIUM CHLORIDE, SODIUM LACTATE, POTASSIUM CHLORIDE, CALCIUM CHLORIDE 600; 310; 30; 20 MG/100ML; MG/100ML; MG/100ML; MG/100ML
20 INJECTION, SOLUTION INTRAVENOUS CONTINUOUS
Status: DISCONTINUED | OUTPATIENT
Start: 2024-04-29 | End: 2024-05-03 | Stop reason: HOSPADM

## 2024-04-29 RX ORDER — PROPOFOL 10 MG/ML
INJECTION, EMULSION INTRAVENOUS AS NEEDED
Status: DISCONTINUED | OUTPATIENT
Start: 2024-04-29 | End: 2024-04-29

## 2024-04-29 RX ORDER — SODIUM CHLORIDE, SODIUM LACTATE, POTASSIUM CHLORIDE, CALCIUM CHLORIDE 600; 310; 30; 20 MG/100ML; MG/100ML; MG/100ML; MG/100ML
50 INJECTION, SOLUTION INTRAVENOUS CONTINUOUS
Status: DISCONTINUED | OUTPATIENT
Start: 2024-04-29 | End: 2024-05-03 | Stop reason: HOSPADM

## 2024-04-29 RX ADMIN — PROPOFOL 50 MG: 10 INJECTION, EMULSION INTRAVENOUS at 08:28

## 2024-04-29 RX ADMIN — PROPOFOL 100 MG: 10 INJECTION, EMULSION INTRAVENOUS at 08:06

## 2024-04-29 RX ADMIN — PROPOFOL 50 MG: 10 INJECTION, EMULSION INTRAVENOUS at 08:19

## 2024-04-29 RX ADMIN — PROPOFOL 50 MG: 10 INJECTION, EMULSION INTRAVENOUS at 08:23

## 2024-04-29 RX ADMIN — SODIUM CHLORIDE, SODIUM LACTATE, POTASSIUM CHLORIDE, CALCIUM CHLORIDE: 600; 310; 30; 20 INJECTION, SOLUTION INTRAVENOUS at 08:28

## 2024-04-29 RX ADMIN — PROPOFOL 50 MG: 10 INJECTION, EMULSION INTRAVENOUS at 08:16

## 2024-04-29 RX ADMIN — PROPOFOL 100 MG: 10 INJECTION, EMULSION INTRAVENOUS at 08:09

## 2024-04-29 RX ADMIN — SODIUM CHLORIDE, SODIUM LACTATE, POTASSIUM CHLORIDE, CALCIUM CHLORIDE 50 ML/HR: 600; 310; 30; 20 INJECTION, SOLUTION INTRAVENOUS at 07:51

## 2024-04-29 NOTE — ANESTHESIA POSTPROCEDURE EVALUATION
Post-Op Assessment Note    CV Status:  Stable  Pain Score: 0    Pain management: adequate       Mental Status:  Alert and awake   Hydration Status:  Euvolemic   PONV Controlled:  Controlled   Airway Patency:  Patent     Post Op Vitals Reviewed: Yes    No anethesia notable event occurred.    Staff: CRNA               BP   141/63   Temp   98   Pulse  67   Resp   16   SpO2   100

## 2024-04-29 NOTE — DISCHARGE INSTRUCTIONS
Upper Endoscopy and Colonoscopy   WHAT YOU NEED TO KNOW:   An upper endoscopy is also called an upper gastrointestinal (GI) endoscopy, or an esophagogastroduodenoscopy (EGD). It is a procedure to examine the inside of your esophagus, stomach, and duodenum (first part of the small intestine) with a scope. You may feel bloated, gassy, or have some abdominal discomfort after your procedure. Your throat may be sore for 24 to 36 hours. You may burp or pass gas from air that is still inside your body.                A colonoscopy is a procedure to examine the inside of your colon (intestine) with a scope. Polyps or tissue growths may have been removed during your colonoscopy. It is normal to feel bloated and to have some abdominal discomfort. You should be passing gas. If you have hemorrhoids or you had polyps removed, you may have a small amount of bleeding.          DISCHARGE INSTRUCTIONS:   Seek care immediately if:   You have sudden, severe abdominal pain.     You have problems swallowing.     You have a large amount of black, sticky bowel movements or blood in your bowel movements.     You have sudden trouble breathing.     You feel weak, lightheaded, or faint or your heart beats faster than normal for you.     Contact your healthcare provider if:   You have a fever and chills.      You have nausea or are vomiting.      Your abdomen is bloated or feels full and hard.     You have abdominal pain.    You have a large amount of black, sticky bowel movements or blood in your bowel movements.    You have not had a bowel movement for 3 days after your procedure.    You have rash or hives.    You have questions or concerns about your procedure.     Activity:   ·       Do not lift, strain, or run for 24 hours after your procedure.     ·       Rest after your procedure. You have been given medicine to relax you. Do not drive or make important decisions until the day after your procedure. Return to your normal activity as  directed.     ·       Relieve gas and discomfort from bloating by lying on your right side with a heating pad on your abdomen. You may need to take short walks to help the gas move out. Eat small meals until bloating is relieved.  Follow up with your healthcare provider as directed: Write down your questions so you remember to ask them during your visits.      If you take a “blood thinner”, please review the specific instructions from your endoscopist about when you should resume it. These can be found in the “Recommendation” and “Your Medication list” sections of this After Visit Summary.

## 2024-04-29 NOTE — H&P
History and Physical - [facility]  Brianna Baum 73 y.o. female MRN: [id number]    HPI: Brianna Baum is a 73 y.o. female who presents for upper endoscopy due to chronic GERD and colonoscopy due to history of colon polyps    REVIEW OF SYSTEMS: Per the HPI, and otherwise unremarkable.    Historical Information   Past Medical History:   Diagnosis Date    Arthritis     Chronic pain     Colon polyp     Diverticulitis     GERD (gastroesophageal reflux disease)     Hypertension     Vertigo      Past Surgical History:   Procedure Laterality Date    CHOLECYSTECTOMY      CHOLECYSTECTOMY LAPAROSCOPIC N/A 12/06/2021    Procedure: SUBTOTAL CHOLECYSTECTOMY LAPAROSCOPIC;  Surgeon: Víctor Viveros MD;  Location:  MAIN OR;  Service: General    COLONOSCOPY      GALLBLADDER SURGERY  12/2021    Dr. Viveros    TUBAL LIGATION       Social History   Social History     Substance and Sexual Activity   Alcohol Use Yes    Comment: 3 times a week     Social History     Substance and Sexual Activity   Drug Use Yes    Types: Marijuana    Comment: rarely/ last used 2 days ago     Social History     Tobacco Use   Smoking Status Never   Smokeless Tobacco Never     Family History   Problem Relation Age of Onset    Stroke Mother     Diabetes Mother     Hypertension Mother     Heart disease Father     Hypertension Father     Heart disease Brother     Diabetes Brother     Hypertension Brother     Substance Abuse Neg Hx     Mental illness Neg Hx        Meds/Allergies       Current Outpatient Medications:     Ascorbic Acid (VITAMIN C) 1000 MG tablet    Biotin 10 MG CAPS    celecoxib (CeleBREX) 200 mg capsule    esomeprazole (NexIUM) 40 MG capsule    furosemide (LASIX) 20 mg tablet    HYDROcodone-acetaminophen (Norco) 5-325 mg per tablet    Olive Leaf Extract 500 MG CAPS    polyethylene glycol (GLYCOLAX) 17 GM/SCOOP powder    polyethylene glycol (Golytely) 4000 mL solution    Current Facility-Administered Medications:     lactated  "ringers infusion, 50 mL/hr, Intravenous, Continuous, 50 mL/hr at 04/29/24 0751    Allergies   Allergen Reactions    Tramadol Hives       Objective     /72   Pulse 80   Temp (!) 97.4 °F (36.3 °C) (Temporal)   Resp 20   Ht 5' 5\" (1.651 m)   Wt 85.3 kg (188 lb)   SpO2 98%   BMI 31.28 kg/m²     PHYSICAL EXAM    General Appearance: NAD, cooperative, alert  Eyes: Anicteric  GI:  Soft, non-tender, non-distended; normal bowel sounds; no masses, no organomegaly   Rectal: Deferred until procedure  Musculoskeletal: No edema.  Skin:  No jaundice    ASSESSMENT/PLAN:  This is a 73 y.o. year old female here for upper endoscopy and colonoscopy, and she is stable and optimized for her procedure.        "

## 2024-04-29 NOTE — ANESTHESIA PREPROCEDURE EVALUATION
Procedure:  COLONOSCOPY  EGD    Relevant Problems   CARDIO   (+) Hypertension   (+) Mixed hyperlipidemia      GI/HEPATIC   (+) Gastroesophageal reflux disease without esophagitis      /RENAL   (+) CKD (chronic kidney disease) stage 2, GFR 60-89 ml/min      MUSCULOSKELETAL   (+) Chronic bilateral low back pain with bilateral sciatica      NEURO/PSYCH   (+) Chronic bilateral low back pain with bilateral sciatica   (+) Chronic neck pain   (+) Continuous opioid dependence (HCC)      Lasix being taken for intermittent lower ext swelling  Physical Exam    Airway    Mallampati score: III  TM Distance: <3 FB  Neck ROM: full     Dental    upper dentures and lower dentures    Cardiovascular      Pulmonary      Other Findings  post-pubertal.        ECHO (10/2023)    Left Ventricle: Left ventricular cavity size is normal. Wall thickness is normal. The left ventricular ejection fraction is 55%. Systolic function is normal. Although no diagnostic regional wall motion abnormality was identified, this possibility cannot be completely excluded on the basis of this study. Diastolic function is normal for age.    Right Ventricle: Right ventricular cavity size is normal. Systolic function is normal.    Mitral Valve: There is mild regurgitation.       Anesthesia Plan  ASA Score- 2     Anesthesia Type- IV sedation with anesthesia with ASA Monitors.         Additional Monitors:     Airway Plan:     Comment: 03:30 - last of PO bowel prep    Patient educated on the possibility for awareness under sedation and of the possibility of airway intervention in the event of an airway or procedural emergency  .       Plan Factors-Exercise tolerance (METS): >4 METS.    Chart reviewed.    Patient summary reviewed.    Patient is not a current smoker.              Induction- intravenous.    Postoperative Plan-     Informed Consent- Anesthetic plan and risks discussed with patient.  I personally reviewed this patient with the CRNA. Discussed and agreed  on the Anesthesia Plan with the CRNA..

## 2024-05-01 PROCEDURE — 88305 TISSUE EXAM BY PATHOLOGIST: CPT | Performed by: PATHOLOGY

## 2024-05-02 ENCOUNTER — TELEPHONE (OUTPATIENT)
Age: 74
End: 2024-05-02

## 2024-05-02 NOTE — TELEPHONE ENCOUNTER
Patient called requesting refill for furosemide (LASIX) 20 mg tablet. Patient made aware medication was refilled on 03- for 30 with 5 refills to  [facility]   Patient instructed to contact the pharmacy to obtain refills of medication. Patient verbalized understanding.

## 2024-05-13 ENCOUNTER — TELEPHONE (OUTPATIENT)
Dept: FAMILY MEDICINE CLINIC | Facility: HOSPITAL | Age: 74
End: 2024-05-13

## 2024-05-13 DIAGNOSIS — M54.42 CHRONIC BILATERAL LOW BACK PAIN WITH BILATERAL SCIATICA: ICD-10-CM

## 2024-05-13 DIAGNOSIS — M54.41 CHRONIC BILATERAL LOW BACK PAIN WITH BILATERAL SCIATICA: ICD-10-CM

## 2024-05-13 DIAGNOSIS — G89.29 CHRONIC BILATERAL LOW BACK PAIN WITH BILATERAL SCIATICA: ICD-10-CM

## 2024-05-13 RX ORDER — HYDROCODONE BITARTRATE AND ACETAMINOPHEN 5; 325 MG/1; MG/1
1 TABLET ORAL EVERY 8 HOURS PRN
Qty: 90 TABLET | Refills: 0 | Status: SHIPPED | OUTPATIENT
Start: 2024-05-13

## 2024-05-13 NOTE — TELEPHONE ENCOUNTER
----- Message from LADONNA Drew sent at 5/13/2024  2:50 PM EDT -----  Please call Ms. Baum to reschedule her medicare wellness.  She has been overdue since December.  Thanks!

## 2024-05-13 NOTE — TELEPHONE ENCOUNTER
Reason for call:   [x] Refill   [] Prior Auth  [] Other:     Office:   [x] PCP/Provider -[facility] [address]/[place]       [] Specialty/Provider -     Medication: Norco    Dose/Frequency: 5-325 mg     Quantity: #90    Pharmacy: [facility]    Does the patient have enough for 3 days?   [] Yes   [x] No - Send as HP to POD

## 2024-05-14 ENCOUNTER — OFFICE VISIT (OUTPATIENT)
Dept: FAMILY MEDICINE CLINIC | Facility: HOSPITAL | Age: 74
End: 2024-05-14
Payer: MEDICARE

## 2024-05-14 VITALS
OXYGEN SATURATION: 98 % | SYSTOLIC BLOOD PRESSURE: 124 MMHG | HEART RATE: 75 BPM | DIASTOLIC BLOOD PRESSURE: 70 MMHG | WEIGHT: 184.8 LBS | BODY MASS INDEX: 30.75 KG/M2

## 2024-05-14 DIAGNOSIS — Z00.00 MEDICARE ANNUAL WELLNESS VISIT, SUBSEQUENT: Primary | ICD-10-CM

## 2024-05-14 DIAGNOSIS — N39.41 URGE INCONTINENCE OF URINE: ICD-10-CM

## 2024-05-14 DIAGNOSIS — E66.9 OBESITY (BMI 30.0-34.9): Chronic | ICD-10-CM

## 2024-05-14 DIAGNOSIS — E78.2 MIXED HYPERLIPIDEMIA: Chronic | ICD-10-CM

## 2024-05-14 DIAGNOSIS — L25.5 DERMATITIS DUE TO PLANTS, INCLUDING POISON IVY, SUMAC, AND OAK: ICD-10-CM

## 2024-05-14 DIAGNOSIS — H10.13 ALLERGIC CONJUNCTIVITIS OF BOTH EYES: ICD-10-CM

## 2024-05-14 PROBLEM — L24.9 IRRITANT HAND DERMATITIS: Status: RESOLVED | Noted: 2022-12-01 | Resolved: 2024-05-14

## 2024-05-14 PROCEDURE — G0439 PPPS, SUBSEQ VISIT: HCPCS | Performed by: NURSE PRACTITIONER

## 2024-05-14 PROCEDURE — 99213 OFFICE O/P EST LOW 20 MIN: CPT | Performed by: NURSE PRACTITIONER

## 2024-05-14 RX ORDER — KETOTIFEN FUMARATE 0.35 MG/ML
1 SOLUTION/ DROPS OPHTHALMIC 2 TIMES DAILY
Qty: 10 ML | Refills: 2 | Status: SHIPPED | OUTPATIENT
Start: 2024-05-14

## 2024-05-14 RX ORDER — PREDNISONE 10 MG/1
TABLET ORAL DAILY
Qty: 18 TABLET | Refills: 0 | Status: SHIPPED | OUTPATIENT
Start: 2024-05-14 | End: 2024-05-23

## 2024-05-14 NOTE — ASSESSMENT & PLAN NOTE
Latest Reference Range & Units 03/28/24 12:16   Cholesterol See Comment mg/dL 199   Triglycerides See Comment mg/dL 72   HDL >=50 mg/dL 66   LDL Calculated 0 - 100 mg/dL 119 (H)   (H): Data is abnormally high    Overall improved with lifestyle changes including limiting processed foods, recently stopped alcohol use.

## 2024-05-14 NOTE — PROGRESS NOTES
Assessment and Plan:     Problem List Items Addressed This Visit       Mixed hyperlipidemia (Chronic)      Latest Reference Range & Units 03/28/24 12:16   Cholesterol See Comment mg/dL 199   Triglycerides See Comment mg/dL 72   HDL >=50 mg/dL 66   LDL Calculated 0 - 100 mg/dL 119 (H)   (H): Data is abnormally high    Overall improved with lifestyle changes including limiting processed foods, recently stopped alcohol use.          Urge incontinence of urine     Has been on multiple OAB medications in the past.  Admits to drinking over  ounces fluid daily.  Recently stopped drinking alcohol which has helped symptoms.          Obesity (BMI 30.0-34.9) (Chronic)     Body mass index is 30.75 kg/m².   Discussed dietary adjustments, increased physical activity including weight training and aerobic exercise. Stress management          Other Visit Diagnoses       Medicare annual wellness visit, subsequent    -  Primary    Dermatitis due to plants, including poison ivy, sumac, and oak        Relevant Medications    predniSONE 10 mg tablet    Allergic conjunctivitis of both eyes        Relevant Medications    Ketotifen Fumarate (ZADITOR) 0.035 % ophthalmic solution    predniSONE 10 mg tablet            Depression Screening and Follow-up Plan: Patient was screened for depression during today's encounter. They screened negative with a PHQ-2 score of 0.    Urinary Incontinence Plan of Care: counseling topics discussed: practice Kegel (pelvic floor strengthening) exercises, use restroom every 2 hours, limit alcohol, caffeine, spicy foods, and acidic foods, keeping a bladder diary, limiting fluid intake 3-4 hours before bed, weight loss, preventing constipation, taking fluid pills at a time when you can get to bathroom easily and limiting fluid intake to 60 oz. per day.       Preventive health issues were discussed with patient, and age appropriate screening tests were ordered as noted in patient's After Visit  Summary.  Personalized health advice and appropriate referrals for health education or preventive services given if needed, as noted in patient's After Visit Summary.     History of Present Illness:     Patient presents for a Medicare Wellness Visit    Here for AWV.  Reports gardening with BUA pruritic papular rash.  Also having recurrent pruritic watery eyes, associated with seasonal allergies.    Down 6lb in the past year due to dietary changes, decreased alcohol.  Bowel pattern has also improved with these changes.         Patient Care Team:  LADONNA Drew as PCP - General (Family Medicine)     Review of Systems:     Review of Systems   Constitutional:  Positive for fatigue. Negative for activity change, appetite change, chills and fever.   HENT: Negative.  Negative for congestion, ear pain, postnasal drip and sinus pain.    Eyes:  Positive for itching.   Respiratory: Negative.  Negative for cough and shortness of breath.    Cardiovascular: Negative.  Negative for chest pain and leg swelling.   Gastrointestinal: Negative.  Negative for constipation and diarrhea.   Endocrine: Negative.    Genitourinary:  Positive for urgency. Negative for dysuria.   Musculoskeletal:  Positive for arthralgias. Negative for gait problem.   Skin: Negative.    Allergic/Immunologic: Negative.  Negative for immunocompromised state.   Neurological:  Negative for dizziness and light-headedness.   Hematological: Negative.    Psychiatric/Behavioral: Negative.  Negative for sleep disturbance.         Goes to bed by 10PM and gets up by 5-6AM        Problem List:     Patient Active Problem List   Diagnosis    Chronic neck pain    Chronic bilateral low back pain with bilateral sciatica    Gastroesophageal reflux disease without esophagitis    Mixed hyperlipidemia    Myalgia    Urge incontinence of urine    Obesity (BMI 30.0-34.9)    Primary osteoarthritis of left hand    Varicose veins of both lower extremities without ulcer or  inflammation    Acute cholecystitis    Hypertension    Mass of soft tissue of hand    Vertigo    Situational anxiety    CKD (chronic kidney disease) stage 2, GFR 60-89 ml/min    Continuous opioid dependence (HCC)      Past Medical and Surgical History:     Past Medical History:   Diagnosis Date    Arthritis     Chronic pain     Colon polyp     Diverticulitis     GERD (gastroesophageal reflux disease)     Hypertension     Vertigo      Past Surgical History:   Procedure Laterality Date    CHOLECYSTECTOMY      CHOLECYSTECTOMY LAPAROSCOPIC N/A 12/06/2021    Procedure: SUBTOTAL CHOLECYSTECTOMY LAPAROSCOPIC;  Surgeon: Víctor Viveros MD;  Location:  MAIN OR;  Service: General    COLONOSCOPY      GALLBLADDER SURGERY  12/2021    Dr. Viveros    TUBAL LIGATION        Family History:     Family History   Problem Relation Age of Onset    Stroke Mother     Diabetes Mother     Hypertension Mother     Heart disease Father     Hypertension Father     Heart disease Brother     Diabetes Brother     Hypertension Brother     Substance Abuse Neg Hx     Mental illness Neg Hx       Social History:     Social History     Socioeconomic History    Marital status:      Spouse name: None    Number of children: None    Years of education: None    Highest education level: None   Occupational History    None   Tobacco Use    Smoking status: Never    Smokeless tobacco: Never   Vaping Use    Vaping status: Never Used   Substance and Sexual Activity    Alcohol use: Yes     Comment: 3 times a week    Drug use: Yes     Types: Marijuana     Comment: rarely/ last used 2 days ago    Sexual activity: Not Currently   Other Topics Concern    None   Social History Narrative    Lives at home with     Feels safe at home    No living will    Rare dental care     Social Determinants of Health     Financial Resource Strain: Low Risk  (12/1/2022)    Overall Financial Resource Strain (CARDIA)     Difficulty of Paying Living Expenses: Not hard at all    Food Insecurity: No Food Insecurity (5/14/2024)    Hunger Vital Sign     Worried About Running Out of Food in the Last Year: Never true     Ran Out of Food in the Last Year: Never true   Transportation Needs: No Transportation Needs (5/14/2024)    PRAPARE - Transportation     Lack of Transportation (Medical): No     Lack of Transportation (Non-Medical): No   Physical Activity: Insufficiently Active (7/22/2020)    Exercise Vital Sign     Days of Exercise per Week: 7 days     Minutes of Exercise per Session: 20 min   Stress: Stress Concern Present (7/22/2020)    [employer] [place] [employer] - Occupational Stress Questionnaire     Feeling of Stress : Rather much   Social Connections: Moderately Isolated (7/22/2020)    Social Connection and Isolation Panel [NHANES]     Frequency of Communication with Friends and Family: Three times a week     Frequency of Social Gatherings with Friends and Family: Once a week     Attends Denominational Services: Never     Active Member of Clubs or Organizations: No     Attends Club or Organization Meetings: Never     Marital Status:    Intimate Partner Violence: Not At Risk (7/22/2020)    Humiliation, Afraid, Rape, and Kick questionnaire     Fear of Current or Ex-Partner: No     Emotionally Abused: No     Physically Abused: No     Sexually Abused: No   Housing Stability: Low Risk  (5/14/2024)    Housing Stability Vital Sign     Unable to Pay for Housing in the Last Year: No     Number of Places Lived in the Last Year: 1     Unstable Housing in the Last Year: No      Medications and Allergies:     Current Outpatient Medications   Medication Sig Dispense Refill    Ascorbic Acid (VITAMIN C) 1000 MG tablet Take 1,000 mg by mouth daily      Biotin 10 MG CAPS Take by mouth in the morning      celecoxib (CeleBREX) 200 mg capsule Take 1 capsule (200 mg total) by mouth daily 90 capsule 3    esomeprazole (NexIUM) 40 MG capsule TAKE 1 CAPSULE DAILY 90 capsule 3    furosemide  (LASIX) 20 mg tablet Take 1 tablet (20 mg total) by mouth daily 30 tablet 5    HYDROcodone-acetaminophen (Norco) 5-325 mg per tablet Take 1 tablet by mouth every 8 (eight) hours as needed for pain Max Daily Amount: 3 tablets 90 tablet 0    Ketotifen Fumarate (ZADITOR) 0.035 % ophthalmic solution Administer 1 drop to both eyes 2 (two) times a day 10 mL 2    predniSONE 10 mg tablet Take 3 tablets (30 mg total) by mouth daily for 3 days, THEN 2 tablets (20 mg total) daily for 3 days, THEN 1 tablet (10 mg total) daily for 3 days. 18 tablet 0     No current facility-administered medications for this visit.     Allergies   Allergen Reactions    Tramadol Hives      Immunizations:     Immunization History   Administered Date(s) Administered    COVID-19 PFIZER VACCINE 0.3 ML IM 05/07/2021, 06/15/2021      Health Maintenance:         Topic Date Due    Hepatitis C Screening  Never done    Breast Cancer Screening: Mammogram  05/14/2025 (Originally 2/11/2020)    Colorectal Cancer Screening  Discontinued         Topic Date Due    Pneumococcal Vaccine: 65+ Years (1 of 2 - PCV) Never done    COVID-19 Vaccine (3 - 2023-24 season) 09/01/2023      Medicare Screening Tests and Risk Assessments:     Brianna is here for her Subsequent Wellness visit.     Health Risk Assessment:   Patient rates overall health as very good. Patient feels that their physical health rating is much better. Patient is very satisfied with their life. Eyesight was rated as slightly worse. Hearing was rated as same. Patient feels that their emotional and mental health rating is same. Patients states they are never, rarely angry. Patient states they are sometimes unusually tired/fatigued. Pain experienced in the last 7 days has been some. Patient's pain rating has been 5/10. Patient states that she has experienced no weight loss or gain in last 6 months.     Depression Screening:   PHQ-2 Score: 0      Fall Risk Screening:   In the past year, patient has  experienced: no history of falling in past year      Urinary Incontinence Screening:   Patient has leaked urine accidently in the last six months.     Home Safety:  Patient does not have trouble with stairs inside or outside of their home. Patient has working smoke alarms and has working carbon monoxide detector. Home safety hazards include: none.     Nutrition:   Current diet is Regular.     Medications:   Patient is currently taking over-the-counter supplements. OTC medications include: see medication list. Patient is able to manage medications.     Activities of Daily Living (ADLs)/Instrumental Activities of Daily Living (IADLs):   Walk and transfer into and out of bed and chair?: Yes  Dress and groom yourself?: Yes    Bathe or shower yourself?: Yes    Feed yourself? Yes  Do your laundry/housekeeping?: Yes  Manage your money, pay your bills and track your expenses?: Yes  Make your own meals?: Yes    Do your own shopping?: Yes    Previous Hospitalizations:   Any hospitalizations or ED visits within the last 12 months?: Yes    How many hospitalizations have you had in the last year?: 1-2    Advance Care Planning:   Living will: Yes    Durable POA for healthcare: Yes    Advanced directive: Yes    Advanced directive counseling given: Yes    ACP document given: Yes      PREVENTIVE SCREENINGS      Cardiovascular Screening:    General: History Lipid Disorder and Screening Current      Diabetes Screening:     General: Screening Current      Colorectal Cancer Screening:     General: Screening Current      Breast Cancer Screening:     General: Risks and Benefits Discussed and Patient Declines      Cervical Cancer Screening:    General: Screening Not Indicated      Osteoporosis Screening:    General: Risks and Benefits Discussed and Patient Declines      Lung Cancer Screening:     General: Screening Not Indicated    Screening, Brief Intervention, and Referral to Treatment (SBIRT)    Screening    Typical number of drinks in  a week: 7    Single Item Drug Screening:  How often have you used an illegal drug (including marijuana) or a prescription medication for non-medical reasons in the past year? never    Single Item Drug Screen Score: 0  Interpretation: Negative screen for possible drug use disorder    Other Counseling Topics:   Car/seat belt/driving safety, skin self-exam, sunscreen and calcium and vitamin D intake and regular weightbearing exercise.     No results found.     Physical Exam:     /70 (BP Location: Left arm)   Pulse 75   Wt 83.8 kg (184 lb 12.8 oz)   SpO2 98%   BMI 30.75 kg/m²     Physical Exam  Vitals and nursing note reviewed.   Constitutional:       General: She is not in acute distress.     Appearance: She is well-developed. She is obese.   HENT:      Head: Normocephalic and atraumatic.   Eyes:      Conjunctiva/sclera: Conjunctivae normal.      Comments: Scant watery discharge bilateral eyes   Cardiovascular:      Rate and Rhythm: Normal rate and regular rhythm.      Heart sounds: No murmur heard.  Pulmonary:      Effort: Pulmonary effort is normal. No respiratory distress.      Breath sounds: Normal breath sounds.   Abdominal:      Palpations: Abdomen is soft.      Tenderness: There is no abdominal tenderness.   Musculoskeletal:         General: No swelling.      Cervical back: Neck supple.      Right lower leg: No edema.      Left lower leg: No edema.   Skin:     General: Skin is warm and dry.      Capillary Refill: Capillary refill takes less than 2 seconds.      Findings: Rash present. Rash is papular and purpuric.   Neurological:      Mental Status: She is alert.      Gait: Gait abnormal.      Comments: Antalgic gait due to arthritic knees   Psychiatric:         Mood and Affect: Mood normal.          LADONNA Madden

## 2024-05-14 NOTE — PATIENT INSTRUCTIONS
Schedule eye exam    Medicare Preventive Visit Patient Instructions  Thank you for completing your Welcome to Medicare Visit or Medicare Annual Wellness Visit today. Your next wellness visit will be due in one year (5/15/2025).  The screening/preventive services that you may require over the next 5-10 years are detailed below. Some tests may not apply to you based off risk factors and/or age. Screening tests ordered at today's visit but not completed yet may show as past due. Also, please note that scanned in results may not display below.  Preventive Screenings:  Service Recommendations Previous Testing/Comments   Colorectal Cancer Screening  * Colonoscopy    * Fecal Occult Blood Test (FOBT)/Fecal Immunochemical Test (FIT)  * Fecal DNA/Cologuard Test  * Flexible Sigmoidoscopy Age: 45-75 years old   Colonoscopy: every 10 years (may be performed more frequently if at higher risk)  OR  FOBT/FIT: every 1 year  OR  Cologuard: every 3 years  OR  Sigmoidoscopy: every 5 years  Screening may be recommended earlier than age 45 if at higher risk for colorectal cancer. Also, an individualized decision between you and your healthcare provider will decide whether screening between the ages of 76-85 would be appropriate. Colonoscopy: 04/29/2024  FOBT/FIT: Not on file  Cologuard: Not on file  Sigmoidoscopy: Not on file    Screening Current     Breast Cancer Screening Age: 40+ years old  Frequency: every 1-2 years  Not required if history of left and right mastectomy Mammogram: 02/11/2019        Cervical Cancer Screening Between the ages of 21-29, pap smear recommended once every 3 years.   Between the ages of 30-65, can perform pap smear with HPV co-testing every 5 years.   Recommendations may differ for women with a history of total hysterectomy, cervical cancer, or abnormal pap smears in past. Pap Smear: Not on file    Screening Not Indicated   Hepatitis C Screening Once for adults born between 1945 and 1965  More frequently in  patients at high risk for Hepatitis C Hep C Antibody: Not on file        Diabetes Screening 1-2 times per year if you're at risk for diabetes or have pre-diabetes Fasting glucose: 102 mg/dL (3/28/2024)  A1C: 5.4 % (3/28/2024)  Screening Current   Cholesterol Screening Once every 5 years if you don't have a lipid disorder. May order more often based on risk factors. Lipid panel: 03/28/2024    Screening Not Indicated  History Lipid Disorder     Other Preventive Screenings Covered by Medicare:  Abdominal Aortic Aneurysm (AAA) Screening: covered once if your at risk. You're considered to be at risk if you have a family history of AAA.  Lung Cancer Screening: covers low dose CT scan once per year if you meet all of the following conditions: (1) Age 55-77; (2) No signs or symptoms of lung cancer; (3) Current smoker or have quit smoking within the last 15 years; (4) You have a tobacco smoking history of at least 20 pack years (packs per day multiplied by number of years you smoked); (5) You get a written order from a healthcare provider.  Glaucoma Screening: covered annually if you're considered high risk: (1) You have diabetes OR (2) Family history of glaucoma OR (3)  aged 50 and older OR (4)  American aged 65 and older  Osteoporosis Screening: covered every 2 years if you meet one of the following conditions: (1) You're estrogen deficient and at risk for osteoporosis based off medical history and other findings; (2) Have a vertebral abnormality; (3) On glucocorticoid therapy for more than 3 months; (4) Have primary hyperparathyroidism; (5) On osteoporosis medications and need to assess response to drug therapy.   Last bone density test (DXA Scan): 02/11/2019.  HIV Screening: covered annually if you're between the age of 15-65. Also covered annually if you are younger than 15 and older than 65 with risk factors for HIV infection. For pregnant patients, it is covered up to 3 times per  pregnancy.    Immunizations:  Immunization Recommendations   Influenza Vaccine Annual influenza vaccination during flu season is recommended for all persons aged >= 6 months who do not have contraindications   Pneumococcal Vaccine   * Pneumococcal conjugate vaccine = PCV13 (Prevnar 13), PCV15 (Vaxneuvance), PCV20 (Prevnar 20)  * Pneumococcal polysaccharide vaccine = PPSV23 (Pneumovax) Adults 19-63 yo with certain risk factors or if 65+ yo  If never received any pneumonia vaccine: recommend Prevnar 20 (PCV20)  Give PCV20 if previously received 1 dose of PCV13 or PPSV23   Hepatitis B Vaccine 3 dose series if at intermediate or high risk (ex: diabetes, end stage renal disease, liver disease)   Respiratory syncytial virus (RSV) Vaccine - COVERED BY MEDICARE PART D  * RSVPreF3 (Arexvy) CDC recommends that adults 60 years of age and older may receive a single dose of RSV vaccine using shared clinical decision-making (SCDM)   Tetanus (Td) Vaccine - COST NOT COVERED BY MEDICARE PART B Following completion of primary series, a booster dose should be given every 10 years to maintain immunity against tetanus. Td may also be given as tetanus wound prophylaxis.   Tdap Vaccine - COST NOT COVERED BY MEDICARE PART B Recommended at least once for all adults. For pregnant patients, recommended with each pregnancy.   Shingles Vaccine (Shingrix) - COST NOT COVERED BY MEDICARE PART B  2 shot series recommended in those 19 years and older who have or will have weakened immune systems or those 50 years and older     Health Maintenance Due:      Topic Date Due    Hepatitis C Screening  Never done    Breast Cancer Screening: Mammogram  02/11/2020    Colorectal Cancer Screening  Discontinued     Immunizations Due:      Topic Date Due    Pneumococcal Vaccine: 65+ Years (1 of 2 - PCV) Never done    COVID-19 Vaccine (3 - 2023-24 season) 09/01/2023     Advance Directives   What are advance directives?  Advance directives are legal documents that  state your wishes and plans for medical care. These plans are made ahead of time in case you lose your ability to make decisions for yourself. Advance directives can apply to any medical decision, such as the treatments you want, and if you want to donate organs.   What are the types of advance directives?  There are many types of advance directives, and each state has rules about how to use them. You may choose a combination of any of the following:  Living will:  This is a written record of the treatment you want. You can also choose which treatments you do not want, which to limit, and which to stop at a certain time. This includes surgery, medicine, IV fluid, and tube feedings.   Durable power of  for healthcare (DPAHC):  This is a written record that states who you want to make healthcare choices for you when you are unable to make them for yourself. This person, called a proxy, is usually a family member or a friend. You may choose more than 1 proxy.  Do not resuscitate (DNR) order:  A DNR order is used in case your heart stops beating or you stop breathing. It is a request not to have certain forms of treatment, such as CPR. A DNR order may be included in other types of advance directives.  Medical directive:  This covers the care that you want if you are in a coma, near death, or unable to make decisions for yourself. You can list the treatments you want for each condition. Treatment may include pain medicine, surgery, blood transfusions, dialysis, IV or tube feedings, and a ventilator (breathing machine).  Values history:  This document has questions about your views, beliefs, and how you feel and think about life. This information can help others choose the care that you would choose.  Why are advance directives important?  An advance directive helps you control your care. Although spoken wishes may be used, it is better to have your wishes written down. Spoken wishes can be misunderstood, or not  followed. Treatments may be given even if you do not want them. An advance directive may make it easier for your family to make difficult choices about your care.   Urinary Incontinence   Urinary incontinence (UI)  is when you lose control of your bladder. UI develops because your bladder cannot store or empty urine properly. The 3 most common types of UI are stress incontinence, urge incontinence, or both.  Medicines:   May be given to help strengthen your bladder control. Report any side effects of medication to your healthcare provider.  Do pelvic muscle exercises often:  Your pelvic muscles help you stop urinating. Squeeze these muscles tight for 5 seconds, then relax for 5 seconds. Gradually work up to squeezing for 10 seconds. Do 3 sets of 15 repetitions a day, or as directed. This will help strengthen your pelvic muscles and improve bladder control.  Train your bladder:  Go to the bathroom at set times, such as every 2 hours, even if you do not feel the urge to go. You can also try to hold your urine when you feel the urge to go. For example, hold your urine for 5 minutes when you feel the urge to go. As that becomes easier, hold your urine for 10 minutes.   Self-care:   Keep a UI record.  Write down how often you leak urine and how much you leak. Make a note of what you were doing when you leaked urine.  Drink liquids as directed. You may need to limit the amount of liquid you drink to help control your urine leakage. Do not drink any liquid right before you go to bed. Limit or do not have drinks that contain caffeine or alcohol.   Prevent constipation.  Eat a variety of high-fiber foods. Good examples are high-fiber cereals, beans, vegetables, and whole-grain breads. Walking is the best way to trigger your intestines to have a bowel movement.  Exercise regularly and maintain a healthy weight.  Weight loss and exercise will decrease pressure on your bladder and help you control your leakage.   Use a catheter  as directed  to help empty your bladder. A catheter is a tiny, plastic tube that is put into your bladder to drain your urine.   Go to behavior therapy as directed.  Behavior therapy may be used to help you learn to control your urge to urinate.    Weight Management   Why it is important to manage your weight:  Being overweight increases your risk of health conditions such as heart disease, high blood pressure, type 2 diabetes, and certain types of cancer. It can also increase your risk for osteoarthritis, sleep apnea, and other respiratory problems. Aim for a slow, steady weight loss. Even a small amount of weight loss can lower your risk of health problems.  How to lose weight safely:  A safe and healthy way to lose weight is to eat fewer calories and get regular exercise. You can lose up about 1 pound a week by decreasing the number of calories you eat by 500 calories each day.   Healthy meal plan for weight management:  A healthy meal plan includes a variety of foods, contains fewer calories, and helps you stay healthy. A healthy meal plan includes the following:  Eat whole-grain foods more often.  A healthy meal plan should contain fiber. Fiber is the part of grains, fruits, and vegetables that is not broken down by your body. Whole-grain foods are healthy and provide extra fiber in your diet. Some examples of whole-grain foods are whole-wheat breads and pastas, oatmeal, brown rice, and bulgur.  Eat a variety of vegetables every day.  Include dark, leafy greens such as spinach, kale, smitha greens, and mustard greens. Eat yellow and orange vegetables such as carrots, sweet potatoes, and winter squash.   Eat a variety of fruits every day.  Choose fresh or canned fruit (canned in its own juice or light syrup) instead of juice. Fruit juice has very little or no fiber.  Eat low-fat dairy foods.  Drink fat-free (skim) milk or 1% milk. Eat fat-free yogurt and low-fat cottage cheese. Try low-fat cheeses such as  mozzarella and other reduced-fat cheeses.  Choose meat and other protein foods that are low in fat.  Choose beans or other legumes such as split peas or lentils. Choose fish, skinless poultry (chicken or turkey), or lean cuts of red meat (beef or pork). Before you cook meat or poultry, cut off any visible fat.   Use less fat and oil.  Try baking foods instead of frying them. Add less fat, such as margarine, sour cream, regular salad dressing and mayonnaise to foods. Eat fewer high-fat foods. Some examples of high-fat foods include french fries, doughnuts, ice cream, and cakes.  Eat fewer sweets.  Limit foods and drinks that are high in sugar. This includes candy, cookies, regular soda, and sweetened drinks.  Exercise:  Exercise at least 30 minutes per day on most days of the week. Some examples of exercise include walking, biking, dancing, and swimming. You can also fit in more physical activity by taking the stairs instead of the elevator or parking farther away from stores. Ask your healthcare provider about the best exercise plan for you.      © Copyright [employer] 2018 Information is for End User's use only and may not be sold, redistributed or otherwise used for commercial purposes. All illustrations and images included in CareNotes® are the copyrighted property of [employer] Inc[employer] or [employer]

## 2024-05-14 NOTE — ASSESSMENT & PLAN NOTE
Has been on multiple OAB medications in the past.  Admits to drinking over  ounces fluid daily.  Recently stopped drinking alcohol which has helped symptoms.

## 2024-05-15 NOTE — ASSESSMENT & PLAN NOTE
Body mass index is 30.75 kg/m².   Discussed dietary adjustments, increased physical activity including weight training and aerobic exercise. Stress management

## 2024-05-24 ENCOUNTER — TELEPHONE (OUTPATIENT)
Age: 74
End: 2024-05-24

## 2024-05-24 DIAGNOSIS — L25.5 RHUS DERMATITIS: Primary | ICD-10-CM

## 2024-05-24 RX ORDER — METHYLPREDNISOLONE 4 MG/1
TABLET ORAL
Qty: 21 EACH | Refills: 0 | Status: SHIPPED | OUTPATIENT
Start: 2024-05-24

## 2024-05-24 NOTE — TELEPHONE ENCOUNTER
Patient calling in again regarding poison ivy. Patient frustrated as her provider has not responded. Please advise. Did advised that she can also be seen in the [facility].

## 2024-05-24 NOTE — TELEPHONE ENCOUNTER
Received call from patient regarding poison ivy. She was put on prednisone and took the last pill today but states it is worse and the itching is terrible. She has tried alcohol, calmazine, benadryl cream, and jock itch creams and nothing is working. Please advise.

## 2024-06-04 DIAGNOSIS — L23.7 ALLERGIC CONTACT DERMATITIS DUE TO URUSHIOL FROM EASTERN POISON IVY: Primary | ICD-10-CM

## 2024-07-03 ENCOUNTER — NURSE TRIAGE (OUTPATIENT)
Age: 74
End: 2024-07-03

## 2024-07-03 DIAGNOSIS — G89.29 CHRONIC BILATERAL LOW BACK PAIN WITH BILATERAL SCIATICA: ICD-10-CM

## 2024-07-03 DIAGNOSIS — M54.41 CHRONIC BILATERAL LOW BACK PAIN WITH BILATERAL SCIATICA: ICD-10-CM

## 2024-07-03 DIAGNOSIS — M54.42 CHRONIC BILATERAL LOW BACK PAIN WITH BILATERAL SCIATICA: ICD-10-CM

## 2024-07-03 RX ORDER — HYDROCODONE BITARTRATE AND ACETAMINOPHEN 5; 325 MG/1; MG/1
1 TABLET ORAL EVERY 8 HOURS PRN
Qty: 90 TABLET | Refills: 0 | Status: SHIPPED | OUTPATIENT
Start: 2024-07-03

## 2024-07-03 NOTE — TELEPHONE ENCOUNTER
"Patient called in for advise regarding cramping in feet and legs. The cramping starts in feet and works it's way up to legs mostly during the day. She has ongoing swelling in ankles and lower legs.The swelling increases as the day goes on and the heat is making it worse She reported that she is taking, on her own, 30 mg of Lasix daily for the last few days. Denies SOB. She eats bananas and takes MVI daily. Patient said she was on Magnesium in past but, that was stopped by Dr. Baum after some lab work. She said she is planning on making an appointment with the podiatrist in the future regarding foot cramping. Home care advise given elevation, hydration, nutrition and proper footwear. Patient refused appointment with other provider. OV 7/18/24 1220. Please advise with any further recommendations from PCP. Thank you    Reason for Disposition   MILD swelling of both ankles (i.e., pedal edema) and caused by hot weather    Answer Assessment - Initial Assessment Questions  1. ONSET: \"When did the swelling start?\" (e.g., minutes, hours, days)      Ongoing- worse with hot weather  2. LOCATION: \"What part of the leg is swollen?\"  \"Are both legs swollen or just one leg?\"      Feet, ankles and lower legs   3. SEVERITY: \"How bad is the swelling?\" (e.g., localized; mild, moderate, severe)   - Localized - small area of swelling localized to one leg   - MILD pedal edema - swelling limited to foot and ankle, pitting edema < 1/4 inch (6 mm) deep, rest and elevation eliminate most or all swelling   - MODERATE edema - swelling of lower leg to knee, pitting edema > 1/4 inch (6 mm) deep, rest and elevation only partially reduce swelling   - SEVERE edema - swelling extends above knee, facial or hand swelling present       Mild am, moderate pm  4. REDNESS: \"Does the swelling look red or infected?\"      no  5. PAIN: \"Is the swelling painful to touch?\" If Yes, ask: \"How painful is it?\"   (Scale 1-10; mild, moderate or severe)      Cramps " "severe like charley horses, legs hurt when she pushes on them  6. FEVER: \"Do you have a fever?\" If Yes, ask: \"What is it, how was it measured, and when did it start?\"       denies  7. CAUSE: \"What do you think is causing the leg swelling?\"      ongoing  8. MEDICAL HISTORY: \"Do you have a history of heart failure, kidney disease, liver failure, or cancer?\"      Skin pre-cancer  9. RECURRENT SYMPTOM: \"Have you had leg swelling before?\" If Yes, ask: \"When was the last time?\" \"What happened that time?\"      yes  10. OTHER SYMPTOMS: \"Do you have any other symptoms?\" (e.g., chest pain, difficulty breathing)        no  11. PREGNANCY: \"Is there any chance you are pregnant?\" \"When was your last menstrual period?\"        Post menopausal    Protocols used: Leg Swelling and Edema-ADULT-[place]    "

## 2024-07-03 NOTE — TELEPHONE ENCOUNTER
[facility].      Patient also wanted it noted in her account that she went to Dermatology and her rash was not Poison ivy it precancerous growths. Please inform DR. You.    Tank you

## 2024-07-05 NOTE — TELEPHONE ENCOUNTER
Brianna called in because the medication was sent out to the wrong pharmacy. She wanted the [facility]. [facility] the [facility]codone-acetaminophen [facility]. She mentioned that she canceled the order at [facility] pharmacy.

## 2024-07-10 ENCOUNTER — RA CDI HCC (OUTPATIENT)
Dept: OTHER | Facility: HOSPITAL | Age: 74
End: 2024-07-10

## 2024-07-17 NOTE — PROGRESS NOTES
[facility]   Farzaneh DOUGHERTY    Assessment/Plan:   1. Atrial flutter by electrocardiogram (HCC)  -     POCT ECG  -     Comprehensive metabolic panel; Future  -     Ambulatory Referral to Cardiology; Future  -     diltiazem (CARDIZEM CD) 180 mg 24 hr capsule; Take 1 capsule (180 mg total) by mouth daily  -     Echo complete w/ contrast if indicated; Future; Expected date: 07/18/2024  -     apixaban (Eliquis) 5 mg; Take 1 tablet (5 mg total) by mouth 2 (two) times a day  -     Magnesium; Future  2. Primary hypertension  3. CKD (chronic kidney disease) stage 2, GFR 60-89 ml/min  -     Comprehensive metabolic panel; Future  4. Chronic bilateral low back pain with bilateral sciatica  -     HYDROcodone-acetaminophen (Norco) 5-325 mg per tablet; Take 1 tablet by mouth every 8 (eight) hours as needed for pain Max Daily Amount: 3 tablets  5. Screening for thyroid disorder  -     TSH, 3rd generation with Free T4 reflex; Future  6. Screening for deficiency anemia  -     CBC and differential; Future      Referral to cardiology   Start cardizem and eliquis  Hold celebrex  Repeat echocardiogram  Monitor your heart rate and blood pressure record and bring to follow up  Obtain fasting blood work.   Return in about 4 days (around 7/22/2024) for Recheck, F/U after bloodwork.  Patient may call or return to office with any questions or concerns.     ______________________________________________________________________  Subjective:     Patient ID: Brianna Baum is a 74 y.o. female.  Brianna Baum  Chief Complaint   Patient presents with    Foot Swelling     B/L swelling in feet and ankles     Reports recent increased BLE edema thus she increased her lasix to 30mg daily (on her own) for a few days then back to 20mg as ordered.  Increased feelings of anxiety but denies palpitations, chest pressure/pain nor dyspnea.  No vision changes/HA.  HR irregular in office, ECG AFL with variable block.  New onset.        Echo  10/2023   Left Ventricle: Left ventricular cavity size is normal. Wall thickness is normal. The left ventricular ejection fraction is 55%. Systolic function is normal. Although no diagnostic regional wall motion abnormality was identified, this possibility cannot be completely excluded on the basis of this study. Diastolic function is normal for age.  ·  Right Ventricle: Right ventricular cavity size is normal. Systolic function is normal.  ·  Mitral Valve: There is mild regurgitation.                  The following portions of the patient's history were reviewed and updated as appropriate: allergies, current medications, past family history, past medical history, past social history, past surgical history, and problem list.    Review of Systems   Constitutional: Negative.  Negative for activity change, appetite change, chills, fatigue and fever.   HENT: Negative.  Negative for congestion, ear pain, postnasal drip and sinus pain.    Eyes: Negative.    Respiratory: Negative.  Negative for cough, chest tightness and shortness of breath.    Cardiovascular:  Positive for leg swelling. Negative for chest pain and palpitations.   Gastrointestinal: Negative.  Negative for constipation and diarrhea.   Endocrine: Negative.    Genitourinary: Negative.  Negative for dysuria.   Musculoskeletal:  Positive for arthralgias.   Skin: Negative.    Allergic/Immunologic: Negative.  Negative for immunocompromised state.   Neurological: Negative.  Negative for dizziness and light-headedness.   Hematological: Negative.    Psychiatric/Behavioral:  The patient is nervous/anxious.          Objective:      Vitals:    07/18/24 1139   BP: 118/74   Pulse: (!) 122   SpO2: 96%      Physical Exam  Vitals and nursing note reviewed.   Constitutional:       Appearance: Normal appearance.   HENT:      Head: Normocephalic and atraumatic.      Right Ear: Tympanic membrane, ear canal and external ear normal.      Left Ear: Tympanic membrane, ear canal and  "external ear normal.      Nose: Nose normal.      Mouth/Throat:      Mouth: Mucous membranes are moist.      Pharynx: Oropharynx is clear.   Eyes:      Extraocular Movements: Extraocular movements intact.      Conjunctiva/sclera: Conjunctivae normal.      Pupils: Pupils are equal, round, and reactive to light.   Cardiovascular:      Rate and Rhythm: Normal rate. Rhythm irregular.      Pulses: Normal pulses.      Heart sounds: Normal heart sounds.   Pulmonary:      Effort: Pulmonary effort is normal.      Breath sounds: Normal breath sounds.   Abdominal:      General: Bowel sounds are normal.      Palpations: Abdomen is soft.   Musculoskeletal:         General: Normal range of motion.      Cervical back: Normal range of motion and neck supple.      Right knee: Bony tenderness present.      Left knee: Bony tenderness present.      Right lower leg: Edema present.      Left lower leg: Edema present.      Comments: Trace BLE    Skin:     General: Skin is warm and dry.   Neurological:      General: No focal deficit present.      Mental Status: She is alert and oriented to person, place, and time.   Psychiatric:         Mood and Affect: Mood normal.         Behavior: Behavior normal.         Thought Content: Thought content normal.         Judgment: Judgment normal.           Portions of the record may have been created with voice recognition software. Occasional wrong word or \"sound alike\" substitutions may have occurred due to the inherent limitations of voice recognition software. Please review the chart carefully and recognize, using context, where substitutions/typographical errors may have occurred.       "

## 2024-07-18 ENCOUNTER — OFFICE VISIT (OUTPATIENT)
Dept: FAMILY MEDICINE CLINIC | Facility: HOSPITAL | Age: 74
End: 2024-07-18
Payer: MEDICARE

## 2024-07-18 ENCOUNTER — TELEPHONE (OUTPATIENT)
Dept: FAMILY MEDICINE CLINIC | Facility: HOSPITAL | Age: 74
End: 2024-07-18

## 2024-07-18 VITALS
DIASTOLIC BLOOD PRESSURE: 74 MMHG | WEIGHT: 179.2 LBS | HEART RATE: 122 BPM | BODY MASS INDEX: 29.82 KG/M2 | OXYGEN SATURATION: 96 % | SYSTOLIC BLOOD PRESSURE: 118 MMHG

## 2024-07-18 DIAGNOSIS — M54.42 CHRONIC BILATERAL LOW BACK PAIN WITH BILATERAL SCIATICA: ICD-10-CM

## 2024-07-18 DIAGNOSIS — Z13.0 SCREENING FOR DEFICIENCY ANEMIA: ICD-10-CM

## 2024-07-18 DIAGNOSIS — N18.2 CKD (CHRONIC KIDNEY DISEASE) STAGE 2, GFR 60-89 ML/MIN: ICD-10-CM

## 2024-07-18 DIAGNOSIS — I10 PRIMARY HYPERTENSION: ICD-10-CM

## 2024-07-18 DIAGNOSIS — I48.92 ATRIAL FLUTTER BY ELECTROCARDIOGRAM (HCC): ICD-10-CM

## 2024-07-18 DIAGNOSIS — I48.92 ATRIAL FLUTTER BY ELECTROCARDIOGRAM (HCC): Primary | ICD-10-CM

## 2024-07-18 DIAGNOSIS — Z13.29 SCREENING FOR THYROID DISORDER: ICD-10-CM

## 2024-07-18 DIAGNOSIS — M54.41 CHRONIC BILATERAL LOW BACK PAIN WITH BILATERAL SCIATICA: ICD-10-CM

## 2024-07-18 DIAGNOSIS — G89.29 CHRONIC BILATERAL LOW BACK PAIN WITH BILATERAL SCIATICA: ICD-10-CM

## 2024-07-18 LAB — VENTRICULAR RATE: 122 DEGREES

## 2024-07-18 PROCEDURE — 93000 ELECTROCARDIOGRAM COMPLETE: CPT | Performed by: NURSE PRACTITIONER

## 2024-07-18 PROCEDURE — 99214 OFFICE O/P EST MOD 30 MIN: CPT | Performed by: NURSE PRACTITIONER

## 2024-07-18 RX ORDER — DILTIAZEM HYDROCHLORIDE 180 MG/1
180 CAPSULE, COATED, EXTENDED RELEASE ORAL DAILY
Qty: 30 CAPSULE | Refills: 5 | Status: SHIPPED | OUTPATIENT
Start: 2024-07-18 | End: 2024-07-18 | Stop reason: CLARIF

## 2024-07-18 RX ORDER — DILTIAZEM HYDROCHLORIDE 180 MG/1
180 CAPSULE, COATED, EXTENDED RELEASE ORAL DAILY
Qty: 30 CAPSULE | Refills: 5 | Status: ON HOLD | OUTPATIENT
Start: 2024-07-18 | End: 2025-01-14

## 2024-07-18 RX ORDER — HYDROCODONE BITARTRATE AND ACETAMINOPHEN 5; 325 MG/1; MG/1
1 TABLET ORAL EVERY 8 HOURS PRN
Qty: 90 TABLET | Refills: 0 | Status: ON HOLD | OUTPATIENT
Start: 2024-07-18

## 2024-07-18 NOTE — TELEPHONE ENCOUNTER
Pt states HYDROcodone-acetaminophen (Norco) 5-325 mg per tablet was supposed to be sent to [facility] not [facility].  Please resend

## 2024-07-18 NOTE — TELEPHONE ENCOUNTER
Pt states she needs diltiazem (CARDIZEM CD) 180 mg 24 hr capsule and   apixaban (Eliquis) 5 mg sent to [facility] so she can start taking right away.  HYDROcodone-acetaminophen (Norco) 5-325 mg per tablet can still go to [facility]

## 2024-07-18 NOTE — PATIENT INSTRUCTIONS
Referral to cardiology   Start cardizem and eliquis  Hold celebrex  Repeat Echocardiogram  Monitor your heart rate and blood pressure record and bring to follow up  Obtain fasting blood work.

## 2024-07-18 NOTE — TELEPHONE ENCOUNTER
Patient called back because she needs the  (Eliquis) 5 mg [facility]. I called over to clinical line but no one answered.

## 2024-07-19 ENCOUNTER — TELEPHONE (OUTPATIENT)
Dept: LAB | Facility: HOSPITAL | Age: 74
End: 2024-07-19

## 2024-07-22 ENCOUNTER — APPOINTMENT (EMERGENCY)
Dept: CT IMAGING | Facility: HOSPITAL | Age: 74
End: 2024-07-22
Payer: MEDICARE

## 2024-07-22 ENCOUNTER — HOSPITAL ENCOUNTER (EMERGENCY)
Facility: HOSPITAL | Age: 74
End: 2024-07-22
Attending: EMERGENCY MEDICINE
Payer: MEDICARE

## 2024-07-22 ENCOUNTER — APPOINTMENT (EMERGENCY)
Dept: RADIOLOGY | Facility: HOSPITAL | Age: 74
End: 2024-07-22
Payer: MEDICARE

## 2024-07-22 ENCOUNTER — ANESTHESIA EVENT (INPATIENT)
Dept: RADIOLOGY | Facility: HOSPITAL | Age: 74
DRG: 023 | End: 2024-07-22
Payer: MEDICARE

## 2024-07-22 ENCOUNTER — TELEPHONE (OUTPATIENT)
Dept: LAB | Facility: HOSPITAL | Age: 74
End: 2024-07-22

## 2024-07-22 ENCOUNTER — ANESTHESIA (INPATIENT)
Dept: RADIOLOGY | Facility: HOSPITAL | Age: 74
DRG: 023 | End: 2024-07-22
Payer: MEDICARE

## 2024-07-22 ENCOUNTER — HOSPITAL ENCOUNTER (INPATIENT)
Dept: RADIOLOGY | Facility: HOSPITAL | Age: 74
LOS: 9 days | Discharge: NURSING, REHAB OR LONG-TERM CARE | DRG: 023 | End: 2024-07-31
Attending: RADIOLOGY | Admitting: EMERGENCY MEDICINE
Payer: MEDICARE

## 2024-07-22 VITALS
DIASTOLIC BLOOD PRESSURE: 72 MMHG | OXYGEN SATURATION: 97 % | BODY MASS INDEX: 29.82 KG/M2 | HEIGHT: 65 IN | SYSTOLIC BLOOD PRESSURE: 176 MMHG | HEART RATE: 103 BPM | TEMPERATURE: 99.2 F | RESPIRATION RATE: 17 BRPM

## 2024-07-22 DIAGNOSIS — I48.92 ATRIAL FLUTTER BY ELECTROCARDIOGRAM (HCC): ICD-10-CM

## 2024-07-22 DIAGNOSIS — I63.9 CEREBROVASCULAR ACCIDENT (CVA), UNSPECIFIED MECHANISM (HCC): Primary | ICD-10-CM

## 2024-07-22 DIAGNOSIS — I63.512 CEREBROVASCULAR ACCIDENT (CVA) DUE TO OCCLUSION OF LEFT MIDDLE CEREBRAL ARTERY (HCC): ICD-10-CM

## 2024-07-22 DIAGNOSIS — R79.89 LOW SERUM CORTISOL LEVEL: ICD-10-CM

## 2024-07-22 DIAGNOSIS — K21.9 GASTROESOPHAGEAL REFLUX DISEASE, UNSPECIFIED WHETHER ESOPHAGITIS PRESENT: ICD-10-CM

## 2024-07-22 DIAGNOSIS — I63.9 STROKE (HCC): ICD-10-CM

## 2024-07-22 DIAGNOSIS — R47.9 DIFFICULTY WITH SPEECH: Primary | ICD-10-CM

## 2024-07-22 DIAGNOSIS — I48.91 ATRIAL FIBRILLATION, UNSPECIFIED TYPE (HCC): ICD-10-CM

## 2024-07-22 LAB
AMPHETAMINES SERPL QL SCN: NEGATIVE
ANION GAP SERPL CALCULATED.3IONS-SCNC: 8 MMOL/L (ref 4–13)
APTT PPP: 24 SECONDS (ref 23–37)
BARBITURATES UR QL: NEGATIVE
BENZODIAZ UR QL: NEGATIVE
BUN SERPL-MCNC: 11 MG/DL (ref 5–25)
CALCIUM SERPL-MCNC: 9.6 MG/DL (ref 8.4–10.2)
CARDIAC TROPONIN I PNL SERPL HS: 9 NG/L
CHLORIDE SERPL-SCNC: 102 MMOL/L (ref 96–108)
CO2 SERPL-SCNC: 29 MMOL/L (ref 21–32)
COCAINE UR QL: NEGATIVE
CREAT SERPL-MCNC: 0.96 MG/DL (ref 0.6–1.3)
ERYTHROCYTE [DISTWIDTH] IN BLOOD BY AUTOMATED COUNT: 12.6 % (ref 11.6–15.1)
FENTANYL UR QL SCN: NEGATIVE
FLUAV RNA RESP QL NAA+PROBE: NEGATIVE
FLUBV RNA RESP QL NAA+PROBE: NEGATIVE
GFR SERPL CREATININE-BSD FRML MDRD: 58 ML/MIN/1.73SQ M
GLUCOSE SERPL-MCNC: 107 MG/DL (ref 65–140)
GLUCOSE SERPL-MCNC: 107 MG/DL (ref 65–140)
HCT VFR BLD AUTO: 41.4 % (ref 34.8–46.1)
HGB BLD-MCNC: 13.2 G/DL (ref 11.5–15.4)
HYDROCODONE UR QL SCN: NEGATIVE
INR PPP: 1.01 (ref 0.84–1.19)
MCH RBC QN AUTO: 30.1 PG (ref 26.8–34.3)
MCHC RBC AUTO-ENTMCNC: 31.9 G/DL (ref 31.4–37.4)
MCV RBC AUTO: 95 FL (ref 82–98)
METHADONE UR QL: NEGATIVE
OPIATES UR QL SCN: POSITIVE
OXYCODONE+OXYMORPHONE UR QL SCN: NEGATIVE
PCP UR QL: NEGATIVE
PLATELET # BLD AUTO: 196 THOUSANDS/UL (ref 149–390)
PMV BLD AUTO: 10.8 FL (ref 8.9–12.7)
POTASSIUM SERPL-SCNC: 4.1 MMOL/L (ref 3.5–5.3)
PROTHROMBIN TIME: 13.7 SECONDS (ref 11.6–14.5)
RBC # BLD AUTO: 4.38 MILLION/UL (ref 3.81–5.12)
RSV RNA RESP QL NAA+PROBE: NEGATIVE
SARS-COV-2 RNA RESP QL NAA+PROBE: NEGATIVE
SODIUM SERPL-SCNC: 139 MMOL/L (ref 135–147)
THC UR QL: NEGATIVE
WBC # BLD AUTO: 7.09 THOUSAND/UL (ref 4.31–10.16)

## 2024-07-22 PROCEDURE — 36224 PLACE CATH CAROTD ART: CPT

## 2024-07-22 PROCEDURE — 76937 US GUIDE VASCULAR ACCESS: CPT

## 2024-07-22 PROCEDURE — C1894 INTRO/SHEATH, NON-LASER: HCPCS

## 2024-07-22 PROCEDURE — 61645 PERQ ART M-THROMBECT &/NFS: CPT

## 2024-07-22 PROCEDURE — 85730 THROMBOPLASTIN TIME PARTIAL: CPT | Performed by: EMERGENCY MEDICINE

## 2024-07-22 PROCEDURE — 70498 CT ANGIOGRAPHY NECK: CPT

## 2024-07-22 PROCEDURE — C1769 GUIDE WIRE: HCPCS

## 2024-07-22 PROCEDURE — 0241U HB NFCT DS VIR RESP RNA 4 TRGT: CPT | Performed by: EMERGENCY MEDICINE

## 2024-07-22 PROCEDURE — 85027 COMPLETE CBC AUTOMATED: CPT | Performed by: EMERGENCY MEDICINE

## 2024-07-22 PROCEDURE — C1760 CLOSURE DEV, VASC: HCPCS

## 2024-07-22 PROCEDURE — C1887 CATHETER, GUIDING: HCPCS

## 2024-07-22 PROCEDURE — 36415 COLL VENOUS BLD VENIPUNCTURE: CPT | Performed by: EMERGENCY MEDICINE

## 2024-07-22 PROCEDURE — 84484 ASSAY OF TROPONIN QUANT: CPT | Performed by: EMERGENCY MEDICINE

## 2024-07-22 PROCEDURE — 82948 REAGENT STRIP/BLOOD GLUCOSE: CPT

## 2024-07-22 PROCEDURE — 36223 PLACE CATH CAROTID/INOM ART: CPT

## 2024-07-22 PROCEDURE — 80307 DRUG TEST PRSMV CHEM ANLYZR: CPT

## 2024-07-22 PROCEDURE — 93005 ELECTROCARDIOGRAM TRACING: CPT

## 2024-07-22 PROCEDURE — 83036 HEMOGLOBIN GLYCOSYLATED A1C: CPT | Performed by: NURSE PRACTITIONER

## 2024-07-22 PROCEDURE — 99291 CRITICAL CARE FIRST HOUR: CPT

## 2024-07-22 PROCEDURE — 80048 BASIC METABOLIC PNL TOTAL CA: CPT | Performed by: EMERGENCY MEDICINE

## 2024-07-22 PROCEDURE — 99285 EMERGENCY DEPT VISIT HI MDM: CPT

## 2024-07-22 PROCEDURE — 71045 X-RAY EXAM CHEST 1 VIEW: CPT

## 2024-07-22 PROCEDURE — 70496 CT ANGIOGRAPHY HEAD: CPT

## 2024-07-22 PROCEDURE — 99223 1ST HOSP IP/OBS HIGH 75: CPT | Performed by: RADIOLOGY

## 2024-07-22 PROCEDURE — 85610 PROTHROMBIN TIME: CPT | Performed by: EMERGENCY MEDICINE

## 2024-07-22 PROCEDURE — 96365 THER/PROPH/DIAG IV INF INIT: CPT

## 2024-07-22 RX ORDER — ASPIRIN 325 MG
325 TABLET ORAL ONCE
Status: DISCONTINUED | OUTPATIENT
Start: 2024-07-22 | End: 2024-07-22

## 2024-07-22 RX ORDER — FENTANYL CITRATE 50 UG/ML
INJECTION, SOLUTION INTRAMUSCULAR; INTRAVENOUS AS NEEDED
Status: DISCONTINUED | OUTPATIENT
Start: 2024-07-22 | End: 2024-07-23

## 2024-07-22 RX ORDER — CLOPIDOGREL BISULFATE 75 MG/1
300 TABLET ORAL ONCE
Status: DISCONTINUED | OUTPATIENT
Start: 2024-07-22 | End: 2024-07-22

## 2024-07-22 RX ORDER — LIDOCAINE WITH 8.4% SOD BICARB 0.9%(10ML)
SYRINGE (ML) INJECTION AS NEEDED
Status: COMPLETED | OUTPATIENT
Start: 2024-07-22 | End: 2024-07-22

## 2024-07-22 RX ORDER — SODIUM CHLORIDE 9 MG/ML
INJECTION, SOLUTION INTRAVENOUS CONTINUOUS PRN
Status: DISCONTINUED | OUTPATIENT
Start: 2024-07-22 | End: 2024-07-23

## 2024-07-22 RX ADMIN — FENTANYL CITRATE 50 MCG: 50 INJECTION INTRAMUSCULAR; INTRAVENOUS at 23:16

## 2024-07-22 RX ADMIN — NOREPINEPHRINE BITARTRATE 5 MCG/MIN: 1 SOLUTION INTRAVENOUS at 21:50

## 2024-07-22 RX ADMIN — FENTANYL CITRATE 50 MCG: 50 INJECTION INTRAMUSCULAR; INTRAVENOUS at 23:50

## 2024-07-22 RX ADMIN — Medication 10 ML: at 23:20

## 2024-07-22 RX ADMIN — PHENYLEPHRINE HYDROCHLORIDE 40 MCG/MIN: 10 INJECTION INTRAVENOUS at 23:20

## 2024-07-22 RX ADMIN — Medication 20 MG: at 21:52

## 2024-07-22 RX ADMIN — FENTANYL CITRATE 50 MCG: 50 INJECTION INTRAMUSCULAR; INTRAVENOUS at 23:44

## 2024-07-22 RX ADMIN — IOHEXOL 88 ML: 350 INJECTION, SOLUTION INTRAVENOUS at 21:07

## 2024-07-22 RX ADMIN — FENTANYL CITRATE 50 MCG: 50 INJECTION INTRAMUSCULAR; INTRAVENOUS at 23:21

## 2024-07-22 RX ADMIN — SODIUM CHLORIDE: 9 INJECTION, SOLUTION INTRAVENOUS at 23:13

## 2024-07-22 NOTE — TELEPHONE ENCOUNTER
Patient is calling to follow up on refill request, she states that [facility] is asking for a call from the provider or to respond to a fax sent over last Friday  Please review

## 2024-07-23 ENCOUNTER — APPOINTMENT (INPATIENT)
Dept: NON INVASIVE DIAGNOSTICS | Facility: HOSPITAL | Age: 74
DRG: 023 | End: 2024-07-23
Payer: MEDICARE

## 2024-07-23 ENCOUNTER — APPOINTMENT (OUTPATIENT)
Dept: RADIOLOGY | Facility: HOSPITAL | Age: 74
DRG: 023 | End: 2024-07-23
Payer: MEDICARE

## 2024-07-23 ENCOUNTER — APPOINTMENT (INPATIENT)
Dept: RADIOLOGY | Facility: HOSPITAL | Age: 74
DRG: 023 | End: 2024-07-23
Payer: MEDICARE

## 2024-07-23 PROBLEM — I63.512 CEREBROVASCULAR ACCIDENT (CVA) DUE TO OCCLUSION OF LEFT MIDDLE CEREBRAL ARTERY (HCC): Status: ACTIVE | Noted: 2024-07-23

## 2024-07-23 LAB
ANION GAP SERPL CALCULATED.3IONS-SCNC: 8 MMOL/L (ref 4–13)
AORTIC ROOT: 2.7 CM
APICAL FOUR CHAMBER EJECTION FRACTION: 55 %
ASCENDING AORTA: 3.5 CM
ATRIAL RATE: 102 BPM
ATRIAL RATE: 115 BPM
BASOPHILS # BLD AUTO: 0.03 THOUSANDS/ÂΜL (ref 0–0.1)
BASOPHILS NFR BLD AUTO: 0 % (ref 0–1)
BSA FOR ECHO PROCEDURE: 1.91 M2
BUN SERPL-MCNC: 9 MG/DL (ref 5–25)
CALCIUM SERPL-MCNC: 8.2 MG/DL (ref 8.4–10.2)
CHLORIDE SERPL-SCNC: 109 MMOL/L (ref 96–108)
CHOLEST SERPL-MCNC: 187 MG/DL
CO2 SERPL-SCNC: 24 MMOL/L (ref 21–32)
CREAT SERPL-MCNC: 0.73 MG/DL (ref 0.6–1.3)
E WAVE DECELERATION TIME: 209 MS
E/A RATIO: 1.62
EOSINOPHIL # BLD AUTO: 0 THOUSAND/ÂΜL (ref 0–0.61)
EOSINOPHIL NFR BLD AUTO: 0 % (ref 0–6)
ERYTHROCYTE [DISTWIDTH] IN BLOOD BY AUTOMATED COUNT: 12.8 % (ref 11.6–15.1)
EST. AVERAGE GLUCOSE BLD GHB EST-MCNC: 120 MG/DL
ETHANOL SERPL-MCNC: <10 MG/DL
FRACTIONAL SHORTENING: 28 (ref 28–44)
GFR SERPL CREATININE-BSD FRML MDRD: 81 ML/MIN/1.73SQ M
GLUCOSE SERPL-MCNC: 163 MG/DL (ref 65–140)
HBA1C MFR BLD: 5.8 %
HCT VFR BLD AUTO: 39.2 % (ref 34.8–46.1)
HDLC SERPL-MCNC: 57 MG/DL
HGB BLD-MCNC: 13.1 G/DL (ref 11.5–15.4)
IMM GRANULOCYTES # BLD AUTO: 0.03 THOUSAND/UL (ref 0–0.2)
IMM GRANULOCYTES NFR BLD AUTO: 0 % (ref 0–2)
INTERVENTRICULAR SEPTUM IN DIASTOLE (PARASTERNAL SHORT AXIS VIEW): 1 CM
INTERVENTRICULAR SEPTUM: 1 CM (ref 0.6–1.1)
IVC: 23 MM
LAAS-AP2: 26.2 CM2
LAAS-AP4: 26.9 CM2
LDLC SERPL CALC-MCNC: 120 MG/DL (ref 0–100)
LEFT ATRIUM SIZE: 4.4 CM
LEFT ATRIUM VOLUME (MOD BIPLANE): 93 ML
LEFT ATRIUM VOLUME INDEX (MOD BIPLANE): 48.7 ML/M2
LEFT INTERNAL DIMENSION IN SYSTOLE: 3.4 CM (ref 2.1–4)
LEFT VENTRICULAR INTERNAL DIMENSION IN DIASTOLE: 4.7 CM (ref 3.5–6)
LEFT VENTRICULAR POSTERIOR WALL IN END DIASTOLE: 1 CM
LEFT VENTRICULAR STROKE VOLUME: 55 ML
LVSV (TEICH): 55 ML
LYMPHOCYTES # BLD AUTO: 1.71 THOUSANDS/ÂΜL (ref 0.6–4.47)
LYMPHOCYTES NFR BLD AUTO: 16 % (ref 14–44)
MCH RBC QN AUTO: 32 PG (ref 26.8–34.3)
MCHC RBC AUTO-ENTMCNC: 33.4 G/DL (ref 31.4–37.4)
MCV RBC AUTO: 96 FL (ref 82–98)
MONOCYTES # BLD AUTO: 0.55 THOUSAND/ÂΜL (ref 0.17–1.22)
MONOCYTES NFR BLD AUTO: 5 % (ref 4–12)
MV E'TISSUE VEL-SEP: 10 CM/S
MV EROA: 0.2 CM2
MV PEAK A VEL: 0.74 M/S
MV PEAK E VEL: 120 CM/S
MV REGURGITANT VOLUME: 50 ML
MV STENOSIS PRESSURE HALF TIME: 61 MS
MV VALVE AREA P 1/2 METHOD: 3.61
NEUTROPHILS # BLD AUTO: 8.5 THOUSANDS/ÂΜL (ref 1.85–7.62)
NEUTS SEG NFR BLD AUTO: 79 % (ref 43–75)
NRBC BLD AUTO-RTO: 0 /100 WBCS
P AXIS: 241 DEGREES
PISA MRMAX VEL: 0.42 M/S
PISA RADIUS: 0.7 CM
PLATELET # BLD AUTO: 226 THOUSANDS/UL (ref 149–390)
PMV BLD AUTO: 10.8 FL (ref 8.9–12.7)
POTASSIUM SERPL-SCNC: 4.6 MMOL/L (ref 3.5–5.3)
PR INTERVAL: 208 MS
QRS AXIS: 17 DEGREES
QRS AXIS: 34 DEGREES
QRSD INTERVAL: 75 MS
QRSD INTERVAL: 80 MS
QT INTERVAL: 283 MS
QT INTERVAL: 350 MS
QTC INTERVAL: 392 MS
QTC INTERVAL: 473 MS
RA PRESSURE ESTIMATED: 8 MMHG
RBC # BLD AUTO: 4.09 MILLION/UL (ref 3.81–5.12)
RIGHT ATRIAL 2D VOLUME: 45 ML
RIGHT ATRIUM AREA SYSTOLE A4C: 17.3 CM2
RIGHT VENTRICLE ID DIMENSION: 4.2 CM
SL CV LEFT ATRIUM LENGTH A2C: 6.4 CM
SL CV LV EF: 55
SL CV PED ECHO LEFT VENTRICLE DIASTOLIC VOLUME (MOD BIPLANE) 2D: 101 ML
SL CV PED ECHO LEFT VENTRICLE SYSTOLIC VOLUME (MOD BIPLANE) 2D: 46 ML
SODIUM SERPL-SCNC: 141 MMOL/L (ref 135–147)
T WAVE AXIS: 5 DEGREES
T WAVE AXIS: 82 DEGREES
T4 FREE SERPL-MCNC: 1.23 NG/DL (ref 0.61–1.12)
TRICUSPID ANNULAR PLANE SYSTOLIC EXCURSION: 2.1 CM
TRIGL SERPL-MCNC: 50 MG/DL
TSH SERPL DL<=0.05 MIU/L-ACNC: 1.74 UIU/ML (ref 0.45–4.5)
VENTRICULAR RATE: 110 BPM
VENTRICULAR RATE: 115 BPM
WBC # BLD AUTO: 10.82 THOUSAND/UL (ref 4.31–10.16)

## 2024-07-23 PROCEDURE — 84439 ASSAY OF FREE THYROXINE: CPT | Performed by: PSYCHIATRY & NEUROLOGY

## 2024-07-23 PROCEDURE — 92610 EVALUATE SWALLOWING FUNCTION: CPT

## 2024-07-23 PROCEDURE — 94760 N-INVAS EAR/PLS OXIMETRY 1: CPT

## 2024-07-23 PROCEDURE — 61645 PERQ ART M-THROMBECT &/NFS: CPT | Performed by: RADIOLOGY

## 2024-07-23 PROCEDURE — 70551 MRI BRAIN STEM W/O DYE: CPT

## 2024-07-23 PROCEDURE — 93306 TTE W/DOPPLER COMPLETE: CPT | Performed by: INTERNAL MEDICINE

## 2024-07-23 PROCEDURE — 81001 URINALYSIS AUTO W/SCOPE: CPT | Performed by: PSYCHIATRY & NEUROLOGY

## 2024-07-23 PROCEDURE — 97163 PT EVAL HIGH COMPLEX 45 MIN: CPT

## 2024-07-23 PROCEDURE — 93010 ELECTROCARDIOGRAM REPORT: CPT | Performed by: INTERNAL MEDICINE

## 2024-07-23 PROCEDURE — 70498 CT ANGIOGRAPHY NECK: CPT

## 2024-07-23 PROCEDURE — 97167 OT EVAL HIGH COMPLEX 60 MIN: CPT

## 2024-07-23 PROCEDURE — NC001 PR NO CHARGE: Performed by: PSYCHIATRY & NEUROLOGY

## 2024-07-23 PROCEDURE — 03CG3ZZ EXTIRPATION OF MATTER FROM INTRACRANIAL ARTERY, PERCUTANEOUS APPROACH: ICD-10-PCS | Performed by: RADIOLOGY

## 2024-07-23 PROCEDURE — 99291 CRITICAL CARE FIRST HOUR: CPT | Performed by: EMERGENCY MEDICINE

## 2024-07-23 PROCEDURE — 70496 CT ANGIOGRAPHY HEAD: CPT

## 2024-07-23 PROCEDURE — 99223 1ST HOSP IP/OBS HIGH 75: CPT | Performed by: NURSE PRACTITIONER

## 2024-07-23 PROCEDURE — 84443 ASSAY THYROID STIM HORMONE: CPT | Performed by: PSYCHIATRY & NEUROLOGY

## 2024-07-23 PROCEDURE — 93306 TTE W/DOPPLER COMPLETE: CPT

## 2024-07-23 PROCEDURE — 76937 US GUIDE VASCULAR ACCESS: CPT | Performed by: RADIOLOGY

## 2024-07-23 PROCEDURE — 85025 COMPLETE CBC W/AUTO DIFF WBC: CPT | Performed by: NURSE PRACTITIONER

## 2024-07-23 PROCEDURE — 93005 ELECTROCARDIOGRAM TRACING: CPT

## 2024-07-23 PROCEDURE — 80061 LIPID PANEL: CPT | Performed by: NURSE PRACTITIONER

## 2024-07-23 PROCEDURE — 80048 BASIC METABOLIC PNL TOTAL CA: CPT | Performed by: NURSE PRACTITIONER

## 2024-07-23 PROCEDURE — 82077 ASSAY SPEC XCP UR&BREATH IA: CPT | Performed by: PSYCHIATRY & NEUROLOGY

## 2024-07-23 PROCEDURE — 99233 SBSQ HOSP IP/OBS HIGH 50: CPT | Performed by: RADIOLOGY

## 2024-07-23 RX ORDER — ATORVASTATIN CALCIUM 40 MG/1
40 TABLET, FILM COATED ORAL EVERY EVENING
Status: DISCONTINUED | OUTPATIENT
Start: 2024-07-23 | End: 2024-07-31 | Stop reason: HOSPADM

## 2024-07-23 RX ORDER — ALBUMIN, HUMAN INJ 5% 5 %
25 SOLUTION INTRAVENOUS ONCE
Status: COMPLETED | OUTPATIENT
Start: 2024-07-23 | End: 2024-07-23

## 2024-07-23 RX ORDER — LIDOCAINE 50 MG/G
1 PATCH TOPICAL DAILY
Status: DISCONTINUED | OUTPATIENT
Start: 2024-07-23 | End: 2024-07-31 | Stop reason: HOSPADM

## 2024-07-23 RX ORDER — ASCORBIC ACID 500 MG
1000 TABLET ORAL DAILY
Status: DISCONTINUED | OUTPATIENT
Start: 2024-07-23 | End: 2024-07-31 | Stop reason: HOSPADM

## 2024-07-23 RX ORDER — HYDRALAZINE HYDROCHLORIDE 20 MG/ML
10 INJECTION INTRAMUSCULAR; INTRAVENOUS EVERY 4 HOURS PRN
Status: DISCONTINUED | OUTPATIENT
Start: 2024-07-23 | End: 2024-07-24

## 2024-07-23 RX ORDER — DILTIAZEM HYDROCHLORIDE 180 MG/1
180 CAPSULE, COATED, EXTENDED RELEASE ORAL DAILY
Status: DISCONTINUED | OUTPATIENT
Start: 2024-07-23 | End: 2024-07-23

## 2024-07-23 RX ORDER — PANTOPRAZOLE SODIUM 40 MG/1
40 TABLET, DELAYED RELEASE ORAL
Status: DISCONTINUED | OUTPATIENT
Start: 2024-07-23 | End: 2024-07-31 | Stop reason: HOSPADM

## 2024-07-23 RX ORDER — CHLORHEXIDINE GLUCONATE ORAL RINSE 1.2 MG/ML
15 SOLUTION DENTAL EVERY 12 HOURS SCHEDULED
Status: DISCONTINUED | OUTPATIENT
Start: 2024-07-23 | End: 2024-07-24

## 2024-07-23 RX ORDER — IODIXANOL 320 MG/ML
200 INJECTION, SOLUTION INTRAVASCULAR
Status: COMPLETED | OUTPATIENT
Start: 2024-07-23 | End: 2024-07-23

## 2024-07-23 RX ORDER — ACETAMINOPHEN 325 MG/1
650 TABLET ORAL EVERY 6 HOURS PRN
Status: DISCONTINUED | OUTPATIENT
Start: 2024-07-23 | End: 2024-07-31 | Stop reason: HOSPADM

## 2024-07-23 RX ADMIN — PANTOPRAZOLE SODIUM 40 MG: 40 TABLET, DELAYED RELEASE ORAL at 08:45

## 2024-07-23 RX ADMIN — PHENYLEPHRINE HYDROCHLORIDE 30 MCG/MIN: 50 INJECTION INTRAVENOUS at 02:36

## 2024-07-23 RX ADMIN — PHENYLEPHRINE HYDROCHLORIDE 180 MCG/MIN: 50 INJECTION INTRAVENOUS at 20:59

## 2024-07-23 RX ADMIN — PHENYLEPHRINE HYDROCHLORIDE 160 MCG/MIN: 50 INJECTION INTRAVENOUS at 15:36

## 2024-07-23 RX ADMIN — ALBUMIN (HUMAN) 25 G: 12.5 INJECTION, SOLUTION INTRAVENOUS at 12:03

## 2024-07-23 RX ADMIN — ATORVASTATIN CALCIUM 40 MG: 40 TABLET, FILM COATED ORAL at 17:01

## 2024-07-23 RX ADMIN — LIDOCAINE 5% 1 PATCH: 700 PATCH TOPICAL at 14:09

## 2024-07-23 RX ADMIN — IODIXANOL 130 ML: 320 INJECTION, SOLUTION INTRAVASCULAR at 00:20

## 2024-07-23 RX ADMIN — PHENYLEPHRINE HYDROCHLORIDE 130 MCG/MIN: 50 INJECTION INTRAVENOUS at 10:00

## 2024-07-23 RX ADMIN — ACETAMINOPHEN 650 MG: 325 TABLET, FILM COATED ORAL at 20:46

## 2024-07-23 RX ADMIN — ACETAMINOPHEN 650 MG: 325 TABLET, FILM COATED ORAL at 11:33

## 2024-07-23 RX ADMIN — IOHEXOL 85 ML: 350 INJECTION, SOLUTION INTRAVENOUS at 12:40

## 2024-07-23 RX ADMIN — ATORVASTATIN CALCIUM 40 MG: 40 TABLET, FILM COATED ORAL at 02:50

## 2024-07-23 RX ADMIN — CHLORHEXIDINE GLUCONATE 0.12% ORAL RINSE 15 ML: 1.2 LIQUID ORAL at 08:46

## 2024-07-23 RX ADMIN — CHLORHEXIDINE GLUCONATE 0.12% ORAL RINSE 15 ML: 1.2 LIQUID ORAL at 20:58

## 2024-07-23 RX ADMIN — OXYCODONE HYDROCHLORIDE AND ACETAMINOPHEN 1000 MG: 500 TABLET ORAL at 08:45

## 2024-07-23 NOTE — ANESTHESIA POSTPROCEDURE EVALUATION
Post-Op Assessment Note    CV Status:  Stable  Pain Score: 0    Pain management: adequate       Mental Status:  Alert and awake   Hydration Status:  Stable   PONV Controlled:  None   Airway Patency:  Patent     Post Op Vitals Reviewed: Yes    No anethesia notable event occurred.    Staff: CRNA, Anesthesiologist               BP   164/89   Temp   36.1   Pulse 82   Resp 12   SpO2 98

## 2024-07-23 NOTE — CONSULTS
PHYSICAL MEDICINE AND REHABILITATION CONSULT NOTE  Brianna Baum 74 y.o. female MRN: [id number]  Unit/Bed#: ICU 03 Encounter: [id number]    Requested by (Physician/Service): Cristina Meraz MD  Reason for Consultation:  Assessment of rehabilitation needs    Assessment:  Rehabilitation Diagnosis:   Left MCA CVA  Left M1 occlusion s/p TNK and attempted thrombectomy     Recommendations:  Rehabilitation Plan:  Therapy is pending and patient currently does not appear to have any deficits on exam. She may be able to d/c to home once medically stable. Will follow up once therapy has completed evaluations.     Medical Co-morbidities Plan:  Headache   Newly diagnosed atrial fibrillation   GERD  Bowel plan: continent   Bladder plan: purwick   DVT ppx: SCD      Thank you for this consultation.  Do not hesitate to contact service with further questions.      LADONNA Cordero  PM&R    I have spent a total time of 30 minutes on 07/23/24 in caring for this patient including Patient and family education, Counseling / Coordination of care, Documenting in the medical record, Reviewing / ordering tests, medicine, procedures  , Obtaining or reviewing history  , and Communicating with other healthcare professionals .    History of Present Illness:  Brianna Baum is a 74 y.o. female with a PMH of atrial fibrillation, LE edema, GERD, chronic back pain who presented to the [facility] on 7/23/2024 with dizziness and confusion. CT head was negative however CTA showed a left M1 occlusion. She was given TNK and endovascular alert was called. She was transferred to [place]. She underwent attempted thrombectomy without significant change. Non-occlusive lesion remained. MRI is pending. She was recently diagnosed on 7/18/2024 with atrial fibrillation and was placed on cardizem and Eliquis however she did not start Eliquis due to cost. PM&R are consulted for rehabilitation recommendations.     The patient  "was seen in the ICU. She currently reports headache. She denies any weakness, numbness, tingling, SOB, chest pain or difficulty with her speech.     Review of Systems: 10 point ROS negative except for what is noted in HPI    Function:  Prior level of function and living situation: The patient lives in a ranch level home with 0 GREGORY and ramp entrance.     Current level of function:  Physical Therapy: pending   Occupational Therapy: pending   Speech Therapy: pending       Physical Exam:  /82   Pulse 66   Temp 98.7 °F (37.1 °C) (Oral)   Resp 18   Ht 5' 5\" (1.651 m)   SpO2 96%   BMI 29.82 kg/m²        Intake/Output Summary (Last 24 hours) at 7/23/2024 1056  Last data filed at 7/23/2024 0800  Gross per 24 hour   Intake 637.85 ml   Output 400 ml   Net 237.85 ml       Body mass index is 29.82 kg/m².      Physical Exam  Constitutional:       General: She is not in acute distress.     Appearance: She is not toxic-appearing.   HENT:      Head: Normocephalic and atraumatic.      Right Ear: External ear normal.      Left Ear: External ear normal.      Nose: Nose normal.      Mouth/Throat:      Mouth: Mucous membranes are moist.      Pharynx: Oropharynx is clear.   Eyes:      Extraocular Movements: Extraocular movements intact.   Pulmonary:      Effort: Pulmonary effort is normal. No respiratory distress.   Abdominal:      General: There is no distension.   Musculoskeletal:         General: Normal range of motion.   Skin:     General: Skin is warm and dry.   Neurological:      Mental Status: She is alert and oriented to person, place, and time.   Psychiatric:         Mood and Affect: Mood normal.            Social History:    Social History     Socioeconomic History    Marital status:      Spouse name: None    Number of children: None    Years of education: None    Highest education level: None   Occupational History    None   Tobacco Use    Smoking status: Never    Smokeless tobacco: Never   Vaping Use    " Vaping status: Never Used   Substance and Sexual Activity    Alcohol use: Yes     Comment: 3 times a week    Drug use: Yes     Types: Marijuana     Comment: rarely/ last used 2 days ago    Sexual activity: Not Currently   Other Topics Concern    None   Social History Narrative    Lives at home with     Feels safe at home    No living will    Rare dental care     Social Determinants of Health     Financial Resource Strain: Low Risk  (12/1/2022)    Overall Financial Resource Strain (CARDIA)     Difficulty of Paying Living Expenses: Not hard at all   Food Insecurity: No Food Insecurity (5/14/2024)    Hunger Vital Sign     Worried About Running Out of Food in the Last Year: Never true     Ran Out of Food in the Last Year: Never true   Transportation Needs: No Transportation Needs (5/14/2024)    PRAPARE - Transportation     Lack of Transportation (Medical): No     Lack of Transportation (Non-Medical): No   Physical Activity: Insufficiently Active (7/22/2020)    Exercise Vital Sign     Days of Exercise per Week: 7 days     Minutes of Exercise per Session: 20 min   Stress: Stress Concern Present (7/22/2020)    [employer] [place] [employer] - Occupational Stress Questionnaire     Feeling of Stress : Rather much   Social Connections: Moderately Isolated (7/22/2020)    Social Connection and Isolation Panel [NHANES]     Frequency of Communication with Friends and Family: Three times a week     Frequency of Social Gatherings with Friends and Family: Once a week     Attends Mandaen Services: Never     Active Member of Clubs or Organizations: No     Attends Club or Organization Meetings: Never     Marital Status:    Intimate Partner Violence: Not At Risk (7/22/2020)    Humiliation, Afraid, Rape, and Kick questionnaire     Fear of Current or Ex-Partner: No     Emotionally Abused: No     Physically Abused: No     Sexually Abused: No   Housing Stability: Low Risk  (5/14/2024)    Housing Stability Vital  Sign     Unable to Pay for Housing in the Last Year: No     Number of Times Moved in the Last Year: 1     Homeless in the Last Year: No        Family History:    Family History   Problem Relation Age of Onset    Stroke Mother     Diabetes Mother     Hypertension Mother     Heart disease Father     Hypertension Father     Heart disease Brother     Diabetes Brother     Hypertension Brother     Substance Abuse Neg Hx     Mental illness Neg Hx          Medications:     Current Facility-Administered Medications:     ascorbic acid (VITAMIN C) tablet 1,000 mg, 1,000 mg, Oral, Daily, LADONNA Kc, 1,000 mg at 07/23/24 0845    atorvastatin (LIPITOR) tablet 40 mg, 40 mg, Oral, QPM, JANET KcNP, 40 mg at 07/23/24 0250    chlorhexidine (PERIDEX) 0.12 % oral rinse 15 mL, 15 mL, Mouth/Throat, Q12H ANETA, LADONNA Kc, 15 mL at 07/23/24 0846    hydrALAZINE (APRESOLINE) injection 10 mg, 10 mg, Intravenous, Q4H PRN, LADONNA Kc    pantoprazole (PROTONIX) EC tablet 40 mg, 40 mg, Oral, Daily Before Breakfast, Jose Zamora PA-C, 40 mg at 07/23/24 0845    phenylephrine (KENYA-SYNEPHRINE) 50 mg (STANDARD CONCENTRATION) in sodium chloride 0.9% 250 mL,  mcg/min, Intravenous, Titrated, JANET KcNP, Last Rate: 39 mL/hr at 07/23/24 0846, 130 mcg/min at 07/23/24 0846    Past Medical History:     Past Medical History:   Diagnosis Date    Arthritis     Chronic pain     Colon polyp     Diverticulitis     GERD (gastroesophageal reflux disease)     Hypertension     Vertigo         Past Surgical History:     Past Surgical History:   Procedure Laterality Date    CHOLECYSTECTOMY      CHOLECYSTECTOMY LAPAROSCOPIC N/A 12/06/2021    Procedure: SUBTOTAL CHOLECYSTECTOMY LAPAROSCOPIC;  Surgeon: Víctor Viveros MD;  Location:  MAIN OR;  Service: General    COLONOSCOPY      GALLBLADDER SURGERY  12/2021    Dr. Viveros    TUBAL LIGATION           Allergies:     Allergies   Allergen  Reactions    Tramadol Hives           LABORATORY RESULTS:      Lab Results   Component Value Date    HGB 13.1 07/23/2024    HGB 13.3 04/23/2014    HCT 39.2 07/23/2024    HCT 40.5 04/23/2014    WBC 10.82 (H) 07/23/2024    WBC 5.89 04/23/2014     Lab Results   Component Value Date    BUN 9 07/23/2024    BUN 13 04/23/2014     04/23/2014    K 4.6 07/23/2024    K 3.7 04/23/2014     (H) 07/23/2024     04/23/2014    GLUCOSE 93 04/23/2014    CREATININE 0.73 07/23/2024    CREATININE 0.82 04/23/2014     Lab Results   Component Value Date    PROTIME 13.7 07/22/2024    PROTIME 14.0 04/23/2014    INR 1.01 07/22/2024    INR 1.12 04/23/2014        DIAGNOSTIC STUDIES: Reviewed  X-ray chest 1 view portable    Result Date: 7/22/2024  Impression: No acute cardiopulmonary disease. Workstation performed: [id number]     CT stroke alert brain    Result Date: 7/22/2024  Impression: No acute intracranial abnormality. Chronic microangiopathic changes. Underlying developing infarct not excluded. MRI can be performed for further evaluation if clinically warranted. Cerebral volume loss with mild ventriculomegaly, which could be secondary to the volume loss versus mild communicating hydrocephalus, similar to study of 2018. Findings were directly discussed with Wilfred Rubio at 9:26 p.m. on 7/22/2024. Workstation performed: [id number]     CTA stroke alert (head/neck)    Result Date: 7/22/2024  Impression: Focal occlusion distal left M1 at the bifurcation. Findings were directly discussed with Wilfred Rubio at 9:26 p.m. on 7/22/2024. Workstation performed: [id number]

## 2024-07-23 NOTE — EMTALA/ACUTE CARE TRANSFER
[facility] EMERGENCY DEPARTMENT  [address] [address]  [facility] [address]  Dept: [phone]      EMTALA TRANSFER CONSENT    NAME Brianna Baum                                         1950                              MRN [id number]    I have been informed of my rights regarding examination, treatment, and transfer   by Dr. García Martinez DO    Benefits: Specialized equipment and/or services available at the receiving facility (Include comment)________________________, Continuity of care    Risks: Potential for delay in receiving treatment, Potential deterioration of medical condition, Loss of IV, Increased discomfort during transfer, Possible worsening of condition or death during transfer      Consent for Transfer:  I acknowledge that my medical condition has been evaluated and explained to me by the emergency department physician or other qualified medical person and/or my attending physician, who has recommended that I be transferred to the service of  Accepting Physician: Dr. Akers at Accepting Facility Name, City & State : [facility]. The above potential benefits of such transfer, the potential risks associated with such transfer, and the probable risks of not being transferred have been explained to me, and I fully understand them.  The doctor has explained that, in my case, the benefits of transfer outweigh the risks.  I agree to be transferred.    I authorize the performance of emergency medical procedures and treatments upon me in both transit and upon arrival at the receiving facility.  Additionally, I authorize the release of any and all medical records to the receiving facility and request they be transported with me, if possible.  I understand that the safest mode of transportation during a medical emergency is an ambulance and that the Hospital advocates the use of this mode of transport. Risks of traveling to the receiving facility by car, including  absence of medical control, life sustaining equipment, such as oxygen, and medical personnel has been explained to me and I fully understand them.    (ADRIANNE CORRECT BOX BELOW)  [  ]  I consent to the stated transfer and to be transported by ambulance/helicopter.  [  ]  I consent to the stated transfer, but refuse transportation by ambulance and accept full responsibility for my transportation by car.  I understand the risks of non-ambulance transfers and I exonerate the Hospital and its staff from any deterioration in my condition that results from this refusal.    X___________________________________________    DATE  24  TIME________  Signature of patient or legally responsible individual signing on patient behalf           RELATIONSHIP TO PATIENT_________________________          Provider Certification    NAME Brianna Baum                                         1950                              MRN [id number]    A medical screening exam was performed on the above named patient.  Based on the examination:    Condition Necessitating Transfer The primary encounter diagnosis was Difficulty with speech. A diagnosis of Stroke (HCC) was also pertinent to this visit.    Patient Condition: The patient has been stabilized such that within reasonable medical probability, no material deterioration of the patient condition or the condition of the unborn child(janis) is likely to result from the transfer    Reason for Transfer: Level of Care needed not available at this facility (neurosurgery)    Transfer Requirements: Facility [facility]   Space available and qualified personnel available for treatment as acknowledged by    Agreed to accept transfer and to provide appropriate medical treatment as acknowledged by       Dr. Akers  Appropriate medical records of the examination and treatment of the patient are provided at the time of transfer   STAFF INITIAL WHEN COMPLETED _______  Transfer will be  performed by qualified personnel from    and appropriate transfer equipment as required, including the use of necessary and appropriate life support measures.    Provider Certification: I have examined the patient and explained the following risks and benefits of being transferred/refusing transfer to the patient/family:  General risk, such as traffic hazards, adverse weather conditions, rough terrain or turbulence, possible failure of equipment (including vehicle or aircraft), or consequences of actions of persons outside the control of the transport personnel, Unanticipated needs of medical equipment and personnel during transport, Risk of worsening condition      Based on these reasonable risks and benefits to the patient and/or the unborn child(janis), and based upon the information available at the time of the patient’s examination, I certify that the medical benefits reasonably to be expected from the provision of appropriate medical treatments at another medical facility outweigh the increasing risks, if any, to the individual’s medical condition, and in the case of labor to the unborn child, from effecting the transfer.    X____________________________________________ DATE 07/22/24        TIME_______      ORIGINAL - SEND TO MEDICAL RECORDS   COPY - SEND WITH PATIENT DURING TRANSFER

## 2024-07-23 NOTE — PHYSICAL THERAPY NOTE
Physical Therapy Evaluation     Patient's Name: Brianna Baum    Admitting Diagnosis  Cerebrovascular accident (CVA), unspecified mechanism ([facility]) [I63.9]    Problem List  Patient Active Problem List   Diagnosis    Chronic neck pain    Chronic bilateral low back pain with bilateral sciatica    Gastroesophageal reflux disease without esophagitis    Mixed hyperlipidemia    Myalgia    Urge incontinence of urine    Obesity (BMI 30.0-34.9)    Primary osteoarthritis of left hand    Varicose veins of both lower extremities without ulcer or inflammation    Acute cholecystitis    Hypertension    Mass of soft tissue of hand    Vertigo    Situational anxiety    CKD (chronic kidney disease) stage 2, GFR 60-89 ml/min    Continuous opioid dependence (HCC)    Atrial flutter by electrocardiogram (HCC)    Cerebrovascular accident (CVA) due to occlusion of left middle cerebral artery (HCC)       Past Medical History  Past Medical History:   Diagnosis Date    Arthritis     Chronic pain     Colon polyp     Diverticulitis     GERD (gastroesophageal reflux disease)     Hypertension     Vertigo        Past Surgical History  Past Surgical History:   Procedure Laterality Date    CHOLECYSTECTOMY      CHOLECYSTECTOMY LAPAROSCOPIC N/A 12/06/2021    Procedure: SUBTOTAL CHOLECYSTECTOMY LAPAROSCOPIC;  Surgeon: Víctor Viveros MD;  Location:  MAIN OR;  Service: General    COLONOSCOPY      GALLBLADDER SURGERY  12/2021    Dr. Viveros    TUBAL LIGATION          07/23/24 1207   PT Last Visit   PT Visit Date 07/23/24   Note Type   Note type Evaluation   Pain Assessment   Pain Assessment Tool 0-10   Pain Score 6   Pain Location/Orientation Location: Head   Patient's Stated Pain Goal No pain   Hospital Pain Intervention(s) Repositioned;Ambulation/increased activity   Restrictions/Precautions   Weight Bearing Precautions Per Order No   Other Precautions Multiple lines;Telemetry;Fall Risk;Pain;Cognitive;Chair Alarm;Bed Alarm;Impulsive   Home Living   Type  "of Home House   Home Layout One level  (2 GREGORY)   Bathroom Shower/Tub Walk-in shower   Bathroom Toilet Raised   Bathroom Equipment Shower chair   Home Equipment Cane   Prior Function   Level of Boundary Independent with functional mobility   Lives With Alone   Receives Help From Other (Comment)  (pt reports no social support)   Falls in the last 6 months 0   Vocational Retired   General   Family/Caregiver Present No   Cognition   Orientation Level Oriented to person  (general place and situation)   Subjective   Subjective Pt willing and agreeable to PT session, \"I just want to be dry\"   RLE Assessment   RLE Assessment   (grossly 3+/5 with movement)   LLE Assessment   LLE Assessment   (grossly 4/5 with movement)   Coordination   Movements are Fluid and Coordinated 0   Coordination and Movement Description slow and guarded with weakness and fatigue   Bed Mobility   Supine to Sit 4  Minimal assistance   Additional items Assist x 1;HOB elevated   Sit to Supine 4  Minimal assistance   Additional items Assist x 1   Transfers   Sit to Stand 3  Moderate assistance   Additional items Assist x 2;Increased time required   Stand to Sit 3  Moderate assistance   Additional items Assist x 2;Increased time required   Ambulation/Elevation   Gait pattern Excessively slow;Shuffling;Decreased foot clearance;Inconsistent hawa   Gait Assistance 3  Moderate assist   Additional items Assist x 2   Assistive Device Other (Comment)  (HHA)   Distance 2'   Balance   Static Sitting Fair -   Dynamic Sitting Poor +   Static Standing Poor   Dynamic Standing Poor   Ambulatory Poor   Endurance Deficit   Endurance Deficit No   Activity Tolerance   Activity Tolerance Patient limited by pain;Patient limited by fatigue   Medical Staff Made Aware OT   Nurse Made Aware yes, nsg gave clearance to work with pt   Assessment   Prognosis Good   Problem List Decreased range of motion;Decreased strength;Decreased endurance;Decreased mobility;Impaired " balance;Decreased coordination;Decreased cognition;Impaired judgement;Decreased safety awareness;Pain   Assessment Pt is 74 y.o. female seen for PT evaluation s/p admit to [facility] on 7/22/2024 w/ Cerebrovascular accident (CVA) due to occlusion of left middle cerebral artery (HCC). PT consulted to assess pt's functional mobility and d/c needs. Order placed for PT eval and tx, w/ up w/ A order. Comorbidities affecting pt's physical performance at time of assessment include:  has a past medical history of Arthritis, Chronic pain, Colon polyp, Diverticulitis, GERD (gastroesophageal reflux disease), Hypertension, and Vertigo. PTA, pt was ambulates unrestricted distances and all terrain and ambulates household distances. Personal factors affecting pt at time of IE include: inaccessible home environment, ambulating w/ assistive device, stairs to enter home, inability to ambulate household distances, decreased initiation and engagement, unable to perform physical activity, limited insight into impairments, inability to perform IADLs, and inability to perform ADLs. Please find objective findings from PT assessment regarding body systems outlined above with impairments and limitations including weakness, decreased ROM, impaired balance, decreased endurance, impaired coordination, gait deviations, pain, decreased activity tolerance, decreased functional mobility tolerance, decreased safety awareness, impaired judgement, fall risk, and decreased cognition. The following objective measures performed on IE also reveal limitations: The patient's AM-PAC Basic Mobility Inpatient Short Form Raw Score is 13, Standardized Score is 33.99. A standardized score less than 42.9 suggests the patient may benefit from discharge to post-acute rehabilitation services. Please also refer to the recommendation of the Physical Therapist for safe discharge planning. Pt's clinical presentation is currently unstable/unpredictable seen in  pt's presentation of critical care monitoring. Pt to benefit from continued PT tx to address deficits as defined above and maximize level of functional independent mobility and consistency. From PT/mobility standpoint, recommendation at time of d/c would be level I pending progress in order to facilitate return to PLOF.   Barriers to Discharge Inaccessible home environment;Decreased caregiver support   Goals   Patient Goals To get home to my cats   STG Expiration Date 08/04/24   Short Term Goal #1 1. Complete bed mobility and transfers I to decrease need for caregiver in home. 2. Ambulate 300' I to complete household and community mobility without A. 3. Improve dynamic balance to good to decrease need for UE support during ambulation. 4. Be educated & demonstate 3 steps to be able to enter home without A.   Plan   Treatment/Interventions OT;Spoke to case management;Spoke to nursing;Gait training;Bed mobility;Patient/family training;Endurance training;LE strengthening/ROM;Functional transfer training   PT Frequency 3-5x/wk   Discharge Recommendation   Rehab Resource Intensity Level, PT I (Maximum Resource Intensity)   AM-PAC Basic Mobility Inpatient   Turning in Flat Bed Without Bedrails 3   Lying on Back to Sitting on Edge of Flat Bed Without Bedrails 3   Moving Bed to Chair 2   Standing Up From Chair Using Arms 2   Walk in Room 2   Climb 3-5 Stairs With Railing 1   Basic Mobility Inpatient Raw Score 13   Basic Mobility Standardized Score 33.99   [facility] Highest Level Of Mobility   -[facility] Goal 4: Move to chair/commode   -HLM Achieved 4: Move to chair/commode           Felecia Garcia, PT

## 2024-07-23 NOTE — PROGRESS NOTES
Critical Care Interval Transfer Note:    Brief Hospital Summary:   Patient is a 74yoF who presented to outside facility for evaluation of acute onset of dizziness and confusion. Stroke alert was initiated and patient found to have focal occlusion of left MCA. TNK given at outside facility. Patient transferred to [facility]. Upon arrival single pass thrombectomy was attempted. On 7/23 patient with increased confusion. CT performed which showed resolution of left MCA lesion and evolving developing acute left infarct.   MRI 24 hours showed L BG stroke and noted hemorrhage superimposed. Weaned off neosynephrine and started on midodrine 10 mg 3 times daily  Patient was transferred to  as a SD1 and soon after developed L MCA syndrome and was taken to CT and got a CTH and CTA showing a new proximal L M1 occlusion and had thrombectomy w/ TICI 3. Patient was kept phenylephrine to keep -160 and was slowly weaned down to normotension w/ midodrine 10mg TID continued and IVF running  Heparin gtt started for noted afib and after 2 therapeutic PTTs, had a CTH that did not show worsening bleed but noted petechial hemorrhage in L BG that was appreciated before. MRI showed L BG stroke and smaller foci in the frontal operculum and insula   Neurology recommended to stay on hep gtt for 24 hours after therapeutic and repeat CTH in AM  and pending final decision for AC/AP per neurology.    Barriers to discharge:   Transition to [facility]  Rehab/PT/OT  Case management to discuss AC-- Pt did not fill Eliquis due to cost --> $400.     Consults: IP CONSULT TO NEUROSURGERY  IP CONSULT TO PHYSICAL MEDICINE REHAB  IP CONSULT TO NEUROLOGY  IP CONSULT TO CASE MANAGEMENT  IP CONSULT TO NUTRITION SERVICES    Recommended to review admission imaging for incidental findings and document in discharge navigator: Chart reviewed, no known incidental findings noted at this time.      Discharge Plan: Anticipate discharge in 24-48 hrs to home with home  services.    PT Recommendations: Home with home health rehabilitation  OT Recommendations: No rehabilitation needs      Patient seen and evaluated by Critical Care today and deemed to be appropriate for transfer to Stepdown Level 2. Spoke to Dr. Valdez from [facility] to accept transfer. Critical care can be contacted via [employer] with any questions or concerns.

## 2024-07-23 NOTE — SPEECH THERAPY NOTE
Speech-Language Pathology Bedside Swallow Evaluation    Patient Name: Brianna Baum    Today's Date: 7/23/2024     Problem List  Principal Problem:    Cerebrovascular accident (CVA) due to occlusion of left middle cerebral artery (HCC)    Past Medical History  Past Medical History:   Diagnosis Date    Arthritis     Chronic pain     Colon polyp     Diverticulitis     GERD (gastroesophageal reflux disease)     Hypertension     Vertigo      Past Surgical History  Past Surgical History:   Procedure Laterality Date    CHOLECYSTECTOMY      CHOLECYSTECTOMY LAPAROSCOPIC N/A 12/06/2021    Procedure: SUBTOTAL CHOLECYSTECTOMY LAPAROSCOPIC;  Surgeon: Víctor Viveros MD;  Location:  MAIN OR;  Service: General    COLONOSCOPY      GALLBLADDER SURGERY  12/2021    Dr. Viveros    TUBAL LIGATION         Summary  Pt presented with functional appearing oral and pharyngeal stage swallowing skills with materials administered today. Mastication was complete and timely, and no s/s aspiration occurred with the trials offered. Motor speech appears WFL, naming appears in tact, however question some higher level aphasia in conversational speech. Will f/u to determine if formal language assessment is indicated.     Risk/s for Aspiration: mild    Recommended Diet: regular diet and thin liquids   Recommended Form of Meds: whole with liquid   Aspiration precautions and swallowing strategies: upright posture  Other Recommendations: Continue frequent oral care      Current Medical Status per H&P 7/23/24  Brianna Baum is a 74 y.o. with PMH AFib new dx, LE edema, GERD, chronic back pain who presents with dizziness/confusion to [facility]. Initial NIH O. Stroke alert called. CTH neg. CTA L M1 occl. NIH 7 with worsening symptoms. TNK given 2152 Endovascular alert. Tx to [facility].  Thrombectomy attempted with no change and non occlusive lesion remained.   History obtained from chart review, the patient, and limited from pt with exp aphasia. In further review  office visit 7/18 for LE edema. EKG revealed Afib new onset and placed on cardizem and Eliquis. Pt did not fill Eliquis due to cost of $400.      Special Studies:  CXR 7/22/24 IMPRESSION:  No acute cardiopulmonary disease.  CT brain 7/22/24 IMPRESSION:  No acute intracranial abnormality.  Chronic microangiopathic changes. Underlying developing infarct not excluded. MRI can be performed for further evaluation if clinically warranted.  Cerebral volume loss with mild ventriculomegaly, which could be secondary to the volume loss versus mild communicating hydrocephalus, similar to study of 2018.       Swallow Information   Current Risks for Dysphagia & Aspiration: CVA and AMS  Current Diet: regular diet and thin liquids   Baseline Diet: regular diet and thin liquids      Baseline Assessment   Behavior/Cognition: alert and lethargic  Speech/Language Status: able to participate in basic conversation and able to follow commands  Patient Positioning: upright in bed  Pain Status/Interventions/Response to Interventions: No report of or nonverbal indications of pain.       Swallow Mechanism Exam  Facial: right facial droop  Labial: bilateral decreased ROM  Lingual: decreased ROM  Velum: symmetrical  Mandible: adequate ROM  Dentition: adequate  Vocal quality:weak   Volitional Cough: weak   Respiratory Status: on RA      Consistencies Assessed and Performance   Consistencies Administered: thin liquids, puree, and hard solids    Oral Stage: WFL  Mastication was adequate with the materials administered today. Bolus formation and transfer were functional with no significant oral residue noted. No overt s/s reduced oral control.    Pharyngeal Stage: WFL  Swallow Mechanics: Swallowing initiation appeared prompt. Laryngeal rise was within functional limits. No coughing, throat clearing, change in vocal quality or respiratory status noted today.     Esophageal Concerns:  hx GERD      Summary and Recommendations (see above)    Results  Reviewed with: patient and RN     Treatment Recommended: no f/u needed for swallowing, will f/u to determine if speech language eval is needed

## 2024-07-23 NOTE — H&P
[facility]  H&P: Critical Care  Name: Brianna Baum 74 y.o. female I MRN: [id number]  Unit/Bed#: ICU 03 I Date of Admission: 7/22/2024   Date of Service: 7/23/2024 I Hospital Day: 1      Assessment & Plan   Neuro:   Diagnosis: Acute L MCA CVA s/p TNK  CTH-No acute intracranial abnormality.   CTA-Focal occlusion distal left M1 at the bifurcation.   TNK 2152 7/22  Endovascular alert-Interval thrombolysis with trace non-occlusive thrombus within the M2 superior division, overall TICI 2B. Single pass thrombectomy attempted, no significant change. Ultimately non-occlusive lesion remained.   Plan:   Neuro cx  Stroke pathway-ECHO/FLP/A1C/PT/OT/Speech  SBP <180/105  CTH in 24hrs  MRI  No dvt ppx for 24hrs  statin    CV:   Diagnosis: AFib  New onset afib noted on 7/18 physician visit for LE edema. Prescribed Cardizem and Eliquis--did not get Eliquis due to cost   Plan:   Cont home cardizem  Check tsh/ECHO  Will need anticoag post 24hr scan--discuss cost with CM  Hold home lasix    Pulm:  No active issues    GI:   Diagnosis: GERD  Plan: home ppi    :   No active issues    F/E/N:    No active issues    Heme/Onc:   No active issues    Endo:   No active issues    ID:   No active issues    MSK/Skin:   No active issues    Disposition: Critical care       History of Present Illness     HPI: Brianna Baum is a 74 y.o. with PMH AFib new dx, LE edema, GERD, chronic back pain who presents with dizziness/confusion to [facility]. Initial NIH O. Stroke alert called. CTH neg. CTA L M1 occl. NIH 7 with worsening symptoms. TNK given 2152 Endovascular alert. Tx to [facility].    Thrombectomy attempted with no change and non occlusive lesion remained.     History obtained from chart review, the patient, and limited from pt with exp aphasia. In further review office visit 7/18 for LE edema. EKG revealed Afib new onset and placed on cardizem and Eliquis. Pt did not fill Eliquis due to cost of $400.    Review of  Systems: Review of Systems   Unable to perform ROS: Other (limited w exp aphasia)   Constitutional: Negative.    Respiratory: Negative.     Cardiovascular: Negative.    Neurological:  Positive for weakness.       Historical Information   Past Medical History:  No date: Arthritis  No date: Chronic pain  No date: Colon polyp  No date: Diverticulitis  No date: GERD (gastroesophageal reflux disease)  No date: Hypertension  No date: Vertigo Past Surgical History:  No date: CHOLECYSTECTOMY  12/06/2021: CHOLECYSTECTOMY LAPAROSCOPIC; N/A      Comment:  Procedure: SUBTOTAL CHOLECYSTECTOMY LAPAROSCOPIC;                 Surgeon: Víctor Viveros MD;  Location:  MAIN OR;                 Service: General  No date: COLONOSCOPY  12/2021: GALLBLADDER SURGERY      Comment:  Dr. Viveros  No date: TUBAL LIGATION   Current Outpatient Medications   Medication Instructions    apixaban (ELIQUIS) 5 mg, Oral, 2 times daily    Biotin 10 MG CAPS Oral, Daily    celecoxib (CELEBREX) 200 mg, Oral, Daily    diltiazem (CARDIZEM CD) 180 mg, Oral, Daily    esomeprazole (NexIUM) 40 MG capsule TAKE 1 CAPSULE DAILY    furosemide (LASIX) 20 mg, Oral, Daily    HYDROcodone-acetaminophen (Norco) 5-325 mg per tablet 1 tablet, Oral, Every 8 hours PRN    vitamin C 1,000 mg, Oral, Daily    Allergies   Allergen Reactions    Tramadol Hives      Social History     Tobacco Use    Smoking status: Never    Smokeless tobacco: Never   Vaping Use    Vaping status: Never Used   Substance Use Topics    Alcohol use: Yes     Comment: 3 times a week    Drug use: Yes     Types: Marijuana     Comment: rarely/ last used 2 days ago    Family History   Problem Relation Age of Onset    Stroke Mother     Diabetes Mother     Hypertension Mother     Heart disease Father     Hypertension Father     Heart disease Brother     Diabetes Brother     Hypertension Brother     Substance Abuse Neg Hx     Mental illness Neg Hx           Objective                            Vitals I/O      Most  Recent Min/Max in 24hrs   Temp 98.9 °F (37.2 °C) Temp  Min: 98.9 °F (37.2 °C)  Max: 99.2 °F (37.3 °C)   Pulse 80 Pulse  Min: 80  Max: 125   Resp 20 Resp  Min: 16  Max: 22   /66 BP  Min: 123/73  Max: 176/72   O2 Sat 100 % SpO2  Min: 94 %  Max: 100 %      Intake/Output Summary (Last 24 hours) at 7/23/2024 0054  Last data filed at 7/23/2024 0004  Gross per 24 hour   Intake 450 ml   Output --   Net 450 ml       Diet Regular; Regular House    Invasive Monitoring           Physical Exam   Physical Exam  Eyes:      Pupils: Pupils are equal, round, and reactive to light.   Skin:     General: Skin is warm and dry.      Comments: R groin dsg CDI soft no hematoma   HENT:      Head: Normocephalic and atraumatic.      Mouth/Throat:      Mouth: Mucous membranes are moist.   Cardiovascular:      Rate and Rhythm: Normal rate and regular rhythm.      Pulses: Normal pulses.      Heart sounds: Normal heart sounds.   Musculoskeletal:         General: No swelling. Normal range of motion.      Right lower leg: No edema.      Left lower leg: No edema.   Abdominal: General: Bowel sounds are normal. There is no distension.      Palpations: Abdomen is soft.      Tenderness: There is no abdominal tenderness.   Constitutional:       General: She is not in acute distress.  Pulmonary:      Effort: Pulmonary effort is normal. No respiratory distress.      Breath sounds: Normal breath sounds.   Psychiatric:         Speech: Speech is expressive aphasia.   Neurological:      Mental Status: She is oriented to person, place and time. She is lethargic and disoriented to situation.      GCS: GCS eye subscore is 4. GCS verbal subscore is 5. GCS motor subscore is 6.      Motor: Strength full and intact in all extremities. No motor deficit.      Comments: R facial droop            Diagnostic Studies      EKG: ST artifact no acute st changes  Imaging:  I have personally reviewed pertinent reports.   and I have personally reviewed pertinent films in  PACS     Medications:  Scheduled PRN   vitamin C, 1,000 mg, Daily  atorvastatin, 40 mg, QPM  chlorhexidine, 15 mL, Q12H ANETA  diltiazem, 180 mg, Daily      hydrALAZINE, 10 mg, Q4H PRN       Continuous          Labs:    CBC    Recent Labs     07/22/24 2057   WBC 7.09   HGB 13.2   HCT 41.4        BMP    Recent Labs     07/22/24 2057   SODIUM 139   K 4.1      CO2 29   AGAP 8   BUN 11   CREATININE 0.96   CALCIUM 9.6       Coags    Recent Labs     07/22/24 2057   INR 1.01   PTT 24        Additional Electrolytes  No recent results       Blood Gas    No recent results  No recent results LFTs  No recent results    Infectious  No recent results  Glucose  Recent Labs     07/22/24 2057   GLUC 107               LADONNA Kc

## 2024-07-23 NOTE — PROGRESS NOTES
"During rounds pt found to have significant facial droop - upper and lower face, urinated on her self. When asked why she urinated on her self, she states that she asked for help and no one came.  . Supa increased, albumin given and pt sent for stat CTA since this exam appears tot be different from the exam other examiners got this morning.  CT head with evolving strokes. CTA per read \"Left middle cerebral artery distal M1/bifurcation occlusion is no longer visualized. Previous short segment occlusions of multiple left middle cerebral artery M2 branches at their origins also appear to have resolved\"  -sbp 160-180 fot today since she seemed to get more symptoms/behavioral problems at   -CT head 24 hours post TNK  -speech  -PT/OT  -MRI brain when able  -statin  -holding antiplatelet/anticoagulation for now pending 24 hours post TNK scan (+/- MRI)  -incentive spirometry    Cristina Meraz MD  Critical care time 35 minutes     "

## 2024-07-23 NOTE — CONSULTS
NEUROLOGY RESIDENCY CONSULT NOTE     Name: Brianna Baum   Age & Sex: 74 y.o. female   MRN: [id number]  Unit/Bed#: ICU 03   Encounter: [id number]  Length of Stay: 1    Brianna Baum will need follow up in 4-6 weeks with neurovascular attending/AP .  She will not require outpatient neurological testing.      Pending for discharge: MRI    ASSESSMENT & PLAN     * Cerebrovascular accident (CVA) due to occlusion of left middle cerebral artery (HCC)  Assessment & Plan  Brianna Baum is a 74 y.o. female with pertinent PMHx of AFib new dx, LE edema, GERD, chronic back pain on  at  stroke alert was called for evaluation of symptoms concerning for stroke consisting of near syncope and speech difficulties. Last known well shortly before stroke alert at 2053. Initial NIHSS initially 0. Upon return from CT, patient had an NIHSS of 7 with aphasia, left gaze preference, and right facial droop.  Initial BP Blood Pressure: 133/66, FSBG  . Arrival EKG atrial flutter at 115 bpm . NC CTH no acute intracranial abnormalities and CTA H/N focal occlusion distal left M1 at the bifurcation . Last normal apparently shortly before stroke alert, within TNK window thus was given at 2152.   - Home Antiplatelet /Anticoagulants PTA: no antiplatelet or anticoagulants  - Stroke risk factors: HTN, HLD, Atrial Fibrillation, and Over weight  - Prior Stroke Hx: none  - Past Neurological Hx: no neurological problems    Pertinent scores:  Initial NIHSS: Total: 2   Modified Marie Score: 0 (No baseline symptoms/disability)    BP over last 24 hours: Blood Pressure: 151/82  current BP: Blood Pressure: 151/82    Workup:  - CTH: No acute intracranial abnormalities  - CTA: Focal occlusion distal left M1 at the bifurcation   - MRI WO: Pending  - TTE: Pending  - Labs: Hemoglobin A1c 5.8 (7/22/2024),  (7/23/2024)    Impression: CTA of head and neck revealed distal left M1 occlusion in the setting of atrial fibrillation off of anticoagulation due to cost.   Patient is status post TNK and thrombectomy TICI 2B.  Will obtain stroke workup including MRI and echocardiogram.  Will cotto check anticoagulation options for secondary stroke prevention.    Plan: Discussed plan with neurology attending, Dr. Mccracken  Admit along the stroke pathway with telemetry monitoring  Repeat CT head without contrast 24 hours post TNK  Strict NPO prior to dysphagia screen   Frequent neuro checks per protocol. If there is any acute changes in exam, please obtain stat CT head and notify neurology team  BP Goals: Per nsgy, SBP goal 140-180 in setting of TNK and thrombectomy  AP/AC agents: No AC/AP for 24 hours post-TNK  Statin: Will start atorvastin 40 mg daily  Labs: Hgb A1c, LDL   Brain Imaging: Obtain MRI brain without contrast  TTE pending  Euthermic/Euglycemic  DVT PPx: No AC/AP for 24 hours post TNK, SCDs  PT/OT/ST/PMR evaluations when able  Stroke education and counseling  Rest of other care per primary team appreciated    Disposition: PT Recommendations -          SUBJECTIVE     Reason for Consult / Principal Problem: Stroke alert  Hx and PE limited by: N/A    HPI: Brianna Baum is a 74 y.o. right handed female with PMHx of newly diagnosed atrial fibrillation off of AC due to cost, GERD, chronic back pain, HTN, and CKD stage 2 presenting to the [facility] ED on 7/22/2024 with a complaint of near syncope and speech difficulties. Patient was on the phone with her cousin who called EMS when Brianna was noted to have difficulty speaking. Patient denies loss of consciousness, dizziness, visual changes, or fall with this episode. She felt predominantly drowsy and weak. Patient notes that she took a Vicodin for lower back pain around 4:30 PM on 7/22.    Upon presentation to the ED, patient was noted to have an NIHSS of 0. Due to conflicting history, stroke pathway was followed. Upon return from CT, patient had an NIHSS of 7 with aphasia, left gaze preference, and right facial droop.  CT head showed no hemorrhage and patient was within window, so TNK was given at 9:52 PM. CTA head/neck showed a focal occlusion of the left M1 at the bifurcation. Endovascular was notified and patient was transferred to [facility].    Upon endovascular intervention, patient had trace non-occlusive thrombus with TICI 2B. Single pass thrombectomy was attempted with no significant change. Non-occlusive lesion remained after intervention.    Patient seen and examined at the bedside. No acute overnight events. Patient reports that she feels well and has no new concerns at this time. She is cooperative, oriented, and able to follow commands during exam. Patient denies weakness, numbness, chest pain, shortness of breath, abdominal pain, nausea, vomiting, diarrhea, constipation, fever, and chills.      Inpatient consult to Neurology  Consult performed by: Israel Pleitez DO  Consult ordered by: LADONNA Kc          Historical Information   Past Medical History:   Diagnosis Date    Arthritis     Chronic pain     Colon polyp     Diverticulitis     GERD (gastroesophageal reflux disease)     Hypertension     Vertigo      Past Surgical History:   Procedure Laterality Date    CHOLECYSTECTOMY      CHOLECYSTECTOMY LAPAROSCOPIC N/A 12/06/2021    Procedure: SUBTOTAL CHOLECYSTECTOMY LAPAROSCOPIC;  Surgeon: Víctor Viveros MD;  Location:  MAIN OR;  Service: General    COLONOSCOPY      GALLBLADDER SURGERY  12/2021    Dr. Viveros    TUBAL LIGATION       Social History   Social History     Substance and Sexual Activity   Alcohol Use Yes    Comment: 3 times a week     Social History     Substance and Sexual Activity   Drug Use Yes    Types: Marijuana    Comment: rarely/ last used 2 days ago     E-Cigarette/Vaping    E-Cigarette Use Never User      E-Cigarette/Vaping Substances     Social History     Tobacco Use   Smoking Status Never   Smokeless Tobacco Never     Family History:   Family History   Problem Relation Age  of Onset    Stroke Mother     Diabetes Mother     Hypertension Mother     Heart disease Father     Hypertension Father     Heart disease Brother     Diabetes Brother     Hypertension Brother     Substance Abuse Neg Hx     Mental illness Neg Hx      Meds/Allergies   all current active meds have been reviewed, current meds:   Current Facility-Administered Medications   Medication Dose Route Frequency    acetaminophen (TYLENOL) tablet 650 mg  650 mg Oral Q6H PRN    ascorbic acid (VITAMIN C) tablet 1,000 mg  1,000 mg Oral Daily    atorvastatin (LIPITOR) tablet 40 mg  40 mg Oral QPM    chlorhexidine (PERIDEX) 0.12 % oral rinse 15 mL  15 mL Mouth/Throat Q12H ANETA    hydrALAZINE (APRESOLINE) injection 10 mg  10 mg Intravenous Q4H PRN    lidocaine (LIDODERM) 5 % patch 1 patch  1 patch Topical Daily    pantoprazole (PROTONIX) EC tablet 40 mg  40 mg Oral Daily Before Breakfast    phenylephrine (KENYA-SYNEPHRINE) 50 mg (STANDARD CONCENTRATION) in sodium chloride 0.9% 250 mL   mcg/min Intravenous Titrated   , and PTA meds:   Prior to Admission Medications   Prescriptions Last Dose Informant Patient Reported? Taking?   Ascorbic Acid (VITAMIN C) 1000 MG tablet  Self Yes No   Sig: Take 1,000 mg by mouth daily   Biotin 10 MG CAPS  Self Yes No   Sig: Take by mouth in the morning   HYDROcodone-acetaminophen (Norco) 5-325 mg per tablet   No No   Sig: Take 1 tablet by mouth every 8 (eight) hours as needed for pain Max Daily Amount: 3 tablets   apixaban (Eliquis) 5 mg   No No   Sig: Take 1 tablet (5 mg total) by mouth 2 (two) times a day   celecoxib (CeleBREX) 200 mg capsule  Self No No   Sig: Take 1 capsule (200 mg total) by mouth daily   diltiazem (CARDIZEM CD) 180 mg 24 hr capsule   No No   Sig: Take 1 capsule (180 mg total) by mouth daily   esomeprazole (NexIUM) 40 MG capsule  Self No No   Sig: TAKE 1 CAPSULE DAILY   furosemide (LASIX) 20 mg tablet  Self No No   Sig: Take 1 tablet (20 mg total) by mouth daily     "  Facility-Administered Medications: None     Allergies   Allergen Reactions    Tramadol Hives       Review of previous medical records was completed.       Review of Systems  A 12 point ROS was completed. Other than the above mentioned  complaints, all remaining systems were negative.    OBJECTIVE     Patient ID: Brianna Baum is a 74 y.o. female.    Vitals:   Vitals:    24 0800 24 0900 24 1000 24 1100   BP: 152/74 151/82 165/69 151/82   BP Location:       Pulse: 72 66 60 66   Resp: 21 18 17 (!) 23   Temp:       TempSrc:       SpO2: 95% 96% 96% 97%   Weight:    83.5 kg (184 lb)   Height:    5' 5\" (1.651 m)      Body mass index is 30.62 kg/m².     Intake/Output Summary (Last 24 hours) at 2024 1220  Last data filed at 2024 1000  Gross per 24 hour   Intake 713.55 ml   Output 400 ml   Net 313.55 ml       Temperature:   Temp (24hrs), Av.9 °F (37.2 °C), Min:98.7 °F (37.1 °C), Max:99.2 °F (37.3 °C)    Temperature: 98.7 °F (37.1 °C)    Invasive Devices:   Invasive Devices       Peripheral Intravenous Line  Duration             Peripheral IV 24 Distal;Dorsal (posterior);Left Forearm <1 day    Peripheral IV 24 Left Antecubital <1 day    Peripheral IV 24 Right Antecubital <1 day              Drain  Duration             Closed/Suction Drain Right;Lateral RLQ Bulb 15 Fr. 959 days    External Urinary Catheter <1 day                    Physical Exam  Vitals reviewed.   Constitutional:       General: She is not in acute distress.     Appearance: Normal appearance. She is not ill-appearing, toxic-appearing or diaphoretic.   Eyes:      Extraocular Movements: EOM normal.      Pupils: Pupils are equal, round, and reactive to light.   Cardiovascular:      Rate and Rhythm: Tachycardia present. Rhythm irregular.      Heart sounds: Normal heart sounds.   Pulmonary:      Effort: Pulmonary effort is normal.      Breath sounds: Normal breath sounds. No wheezing, rhonchi or rales. "   Skin:     General: Skin is warm and dry.   Neurological:      Mental Status: She is alert and oriented to person, place, and time.      Motor: Motor strength is normal.     Coordination: Finger-Nose-Finger Test and Heel to Shin Test normal.      Deep Tendon Reflexes:      Reflex Scores:       Tricep reflexes are 2+ on the right side and 2+ on the left side.       Bicep reflexes are 2+ on the right side and 2+ on the left side.       Brachioradialis reflexes are 2+ on the right side and 2+ on the left side.       Patellar reflexes are 3+ on the right side and 3+ on the left side.       Achilles reflexes are 2+ on the right side and 2+ on the left side.  Psychiatric:         Speech: Speech normal.          Neurologic Exam     Mental Status   Oriented to person, place, and time.   Speech: speech is normal (Slow)  Level of consciousness: alert  Able to name object. Normal comprehension.     Cranial Nerves     CN III, IV, VI   Pupils are equal, round, and reactive to light.  Extraocular motions are normal.     CN V   Facial sensation intact.     CN VII   Right facial weakness: central  Left facial weakness: none    CN VIII   Hearing: intact    CN IX, X   Palate: symmetric    CN XI   Right sternocleidomastoid strength: normal  Left sternocleidomastoid strength: normal  Right trapezius strength: normal  Left trapezius strength: normal    CN XII   Tongue deviation: none    Motor Exam   Muscle bulk: normal  Overall muscle tone: normal  Right arm pronator drift: absent  Left arm pronator drift: absent    Strength   Strength 5/5 throughout.     Sensory Exam   Light touch normal.   Pinprick normal.     Gait, Coordination, and Reflexes     Coordination   Finger to nose coordination: normal  Heel to shin coordination: normal    Tremor   Resting tremor: absent  Intention tremor: absent  Action tremor: absent    Reflexes   Right brachioradialis: 2+  Left brachioradialis: 2+  Right biceps: 2+  Left biceps: 2+  Right triceps:  2+  Left triceps: 2+  Right patellar: 3+  Left patellar: 3+  Right achilles: 2+  Left achilles: 2+  Right : 2+  Left : 2+  Right plantar: equivocal  Left plantar: upgoing  Right Dykes: absent  Left Dykes: absent  Right ankle clonus: absent  Left ankle clonus: absent  Deferred gait testing            LABORATORY DATA     Labs: I have personally reviewed pertinent reports.   and I have personally reviewed pertinent films in PACS  Results from last 7 days   Lab Units 07/23/24 0454 07/22/24 2057   WBC Thousand/uL 10.82* 7.09   HEMOGLOBIN g/dL 13.1 13.2   HEMATOCRIT % 39.2 41.4   PLATELETS Thousands/uL 226 196   SEGS PCT % 79*  --    MONO PCT % 5  --    EOS PCT % 0  --       Results from last 7 days   Lab Units 07/23/24 0454 07/22/24 2057   POTASSIUM mmol/L 4.6 4.1   CHLORIDE mmol/L 109* 102   CO2 mmol/L 24 29   BUN mg/dL 9 11   CREATININE mg/dL 0.73 0.96   CALCIUM mg/dL 8.2* 9.6              Results from last 7 days   Lab Units 07/22/24 2057   INR  1.01   PTT seconds 24               IMAGING & DIAGNOSTIC TESTING     Radiology Results: I have personally reviewed pertinent films in PACS  IR stroke alert    (Results Pending)   CT head wo contrast    (Results Pending)   MRI brain wo contrast    (Results Pending)   CT stroke alert brain    (Results Pending)   CTA stroke alert (head/neck)    (Results Pending)   CT cerebral perfusion    (Results Pending)       Other Diagnostic Testing: I have personally reviewed pertinent reports.      ACTIVE MEDICATIONS     Current Facility-Administered Medications   Medication Dose Route Frequency    acetaminophen (TYLENOL) tablet 650 mg  650 mg Oral Q6H PRN    ascorbic acid (VITAMIN C) tablet 1,000 mg  1,000 mg Oral Daily    atorvastatin (LIPITOR) tablet 40 mg  40 mg Oral QPM    chlorhexidine (PERIDEX) 0.12 % oral rinse 15 mL  15 mL Mouth/Throat Q12H ANETA    hydrALAZINE (APRESOLINE) injection 10 mg  10 mg Intravenous Q4H PRN    lidocaine (LIDODERM) 5 % patch 1 patch  1 patch  Topical Daily    pantoprazole (PROTONIX) EC tablet 40 mg  40 mg Oral Daily Before Breakfast    phenylephrine (KENYA-SYNEPHRINE) 50 mg (STANDARD CONCENTRATION) in sodium chloride 0.9% 250 mL   mcg/min Intravenous Titrated       Prior to Admission medications    Medication Sig Start Date End Date Taking? Authorizing Provider   apixaban (Eliquis) 5 mg Take 1 tablet (5 mg total) by mouth 2 (two) times a day 7/22/24   LADONNA Drew   Ascorbic Acid (VITAMIN C) 1000 MG tablet Take 1,000 mg by mouth daily    Historical Provider, MD   Biotin 10 MG CAPS Take by mouth in the morning    Historical Provider, MD   celecoxib (CeleBREX) 200 mg capsule Take 1 capsule (200 mg total) by mouth daily 2/26/24   LADONNA Drew   diltiazem (CARDIZEM CD) 180 mg 24 hr capsule Take 1 capsule (180 mg total) by mouth daily 7/18/24 1/14/25  LADONNA Drew   esomeprazole (NexIUM) 40 MG capsule TAKE 1 CAPSULE DAILY 1/16/24   LADONNA Drew   furosemide (LASIX) 20 mg tablet Take 1 tablet (20 mg total) by mouth daily 3/12/24   Ladan Baum DO   HYDROcodone-acetaminophen (Norco) 5-325 mg per tablet Take 1 tablet by mouth every 8 (eight) hours as needed for pain Max Daily Amount: 3 tablets 7/18/24   LADONNA Drew       CODE STATUS & ADVANCED DIRECTIVES     Code Status: Level 1 - Full Code  Advance Directive and Living Will:      Power of :    POLST:        VTE Pharmacologic Prophylaxis: Pharmacologic VTE Prophylaxis contraindicated due to rec  VTE Mechanical Prophylaxis: sequential compression device    ======    I have discussed the patient's history, physical exam findings, assessment, and plan in detail with attending, Dr. Mccracken    Thank you for allowing me to participate in the care of your patient, Brianna AVILES Bautista.      Israel Pleitez DO  [facility] Neurology Residency, PGY-II     Dwayne Hill, MS3

## 2024-07-23 NOTE — PLAN OF CARE
Problem: OCCUPATIONAL THERAPY ADULT  Goal: Performs self-care activities at highest level of function for planned discharge setting.  See evaluation for individualized goals.  Description: Treatment Interventions: ADL retraining, Functional transfer training, Endurance training, Cognitive reorientation, UE strengthening/ROM, Patient/family training, Equipment evaluation/education, Compensatory technique education, Continued evaluation, Energy conservation, Activityengagement          See flowsheet documentation for full assessment, interventions and recommendations.   Note: Limitation: Decreased ADL status, Decreased Safe judgement during ADL, Decreased cognition, Decreased endurance, Decreased self-care trans, Decreased high-level ADLs  Prognosis: Fair  Assessment: Pt is a 75 y/o female seen for OT eval s/p adm to [facility] w/ dizziness/confusion to SLUB. Pt is dx'd w/ L M1 occlusion, s/p TNK and thrombectomy. Pt  has a past medical history of Arthritis, Chronic pain, Colon polyp, Diverticulitis, GERD (gastroesophageal reflux disease), Hypertension, and Vertigo. Pt with active OT orders and activity as tolerated orders. Pt lives alone in 1 , 2 [facility]. Pt was I w/  ADLS and IADLS, does not drive, & required occasional use of DME PTA including SPC. Pt is currently demonstrating the following occupational deficits: Min A UB ADLS, Mod A LB ADLS, Min A bed mobility, Mod A x2 transfers and functional mobility w/ B/L HHA. These deficits that are impacting pt's baseline areas of occupation are a result of the following impairments: pain, endurance, activity tolerance, functional mobility, forward functional reach, balance, trunk control, functional standing tolerance, unsupportive home environment, decreased I w/ ADLS/IADLS, cognitive impairments, decreased safety awareness, and decreased insight into deficits.The following Occupational Performance Areas to address include: bathing/shower, toilet hygiene, dressing, health  maintenance, functional mobility, community mobility, clothing management, and household maintenance. Recommend inpatient rehab  upon D/C. Pt to continue to benefit from acute immediate OT services to address the following goals 3-5x/week to  w/in 10-14 days:  Recommendation: Physiatry Consult  Rehab Resource Intensity Level, OT: I (Maximum Resource Intensity)     Felecia Malloy MS, OTR/L

## 2024-07-23 NOTE — PLAN OF CARE
Problem: PHYSICAL THERAPY ADULT  Goal: Performs mobility at highest level of function for planned discharge setting.  See evaluation for individualized goals.  Description: Treatment/Interventions: OT, Spoke to case management, Spoke to nursing, Gait training, Bed mobility, Patient/family training, Endurance training, LE strengthening/ROM, Functional transfer training          See flowsheet documentation for full assessment, interventions and recommendations.  Note: Prognosis: Good  Problem List: Decreased range of motion, Decreased strength, Decreased endurance, Decreased mobility, Impaired balance, Decreased coordination, Decreased cognition, Impaired judgement, Decreased safety awareness, Pain  Assessment: Pt is 74 y.o. female seen for PT evaluation s/p admit to [facility] on [facility]/22/[facility]24 w/ Cerebrovascular accident (CVA) due to occlusion of left middle cerebral artery (HCC). PT consulted to assess pt's functional mobility and d/c needs. Order placed for PT eval and tx, w/ up w/ A order. Comorbidities affecting pt's physical performance at time of assessment include:  has a past medical history of Arthritis, Chronic pain, Colon polyp, Diverticulitis, GERD (gastroesophageal reflux disease), Hypertension, and Vertigo. PTA, pt was ambulates unrestricted distances and all terrain and ambulates household distances. Personal factors affecting pt at time of IE include: inaccessible home environment, ambulating w/ assistive device, stairs to enter home, inability to ambulate household distances, decreased initiation and engagement, unable to perform physical activity, limited insight into impairments, inability to perform IADLs, and inability to perform ADLs. Please find objective findings from PT assessment regarding body systems outlined above with impairments and limitations including weakness, decreased ROM, impaired balance, decreased endurance, impaired coordination, gait deviations, pain, decreased  activity tolerance, decreased functional mobility tolerance, decreased safety awareness, impaired judgement, fall risk, and decreased cognition. The following objective measures performed on IE also reveal limitations: The patient's AM-PAC Basic Mobility Inpatient Short Form Raw Score is 13, Standardized Score is 33.99. A standardized score less than 42.9 suggests the patient may benefit from discharge to post-acute rehabilitation services. Please also refer to the recommendation of the Physical Therapist for safe discharge planning. Pt's clinical presentation is currently unstable/unpredictable seen in pt's presentation of critical care monitoring. Pt to benefit from continued PT tx to address deficits as defined above and maximize level of functional independent mobility and consistency. From PT/mobility standpoint, recommendation at time of d/c would be level I pending progress in order to facilitate return to PLOF.  Barriers to Discharge: Inaccessible home environment, Decreased caregiver support     Rehab Resource Intensity Level, PT: I (Maximum Resource Intensity)    See flowsheet documentation for full assessment.

## 2024-07-23 NOTE — RESPIRATORY THERAPY NOTE
RT Protocol Note  Brianna Baum 74 y.o. female MRN: [id number]  Unit/Bed#: ICU 03 Encounter: [id number]    Assessment    Active Problems:  There are no active Hospital Problems.      Home Pulmonary Medications:  N/A       Past Medical History:   Diagnosis Date    Arthritis     Chronic pain     Colon polyp     Diverticulitis     GERD (gastroesophageal reflux disease)     Hypertension     Vertigo      Social History     Socioeconomic History    Marital status:      Spouse name: None    Number of children: None    Years of education: None    Highest education level: None   Occupational History    None   Tobacco Use    Smoking status: Never    Smokeless tobacco: Never   Vaping Use    Vaping status: Never Used   Substance and Sexual Activity    Alcohol use: Yes     Comment: 3 times a week    Drug use: Yes     Types: Marijuana     Comment: rarely/ last used 2 days ago    Sexual activity: Not Currently   Other Topics Concern    None   Social History Narrative    Lives at home with     Feels safe at home    No living will    Rare dental care     Social Determinants of Health     Financial Resource Strain: Low Risk  (12/1/2022)    Overall Financial Resource Strain (CARDIA)     Difficulty of Paying Living Expenses: Not hard at all   Food Insecurity: No Food Insecurity (5/14/2024)    Hunger Vital Sign     Worried About Running Out of Food in the Last Year: Never true     Ran Out of Food in the Last Year: Never true   Transportation Needs: No Transportation Needs (5/14/2024)    PRAPARE - Transportation     Lack of Transportation (Medical): No     Lack of Transportation (Non-Medical): No   Physical Activity: Insufficiently Active (7/22/2020)    Exercise Vital Sign     Days of Exercise per Week: 7 days     Minutes of Exercise per Session: 20 min   Stress: Stress Concern Present (7/22/2020)    [employer] - Occupational Stress Questionnaire     Feeling of Stress : Rather much   Social  Connections: Moderately Isolated (7/22/2020)    Social Connection and Isolation Panel [NHANES]     Frequency of Communication with Friends and Family: Three times a week     Frequency of Social Gatherings with Friends and Family: Once a week     Attends Scientologist Services: Never     Active Member of Clubs or Organizations: No     Attends Club or Organization Meetings: Never     Marital Status:    Intimate Partner Violence: Not At Risk (7/22/2020)    Humiliation, Afraid, Rape, and Kick questionnaire     Fear of Current or Ex-Partner: No     Emotionally Abused: No     Physically Abused: No     Sexually Abused: No   Housing Stability: Low Risk  (5/14/2024)    Housing Stability Vital Sign     Unable to Pay for Housing in the Last Year: No     Number of Times Moved in the Last Year: 1     Homeless in the Last Year: No       Subjective         Objective    Physical Exam:   Assessment Type: Assess only  General Appearance: Sleeping  Respiratory Pattern: Normal  Chest Assessment: Chest expansion symmetrical  Bilateral Breath Sounds: Diminished, Clear  Cough: None  O2 Device: RA    Vitals:  Blood pressure 133/66, pulse 80, temperature 98.9 °F (37.2 °C), temperature source Oral, resp. rate 20, SpO2 95%.          Imaging and other studies: I have personally reviewed pertinent reports.   and I have personally reviewed pertinent films in PACS    O2 Device: RA     Plan    Respiratory Plan: No distress/Pulmonary history, Discontinue Protocol        Resp Comments: (P) Pt. evaluated at bedside per Respiratory protocol.  Pt. admitted S/P IR thrombectomy due to CVA.  Pt's breath sounds are diminished but clear bilaterally and she is in no distress at this time.  CXR shows lungs are clear and the pt has no prior Pulmonary Hx..  No bronchodilators are indicated at this time.  Will D/C Respiratory protocol.

## 2024-07-23 NOTE — ANESTHESIA PREPROCEDURE EVALUATION
Procedure:  IR STROKE ALERT    Relevant Problems   CARDIO   (+) Hypertension   (+) Mixed hyperlipidemia      GI/HEPATIC   (+) Gastroesophageal reflux disease without esophagitis      /RENAL   (+) CKD (chronic kidney disease) stage 2, GFR 60-89 ml/min      MUSCULOSKELETAL   (+) Chronic bilateral low back pain with bilateral sciatica   (+) Primary osteoarthritis of left hand      NEURO/PSYCH   (+) Chronic bilateral low back pain with bilateral sciatica   (+) Chronic neck pain   (+) Continuous opioid dependence (HCC)   (+) Situational anxiety        Physical Exam    Airway    Mallampati score: II  TM Distance: >3 FB  Neck ROM: full     Dental       Cardiovascular      Pulmonary      Other Findings  post-pubertal.      Anesthesia Plan  ASA Score- 4 Emergent    Anesthesia Type- IV sedation with anesthesia with ASA Monitors.         Additional Monitors:     Airway Plan:            Plan Factors-    Chart reviewed.    Patient summary reviewed.                  Induction- intravenous.    Postoperative Plan-     Perioperative Resuscitation Plan - Level 1 - Full Code.       Informed Consent- Anesthetic plan and risks discussed with patient.  I personally reviewed this patient with the CRNA. Discussed and agreed on the Anesthesia Plan with the CRNA..

## 2024-07-23 NOTE — ED NOTES
Attempted to call [facility] Neuro ICU to give report at [phone]. Charge nurse reported that no room or nurse had been assigned and would return call later for report.      Shahana Moura  07/23/24 0108

## 2024-07-23 NOTE — CONSULTS
Consultation - Neurosurgery   Brianna Baum 74 y.o. female MRN: [id number]  Unit/Bed#: BE IR ROOM Encounter: [id number]      Assessment & Plan     Assessment:  Left M1 occlusion    Plan:  Plan is for cerebral angiogram with mechanical thrombectomy. Risks and benefits discussed with her son who has elected to proceed.     History of Present Illness       HPI: Brianna Baum is a 74 y.o. year old female who presents with acute left MCA syndrome. Last known normal approximately 8:00 pm. NIH 7. CTA with left MCA bifurcation occlusion. TNK given. Exam unchanged upon arrival to [facility]".     Consults    Review of Systems   Unable to perform ROS: Acuity of condition       Historical Information   Past Medical History:   Diagnosis Date    Arthritis     Chronic pain     Colon polyp     Diverticulitis     GERD (gastroesophageal reflux disease)     Hypertension     Vertigo      Past Surgical History:   Procedure Laterality Date    CHOLECYSTECTOMY      CHOLECYSTECTOMY LAPAROSCOPIC N/A 12/06/2021    Procedure: SUBTOTAL CHOLECYSTECTOMY LAPAROSCOPIC;  Surgeon: Víctor Viveros MD;  Location:  MAIN OR;  Service: General    COLONOSCOPY      GALLBLADDER SURGERY  12/2021    Dr. Viveros    TUBAL LIGATION       Social History     Substance and Sexual Activity   Alcohol Use Yes    Comment: 3 times a week     Social History     Substance and Sexual Activity   Drug Use Yes    Types: Marijuana    Comment: rarely/ last used 2 days ago     E-Cigarette/Vaping    E-Cigarette Use Never User      E-Cigarette/Vaping Substances     Social History     Tobacco Use   Smoking Status Never   Smokeless Tobacco Never     Family History   Problem Relation Age of Onset    Stroke Mother     Diabetes Mother     Hypertension Mother     Heart disease Father     Hypertension Father     Heart disease Brother     Diabetes Brother     Hypertension Brother     Substance Abuse Neg Hx     Mental illness Neg Hx        Meds/Allergies   all current active meds have been  reviewed  Allergies   Allergen Reactions    Tramadol Hives       Objective     Intake/Output Summary (Last 24 hours) at 7/23/2024 0005  Last data filed at 7/23/2024 0004  Gross per 24 hour   Intake 450 ml   Output --   Net 450 ml       Physical Exam  Constitutional:       Appearance: Normal appearance.   Eyes:      Pupils: Pupils are equal, round, and reactive to light.   Cardiovascular:      Rate and Rhythm: Tachycardia present.      Pulses: Normal pulses.   Pulmonary:      Effort: Pulmonary effort is normal.   Musculoskeletal:         General: Normal range of motion.   Skin:     General: Skin is warm and dry.   Neurological:      Mental Status: She is alert.      Comments: Mild expressive aphasia  Right facial   Subtle RUE weakness compared to left.    Psychiatric:         Mood and Affect: Mood normal.       Neurologic Exam     Cranial Nerves     CN III, IV, VI   Pupils are equal, round, and reactive to light.      Vitals:There were no vitals taken for this visit.,There is no height or weight on file to calculate BMI.     Lab Results: I have personally reviewed pertinent results.      Imaging Studies: I have personally reviewed pertinent reports.      EKG, Pathology, and Other Studies: I have personally reviewed pertinent reports.      VTE Prophylaxis: Sequential compression device (Venodyne)     Code Status: Prior  Advance Directive and Living Will:      Power of :    POLST:      Counseling / Coordination of Care  None

## 2024-07-23 NOTE — OCCUPATIONAL THERAPY NOTE
Occupational Therapy Evaluation     Patient Name: Brianna Baum  Today's Date: 7/23/2024  Problem List  Principal Problem:    Cerebrovascular accident (CVA) due to occlusion of left middle cerebral artery (HCC)    Past Medical History  Past Medical History:   Diagnosis Date    Arthritis     Chronic pain     Colon polyp     Diverticulitis     GERD (gastroesophageal reflux disease)     Hypertension     Vertigo      Past Surgical History  Past Surgical History:   Procedure Laterality Date    CHOLECYSTECTOMY      CHOLECYSTECTOMY LAPAROSCOPIC N/A 12/06/2021    Procedure: SUBTOTAL CHOLECYSTECTOMY LAPAROSCOPIC;  Surgeon: Víctor Viveros MD;  Location:  MAIN OR;  Service: General    COLONOSCOPY      GALLBLADDER SURGERY  12/2021    Dr. Viveros    TUBAL LIGATION           07/23/24 1206   OT Last Visit   OT Visit Date 07/23/24   Note Type   Note type Evaluation   Pain Assessment   Pain Assessment Tool 0-10   Pain Score 6   Pain Location/Orientation Location: Head   Pain Onset/Description Onset: Ongoing;Descriptor: Aching;Descriptor: Discomfort   Patient's Stated Pain Goal No pain   Hospital Pain Intervention(s) Repositioned;Ambulation/increased activity;Emotional support   Restrictions/Precautions   Weight Bearing Precautions Per Order No   Other Precautions Contact/isolation;Cognitive;Bed Alarm;Multiple lines;Telemetry;Pain;Fall Risk   Home Living   Type of Home House   Home Layout One level  (2 [place])   Bathroom Shower/Tub Walk-in shower   Bathroom Toilet Raised   Bathroom Equipment Shower chair;Grab bars in shower   Home Equipment Cane   Prior Function   Level of [place] Independent with ADLs;Independent with functional mobility;Independent with IADLS   Lives With (S)  Alone   Receives Help From Other (Comment)  (reports no social supports; does later report she has 2 children)   IADLs Independent with meal prep;Independent with medication management;Family/Friend/Other provides transportation  (does not drive; takes  senior bus or Uber; gets groceries delivered)   Falls in the last 6 months 0   Vocational Retired   Lifestyle   Autonomy I w/ ADLS, IADLS, (-) ; I w/ transfers and functional mobility PTA   Reciprocal Relationships Pt lives alone; reports no social support. Has several cats. Reports no one is home to care for them.   Service to Others retired CNA   Intrinsic Gratification dancing   ADL   Eating Assistance 5  Supervision/Setup   Grooming Assistance 4  Minimal Assistance   UB Bathing Assistance 4  Minimal Assistance   LB Bathing Assistance 3  Moderate Assistance   UB Dressing Assistance 4  Minimal Assistance   LB Dressing Assistance 3  Moderate Assistance   Toileting Assistance  3  Moderate Assistance   Functional Assistance 3  Moderate Assistance   Functional Deficit Steadying;Verbal cueing;Supervision/safety;Increased time to complete   Bed Mobility   Supine to Sit 4  Minimal assistance   Additional items Assist x 1;HOB elevated;Increased time required;Verbal cues;LE management   Sit to Supine Unable to assess   Additional Comments Pt sat EOB w/ CGA for sitting balance/trunk control   Transfers   Sit to Stand 3  Moderate assistance   Additional items Assist x 2;Increased time required;Verbal cues   Stand to Sit 3  Moderate assistance   Additional items Assist x 2;Increased time required;Verbal cues   Additional Comments B/L HHA used   Functional Mobility   Functional Mobility 3  Moderate assistance   Additional Comments pt took few small steps along EOB w/ Mod A x2; B/L HHA used. Returned to supine in bed 2/2 reported change in medical status and being taken for CAT scan   Additional items Hand hold assistance   Balance   Static Sitting Fair -   Dynamic Sitting Poor +   Static Standing Poor   Dynamic Standing Poor   Ambulatory Poor   Activity Tolerance   Activity Tolerance Patient limited by fatigue;Patient limited by pain   Medical Staff Made Aware PT   Nurse Made Aware yes   RUE Assessment   RUE Assessment  WFL   LUE Assessment   LUE Assessment WFL   Hand Function   Gross Motor Coordination Functional   Fine Motor Coordination Functional   Sensation   Light Touch No apparent deficits   Vision-Basic Assessment   Current Vision No visual deficits   Psychosocial   Psychosocial (WDL) WDL   Cognition   Overall Cognitive Status Impaired   Arousal/Participation Responsive;Cooperative;Lethargic   Attention Attends with cues to redirect   Orientation Level Oriented to person;Oriented to place;Oriented to time;Disoriented to situation   Memory Decreased recall of precautions;Decreased recall of recent events   Following Commands Follows one step commands without difficulty   Comments pt is pleasant and cooperative; lethargic; slow to respond; appears slightly confused at times; oriented but questionable historian. Per D.O in room, pt noted to have increased R sided facial droop and aphasia; taken for CAT scan post evaluation.   Assessment   Limitation Decreased ADL status;Decreased Safe judgement during ADL;Decreased cognition;Decreased endurance;Decreased self-care trans;Decreased high-level ADLs   Prognosis Fair   Assessment Pt is a 75 y/o female seen for OT eval s/p adm to [facility] w/ dizziness/confusion to [facility]. Pt is dx'd w/ L M1 occlusion, s/p TNK and thrombectomy. Pt  has a past medical history of Arthritis, Chronic pain, Colon polyp, Diverticulitis, GERD (gastroesophageal reflux disease), Hypertension, and Vertigo. Pt with active OT orders and activity as tolerated orders. Pt lives alone in 1 , 2 [place]. Pt was I w/  ADLS and IADLS, does not drive, & required occasional use of DME PTA including SPC. Pt is currently demonstrating the following occupational deficits: Min A UB ADLS, Mod A LB ADLS, Min A bed mobility, Mod A x2 transfers and functional mobility w/ B/L HHA. These deficits that are impacting pt's baseline areas of occupation are a result of the following impairments: pain, endurance, activity tolerance, functional  mobility, forward functional reach, balance, trunk control, functional standing tolerance, unsupportive home environment, decreased I w/ ADLS/IADLS, cognitive impairments, decreased safety awareness, and decreased insight into deficits.The following Occupational Performance Areas to address include: bathing/shower, toilet hygiene, dressing, health maintenance, functional mobility, community mobility, clothing management, and household maintenance. Recommend inpatient rehab  upon D/C. Pt to continue to benefit from acute immediate OT services to address the following goals 3-5x/week to  w/in 10-14 days:   Goals   Patient Goals to be independent and get back to her cats   LTG Time Frame 10-   Long Term Goal #1 see below listed goals   Plan   Treatment Interventions ADL retraining;Functional transfer training;Endurance training;Cognitive reorientation;UE strengthening/ROM;Patient/family training;Equipment evaluation/education;Compensatory technique education;Continued evaluation;Energy conservation;Activityengagement   Goal Expiration Date 24   OT Frequency 3-5x/wk   Discharge Recommendation   Recommendation Physiatry Consult   Rehab Resource Intensity Level, OT I (Maximum Resource Intensity)   Additional Comments  The patient's raw score on the AM-PAC Daily Activity Inpatient Short Form is 15. A raw score of less than 19 suggests the patient may benefit from discharge to post-acute rehabilitation services. Please refer to the recommendation of the Occupational Therapist for safe discharge planning.   Additional Comments 2 Pt seen as a co-session due to the patient's co-morbidities, clinically unstable presentation, and present impairments which are a regression from the patient's baseline.   -[facility] Daily Activity Inpatient   Lower Body Dressing 2   Bathing 2   Toileting 2   Upper Body Dressing 3   Grooming 3   Eating 3   Daily Activity Raw Score 15   Daily Activity Standardized Score (Calc for Raw Score  >=11) 34.69   AM-PAC Applied Cognition Inpatient   Following a Speech/Presentation 2   Understanding Ordinary Conversation 3   Taking Medications 3   Remembering Where Things Are Placed or Put Away 3   Remembering List of 4-5 Errands 3   Taking Care of Complicated Tasks 2   Applied Cognition Raw Score 16   Applied Cognition Standardized Score 35.03   Barthel Index   Feeding 10   Bathing 0   Grooming Score 5   Dressing Score 5   Bladder Score 5   Bowels Score 5   Toilet Use Score 5   Transfers (Bed/Chair) Score 5   Mobility (Level Surface) Score 0   Stairs Score 0   Barthel Index Score 40   End of Consult   Education Provided Yes   Patient Position at End of Consult Supine;Bed/Chair alarm activated;All needs within reach   Nurse Communication Nurse aware of consult      GOALS  1) Pt will improve activity tolerance to G for 30 min txment sessions  2) Pt will complete ADLs/self care w/ mod I w/ use of AD/DME as needed to increase independence in functional tasks  3) Pt will complete toileting w/ mod I w/ G hygiene/thoroughness using DME PRN  4) Pt will improve fx'l tfers on/off all surfaces using DME PRN w/ G balance/safety including toileting w/ mod I  5) Pt will improve fx'l mobility during I/ADl/leisure tasks using DME PRN w/ g balance/safety w/ mod I  6) Pt will  be attentive 100% of the time during ongoing cognitive assessment w/ G participation to A w/ safe d/c planning/recommendations  7) Pt will demonstrate G carryover of pt/caregiver education and training as appropriate w/o cues w/ G tolerance to increase safety during functional tasks  8) Pt will improve UB fx'l use/ROM to WFL and strength 1/2 MMT via AROM/AAROM/PROM in all planes as tolerated s/p skilled education of HEP w/o cues for carryover  9) Pt will follow 100% simple one step verbal commands and be A/Ox4 consistently t/o use of external environmental cues to increase awareness during functional tasks    Felecia Malloy MS, OTR/L

## 2024-07-23 NOTE — ASSESSMENT & PLAN NOTE
Brianna Baum is a 74 y.o. female with pertinent PMHx of AFib new dx, LE edema, GERD, chronic back pain. Stroke alert was called for evaluation of symptoms concerning for stroke consisting of near syncope and speech difficulties. Last known well shortly before stroke alert at 2053. Initial NIHSS initially 0. Upon return from CT, patient had an NIHSS of 7 with aphasia, left gaze preference, and right facial droop.  Initial BP Blood Pressure: 133/66, FSBG POC Glucose: 131. Arrival EKG atrial flutter at 115 bpm . NC CTH no acute intracranial abnormalities and CTA H/N focal occlusion distal left M1 at the bifurcation . Last normal apparently shortly before stroke alert, within TNK window thus was given at 2152 on 7/22.     On evening of 7/24, was noted to have worsened aphasia and have more pronounced deficits on the R side when evaluated by nursing team. NIH 27 as compared to 22 earlier today. Last seen at her baseline around 1720 by nursing team. Was not eligible for TNK. Underwent endovascular intervention with resultant TICI 3.     - Home Antiplatelet /Anticoagulants PTA: no antiplatelet or anticoagulants  - Stroke risk factors: HTN, HLD, Atrial Fibrillation, and Over weight  - Prior Stroke Hx: none  - Past Neurological Hx: no neurological problems    Pertinent scores:  Initial NIHSS: Total: 2   Modified Marie Score: 0 (No baseline symptoms/disability)    BP over last 24 hours: Blood Pressure: 114/52  current BP: Blood Pressure: 114/52    Workup:  - CTH: No acute intracranial abnormalities  - CTA: Focal occlusion distal left M1 at the bifurcation   - Repeat CTH / CTA H/N (7/23): Area of low-attenuation within the left putamen, indicative of a developing acute infarct in this region. No associated hemorrhage is noted. No significant mass effect is identified. Left MCA distal M1/bifurcation occlusion is no longer visualized compatible with interval thrombectomy. Previous short segment occlusions of multiple left  middle cerebral artery M2 branches at their origins also appear to have resolved. No LVO or high-grade stenosis identified. 2 mm infundibulum again demonstrated at the origin of the left posterior communicating artery, unchanged. No hemodynamically significant stenosis or dissection is identified on CT angiogram of the neck.  - MRI WO (7/23): Stable acute infarct centered in the left basal ganglia with superimposed petechial hemorrhage. No significant mass effect.   - TTE (7/23): EF 55%, LA moderately dilated, no PFO  - CTH (7/24/2024): Evolving acute to subacute infarct in the left basal ganglia. There is evidence of superimposed petechial hemorrhage which is best seen on the recent MRI. There is no evidence for a discrete hematoma.  - CTA H/N (7/24/2024): Findings compatible with acute infarct centered in the left basal ganglia. No evidence of hemorrhagic conversion or mass effect. Abrupt cut off of the proximal left M1 segment with corresponding density on the noncontrast head CT suggesting occlusive thrombus throughout the M1 segment. Reasonable collateral flow in distal MCA branches. No hemodynamically significant stenosis within the carotid or vertebral arteries.  - CTH (7/25/2024): Evolving acute to subacute left basal ganglia infarct. The superimposed petechial hemorrhage is best delineated on MRI and demonstrates subtle hyperdensity on CT. There is no evidence for a discrete intraparenchymal hematoma.   - CTH (7/25/2024): Redemonstrated recent left MCA territory infarct with grossly stable foci of petechial hemorrhage. No significant mass effect or midline shift.   - MRI WO (7/26/2024): Compared to MRI 7/23/2024, evolution and mild worsening of the recent left MCA territory infarct involving caudate, lentiform nucleus, anterior internal capsule, and smaller foci in the frontal operculum and insula. Grossly stable foci of petechial hemorrhage. No significant mass effect.  - Labs: Hemoglobin A1c 5.8  (7/22/2024),  (7/23/2024)    Impression: Distal left M1 occlusion in the setting of atrial fibrillation off of anticoagulation due to cost.  S/p TNK and thrombectomy, TICI 2B.  MRI shows stable infarct at left basal ganglia with superimposed hemorrhage. On 7/24 pt developed worsened aphasia and more pronounced deficits on the R side prompting stroke alert. CTA revealed proximal left M1 occlusion. Not a TNK candidate due to recent TNK administration. Underwent thrombectomy, TICI 3. Initiated on heparin gtt, PTT therapeutic.    Plan: Discussed plan with neurology attending, Dr. Jimenes  Continue stroke protocol heparin infusion, goal PTT 50-70  Frequent neuro checks per protocol. If there is any acute changes in exam, please obtain stat CT head and notify neurology team  BP Goals: -160 mmHg   AP/AC agents: heparin gtt  Working with case management to determine outpatient anticoagulation. Patient understands her need for AC and is willing to start Warfarin if she is unable to get approved for any DOACs.  Statin: Continue atorvastin 40 mg daily  Euthermic/Euglycemic  DVT PPx: heparin, SCDs  PT/OT/ST  Stroke education and counseling  Rest of other care per primary team appreciated    Disposition: PT Recommendations - Rehab Resource Intensity Level, PT: I (Maximum Resource Intensity)

## 2024-07-23 NOTE — CASE MANAGEMENT
Case Management Assessment & Discharge Planning Note    Patient name Brianna Baum  Location ICU 03/ICU 03 MRN [id number]  : 1950 Date 2024       Current Admission Date: 2024  Current Admission Diagnosis:Cerebrovascular accident (CVA) due to occlusion of left middle cerebral artery (HCC)   Patient Active Problem List    Diagnosis Date Noted Date Diagnosed    Cerebrovascular accident (CVA) due to occlusion of left middle cerebral artery (HCC) 2024     Atrial flutter by electrocardiogram (HCC) 2024     Continuous opioid dependence (HCC) 2024     Situational anxiety 2024     CKD (chronic kidney disease) stage 2, GFR 60-89 ml/min 2024     Vertigo 2022     Mass of soft tissue of hand 12/15/2021     Hypertension      Acute cholecystitis 2021     Primary osteoarthritis of left hand 2020     Varicose veins of both lower extremities without ulcer or inflammation 2020     Obesity (BMI 30.0-34.9) 2019     Chronic neck pain 2018     Chronic bilateral low back pain with bilateral sciatica 2018     Gastroesophageal reflux disease without esophagitis 2018     Mixed hyperlipidemia 2018     Myalgia 2018     Urge incontinence of urine 2018       LOS (days): 1  Geometric Mean LOS (GMLOS) (days): 4  Days to GMLOS:3.4     OBJECTIVE:    Risk of Unplanned Readmission Score: 10.92         Current admission status: Inpatient       Preferred Pharmacy:   [facility] - [place] [address]  Phone: [phone] Fax: [phone]    [facility] - [place] [address]  Phone: [phone] Fax: [phone]    Primary Care Provider: LADONNA Madden    Primary Insurance: MEDICARE  Secondary Insurance:  FOR LIFE    ASSESSMENT:  Active Health Care Proxies    There  are no active Health Care Proxies on file.       Advance Directives  Does patient have a Health Care POA?:  (Unsure)         Readmission Root Cause  30 Day Readmission: No    Patient Information  Admitted from:: Home  Mental Status: Alert  During Assessment patient was accompanied by: Not accompanied during assessment  Assessment information provided by:: Son  Primary Caregiver: Self  Support Systems: Family members, Son  What city do you live in?: [place]  Home entry access options. Select all that apply.: Stairs  Number of steps to enter home.: 2  Do the steps have railings?: Yes  Type of Current Residence: [facility]  Living Arrangements: Lives Alone    Activities of Daily Living Prior to Admission  Functional Status: Independent  Completes ADLs independently?: Yes  Ambulates independently?: Yes  Does patient use assisted devices?: Yes  Assisted Devices (DME) used: Straight Cane         Patient Information Continued  Income Source: Pension/residential  Does patient have prescription coverage?: Yes  Does patient receive dialysis treatments?: No  Does patient have a history of substance abuse?: No  Does patient have a history of Mental Health Diagnosis?: Yes (Situational anxiety)  Is patient receiving treatment for mental health?: Yes (Medications)  Has patient received inpatient treatment related to mental health in the last 2 years?: No         Means of Transportation  Means of Transport to Appts:: Other (Comment) (Sr bus or Uber per son)      Social Determinants of Health (SDOH)      Flowsheet Row Most Recent Value   Housing Stability    In the last 12 months, was there a time when you were not able to pay the mortgage or rent on time? Pt Unable   In the past 12 months, how many times have you moved where you were living? 0   At any time in the past 12 months, were you homeless or living in a shelter (including now)? Pt Unable   Transportation Needs    In the past 12 months, has lack of transportation kept you from  medical appointments or from getting medications? Pt Unable   In the past 12 months, has lack of transportation kept you from meetings, work, or from getting things needed for daily living? Pt Unable   Food Insecurity    Within the past 12 months, you worried that your food would run out before you got the money to buy more. Pt Unable   Within the past 12 months, the food you bought just didn't last and you didn't have money to get more. Pt Unable   Utilities    In the past 12 months has the electric, gas, oil, or water company threatened to shut off services in your home? Pt Unable            DISCHARGE DETAILS:    Discharge planning discussed with:: Pt's son Zachariah  Freedom of Choice: Yes  Comments - Freedom of Choice: Discussed FOC  CM contacted family/caregiver?: Yes             Contacts  Patient Contacts: Zachariah - son  Relationship to Patient:: Family  Contact Method: Phone  Phone Number: [phone]  Reason/Outcome: Discharge Planning, Emergency Contact, Continuity of Care                   CM reviewed d/c planning process including the following: identifying help at home, patient preference for d/c planning needs, availability of treatment team to discuss questions or concerns patient and/or family may have regarding understanding medications and recognizing signs and symptoms once discharged.  CM also encouraged patient to follow up with all recommended appointments after discharge. Patient advised of importance for patient and family to participate in managing patient’s medical well being.    CM called pt's son Zachariah to introduce self & CM role. Pt currently in CT scan.  Per son pt lives alone in [facility].  May occasionally use a SPC. Pt does not drive - takes Uber or Senior Bus to medical appts.  Son lives in [place] (about 30 mins away). Pt does not have any family locally.  Pt IADL's PTH.  Per chart review pt recently diagnosed w/afib on 7/18 started on eliquis by PCP but pt unable to afford co-pay of  $400.00.  CM to f/u w/pt re: MPOA & any concerns pt may have related to SDOH.

## 2024-07-23 NOTE — PROGRESS NOTES
"[facility]  Progress Note  Name: Brianna Baum I  MRN: [id number]  Unit/Bed#: ICU 03 I Date of Admission: 7/22/2024   Date of Service: 7/23/2024 I Hospital Day: 1    Assessment & Plan   * Cerebrovascular accident (CVA) due to occlusion of left middle cerebral artery (HCC)  Assessment & Plan  PPD 1 attempted thrombectomy with Dr. Akers 7/22/2024  Presented NIHSS 7 with aphasia, L gaze preference and facial droop  Left MCA bifurcation occlusion on CTA, TNK given.  Single pass thrombectomy attempted without significant change. Non-occlusive lesion remained, TICI 2B.  On current exam, R facial droop. Mild dysarthria. Otherwise intact. GCS 15.    Imaging:  CTA stroke alert, 7/22/2024: Focal occlusion distal left M1 at the bifurcation.   CT stroke alert brain, 7/22/2024: Chronic microangiopathic changes. Underlying developing infarct not excluded. MRI can be performed for further evaluation if clinically warranted. Cerebral volume loss with mild ventriculomegaly, which could be secondary to the volume loss versus mild communicating hydrocephalus, similar to study of 2018.    Plan:  Continue to monitor neuro exam closely.  STAT CTA with decline in GCS > 2 pts in 1 hour.  MRI brain pending.  Further stroke management per neurology.  Mobilize as tolerated.  DVT ppx: SCDs.    Neurosurgery will sign off. Call with questions. Follow up per neurology.           Subjective/Objective   Chief Complaint: \"I have a headache.\"    Subjective: C/o HA. States otherwise feeling ok.    Objective: NAD. R groin access site clean and dry, no drainage. Mild ecchymosis. +DP/PT pulses on R. R facial droop. Mild dysarthria.    I/O         07/21 0701  07/22 0700 07/22 0701  07/23 0700 07/23 0701  07/24 0700    I.V.  498.2 139.7    Total Intake  498.2 139.7    Urine  400     Total Output  400     Net  +98.2 +139.7           Unmeasured Urine Occurrence  2 x             Invasive Devices       Peripheral " "Intravenous Line  Duration             Peripheral IV 07/22/24 Distal;Dorsal (posterior);Left Forearm <1 day    Peripheral IV 07/22/24 Left Antecubital <1 day    Peripheral IV 07/22/24 Right Antecubital <1 day              Drain  Duration             Closed/Suction Drain Right;Lateral RLQ Bulb 15 Fr. 959 days    External Urinary Catheter <1 day                    Physical Exam:  Vitals: Blood pressure 151/82, pulse 66, temperature 98.7 °F (37.1 °C), temperature source Oral, resp. rate 18, height 5' 5\" (1.651 m), SpO2 96%.,Body mass index is 29.82 kg/m².    Hemodynamic Monitoring: MAP:      General appearance: alert, appears stated age, cooperative and no distress  Head: Normocephalic, without obvious abnormality, atraumatic  Eyes: EOMI, PERRL  Back: no kyphosis present, no tenderness to percussion or palpation  Lungs: non labored breathing  Heart: regular heart rate  Neurologic:   Mental status: Alert, oriented x3, thought content appropriate  Cranial nerves: grossly intact (Cranial nerves II-XII)  Sensory: normal to crude touch  Motor: moving all extremities without focal weakness   Coordination: finger to nose normal bilaterally, no drift bilaterally      Lab Results:  Results from last 7 days   Lab Units 07/23/24 0454 07/22/24 2057   WBC Thousand/uL 10.82* 7.09   HEMOGLOBIN g/dL 13.1 13.2   HEMATOCRIT % 39.2 41.4   PLATELETS Thousands/uL 226 196   SEGS PCT % 79*  --    MONO PCT % 5  --    EOS PCT % 0  --      Results from last 7 days   Lab Units 07/23/24 0454 07/22/24 2057   SODIUM mmol/L 141 139   POTASSIUM mmol/L 4.6 4.1   CHLORIDE mmol/L 109* 102   CO2 mmol/L 24 29   BUN mg/dL 9 11   CREATININE mg/dL 0.73 0.96   CALCIUM mg/dL 8.2* 9.6             Results from last 7 days   Lab Units 07/22/24 2057   INR  1.01   PTT seconds 24     No results found for: \"TROPONINT\"  ABG:No results found for: \"PHART\", \"YBW8DIH\", \"PO2ART\", \"XID9QCP\", \"V7ESTOBA\", \"BEART\", \"SOURCE\"    Imaging Studies: I have personally reviewed " pertinent reports.   and I have personally reviewed pertinent films in PACS    X-ray chest 1 view portable    Result Date: 7/22/2024  Impression: No acute cardiopulmonary disease. Workstation performed: [id number]     CT stroke alert brain    Result Date: 7/22/2024  Impression: No acute intracranial abnormality. Chronic microangiopathic changes. Underlying developing infarct not excluded. MRI can be performed for further evaluation if clinically warranted. Cerebral volume loss with mild ventriculomegaly, which could be secondary to the volume loss versus mild communicating hydrocephalus, similar to study of 2018. Findings were directly discussed with Wilfred Rubio at 9:26 p.m. on 7/22/2024. Workstation performed: [id number]     CTA stroke alert (head/neck)    Result Date: 7/22/2024  Impression: Focal occlusion distal left M1 at the bifurcation. Findings were directly discussed with Wilfred Rubio at 9:26 p.m. on 7/22/2024. Workstation performed: [id number]       EKG, Pathology, and Other Studies: I have personally reviewed pertinent reports.      VTE Pharmacologic Prophylaxis: Sequential compression device (Venodyne)     VTE Mechanical Prophylaxis: sequential compression device

## 2024-07-23 NOTE — PLAN OF CARE
Problem: Prexisting or High Potential for Compromised Skin Integrity  Goal: Skin integrity is maintained or improved  Description: INTERVENTIONS:  - Identify patients at risk for skin breakdown  - Assess and monitor skin integrity  - Assess and monitor nutrition and hydration status  - Monitor labs   - Assess for incontinence   - Turn and reposition patient  - Assist with mobility/ambulation  - Relieve pressure over bony prominences  - Avoid friction and shearing  - Provide appropriate hygiene as needed including keeping skin clean and dry  - Evaluate need for skin moisturizer/barrier cream  - Collaborate with interdisciplinary team   - Patient/family teaching  - Consider wound care consult   Outcome: Progressing     Problem: PAIN - ADULT  Goal: Verbalizes/displays adequate comfort level or baseline comfort level  Description: Interventions:  - Encourage patient to monitor pain and request assistance  - Assess pain using appropriate pain scale  - Administer analgesics based on type and severity of pain and evaluate response  - Implement non-pharmacological measures as appropriate and evaluate response  - Consider cultural and social influences on pain and pain management  - Notify physician/advanced practitioner if interventions unsuccessful or patient reports new pain  Outcome: Progressing     Problem: INFECTION - ADULT  Goal: Absence or prevention of progression during hospitalization  Description: INTERVENTIONS:  - Assess and monitor for signs and symptoms of infection  - Monitor lab/diagnostic results  - Monitor all insertion sites, i.e. indwelling lines, tubes, and drains  - Monitor endotracheal if appropriate and nasal secretions for changes in amount and color  - [place] appropriate cooling/warming therapies per order  - Administer medications as ordered  - Instruct and encourage patient and family to use good hand hygiene technique  - Identify and instruct in appropriate isolation precautions for  identified infection/condition  Outcome: Progressing  Goal: Absence of fever/infection during neutropenic period  Description: INTERVENTIONS:  - Monitor WBC    Outcome: Progressing     Problem: SAFETY ADULT  Goal: Patient will remain free of falls  Description: INTERVENTIONS:  - Educate patient/family on patient safety including physical limitations  - Instruct patient to call for assistance with activity   - Consult OT/PT to assist with strengthening/mobility   - Keep Call bell within reach  - Keep bed low and locked with side rails adjusted as appropriate  - Keep care items and personal belongings within reach  - Initiate and maintain comfort rounds  - Make Fall Risk Sign visible to staff  - Offer Toileting every  Hours, in advance of need  - Initiate/Maintain alarm  - Obtain necessary fall risk management equipment:   - Apply yellow socks and bracelet for high fall risk patients  - Consider moving patient to room near nurses station  Outcome: Progressing  Goal: Maintain or return to baseline ADL function  Description: INTERVENTIONS:  -  Assess patient's ability to carry out ADLs; assess patient's baseline for ADL function and identify physical deficits which impact ability to perform ADLs (bathing, care of mouth/teeth, toileting, grooming, dressing, etc.)  - Assess/evaluate cause of self-care deficits   - Assess range of motion  - Assess patient's mobility; develop plan if impaired  - Assess patient's need for assistive devices and provide as appropriate  - Encourage maximum independence but intervene and supervise when necessary  - Involve family in performance of ADLs  - Assess for home care needs following discharge   - Consider OT consult to assist with ADL evaluation and planning for discharge  - Provide patient education as appropriate  Outcome: Progressing  Goal: Maintains/Returns to pre admission functional level  Description: INTERVENTIONS:  - Perform AM-PAC 6 Click Basic Mobility/ Daily Activity  assessment daily.  - Set and communicate daily mobility goal to care team and patient/family/caregiver.   - Collaborate with rehabilitation services on mobility goals if consulted  - Perform Range of Motion  times a day.  - Reposition patient every  hours.  - Dangle patient  times a day  - Stand patient  times a day  - Ambulate patient  times a day  - Out of bed to chair  times a day   - Out of bed for meal times a day  - Out of bed for toileting  - Record patient progress and toleration of activity level   Outcome: Progressing     Problem: DISCHARGE PLANNING  Goal: Discharge to home or other facility with appropriate resources  Description: INTERVENTIONS:  - Identify barriers to discharge w/patient and caregiver  - Arrange for needed discharge resources and transportation as appropriate  - Identify discharge learning needs (meds, wound care, etc.)  - Arrange for interpretive services to assist at discharge as needed  - Refer to Case Management Department for coordinating discharge planning if the patient needs post-hospital services based on physician/advanced practitioner order or complex needs related to functional status, cognitive ability, or social support system  Outcome: Progressing     Problem: Knowledge Deficit  Goal: Patient/family/caregiver demonstrates understanding of disease process, treatment plan, medications, and discharge instructions  Description: Complete learning assessment and assess knowledge base.  Interventions:  - Provide teaching at level of understanding  - Provide teaching via preferred learning methods  Outcome: Progressing     Problem: Neurological Deficit  Goal: Neurological status is stable or improving  Description: Interventions:  - Monitor and assess patient's level of consciousness, motor function, sensory function, and level of assistance needed for ADLs.   - Monitor and report changes from baseline. Collaborate with interdisciplinary team to initiate plan and implement  interventions as ordered.   - Provide and maintain a safe environment.  - Consider seizure precautions.  - Consider fall precautions.  - Consider aspiration precautions.  - Consider bleeding precautions.  Outcome: Progressing     Problem: Activity Intolerance/Impaired Mobility  Goal: Mobility/activity is maintained at optimum level for patient  Description: Interventions:  - Assess and monitor patient  barriers to mobility and need for assistive/adaptive devices.  - Assess patient's emotional response to limitations.  - Collaborate with interdisciplinary team and initiate plans and interventions as ordered.  - Encourage independent activity per ability.  - Maintain proper body alignment.  - Perform active/passive rom as tolerated/ordered.  - Plan activities to conserve energy.  - Turn patient as appropriate  Outcome: Progressing     Problem: Communication Impairment  Goal: Ability to express needs and understand communication  Description: Assess patient's communication skills and ability to understand information.  Patient will demonstrate use of effective communication techniques, alternative methods of communication and understanding even if not able to speak.     - Encourage communication and provide alternate methods of communication as needed.  - Collaborate with case management/ for discharge needs.  - Include patient/family/caregiver in decisions related to communication.  Outcome: Progressing     Problem: Potential for Aspiration  Goal: Non-ventilated patient's risk of aspiration is minimized  Description: Assess and monitor vital signs, respiratory status, and labs (WBC).  Monitor for signs of aspiration (tachypnea, cough, rales, wheezing, cyanosis, fever).    - Assess and monitor patient's ability to swallow.  - Place patient up in chair to eat if possible.  - HOB up at 90 degrees to eat if unable to get patient up into chair.  - Supervise patient during oral intake.   - Instruct patient/  family to take small bites.  - Instruct patient/ family to take small single sips when taking liquids.  - Follow patient-specific strategies generated by speech pathologist.  Outcome: Progressing  Goal: Ventilated patient's risk of aspiration is minimized  Description: Assess and monitor vital signs, respiratory status, airway cuff pressure, and labs (WBC).  Monitor for signs of aspiration (tachypnea, cough, rales, wheezing, cyanosis, fever).    - Elevate head of bed 30 degrees if patient has tube feeding.  - Monitor tube feeding.  Outcome: Progressing     Problem: Nutrition  Goal: Nutrition/Hydration status is improving  Description: Monitor and assess patient's nutrition/hydration status for malnutrition (ex- brittle hair, bruises, dry skin, pale skin and conjunctiva, muscle wasting, smooth red tongue, and disorientation). Collaborate with interdisciplinary team and initiate plan and interventions as ordered.  Monitor patient's weight and dietary intake as ordered or per policy. Utilize nutrition screening tool and intervene per policy. Determine patient's food preferences and provide high-protein, high-caloric foods as appropriate.     - Assist patient with eating.  - Allow adequate time for meals.  - Encourage patient to take dietary supplement as ordered.  - Collaborate with clinical nutritionist.  - Include patient/family/caregiver in decisions related to nutrition.  Outcome: Progressing

## 2024-07-23 NOTE — QUICK NOTE
Stroke alert 8:53 am  Neurology call back 8:53 am  Intially upon arrival nih 0. Appeared more of a presyncope. She was in the kitchen, ate a hot dog and felt like she would fall. Stroke alert activated given that EMS stated she had difficulty speaking.  Initial conversation of whether to call off the stroke alert.  Ultimately ER team decided to keep it as there was conflicting storyline.    After coming out of ct scanner patient aphasic, left gaze preferance, rt facial droop, no extremity drift. NIh 7.  Last normal apparently shortly before stroke alert, within iv tnk window thus was given at 9:52 pm.  As ct head with no hemorrhage or early evidence of acute cva. CTA head/neck with acute left m1 occlusion.    Sbp dropped to 157/70. Pressors started goal 180/110 max.  Pt lives alone as  is . She has a son and number provided to team. No known dementia or any restrictions.    Endovascular alert transferring to endovascular suite for thrombectomy attempt then neuro icu admission with neurology team following.    Will need mri brain wo contrast  Tele  2-d echo  PT/OT recs  Neurology official consult in am

## 2024-07-23 NOTE — ASSESSMENT & PLAN NOTE
PPD 1 attempted thrombectomy with Dr. Akers 7/22/2024  Presented NIHSS 7 with aphasia, L gaze preference and facial droop  Left MCA bifurcation occlusion on CTA, TNK given.  Single pass thrombectomy attempted without significant change. Non-occlusive lesion remained, TICI 2B.  On current exam, R facial droop. Mild dysarthria. Otherwise intact. GCS 15.    Imaging:  CTA stroke alert, 7/22/2024: Focal occlusion distal left M1 at the bifurcation.   CT stroke alert brain, 7/22/2024: Chronic microangiopathic changes. Underlying developing infarct not excluded. MRI can be performed for further evaluation if clinically warranted. Cerebral volume loss with mild ventriculomegaly, which could be secondary to the volume loss versus mild communicating hydrocephalus, similar to study of 2018.    Plan:  Continue to monitor neuro exam closely.  STAT CTA with decline in GCS > 2 pts in 1 hour.  MRI brain pending.  Further stroke management per neurology.  Mobilize as tolerated.  DVT ppx: SCDs.    Neurosurgery will sign off. Call with questions. Follow up per neurology.

## 2024-07-23 NOTE — BRIEF OP NOTE (RAD/CATH)
IR STROKE ALERT Procedure Note    PATIENT NAME: Brianna Baum  : 1950  MRN: [id number]    Pre-op Diagnosis:   1. Cerebrovascular accident (CVA), unspecified mechanism (HCC)      Post-op Diagnosis:   1. Cerebrovascular accident (CVA), unspecified mechanism (HCC)        Surgeon:   Guy Akers MD  Assistants:     No qualified resident was available, Resident is only observing    Estimated Blood Loss: 50 cc  Findings:   Interval thrombolysis with trace non-occlusive thrombus within the M2 superior division, overall TICI 2B.     Single pass thrombectomy attempted, no significant change. Ultimately non-occlusive lesion remained.     Right femoral sheath removed, closure device deployed.     Specimens: none    Complications:  none    Anesthesia: MAC sedation    Guy Akers MD     Date: 2024  Time: 12:14 AM

## 2024-07-23 NOTE — SEDATION DOCUMENTATION
Cerebral angiogram completed by Dr. Akers with Anesthesia support. Bedside report given to Wilber. BAILEE, and pt transported to ICU with all belongings. Right groin dressing clean, dry and intact on departure.

## 2024-07-23 NOTE — ED PROVIDER NOTES
History  Chief Complaint   Patient presents with    CVA/TIA-like Symptoms     Patient presents to ED via [facility] from home for acute onset of dizziness and confusion while standing at home around 1930     Patient is a 74-year-old female with history of A-fib not on eliquis, presenting to the emergency department by EMS for complaint of near syncope approximately 30 minutes PTA, however, per EMS report, patient had difficulty speaking upon their arrival.  In the ED, patient describes that she was in the kitchen, when she developed sensation of feeling faint after eating a hot dog. Patient denies syncope, fall, dizziness, headache, visual disturbances, chest pain, shortness of breath, weakness at this time. Patient lives alone and incident was not witnessed by anyone.      CVA/TIA-like Symptoms  Presenting symptoms: no headaches and no weakness    Associated symptoms: no chest pain, no dizziness, no fever, no nausea, no seizures and no vomiting        Prior to Admission Medications   Prescriptions Last Dose Informant Patient Reported? Taking?   Ascorbic Acid (VITAMIN C) 1000 MG tablet  Self Yes No   Sig: Take 1,000 mg by mouth daily   Biotin 10 MG CAPS  Self Yes No   Sig: Take by mouth in the morning   HYDROcodone-acetaminophen (Norco) 5-325 mg per tablet   No No   Sig: Take 1 tablet by mouth every 8 (eight) hours as needed for pain Max Daily Amount: 3 tablets   apixaban (Eliquis) 5 mg   No No   Sig: Take 1 tablet (5 mg total) by mouth 2 (two) times a day   celecoxib (CeleBREX) 200 mg capsule  Self No No   Sig: Take 1 capsule (200 mg total) by mouth daily   diltiazem (CARDIZEM CD) 180 mg 24 hr capsule   No No   Sig: Take 1 capsule (180 mg total) by mouth daily   esomeprazole (NexIUM) 40 MG capsule  Self No No   Sig: TAKE 1 CAPSULE DAILY   furosemide (LASIX) 20 mg tablet  Self No No   Sig: Take 1 tablet (20 mg total) by mouth daily      Facility-Administered Medications: None       Past Medical History:   Diagnosis Date     Arthritis     Chronic pain     Colon polyp     Diverticulitis     GERD (gastroesophageal reflux disease)     Hypertension     Vertigo        Past Surgical History:   Procedure Laterality Date    CHOLECYSTECTOMY      CHOLECYSTECTOMY LAPAROSCOPIC N/A 12/06/2021    Procedure: SUBTOTAL CHOLECYSTECTOMY LAPAROSCOPIC;  Surgeon: Víctor Viveros MD;  Location:  MAIN OR;  Service: General    COLONOSCOPY      GALLBLADDER SURGERY  12/2021    Dr. Viveros    TUBAL LIGATION         Family History   Problem Relation Age of Onset    Stroke Mother     Diabetes Mother     Hypertension Mother     Heart disease Father     Hypertension Father     Heart disease Brother     Diabetes Brother     Hypertension Brother     Substance Abuse Neg Hx     Mental illness Neg Hx      I have reviewed and agree with the history as documented.    E-Cigarette/Vaping    E-Cigarette Use Never User      E-Cigarette/Vaping Substances     Social History     Tobacco Use    Smoking status: Never    Smokeless tobacco: Never   Vaping Use    Vaping status: Never Used   Substance Use Topics    Alcohol use: Yes     Comment: 3 times a week    Drug use: Yes     Types: Marijuana     Comment: rarely/ last used 2 days ago       Review of Systems   Constitutional:  Negative for appetite change, chills, diaphoresis and fever.   Eyes:  Negative for photophobia and visual disturbance.   Respiratory:  Negative for cough and shortness of breath.    Cardiovascular:  Negative for chest pain and palpitations.   Gastrointestinal:  Negative for abdominal pain, nausea and vomiting.   Musculoskeletal:  Negative for arthralgias, back pain and gait problem.   Skin:  Negative for color change and rash.   Neurological:  Positive for speech difficulty (per EMS) and light-headedness. Negative for dizziness, tremors, seizures, syncope, facial asymmetry, weakness, numbness and headaches.        Near syncope   All other systems reviewed and are negative.      Physical Exam  Physical  Exam  Vitals and nursing note reviewed.   Constitutional:       General: She is not in acute distress.     Appearance: Normal appearance. She is well-developed. She is obese. She is not ill-appearing, toxic-appearing or diaphoretic.   HENT:      Head: Normocephalic and atraumatic.   Eyes:      Extraocular Movements: Extraocular movements intact.      Conjunctiva/sclera: Conjunctivae normal.      Pupils: Pupils are equal, round, and reactive to light.   Cardiovascular:      Rate and Rhythm: Normal rate and regular rhythm.   Pulmonary:      Effort: Pulmonary effort is normal. No respiratory distress.      Breath sounds: Normal breath sounds.   Abdominal:      Palpations: Abdomen is soft.      Tenderness: There is no abdominal tenderness.   Musculoskeletal:         General: Normal range of motion.      Cervical back: Normal range of motion and neck supple.   Skin:     General: Skin is warm and dry.      Capillary Refill: Capillary refill takes less than 2 seconds.   Neurological:      General: No focal deficit present.      Mental Status: She is alert and oriented to person, place, and time. Mental status is at baseline.      Sensory: No sensory deficit.      Motor: No weakness.      Coordination: Coordination normal.      Deep Tendon Reflexes: Reflexes normal.   Psychiatric:         Mood and Affect: Mood normal.         Vital Signs  ED Triage Vitals   Temperature Pulse Respirations Blood Pressure SpO2   07/22/24 2050 07/22/24 2050 07/22/24 2048 07/22/24 2050 07/22/24 2050   99.2 °F (37.3 °C) (!) 125 16 (!) 144/105 97 %      Temp Source Heart Rate Source Patient Position - Orthostatic VS BP Location FiO2 (%)   07/22/24 2050 07/22/24 2050 -- -- --   Temporal Monitor         Pain Score       07/22/24 2048       No Pain           Vitals:    07/22/24 2205 07/22/24 2210 07/22/24 2215 07/22/24 2230   BP: (!) 175/71 151/69 162/68 (!) 176/72   Pulse: 97 (!) 107 97 103         Visual Acuity  Visual Acuity      Flowsheet Row  Most Recent Value   L Pupil Size (mm) 4   R Pupil Size (mm) 4            ED Medications  Medications   iohexol (OMNIPAQUE) 350 MG/ML injection (MULTI-DOSE) 88 mL (88 mL Intravenous Given 7/22/24 2107)   tenecteplase (TNKase) injection 20 mg (20 mg Intravenous Given 7/22/24 2152)       Diagnostic Studies  Results Reviewed       Procedure Component Value Units Date/Time    HS Troponin I 4hr [[id number]]     Lab Status: No result Specimen: Blood     FLU/RSV/COVID - if FLU/RSV clinically relevant [[id number]]  (Normal) Collected: 07/22/24 2130    Lab Status: Final result Specimen: Nares from Nose Updated: 07/22/24 2218     SARS-CoV-2 Negative     INFLUENZA A PCR Negative     INFLUENZA B PCR Negative     RSV PCR Negative    Narrative:      FOR PEDIATRIC PATIENTS - copy/paste COVID Guidelines URL to browser: [email or web address]    SARS-CoV-2 assay is a Nucleic Acid Amplification assay intended for the  qualitative detection of nucleic acid from SARS-CoV-2 in nasopharyngeal  swabs. Results are for the presumptive identification of SARS-CoV-2 RNA.    Positive results are indicative of infection with SARS-CoV-2, the virus  causing COVID-19, but do not rule out bacterial infection or co-infection  with other viruses. Laboratories within the [place] and its  territories are required to report all positive results to the appropriate  public health authorities. Negative results do not preclude SARS-CoV-2  infection and should not be used as the sole basis for treatment or other  patient management decisions. Negative results must be combined with  clinical observations, patient history, and epidemiological information.  This test has not been FDA cleared or approved.    This test has been authorized by FDA under an Emergency Use Authorization  (EUA). This test is only authorized for the duration of time the  declaration that circumstances exist justifying the  authorization of the  emergency use of an in vitro diagnostic tests for detection of SARS-CoV-2  virus and/or diagnosis of COVID-19 infection under section 564(b)(1) of  the Act, 21 U.S.C. 360bbb-3(b)(1), unless the authorization is terminated  or revoked sooner. The test has been validated but independent review by FDA  and CLIA is pending.    Test performed using [employer] GeneXpert: This RT-PCR assay targets N2,  a region unique to SARS-CoV-2. A conserved region in the E-gene was chosen  for pan-Sarbecovirus detection which includes SARS-CoV-2.    According to CMS-2020-01-R, this platform meets the definition of high-throughput technology.    Rapid drug screen, urine [[id number]]  (Abnormal) Collected: 07/22/24 2130    Lab Status: Final result Specimen: Urine, Clean Catch Updated: 07/22/24 2147     Amph/Meth UR Negative     Barbiturate Ur Negative     Benzodiazepine Urine Negative     Cocaine Urine Negative     Methadone Urine Negative     Opiate Urine Positive     PCP Ur Negative     THC Urine Negative     Oxycodone Urine Negative     Fentanyl Urine Negative     HYDROCODONE URINE Negative    Narrative:      Presumptive report. If requested, specimen will be sent to reference lab for confirmation.  FOR MEDICAL PURPOSES ONLY.   IF CONFIRMATION NEEDED PLEASE CONTACT THE LAB WITHIN 5 DAYS.    Drug Screen Cutoff Levels:  AMPHETAMINE/METHAMPHETAMINES  1000 ng/mL  BARBITURATES     200 ng/mL  BENZODIAZEPINES     200 ng/mL  COCAINE      300 ng/mL  METHADONE      300 ng/mL  OPIATES      300 ng/mL  PHENCYCLIDINE     25 ng/mL  THC       50 ng/mL  OXYCODONE      100 ng/mL  FENTANYL      5 ng/mL  HYDROCODONE     300 ng/mL    HS Troponin 0hr (reflex protocol) [[id number]]  (Normal) Collected: 07/22/24 2057    Lab Status: Final result Specimen: Blood from Arm, Left Updated: 07/22/24 2125     hs TnI 0hr 9 ng/L     HS Troponin I 2hr [[id number]]     Lab Status: No result Specimen: Blood     Basic metabolic panel [[id number]] Collected:  07/22/24 2057    Lab Status: Final result Specimen: Blood from Arm, Left Updated: 07/22/24 2117     Sodium 139 mmol/L      Potassium 4.1 mmol/L      Chloride 102 mmol/L      CO2 29 mmol/L      ANION GAP 8 mmol/L      BUN 11 mg/dL      Creatinine 0.96 mg/dL      Glucose 107 mg/dL      Calcium 9.6 mg/dL      eGFR 58 ml/min/1.73sq m     Narrative:      National Kidney Disease Foundation guidelines for Chronic Kidney Disease (CKD):     Stage 1 with normal or high GFR (GFR > 90 mL/min/1.73 square meters)    Stage 2 Mild CKD (GFR = 60-89 mL/min/1.73 square meters)    Stage 3A Moderate CKD (GFR = 45-59 mL/min/1.73 square meters)    Stage 3B Moderate CKD (GFR = 30-44 mL/min/1.73 square meters)    Stage 4 Severe CKD (GFR = 15-29 mL/min/1.73 square meters)    Stage 5 End Stage CKD (GFR <15 mL/min/1.73 square meters)  Note: GFR calculation is accurate only with a steady state creatinine    Protime-INR [[id number]]  (Normal) Collected: 07/22/24 2057    Lab Status: Final result Specimen: Blood from Arm, Left Updated: 07/22/24 2116     Protime 13.7 seconds      INR 1.01    APTT [[id number]]  (Normal) Collected: 07/22/24 2057    Lab Status: Final result Specimen: Blood from Arm, Left Updated: 07/22/24 2116     PTT 24 seconds     CBC and Platelet [[id number]]  (Normal) Collected: 07/22/24 2057    Lab Status: Final result Specimen: Blood from Arm, Left Updated: 07/22/24 2102     WBC 7.09 Thousand/uL      RBC 4.38 Million/uL      Hemoglobin 13.2 g/dL      Hematocrit 41.4 %      MCV 95 fL      MCH 30.1 pg      MCHC 31.9 g/dL      RDW 12.6 %      Platelets 196 Thousands/uL      MPV 10.8 fL     Fingerstick Glucose (POCT) [[id number]]  (Normal) Collected: 07/22/24 2052    Lab Status: Final result Specimen: Blood Updated: 07/22/24 2052     POC Glucose 107 mg/dl                    CTA stroke alert (head/neck)   Final Result by Elvis Domínguez DO (07/22 2134)      Focal occlusion distal left M1 at the bifurcation.               Findings were  directly discussed with Wilfred Rubio at 9:26 p.m. on 7/22/2024.      Workstation performed: [id number]         X-ray chest 1 view portable   Final Result by Rachel Greenwood MD (07/22 2232)      No acute cardiopulmonary disease.            Workstation performed: [id number]         CT stroke alert brain   Final Result by Elvis Domínguez DO (07/22 2136)      No acute intracranial abnormality.      Chronic microangiopathic changes. Underlying developing infarct not excluded. MRI can be performed for further evaluation if clinically warranted.      Cerebral volume loss with mild ventriculomegaly, which could be secondary to the volume loss versus mild communicating hydrocephalus, similar to study of 2018.         Findings were directly discussed with Wilfred Rubio at 9:26 p.m. on 7/22/2024.      Workstation performed: [id number]                    Procedures  CriticalCare Time    Date/Time: 7/23/2024 12:48 AM    Performed by: Ivy Choi PA-C  Authorized by: Ivy Choi PA-C    Critical care provider statement:     Critical care time (minutes):  60    Critical care start time:  7/23/2024 9:30 PM    Critical care end time:  7/23/2024 10:30 PM    Critical care time was exclusive of:  Separately billable procedures and treating other patients and teaching time    Critical care was necessary to treat or prevent imminent or life-threatening deterioration of the following conditions:  CNS failure or compromise    Critical care was time spent personally by me on the following activities:  Blood draw for specimens, obtaining history from patient or surrogate, discussions with consultants, evaluation of patient's response to treatment, examination of patient, re-evaluation of patient's condition, ordering and review of laboratory studies, ordering and performing treatments and interventions, interpretation of cardiac output measurements and ordering and review of radiographic studies    I assumed direction of critical care for  "this patient from another provider in my specialty: no             ED Course  ED Course as of 07/23/24 0042 Mon Jul 22, 2024 2102 Patient with no focal deficits, no drift, speech intact   2104 Spoke with Dr. Wilfred Rubio, will proceed with stroke pathway due to conflicting history   2128 Per, Dr Rubio patient has acute m1 occlusion   2129 Tnk initiated per Dr. Rubio recc   2139 Dr Guy Akers calling endovascular alert   2141 Dr. Wilfred Rubio requests pressors to be started, target 180/110 max   2145 Levophed started   2149 Patient denies taking eliquis, states \"they never gave it to her\"   2215 No change in physical exam   2230 Patient picked up by EMS and on route to [place]                    Stroke Assessment       Row Name 07/22/24 2125             NIH Stroke Scale    Interval Baseline      Level of Consciousness (1a.) 1      LOC Questions (1b.) 1      LOC Commands (1c.) 0      Best Gaze (2.) 1      Visual (3.) 0      Facial Palsy (4.) 1      Motor Arm, Left (5a.) 0      Motor Arm, Right (5b.) 0      Motor Leg, Left (6a.) 0      Motor Leg, Right (6b.) 0      Limb Ataxia (7.) 0      Sensory (8.) 0      Best Language (9.) 2      Dysarthria (10.) 1      Extinction and Inattention (11.) (Formerly Neglect) 0      Total 7                     Stroke Assessment       Row Name 07/22/24 2125             NIH Stroke Scale    Interval Baseline      Level of Consciousness (1a.) 1      LOC Questions (1b.) 1      LOC Commands (1c.) 0      Best Gaze (2.) 1      Visual (3.) 0      Facial Palsy (4.) 1      Motor Arm, Left (5a.) 0      Motor Arm, Right (5b.) 0      Motor Leg, Left (6a.) 0      Motor Leg, Right (6b.) 0      Limb Ataxia (7.) 0      Sensory (8.) 0      Best Language (9.) 2      Dysarthria (10.) 1      Extinction and Inattention (11.) (Formerly Neglect) 0      Total 7                                              Medical Decision Making  Patient on initial presentation with NIH 0, cooperative, following commands, no " focal deficits noted, however due to the conflicting story line, will proceed with stroke pathway.  Differential diagnosis includes, but is not limited to, presyncope, CVA, TIA, other intracranial abnormality.  Will consult with neurology on-call, Dr. Wilfred Rubio.    Upon return from CT scan, patient was aphasic, with left gaze preference, right facial droop, no extremity drift and NIH of 7.  Neurology made aware, CTH with no hemorrhage, TNK initiated. CTA head and neck with acute left M1 occlusion. Pressors started for goal 180/110 max. Transfer to [place] initiated for thrombectomy attempt and then neuro ICU admission. Patient case was also discussed with attending, Dr. Martinez, who is agreeable to patient workup and treatment plan.    Amount and/or Complexity of Data Reviewed  Labs: ordered.  Radiology: ordered.    Risk  Prescription drug management.                 Disposition  Final diagnoses:   Difficulty with speech   Stroke (HCC)     Time reflects when diagnosis was documented in both MDM as applicable and the Disposition within this note       Time User Action Codes Description Comment    7/22/2024  8:55 PM García Martinez Add [R47.9] Difficulty with speech     7/22/2024  9:57 PM Ivy Choi Add [I63.9] Stroke (HCC)           ED Disposition       ED Disposition   Transfer to Another Facility-In Network    Condition   --    Date/Time   Mon Jul 22, 2024  9:54 PM    Comment   Brianna Baum should be transferred out to [facility].               MD Documentation      Flowsheet Row Most Recent Value   Patient Condition The patient has been stabilized such that within reasonable medical probability, no material deterioration of the patient condition or the condition of the unborn child(janis) is likely to result from the transfer   Reason for Transfer Level of Care needed not available at this facility  [neurosurgery]   Benefits of Transfer Specialized equipment and/or services available at the receiving  facility (Include comment)________________________, Continuity of care   Risks of Transfer Potential for delay in receiving treatment, Potential deterioration of medical condition, Loss of IV, Increased discomfort during transfer, Possible worsening of condition or death during transfer   Accepting Physician Dr. Akers   Accepting Facility Name, [facility]   Sending MD Juan DO, SYD Choi   Provider Certification General risk, such as traffic hazards, adverse weather conditions, rough terrain or turbulence, possible failure of equipment (including vehicle or aircraft), or consequences of actions of persons outside the control of the transport personnel, Unanticipated needs of medical equipment and personnel during transport, Risk of worsening condition          RN Documentation      Flowsheet Row Most Recent Value   Accepting Facility Name, [facility]          Follow-up Information    None         Discharge Medication List as of 7/22/2024 10:48 PM        CONTINUE these medications which have NOT CHANGED    Details   apixaban (Eliquis) 5 mg Take 1 tablet (5 mg total) by mouth 2 (two) times a day, Starting Mon 7/22/2024, Normal      Ascorbic Acid (VITAMIN C) 1000 MG tablet Take 1,000 mg by mouth daily, Historical Med      Biotin 10 MG CAPS Take by mouth in the morning, Historical Med      celecoxib (CeleBREX) 200 mg capsule Take 1 capsule (200 mg total) by mouth daily, Starting Mon 2/26/2024, Normal      diltiazem (CARDIZEM CD) 180 mg 24 hr capsule Take 1 capsule (180 mg total) by mouth daily, Starting u 7/18/2024, Until Tue 1/14/2025, Normal      esomeprazole (NexIUM) 40 MG capsule TAKE 1 CAPSULE DAILY, Normal      furosemide (LASIX) 20 mg tablet Take 1 tablet (20 mg total) by mouth daily, Starting Tue 3/12/2024, Normal      HYDROcodone-acetaminophen (Norco) 5-325 mg per tablet Take 1 tablet by mouth every 8 (eight) hours as needed for pain Max Daily Amount: 3  tablets, Starting Thu 7/18/2024, Normal             No discharge procedures on file.    PDMP Review         Value Time User    PDMP Reviewed  Yes 7/18/2024 11:52 AM LADONNA Drew            ED Provider  Electronically Signed by             Ivy Choi PA-C  07/23/24 0101

## 2024-07-24 ENCOUNTER — APPOINTMENT (INPATIENT)
Dept: RADIOLOGY | Facility: HOSPITAL | Age: 74
DRG: 023 | End: 2024-07-24
Payer: MEDICARE

## 2024-07-24 ENCOUNTER — ANESTHESIA EVENT (INPATIENT)
Dept: RADIOLOGY | Facility: HOSPITAL | Age: 74
DRG: 023 | End: 2024-07-24
Payer: MEDICARE

## 2024-07-24 ENCOUNTER — ANESTHESIA (INPATIENT)
Dept: RADIOLOGY | Facility: HOSPITAL | Age: 74
DRG: 023 | End: 2024-07-24
Payer: MEDICARE

## 2024-07-24 LAB
ANION GAP SERPL CALCULATED.3IONS-SCNC: 10 MMOL/L (ref 4–13)
ANION GAP SERPL CALCULATED.3IONS-SCNC: 12 MMOL/L (ref 4–13)
BACTERIA UR QL AUTO: ABNORMAL /HPF
BILIRUB UR QL STRIP: NEGATIVE
BUN SERPL-MCNC: 7 MG/DL (ref 5–25)
BUN SERPL-MCNC: 8 MG/DL (ref 5–25)
CA-I BLD-SCNC: 1.14 MMOL/L (ref 1.12–1.32)
CALCIUM SERPL-MCNC: 8.8 MG/DL (ref 8.4–10.2)
CALCIUM SERPL-MCNC: 9.7 MG/DL (ref 8.4–10.2)
CHLORIDE SERPL-SCNC: 106 MMOL/L (ref 96–108)
CHLORIDE SERPL-SCNC: 106 MMOL/L (ref 96–108)
CLARITY UR: CLEAR
CO2 SERPL-SCNC: 24 MMOL/L (ref 21–32)
CO2 SERPL-SCNC: 26 MMOL/L (ref 21–32)
COLOR UR: COLORLESS
CREAT SERPL-MCNC: 0.69 MG/DL (ref 0.6–1.3)
CREAT SERPL-MCNC: 0.84 MG/DL (ref 0.6–1.3)
ERYTHROCYTE [DISTWIDTH] IN BLOOD BY AUTOMATED COUNT: 13 % (ref 11.6–15.1)
GFR SERPL CREATININE-BSD FRML MDRD: 68 ML/MIN/1.73SQ M
GFR SERPL CREATININE-BSD FRML MDRD: 86 ML/MIN/1.73SQ M
GLUCOSE SERPL-MCNC: 129 MG/DL (ref 65–140)
GLUCOSE SERPL-MCNC: 131 MG/DL (ref 65–140)
GLUCOSE SERPL-MCNC: 141 MG/DL (ref 65–140)
GLUCOSE SERPL-MCNC: 157 MG/DL (ref 65–140)
GLUCOSE UR STRIP-MCNC: NEGATIVE MG/DL
HCT VFR BLD AUTO: 42.1 % (ref 34.8–46.1)
HGB BLD-MCNC: 13.7 G/DL (ref 11.5–15.4)
HGB UR QL STRIP.AUTO: NEGATIVE
INR PPP: 1.09 (ref 0.84–1.19)
KETONES UR STRIP-MCNC: NEGATIVE MG/DL
LEUKOCYTE ESTERASE UR QL STRIP: ABNORMAL
MAGNESIUM SERPL-MCNC: 2.1 MG/DL (ref 1.9–2.7)
MAGNESIUM SERPL-MCNC: 2.7 MG/DL (ref 1.9–2.7)
MCH RBC QN AUTO: 30.9 PG (ref 26.8–34.3)
MCHC RBC AUTO-ENTMCNC: 32.5 G/DL (ref 31.4–37.4)
MCV RBC AUTO: 95 FL (ref 82–98)
NITRITE UR QL STRIP: NEGATIVE
NON-SQ EPI CELLS URNS QL MICRO: ABNORMAL /HPF
PH UR STRIP.AUTO: 7.5 [PH]
PHOSPHATE SERPL-MCNC: 3.1 MG/DL (ref 2.3–4.1)
PLATELET # BLD AUTO: 238 THOUSANDS/UL (ref 149–390)
PMV BLD AUTO: 11 FL (ref 8.9–12.7)
POTASSIUM SERPL-SCNC: 3.5 MMOL/L (ref 3.5–5.3)
POTASSIUM SERPL-SCNC: 4.3 MMOL/L (ref 3.5–5.3)
PROT UR STRIP-MCNC: NEGATIVE MG/DL
PROTHROMBIN TIME: 14 SECONDS (ref 11.6–14.5)
RBC # BLD AUTO: 4.43 MILLION/UL (ref 3.81–5.12)
RBC #/AREA URNS AUTO: ABNORMAL /HPF
SODIUM SERPL-SCNC: 142 MMOL/L (ref 135–147)
SODIUM SERPL-SCNC: 142 MMOL/L (ref 135–147)
SP GR UR STRIP.AUTO: 1.01 (ref 1–1.03)
UROBILINOGEN UR STRIP-ACNC: <2 MG/DL
WBC # BLD AUTO: 14.2 THOUSAND/UL (ref 4.31–10.16)
WBC #/AREA URNS AUTO: ABNORMAL /HPF

## 2024-07-24 PROCEDURE — 70450 CT HEAD/BRAIN W/O DYE: CPT

## 2024-07-24 PROCEDURE — 85610 PROTHROMBIN TIME: CPT

## 2024-07-24 PROCEDURE — 85027 COMPLETE CBC AUTOMATED: CPT

## 2024-07-24 PROCEDURE — 70498 CT ANGIOGRAPHY NECK: CPT

## 2024-07-24 PROCEDURE — 84100 ASSAY OF PHOSPHORUS: CPT

## 2024-07-24 PROCEDURE — 82948 REAGENT STRIP/BLOOD GLUCOSE: CPT

## 2024-07-24 PROCEDURE — NC001 PR NO CHARGE: Performed by: EMERGENCY MEDICINE

## 2024-07-24 PROCEDURE — 93005 ELECTROCARDIOGRAM TRACING: CPT

## 2024-07-24 PROCEDURE — 99291 CRITICAL CARE FIRST HOUR: CPT | Performed by: EMERGENCY MEDICINE

## 2024-07-24 PROCEDURE — 82330 ASSAY OF CALCIUM: CPT

## 2024-07-24 PROCEDURE — 80048 BASIC METABOLIC PNL TOTAL CA: CPT

## 2024-07-24 PROCEDURE — 83735 ASSAY OF MAGNESIUM: CPT

## 2024-07-24 PROCEDURE — NC001 PR NO CHARGE

## 2024-07-24 PROCEDURE — 76937 US GUIDE VASCULAR ACCESS: CPT | Performed by: RADIOLOGY

## 2024-07-24 PROCEDURE — 61645 PERQ ART M-THROMBECT &/NFS: CPT | Performed by: RADIOLOGY

## 2024-07-24 PROCEDURE — 70496 CT ANGIOGRAPHY HEAD: CPT

## 2024-07-24 PROCEDURE — 03CG3ZZ EXTIRPATION OF MATTER FROM INTRACRANIAL ARTERY, PERCUTANEOUS APPROACH: ICD-10-PCS | Performed by: RADIOLOGY

## 2024-07-24 RX ORDER — ONDANSETRON 2 MG/ML
4 INJECTION INTRAMUSCULAR; INTRAVENOUS ONCE
Status: COMPLETED | OUTPATIENT
Start: 2024-07-24 | End: 2024-07-24

## 2024-07-24 RX ORDER — FENTANYL CITRATE 50 UG/ML
INJECTION, SOLUTION INTRAMUSCULAR; INTRAVENOUS AS NEEDED
Status: DISCONTINUED | OUTPATIENT
Start: 2024-07-24 | End: 2024-07-24

## 2024-07-24 RX ORDER — POLYETHYLENE GLYCOL 3350 17 G/17G
17 POWDER, FOR SOLUTION ORAL DAILY
Status: DISCONTINUED | OUTPATIENT
Start: 2024-07-24 | End: 2024-07-31 | Stop reason: HOSPADM

## 2024-07-24 RX ORDER — ONDANSETRON 2 MG/ML
4 INJECTION INTRAMUSCULAR; INTRAVENOUS ONCE AS NEEDED
Status: DISCONTINUED | OUTPATIENT
Start: 2024-07-24 | End: 2024-07-24 | Stop reason: HOSPADM

## 2024-07-24 RX ORDER — ONDANSETRON 2 MG/ML
INJECTION INTRAMUSCULAR; INTRAVENOUS
Status: COMPLETED
Start: 2024-07-24 | End: 2024-07-24

## 2024-07-24 RX ORDER — IODIXANOL 320 MG/ML
200 INJECTION, SOLUTION INTRAVASCULAR
Status: COMPLETED | OUTPATIENT
Start: 2024-07-24 | End: 2024-07-24

## 2024-07-24 RX ORDER — POTASSIUM CHLORIDE 20 MEQ/1
40 TABLET, EXTENDED RELEASE ORAL ONCE
Status: COMPLETED | OUTPATIENT
Start: 2024-07-24 | End: 2024-07-24

## 2024-07-24 RX ORDER — FENTANYL CITRATE/PF 50 MCG/ML
25 SYRINGE (ML) INJECTION
Status: DISCONTINUED | OUTPATIENT
Start: 2024-07-24 | End: 2024-07-24 | Stop reason: HOSPADM

## 2024-07-24 RX ORDER — DIPHENHYDRAMINE HYDROCHLORIDE 50 MG/ML
12.5 INJECTION INTRAMUSCULAR; INTRAVENOUS ONCE AS NEEDED
Status: DISCONTINUED | OUTPATIENT
Start: 2024-07-24 | End: 2024-07-24 | Stop reason: HOSPADM

## 2024-07-24 RX ORDER — ENOXAPARIN SODIUM 100 MG/ML
40 INJECTION SUBCUTANEOUS
Status: DISCONTINUED | OUTPATIENT
Start: 2024-07-25 | End: 2024-07-25

## 2024-07-24 RX ORDER — HYDROMORPHONE HCL IN WATER/PF 6 MG/30 ML
0.2 PATIENT CONTROLLED ANALGESIA SYRINGE INTRAVENOUS
Status: DISCONTINUED | OUTPATIENT
Start: 2024-07-24 | End: 2024-07-24 | Stop reason: HOSPADM

## 2024-07-24 RX ORDER — MIDODRINE HYDROCHLORIDE 5 MG/1
5 TABLET ORAL
Status: DISCONTINUED | OUTPATIENT
Start: 2024-07-24 | End: 2024-07-24

## 2024-07-24 RX ORDER — MAGNESIUM SULFATE HEPTAHYDRATE 40 MG/ML
2 INJECTION, SOLUTION INTRAVENOUS ONCE
Status: COMPLETED | OUTPATIENT
Start: 2024-07-24 | End: 2024-07-24

## 2024-07-24 RX ORDER — ASPIRIN 81 MG/1
81 TABLET, CHEWABLE ORAL DAILY
Status: CANCELLED | OUTPATIENT
Start: 2024-07-24

## 2024-07-24 RX ORDER — ASPIRIN 81 MG/1
81 TABLET, CHEWABLE ORAL DAILY
Status: DISCONTINUED | OUTPATIENT
Start: 2024-07-25 | End: 2024-07-24

## 2024-07-24 RX ORDER — LIDOCAINE HYDROCHLORIDE 10 MG/ML
INJECTION, SOLUTION EPIDURAL; INFILTRATION; INTRACAUDAL; PERINEURAL AS NEEDED
Status: COMPLETED | OUTPATIENT
Start: 2024-07-24 | End: 2024-07-24

## 2024-07-24 RX ORDER — POTASSIUM CHLORIDE 20 MEQ/1
20 TABLET, EXTENDED RELEASE ORAL ONCE
Status: COMPLETED | OUTPATIENT
Start: 2024-07-24 | End: 2024-07-24

## 2024-07-24 RX ORDER — DILTIAZEM HYDROCHLORIDE 180 MG/1
180 CAPSULE, COATED, EXTENDED RELEASE ORAL DAILY
Status: DISCONTINUED | OUTPATIENT
Start: 2024-07-24 | End: 2024-07-24

## 2024-07-24 RX ORDER — MIDODRINE HYDROCHLORIDE 5 MG/1
10 TABLET ORAL
Status: DISCONTINUED | OUTPATIENT
Start: 2024-07-24 | End: 2024-07-31 | Stop reason: HOSPADM

## 2024-07-24 RX ORDER — ASPIRIN 81 MG/1
81 TABLET, CHEWABLE ORAL DAILY
Status: DISCONTINUED | OUTPATIENT
Start: 2024-07-24 | End: 2024-07-24

## 2024-07-24 RX ORDER — SODIUM CHLORIDE, SODIUM LACTATE, POTASSIUM CHLORIDE, CALCIUM CHLORIDE 600; 310; 30; 20 MG/100ML; MG/100ML; MG/100ML; MG/100ML
INJECTION, SOLUTION INTRAVENOUS CONTINUOUS PRN
Status: DISCONTINUED | OUTPATIENT
Start: 2024-07-24 | End: 2024-07-24

## 2024-07-24 RX ORDER — SODIUM CHLORIDE, SODIUM GLUCONATE, SODIUM ACETATE, POTASSIUM CHLORIDE, MAGNESIUM CHLORIDE, SODIUM PHOSPHATE, DIBASIC, AND POTASSIUM PHOSPHATE .53; .5; .37; .037; .03; .012; .00082 G/100ML; G/100ML; G/100ML; G/100ML; G/100ML; G/100ML; G/100ML
100 INJECTION, SOLUTION INTRAVENOUS CONTINUOUS
Status: DISPENSED | OUTPATIENT
Start: 2024-07-24 | End: 2024-07-24

## 2024-07-24 RX ADMIN — PANTOPRAZOLE SODIUM 40 MG: 40 TABLET, DELAYED RELEASE ORAL at 07:33

## 2024-07-24 RX ADMIN — POLYETHYLENE GLYCOL 3350 17 G: 17 POWDER, FOR SOLUTION ORAL at 10:27

## 2024-07-24 RX ADMIN — PHENYLEPHRINE HYDROCHLORIDE 180 MCG/MIN: 50 INJECTION INTRAVENOUS at 06:04

## 2024-07-24 RX ADMIN — PHENYLEPHRINE HYDROCHLORIDE 180 MCG/MIN: 50 INJECTION INTRAVENOUS at 01:45

## 2024-07-24 RX ADMIN — MIDODRINE HYDROCHLORIDE 10 MG: 5 TABLET ORAL at 09:39

## 2024-07-24 RX ADMIN — OXYCODONE HYDROCHLORIDE AND ACETAMINOPHEN 1000 MG: 500 TABLET ORAL at 10:27

## 2024-07-24 RX ADMIN — IODIXANOL 30 ML: 320 INJECTION, SOLUTION INTRAVASCULAR at 19:43

## 2024-07-24 RX ADMIN — SODIUM CHLORIDE, SODIUM GLUCONATE, SODIUM ACETATE, POTASSIUM CHLORIDE, MAGNESIUM CHLORIDE, SODIUM PHOSPHATE, DIBASIC, AND POTASSIUM PHOSPHATE 100 ML/HR: .53; .5; .37; .037; .03; .012; .00082 INJECTION, SOLUTION INTRAVENOUS at 10:17

## 2024-07-24 RX ADMIN — LIDOCAINE 5% 1 PATCH: 700 PATCH TOPICAL at 09:37

## 2024-07-24 RX ADMIN — FENTANYL CITRATE 25 MCG: 50 INJECTION INTRAMUSCULAR; INTRAVENOUS at 18:38

## 2024-07-24 RX ADMIN — ACETAMINOPHEN 650 MG: 325 TABLET, FILM COATED ORAL at 03:49

## 2024-07-24 RX ADMIN — MAGNESIUM SULFATE HEPTAHYDRATE 2 G: 40 INJECTION, SOLUTION INTRAVENOUS at 10:23

## 2024-07-24 RX ADMIN — ONDANSETRON 4 MG: 2 INJECTION INTRAMUSCULAR; INTRAVENOUS at 08:40

## 2024-07-24 RX ADMIN — IOHEXOL 75 ML: 350 INJECTION, SOLUTION INTRAVENOUS at 18:02

## 2024-07-24 RX ADMIN — LIDOCAINE HYDROCHLORIDE 10 ML: 10 INJECTION, SOLUTION EPIDURAL; INFILTRATION; INTRACAUDAL; PERINEURAL at 18:46

## 2024-07-24 RX ADMIN — MIDODRINE HYDROCHLORIDE 10 MG: 5 TABLET ORAL at 16:11

## 2024-07-24 RX ADMIN — CHLORHEXIDINE GLUCONATE 0.12% ORAL RINSE 15 ML: 1.2 LIQUID ORAL at 09:37

## 2024-07-24 RX ADMIN — POTASSIUM CHLORIDE 20 MEQ: 1500 TABLET, EXTENDED RELEASE ORAL at 09:54

## 2024-07-24 RX ADMIN — DILTIAZEM HYDROCHLORIDE 30 MG: 30 TABLET ORAL at 13:55

## 2024-07-24 RX ADMIN — PHENYLEPHRINE HYDROCHLORIDE 80 MCG/MIN: 50 INJECTION INTRAVENOUS at 13:56

## 2024-07-24 RX ADMIN — POTASSIUM CHLORIDE 40 MEQ: 1500 TABLET, EXTENDED RELEASE ORAL at 07:33

## 2024-07-24 RX ADMIN — SODIUM CHLORIDE, SODIUM LACTATE, POTASSIUM CHLORIDE, AND CALCIUM CHLORIDE: .6; .31; .03; .02 INJECTION, SOLUTION INTRAVENOUS at 18:37

## 2024-07-24 NOTE — PROGRESS NOTES
NEUROLOGY RESIDENCY PROGRESS NOTE     Name: Brianna Baum   Age & Sex: 74 y.o. female   MRN: [id number]  Unit/Bed#: ICU 03   Encounter: [id number]    Brianna Baum will need follow up in 4-6 weeks with neurovascular attending/AP .  She will not require outpatient neurological testing.     Pending for discharge: Hemorrhage monitoring    ASSESSMENT & PLAN     * Cerebrovascular accident (CVA) due to occlusion of left middle cerebral artery (HCC)  Assessment & Plan  Brianna Baum is a 74 y.o. female with pertinent PMHx of AFib new dx, LE edema, GERD, chronic back pain on  at  stroke alert was called for evaluation of symptoms concerning for stroke consisting of near syncope and speech difficulties. Last known well shortly before stroke alert at 2053. Initial NIHSS initially 0. Upon return from CT, patient had an NIHSS of 7 with aphasia, left gaze preference, and right facial droop.  Initial BP Blood Pressure: 133/66, FSBG  . Arrival EKG atrial flutter at 115 bpm . NC CTH no acute intracranial abnormalities and CTA H/N focal occlusion distal left M1 at the bifurcation . Last normal apparently shortly before stroke alert, within TNK window thus was given at 2152.   - Home Antiplatelet /Anticoagulants PTA: no antiplatelet or anticoagulants  - Stroke risk factors: HTN, HLD, Atrial Fibrillation, and Over weight  - Prior Stroke Hx: none  - Past Neurological Hx: no neurological problems    Pertinent scores:  Initial NIHSS: Total: 2   Modified Rolette Score: 0 (No baseline symptoms/disability)    BP over last 24 hours: Blood Pressure: (!) 185/80  current BP: Blood Pressure: (!) 185/80    Workup:  - CTH: No acute intracranial abnormalities  - CTA: Focal occlusion distal left M1 at the bifurcation   - Repeat CTH / CTA H/N (7/23): Area of low-attenuation within the left putamen, indicative of a developing acute infarct in this region. No associated hemorrhage is noted. No significant mass effect is identified. Left MCA distal  M1/bifurcation occlusion is no longer visualized compatible with interval thrombectomy. Previous short segment occlusions of multiple left middle cerebral artery M2 branches at their origins also appear to have resolved. No LVO or high-grade stenosis identified. 2 mm infundibulum again demonstrated at the origin of the left posterior communicating artery, unchanged. No hemodynamically significant stenosis or dissection is identified on CT angiogram of the neck.  - MRI WO (7/23): Stable acute infarct centered in the left basal ganglia with superimposed petechial hemorrhage. No significant mass effect.   - TTE (7/23): EF 55%, LA moderately dilated, no PFO  - Labs: Hemoglobin A1c 5.8 (7/22/2024),  (7/23/2024)    Impression: CTA of head and neck revealed distal left M1 occlusion in the setting of atrial fibrillation off of anticoagulation due to cost.  Patient is status post TNK and thrombectomy TICI 2B.  MRI shows stable infarct at left basal ganglia with superimposed hemorrhage. Repeat CTH today.    Plan: Discussed plan with neurology attending, Dr. Mccracken  Repeat CTH today, will determine AC/AP initiation upon review of imaging.  Frequent neuro checks per protocol. If there is any acute changes in exam, please obtain stat CT head and notify neurology team  BP Goals: Per primary, SBP goal 160-180mmHg until 1000 today. Supa infusion to achieve goal   AP/AC agents: No AC/AP until repeat CTH reviewed  Statin: Continue atorvastin 40 mg daily  Euthermic/Euglycemic  DVT PPx: No AC/AP until repeat CTH reviewed, SCDs  PT/OT/ST  Stroke education and counseling  Rest of other care per primary team appreciated    Disposition: PT Recommendations - Rehab Resource Intensity Level, PT: I (Maximum Resource Intensity)          SUBJECTIVE     Patient was seen and examined. No acute events overnight. Patient reports no new concerns at this time. She is cooperative, oriented, and able to follow commands during exam.    Pertinent  Negatives include: headaches, syncope, seizures, amaurosis, diplopia, other visual changes, paralysis/weakness, numbness or tingling, involuntary movements, tremor.     Review of Systems  A 12 point ROS was completed. Other than the above mentioned  complaints, all remaining systems were negative.    OBJECTIVE     Patient ID: Brianna Baum is a 74 y.o. female.    Vitals:    24 0935 24 0950 24 1015 24 1100   BP: 168/76 (!) 178/82 (!) 171/70 (!) 185/80   Pulse: 58 68 58 60   Resp:  18 20   Temp:       TempSrc:       SpO2: 97% 96% 95% 95%   Weight:       Height:          Temperature:   Temp (24hrs), Av.2 °F (36.8 °C), Min:97.8 °F (36.6 °C), Max:98.9 °F (37.2 °C)    Temperature: 98.9 °F (37.2 °C)      Physical Exam  Vitals reviewed.   Constitutional:       General: She is not in acute distress.     Appearance: Normal appearance. She is not ill-appearing, toxic-appearing or diaphoretic.   HENT:      Head: Normocephalic and atraumatic.   Eyes:      Extraocular Movements: Extraocular movements intact and EOM normal.      Pupils: Pupils are equal, round, and reactive to light.   Cardiovascular:      Rate and Rhythm: Normal rate and regular rhythm.      Heart sounds: Normal heart sounds.   Pulmonary:      Effort: Pulmonary effort is normal.      Breath sounds: Normal breath sounds.   Skin:     General: Skin is warm and dry.   Neurological:      Mental Status: She is alert and oriented to person, place, and time.      Sensory: No sensory deficit.      Motor: Motor strength is normal.No weakness.      Coordination: Coordination normal. Finger-Nose-Finger Test and Heel to Shin Test normal.      Deep Tendon Reflexes:      Reflex Scores:       Bicep reflexes are 2+ on the right side and 2+ on the left side.       Brachioradialis reflexes are 2+ on the right side and 2+ on the left side.       Patellar reflexes are 3+ on the right side and 3+ on the left side.       Achilles reflexes are 2+ on the  right side and 2+ on the left side.  Psychiatric:         Speech: Speech normal.          Neurologic Exam     Mental Status   Oriented to person, place, and time.   Speech: speech is normal   Level of consciousness: alert    Cranial Nerves     CN III, IV, VI   Pupils are equal, round, and reactive to light.  Extraocular motions are normal.     CN V   Facial sensation intact.     CN VII   Right facial weakness: central  Left facial weakness: none    CN VIII   CN VIII normal.     CN IX, X   Palate: symmetric    CN XI   Right sternocleidomastoid strength: normal  Left sternocleidomastoid strength: normal  Right trapezius strength: normal  Left trapezius strength: normal    CN XII   Tongue: not atrophic  Fasciculations: absent  Tongue deviation: none    Motor Exam   Muscle bulk: normal  Right arm pronator drift: absent  Left arm pronator drift: absent    Strength   Strength 5/5 throughout.     Sensory Exam   Light touch normal.   Pinprick normal.     Gait, Coordination, and Reflexes     Coordination   Finger to nose coordination: normal  Heel to shin coordination: normal    Tremor   Resting tremor: absent    Reflexes   Right brachioradialis: 2+  Left brachioradialis: 2+  Right biceps: 2+  Left biceps: 2+  Right patellar: 3+  Left patellar: 3+  Right achilles: 2+  Left achilles: 2+  Right plantar: equivocal  Left plantar: equivocal  Right Dykes: absent  Left Dykes: absent  Deferred gait testing          LABORATORY DATA     Labs: I have personally reviewed pertinent reports.    Results from last 7 days   Lab Units 07/24/24 0519 07/23/24 0454 07/22/24 2057   WBC Thousand/uL 14.20* 10.82* 7.09   HEMOGLOBIN g/dL 13.7 13.1 13.2   HEMATOCRIT % 42.1 39.2 41.4   PLATELETS Thousands/uL 238 226 196   SEGS PCT %  --  79*  --    MONO PCT %  --  5  --    EOS PCT %  --  0  --       Results from last 7 days   Lab Units 07/24/24 0519 07/23/24 0454 07/22/24 2057   SODIUM mmol/L 142 141 139   POTASSIUM mmol/L 3.5 4.6 4.1    CHLORIDE mmol/L 106 109* 102   CO2 mmol/L 24 24 29   BUN mg/dL 8 9 11   CREATININE mg/dL 0.69 0.73 0.96   CALCIUM mg/dL 9.7 8.2* 9.6     Results from last 7 days   Lab Units 07/24/24  0519   MAGNESIUM mg/dL 2.1     Results from last 7 days   Lab Units 07/24/24  0519   PHOSPHORUS mg/dL 3.1      Results from last 7 days   Lab Units 07/24/24  0519 07/22/24 2057   INR  1.09 1.01   PTT seconds  --  24               IMAGING & DIAGNOSTIC TESTING     Radiology Results: I have personally reviewed pertinent reports.   and I have personally reviewed pertinent films in PACS    MRI brain wo contrast   Final Result by Ismael Bianchi MD (07/24 0230)      Stable acute infarct centered in the left basal ganglia with superimposed petechial hemorrhage. No significant mass effect.      The study was marked in EPIC for immediate notification.      Workstation performed: [id number]         CT stroke alert brain   Final Result by Thomas Castillo MD (07/23 6164)      Area of low-attenuation within the left putamen, indicative of a developing acute infarct in this region. No associated hemorrhage is noted. No significant mass effect is identified.      Left middle cerebral artery distal M1/bifurcation occlusion is no longer visualized compatible with interval thrombectomy. Previous short segment occlusions of multiple left middle cerebral artery M2 branches at their origins also appear to have    resolved.      No new large vessel occlusion, or high-grade stenosis identified on CT angiogram of the head.      2 mm infundibulum again demonstrated at the origin of the left posterior communicating artery, unchanged.      No hemodynamically significant stenosis or dissection is identified on CT angiogram of the neck.      Findings were directly discussed with Dr. Palladino by Dr. Castillo on 7/23/2024, at 1:00 p.m.      Workstation performed: [id number]         CTA stroke alert (head/neck)   Final Result by Thomas Castillo MD (07/23 0999)       Area of low-attenuation within the left putamen, indicative of a developing acute infarct in this region. No associated hemorrhage is noted. No significant mass effect is identified.      Left middle cerebral artery distal M1/bifurcation occlusion is no longer visualized compatible with interval thrombectomy. Previous short segment occlusions of multiple left middle cerebral artery M2 branches at their origins also appear to have    resolved.      No new large vessel occlusion, or high-grade stenosis identified on CT angiogram of the head.      2 mm infundibulum again demonstrated at the origin of the left posterior communicating artery, unchanged.      No hemodynamically significant stenosis or dissection is identified on CT angiogram of the neck.      Findings were directly discussed with Dr. Palladino by Dr. Castillo on 7/23/2024, at 1:00 p.m.      Workstation performed: [id number]         IR stroke alert    (Results Pending)   CT head wo contrast    (Results Pending)       Other Diagnostic Testing: I have personally reviewed pertinent reports.      ACTIVE MEDICATIONS     Current Facility-Administered Medications   Medication Dose Route Frequency    acetaminophen (TYLENOL) tablet 650 mg  650 mg Oral Q6H PRN    ascorbic acid (VITAMIN C) tablet 1,000 mg  1,000 mg Oral Daily    atorvastatin (LIPITOR) tablet 40 mg  40 mg Oral QPM    diltiazem (CARDIZEM) tablet 30 mg  30 mg Oral Q8H ANETA    lidocaine (LIDODERM) 5 % patch 1 patch  1 patch Topical Daily    midodrine (PROAMATINE) tablet 10 mg  10 mg Oral TID AC    multi-electrolyte (PLASMALYTE-A/ISOLYTE-S PH 7.4) IV solution  100 mL/hr Intravenous Continuous    oxyCODONE (ROXICODONE) split tablet 2.5 mg  2.5 mg Oral Q4H PRN    pantoprazole (PROTONIX) EC tablet 40 mg  40 mg Oral Daily Before Breakfast    polyethylene glycol (MIRALAX) packet 17 g  17 g Oral Daily       Prior to Admission medications    Medication Sig Start Date End Date Taking? Authorizing Provider   apixaban  (Eliquis) 5 mg Take 1 tablet (5 mg total) by mouth 2 (two) times a day 7/22/24   LADONNA Drew   Ascorbic Acid (VITAMIN C) 1000 MG tablet Take 1,000 mg by mouth daily    Historical Provider, MD   Biotin 10 MG CAPS Take by mouth in the morning    Historical Provider, MD   celecoxib (CeleBREX) 200 mg capsule Take 1 capsule (200 mg total) by mouth daily 2/26/24   LADONNA Drew   diltiazem (CARDIZEM CD) 180 mg 24 hr capsule Take 1 capsule (180 mg total) by mouth daily 7/18/24 1/14/25  LADONNA Drew   esomeprazole (NexIUM) 40 MG capsule TAKE 1 CAPSULE DAILY 1/16/24   LADONNA Drew   furosemide (LASIX) 20 mg tablet Take 1 tablet (20 mg total) by mouth daily 3/12/24   Ladan Baum DO   HYDROcodone-acetaminophen (Norco) 5-325 mg per tablet Take 1 tablet by mouth every 8 (eight) hours as needed for pain Max Daily Amount: 3 tablets 7/18/24   LADONNA Drew         VTE Pharmacologic Prophylaxis: VTE covered by:    None      VTE Mechanical Prophylaxis: sequential compression device    ======    I have discussed the patient's history, physical exam findings, assessment, and plan in detail with attending, Dr. Mccracken    Thank you for allowing me to participate in the care of your patient, Brianna Baum.    Israel Pleitez DO  [facility] Neurology Residency, PGY-II    [place], MS3

## 2024-07-24 NOTE — PLAN OF CARE
Problem: Prexisting or High Potential for Compromised Skin Integrity  Goal: Skin integrity is maintained or improved  Description: INTERVENTIONS:  - Identify patients at risk for skin breakdown  - Assess and monitor skin integrity  - Assess and monitor nutrition and hydration status  - Monitor labs   - Assess for incontinence   - Turn and reposition patient  - Assist with mobility/ambulation  - Relieve pressure over bony prominences  - Avoid friction and shearing  - Provide appropriate hygiene as needed including keeping skin clean and dry  - Evaluate need for skin moisturizer/barrier cream  - Collaborate with interdisciplinary team   - Patient/family teaching  - Consider wound care consult   Outcome: Progressing     Problem: PAIN - ADULT  Goal: Verbalizes/displays adequate comfort level or baseline comfort level  Description: Interventions:  - Encourage patient to monitor pain and request assistance  - Assess pain using appropriate pain scale  - Administer analgesics based on type and severity of pain and evaluate response  - Implement non-pharmacological measures as appropriate and evaluate response  - Consider cultural and social influences on pain and pain management  - Notify physician/advanced practitioner if interventions unsuccessful or patient reports new pain  Outcome: Progressing     Problem: INFECTION - ADULT  Goal: Absence or prevention of progression during hospitalization  Description: INTERVENTIONS:  - Assess and monitor for signs and symptoms of infection  - Monitor lab/diagnostic results  - Monitor all insertion sites, i.e. indwelling lines, tubes, and drains  - Monitor endotracheal if appropriate and nasal secretions for changes in amount and color  - Edwards appropriate cooling/warming therapies per order  - Administer medications as ordered  - Instruct and encourage patient and family to use good hand hygiene technique  - Identify and instruct in appropriate isolation precautions for  identified infection/condition  Outcome: Progressing  Goal: Absence of fever/infection during neutropenic period  Description: INTERVENTIONS:  - Monitor WBC    Outcome: Progressing     Problem: SAFETY ADULT  Goal: Patient will remain free of falls  Description: INTERVENTIONS:  - Educate patient/family on patient safety including physical limitations  - Instruct patient to call for assistance with activity   - Consult OT/PT to assist with strengthening/mobility   - Keep Call bell within reach  - Keep bed low and locked with side rails adjusted as appropriate  - Keep care items and personal belongings within reach  - Initiate and maintain comfort rounds  - Make Fall Risk Sign visible to staff  - Offer Toileting every  Hours, in advance of need  - Initiate/Maintain alarm  - Obtain necessary fall risk management equipment:   - Apply yellow socks and bracelet for high fall risk patients  - Consider moving patient to room near nurses station  Outcome: Progressing  Goal: Maintain or return to baseline ADL function  Description: INTERVENTIONS:  -  Assess patient's ability to carry out ADLs; assess patient's baseline for ADL function and identify physical deficits which impact ability to perform ADLs (bathing, care of mouth/teeth, toileting, grooming, dressing, etc.)  - Assess/evaluate cause of self-care deficits   - Assess range of motion  - Assess patient's mobility; develop plan if impaired  - Assess patient's need for assistive devices and provide as appropriate  - Encourage maximum independence but intervene and supervise when necessary  - Involve family in performance of ADLs  - Assess for home care needs following discharge   - Consider OT consult to assist with ADL evaluation and planning for discharge  - Provide patient education as appropriate  Outcome: Progressing  Goal: Maintains/Returns to pre admission functional level  Description: INTERVENTIONS:  - Perform AM-PAC 6 Click Basic Mobility/ Daily Activity  assessment daily.  - Set and communicate daily mobility goal to care team and patient/family/caregiver.   - Collaborate with rehabilitation services on mobility goals if consulted  - Perform Range of Motion  times a day.  - Reposition patient every  hours.  - Dangle patient  times a day  - Stand patient  times a day  - Ambulate patient  times a day  - Out of bed to chair  times a day   - Out of bed for meal times a day  - Out of bed for toileting  - Record patient progress and toleration of activity level   Outcome: Progressing     Problem: DISCHARGE PLANNING  Goal: Discharge to home or other facility with appropriate resources  Description: INTERVENTIONS:  - Identify barriers to discharge w/patient and caregiver  - Arrange for needed discharge resources and transportation as appropriate  - Identify discharge learning needs (meds, wound care, etc.)  - Arrange for interpretive services to assist at discharge as needed  - Refer to Case Management Department for coordinating discharge planning if the patient needs post-hospital services based on physician/advanced practitioner order or complex needs related to functional status, cognitive ability, or social support system  Outcome: Progressing     Problem: Knowledge Deficit  Goal: Patient/family/caregiver demonstrates understanding of disease process, treatment plan, medications, and discharge instructions  Description: Complete learning assessment and assess knowledge base.  Interventions:  - Provide teaching at level of understanding  - Provide teaching via preferred learning methods  Outcome: Progressing     Problem: Neurological Deficit  Goal: Neurological status is stable or improving  Description: Interventions:  - Monitor and assess patient's level of consciousness, motor function, sensory function, and level of assistance needed for ADLs.   - Monitor and report changes from baseline. Collaborate with interdisciplinary team to initiate plan and implement  interventions as ordered.   - Provide and maintain a safe environment.  - Consider seizure precautions.  - Consider fall precautions.  - Consider aspiration precautions.  - Consider bleeding precautions.  Outcome: Progressing     Problem: Activity Intolerance/Impaired Mobility  Goal: Mobility/activity is maintained at optimum level for patient  Description: Interventions:  - Assess and monitor patient  barriers to mobility and need for assistive/adaptive devices.  - Assess patient's emotional response to limitations.  - Collaborate with interdisciplinary team and initiate plans and interventions as ordered.  - Encourage independent activity per ability.  - Maintain proper body alignment.  - Perform active/passive rom as tolerated/ordered.  - Plan activities to conserve energy.  - Turn patient as appropriate  Outcome: Progressing     Problem: Communication Impairment  Goal: Ability to express needs and understand communication  Description: Assess patient's communication skills and ability to understand information.  Patient will demonstrate use of effective communication techniques, alternative methods of communication and understanding even if not able to speak.     - Encourage communication and provide alternate methods of communication as needed.  - Collaborate with case management/ for discharge needs.  - Include patient/family/caregiver in decisions related to communication.  Outcome: Progressing     Problem: Potential for Aspiration  Goal: Non-ventilated patient's risk of aspiration is minimized  Description: Assess and monitor vital signs, respiratory status, and labs (WBC).  Monitor for signs of aspiration (tachypnea, cough, rales, wheezing, cyanosis, fever).    - Assess and monitor patient's ability to swallow.  - Place patient up in chair to eat if possible.  - HOB up at 90 degrees to eat if unable to get patient up into chair.  - Supervise patient during oral intake.   - Instruct patient/  family to take small bites.  - Instruct patient/ family to take small single sips when taking liquids.  - Follow patient-specific strategies generated by speech pathologist.  Outcome: Progressing  Goal: Ventilated patient's risk of aspiration is minimized  Description: Assess and monitor vital signs, respiratory status, airway cuff pressure, and labs (WBC).  Monitor for signs of aspiration (tachypnea, cough, rales, wheezing, cyanosis, fever).    - Elevate head of bed 30 degrees if patient has tube feeding.  - Monitor tube feeding.  Outcome: Progressing     Problem: Nutrition  Goal: Nutrition/Hydration status is improving  Description: Monitor and assess patient's nutrition/hydration status for malnutrition (ex- brittle hair, bruises, dry skin, pale skin and conjunctiva, muscle wasting, smooth red tongue, and disorientation). Collaborate with interdisciplinary team and initiate plan and interventions as ordered.  Monitor patient's weight and dietary intake as ordered or per policy. Utilize nutrition screening tool and intervene per policy. Determine patient's food preferences and provide high-protein, high-caloric foods as appropriate.     - Assist patient with eating.  - Allow adequate time for meals.  - Encourage patient to take dietary supplement as ordered.  - Collaborate with clinical nutritionist.  - Include patient/family/caregiver in decisions related to nutrition.  Outcome: Progressing

## 2024-07-24 NOTE — SEDATION DOCUMENTATION
Stroke alert completed by Dr. Akers, anesthesia present for procedure. Left groin access site closed with angioseal, site dressed with guaze and tegaderm. Bedrest start time 1905. Pt in IR, waiting ICU bed vs PACU.    In dept: 1817  In room:1827  Access: 1852  1st pass: 1855  Recan: 1857       impairments found

## 2024-07-24 NOTE — BRIEF OP NOTE (RAD/CATH)
IR STROKE ALERT Procedure Note    PATIENT NAME: Brianna Baum  : 1950  MRN: [id number]    Pre-op Diagnosis:   1. Cerebrovascular accident (CVA), unspecified mechanism (HCC)      Post-op Diagnosis:   1. Cerebrovascular accident (CVA), unspecified mechanism (HCC)        Surgeon:   Guy Akers MD  Assistants:     No qualified resident was available, Resident is only observing    Estimated Blood Loss: 50 cc  Findings:   Left M1 occlusion    PUNCTURE: 18:52  FIRST PASS: 18:55 , ADAPT  RECAN: 18:56 , TICI 3    Left femoral sheath removed, closure device deployed.       Specimens: none    Complications:  none    Anesthesia: MAC sedation    Guy Akers MD     Date: 2024  Time: 7:11 PM

## 2024-07-24 NOTE — PROGRESS NOTES
[facility]  Interval Progress Note: Critical Care  Name: Brianna Baum I  MRN: [id number]  Unit/Bed#: PPHP 722-01 I Date of Admission: 2024   Date of Service: 2024 I Hospital Day: 2    Interval Events:        Patient when trasnferred to SD1 P7 floor had a sudden change in mental status and was aphasic , having non purposeful movements in LUE, and R facial droop.  BP was in 130s          1a  Level of consciousness: 2=not alert, requires repeated stimulation to attend, or is obtunded and requires strong or painful stimulation to make movements (not stereotyped)   1b. LOC questions:  2=Performs neither task correctly   1c. LOC commands: 0=Performs both tasks correctly   2.  Best Gaze: 2=forced deviation, or total gaze paresis not overcome by oculocephalic maneuver   3.  Visual: 0=No visual loss   4. Facial Palsy: 2=Partial paralysis (total or near total paralysis of the lower face)   5a.  Motor left arm: 1=Drift, limb holds 90 (or 45) degrees but drifts down before full 10 seconds: does not hit bed   5b.  Motor right arm: 3=No effort against gravity, limb falls   6a. motor left le=Some effort against gravity, limb cannot get to or maintain (if cured) 90 (or 45) degrees, drifts down to bed, but has some effort against gravity   6b  Motor right leg:  3=No effort against gravity, limb falls   7. Limb Ataxia: UN = Untestable (amputation, fused joint)   8.  Sensory: 0=Normal; no sensory loss   9. Best Language:  3=Mute, global aphasia; no usable speech or auditory comprehension   10. Dysarthria: 2=Severe; patient speech is so slurred as to be unintelligible in the absence of or our of proportion to any dysphagia, or is mute/anarthric   11. Extinction and Inattention: 0=No abnormality   12. Distal motor function: 0=Normal    Total:   22            Pertinent New Data:   blood pressure, pulse, temperature, respirations, and pulse oximetry  Arterial Line  [place] BP    No  "data recorded   MAP    No data recorded        I have personally reviewed pertinent lab results., CBC:   Lab Results   Component Value Date    WBC 14.20 (H) 07/24/2024    HGB 13.7 07/24/2024    HCT 42.1 07/24/2024    MCV 95 07/24/2024     07/24/2024    RBC 4.43 07/24/2024    MCH 30.9 07/24/2024    MCHC 32.5 07/24/2024    RDW 13.0 07/24/2024    MPV 11.0 07/24/2024   , CMP:   Lab Results   Component Value Date    SODIUM 142 07/24/2024    K 4.3 07/24/2024     07/24/2024    CO2 26 07/24/2024    BUN 7 07/24/2024    CREATININE 0.84 07/24/2024    CALCIUM 8.8 07/24/2024    EGFR 68 07/24/2024   , ABG: No results found for: \"PHART\", \"ASF9LNY\", \"PO2ART\", \"VPC3ZOB\", \"O8NDJLRB\", \"BEART\", \"SOURCE\"  CTH and CTA H/NI have personally reviewed pertinent reports.   and I have personally reviewed pertinent films in PACS      Assessment and Plan  Diagnosis: L M1 occlusion   New onset stroke  No TNK given just had stroke 2 days ago and s/p TNK and thrombectomy   LKW 4pm 07/24  Plan:   Stroke alert and endovascular alert called for possible thrombectomy     Billing Level:  Critical Care Time Statement: Upon my evaluation, this patient had a high probability of imminent or life-threatening deterioration due to new onset stroke, which required my direct attention, intervention, and personal management.  I spent a total of 45 minutes directly providing critical care services, including interpretation of complex medical databases, evaluating for the presence of life-threatening injuries or illnesses, management of organ system failure(s) , and complex medical decision making (to support/prevent further life-threatening deterioration).. This time is exclusive of procedures, teaching, family meetings, and any prior time recorded by providers other than myself.      SIGNATURE: Flaco Thompson, DO  "

## 2024-07-24 NOTE — PROGRESS NOTES
"[facility]  Progress Note: Critical Care  Name: Brianna Baum 74 y.o. female I MRN: [id number]  Unit/Bed#: ICU 03 I Date of Admission: 7/22/2024   Date of Service: 7/24/2024 I Hospital Day: 2    Significant 24hr Events     HPI/24hr events: Brianna Baum is a 74 y.o. with PMHx AFib (new dx - was placed on cardiazem and recommended Eliquis initiation but pt did not start because of cost), LE edema, GERD, chronic back pain who presented to [facility] on 7/22 to [facility] with dizziness/confusion. Stroke alert called. CTH negative. CTA showed Left M1 occlusion. NIH 7 with worsening sx. TNK given at 2152 on 7/22. Noted to be in afib/aflutter that night. Endovascular alerted. Pt transferred to [facility]. Thrombectomy attempted with no change and non-occlusive lesion remained. Pt admitted to the NICU post-thrombectomy.     Overnight, the patient completed a 24 hour post TNK MRI that showed \"stable acute infarct centered in the left basal ganglia with superimposed petechial hemorrhage. No significant mass effect.\" Pt remained on phenylephrine infusion maxed at 180mcg/min to achieve SBP goal of 160-180. Pt has persistent right eyelid droop and right mouth droop. UA with micro showed trace leuks and WBC 4-10.     Assessment & Plan   Neuro:   Diagnosis: Acute Left MCA CVA s/p TNK  7/22  CTA: focal occlusion of distal Left M1 at the bifurcation  CTH: No acute intracranial abnormality  S/p TNK @2152  7/23   24hr post-TNK MRI head: \"stable acute infarct centered in the left basal ganglia with superimposed petechial hemorrhage. No significant mass effect.\"  Lipid panel: cholesterol 187, triglycerides 50, HDL 57, ; Hgb A1c: 5.8%  TTE: LVEF 55%, no PFO, MV mild regurg, TV mild regurg, PV mild regurg  Plan:   Q1 neuro checks  ASA 81mg PO once daily to start today  Continue lipitor   Neurology following - recs appreciated  Neurosurgery following - recs appreciated  Discuss starting DVT ppx today  SBP " goal 160-180mmHg until 1000 today; Supa infusion to achieve goal  Fall precautions  NIH score at discharge  PT/OT - pending eval  Stroke education  BG control 110-180    CV:   Diagnosis: Afib  7/18 new onset afib ntoed by PCP during visit for LE edema. Pt was prescribed Cardizem at that time. Eliquis also prescribed but was never started due to cost.   Plan:  Continue home cardizem   Discuss long-term AC plan pending stability of 24hr post-TNK imaging  Holding home lasix in the setting of SBP goals    Pulm:  No active issues  - Promote IS, deep breathing, OOB as tolerated, continuous pulse oximetry    GI:   Diagnosis: GERD  Plan: continue home PPI    :   No active issues  - Guillen in place  - Strict I&Os  - Daily BMP, electrolytes    F/E/N:   F: n/a  E: Monitor and replete to maintain K >4, Phos >3, Mag >2  N: regular diet    Heme/Onc:   No active issues    Endo:   No active issues    ID:   No active issues    MSK/Skin:   No active issues    Disposition: Critical care    ICU Core Measures     A: Assess, Prevent, and Manage Pain Has pain been assessed? Yes  Need for changes to pain regimen? No   B: Both SAT/SAT  N/A   C: Choice of Sedation RASS Goal: N/A patient not on sedation  Need for changes to sedation or analgesia regimen? NA   D: Delirium CAM-ICU: Negative   E: Early Mobility  Plan for early mobility? Yes   F: Family Engagement Plan for family engagement today? Yes       Prophylaxis:  VTE VTE covered by:    None       Stress Ulcer  covered byesomeprazole (NexIUM) 40 MG capsule [[id number]] (Long-Term Med), pantoprazole (PROTONIX) EC tablet 40 mg [[id number]]        Subjective     Review of Systems: Review of Systems   Constitutional: Negative.    HENT: Negative.     Eyes: Negative.    Respiratory: Negative.     Cardiovascular: Negative.    Gastrointestinal: Negative.    Endocrine: Negative.    Genitourinary: Negative.    Musculoskeletal: Negative.    Skin: Negative.    Neurological:  Positive for headaches (mild  b/l frontal headache).   Psychiatric/Behavioral: Negative.          Objective                            Vitals I/O      Most Recent Min/Max in 24hrs   Temp 98 °F (36.7 °C) Temp  Min: 97.8 °F (36.6 °C)  Max: 98.7 °F (37.1 °C)   Pulse 64 Pulse  Min: 56  Max: 84   Resp 20 Resp  Min: 14  Max: 32   /57 BP  Min: 121/54  Max: 180/81   O2 Sat 94 % SpO2  Min: 94 %  Max: 98 %      Intake/Output Summary (Last 24 hours) at 7/24/2024 0323  Last data filed at 7/24/2024 0201  Gross per 24 hour   Intake 1801.85 ml   Output 2550 ml   Net -748.15 ml       Diet Regular; Regular House    Invasive Monitoring           Physical Exam   Physical Exam  Vitals and nursing note reviewed.   Eyes:      General: Vision grossly intact. Neglect     Extraocular Movements: Extraocular movements intact.      Conjunctiva/sclera: Conjunctivae normal.      Pupils: Pupils are equal, round, and reactive to light.      Comments: Right eyelid droop   Skin:     General: Skin is warm and dry.   HENT:      Head: Normocephalic and atraumatic.      Right Ear: Hearing normal.      Left Ear: Hearing normal.      Nose: No nasal deformity, congestion or rhinorrhea.   Neck:      Vascular: No JVD.   no midline tenderness Cardiovascular:      Rate and Rhythm: Normal rate and regular rhythm.      Pulses: Normal pulses.      Heart sounds: Normal heart sounds.   Musculoskeletal:         General: Normal range of motion.      Right lower leg: No edema.      Left lower leg: No edema.   Abdominal: General: Bowel sounds are normal. There is no distension.      Palpations: Abdomen is soft.      Tenderness: There is no abdominal tenderness.   Constitutional:       General: She is not in acute distress.     Appearance: She is well-developed and well-nourished. She is not ill-appearing.   Pulmonary:      Effort: Pulmonary effort is normal. No respiratory distress.      Breath sounds: Normal breath sounds. No wheezing, rhonchi or rales.   Psychiatric:         Speech: Speech  "is not no receptive aphasia or no expressive aphasia.   Neurological:      Mental Status: She is alert and oriented to person, place and time. Mental status is at baseline. She is CAM ICU negative.      Cranial Nerves: Facial asymmetry (right eyelid and right mouth droop present) present. No dysarthria.      Sensory: Sensation is intact.        Corneal reflex present, cough reflex, gag reflex intact and no clonus.      Comments: LUE/LLE: 5/5 strength throughout. RUE/RLE: 4/5 strength throughout.    Genitourinary/Anorectal:  Guillen present.          Diagnostic Studies      [id number]  \"Atrial flutter with rapid ventricular response  Nonspecific ST and T wave abnormality  Abnormal ECG  Confirmed by Arnold Clemente ([id number]) on 2024 8:53:34 AM\"       Imagin2024 MRI Head without:  \"Narrative & Impression   MRI BRAIN WITHOUT CONTRAST     INDICATION: Acute stroke.     COMPARISON:   2024.     TECHNIQUE:  Multiplanar, multisequence imaging of the brain was performed.        IMAGE QUALITY:  Diagnostic.     FINDINGS:     BRAIN PARENCHYMA: Foci of restricted diffusion and FLAIR hyperintensity in the left basal ganglia, left frontal operculum and subinsular white matter. Superimposed susceptibility artifact consistent with petechial hemorrhage.     Periventricular and subcortical T2/FLAIR hyperintense foci consistent with microangiopathic disease..     VENTRICLES: No hydrocephalus or extra-axial collection.     SELLA AND PITUITARY GLAND:  Normal.     ORBITS: Intact.     PARANASAL SINUSES: Clear.     VASCULATURE:  Evaluation of the major intracranial vasculature demonstrates appropriate flow voids.     CALVARIUM AND SKULL BASE: No osseous lesion.     EXTRACRANIAL SOFT TISSUES:  Normal.     IMPRESSION:     Stable acute infarct centered in the left basal ganglia with superimposed petechial hemorrhage. No significant mass effect.     The study was marked in EPIC for immediate notification.     Workstation " "performed: [id number]\"      I have personally reviewed pertinent reports.   and I have personally reviewed pertinent films in PACS     Medications:  Scheduled PRN   vitamin C, 1,000 mg, Daily  aspirin, 81 mg, Daily  atorvastatin, 40 mg, QPM  chlorhexidine, 15 mL, Q12H ANETA  lidocaine, 1 patch, Daily  pantoprazole, 40 mg, Daily Before Breakfast      acetaminophen, 650 mg, Q6H PRN  hydrALAZINE, 10 mg, Q4H PRN  oxyCODONE, 2.5 mg, Q4H PRN       Continuous    phenylephine,  mcg/min, Last Rate: 180 mcg/min (07/24/24 0145)         Labs:    CBC    Recent Labs     07/22/24 2057 07/23/24  0454   WBC 7.09 10.82*   HGB 13.2 13.1   HCT 41.4 39.2    226     BMP    Recent Labs     07/22/24 2057 07/23/24  0454   SODIUM 139 141   K 4.1 4.6    109*   CO2 29 24   AGAP 8 8   BUN 11 9   CREATININE 0.96 0.73   CALCIUM 9.6 8.2*       Coags    Recent Labs     07/22/24 2057   INR 1.01   PTT 24        Additional Electrolytes  No recent results       Blood Gas    No recent results  No recent results LFTs  No recent results    Infectious  No recent results  Glucose  Recent Labs     07/22/24 2057 07/23/24  0454   GLUC 107 163*               LADONNA Mckinney  "

## 2024-07-24 NOTE — SPEECH THERAPY NOTE
Speech-Language Pathology Note      Patient Name: Brianna Baum    Today's Date: 7/24/2024    F/u re: possible aphasia. Per discussion with RN pt is much more verbal today, initiating and maintaining conversation. She has also been tolerating regular diet, thin liquids, and meds without s/s aspiration. No additional ST f/u needed at this time. Please re consult with any new changes or concerns.

## 2024-07-24 NOTE — ANESTHESIA PREPROCEDURE EVALUATION
Procedure:  IR STROKE ALERT    Relevant Problems   CARDIO   (+) Hypertension   (+) Mixed hyperlipidemia      GI/HEPATIC   (+) Gastroesophageal reflux disease without esophagitis      /RENAL   (+) CKD (chronic kidney disease) stage 2, GFR 60-89 ml/min      MUSCULOSKELETAL   (+) Chronic bilateral low back pain with bilateral sciatica   (+) Primary osteoarthritis of left hand      NEURO/PSYCH   (+) Cerebrovascular accident (CVA) due to occlusion of left middle cerebral artery (HCC)   (+) Chronic bilateral low back pain with bilateral sciatica   (+) Chronic neck pain   (+) Continuous opioid dependence (HCC)   (+) Situational anxiety        Physical Exam    Airway    Mallampati score: I  TM Distance: >3 FB  Neck ROM: full     Dental       Cardiovascular  Cardiovascular exam normal    Pulmonary  Pulmonary exam normal     Other Findings  post-pubertal.      Anesthesia Plan  ASA Score- 4 Emergent    Anesthesia Type- IV sedation with anesthesia with ASA Monitors.         Additional Monitors:     Airway Plan:            Plan Factors-Exercise tolerance (METS): >4 METS.    Chart reviewed. EKG reviewed. Imaging results reviewed. Existing labs reviewed. Patient summary reviewed.                  Induction- intravenous.    Postoperative Plan- Plan for postoperative opioid use. Planned trial extubation    Perioperative Resuscitation Plan - Level 1 - Full Code.       Informed Consent- Anesthetic plan and risks discussed with patient.  I personally reviewed this patient with the CRNA. Discussed and agreed on the Anesthesia Plan with the CRNA..

## 2024-07-24 NOTE — CONSULTS
Stroke Alert- Neurology Overnight Note    Patient seen and examined by the overnight resident as a stroke alert.   Preliminary recommendations are as outlined below. Formal consultation to follow in the morning and patient will be seen by the Neurology service.     Name: Brianna Baum   Age & Sex: 74 y.o. female   MRN: [id number]  Unit/Bed#: [facility] 722-01   Encounter: [id number]  Length of Stay: 2    Assessment plan:   74 y.o. female with w/ hx of newly diagnosed afib, recent L MCA territory stroke (7/22/24) due to distal L M1 thrombus s/p TNK and an unsuccessful thrombectomy complicated by petechial hemorrhage evaluated as a stroke alert on 7/24 at 6:12PM.   Pt was noted to be worsened aphasia and have more pronounced deficits on the R side when evaluated by nursing team. NIH 27 as compared to 22 earlier today.   Last seen at her baseline around 5:20PM by nursing team.  Blood Pressure: 133/66 . Pulse 60s. Glucose 141.    STAT NCCTH showed hyperdense prox L M1, L BG acute ischemic stroke, CTA h/n showed prox L M1 long segment thrombus (much worse than prior CTAs).   Endovascular alert was activated upon detection of clot.   Not a TNK candidate due to: recent stroke s/p TNK, thrombectomy.         IMP: L MCA syndrome with L M1 clot resulting in R hemiplegia, global aphasia. Etiology likely cardioembolic given afib.     Plan:  Stroke pathway, discussed w/ Dr. Mccracken .   Further care dependent on clinical course pending procedure.  SBP goals per NCC, Neuro IR s/p procedure   Atorvastatin 40 mg   Goal euglycemia, normothermia  MRI or repeat head CT in 6-12 hours if unable to get timely MRI.   Neuro checks- Every 1 hour x 4 hours, then every 2 hours x 4, then every 4 hours x 72 hours     Neurovascular checks- every 15m x 2 hours then 30m x 6 hours then every 1 hr x 16 hours    Monitor on telemetry  DVT pharmacologic ppx, SCDs.  Medical management as per critical care appreciated  PT/OT/Speech/PMR consults appreciated when  able    Recommendations for outpatient neurological follow up have yet to be determined.       Subjective:    Reason for Consult / Principal Problem:  stroke alert  Stroke alert called: 6:13PM   Neurology stroke alert response: Immediate  Hx and PE limited by:  aphasia   Time last known well: 5:20PM    Review of previous medical records was completed as available.     HPI: Brianna Baum is a 74 y.o. (handedness not determinable) female w/ hx of newly diagnosed afib, recent L MCA territory due to LMCA  distal L M1 thrombus s/p TNK and an unsuccessful thrombectomy, subsequent petechial hemorrhage evaluated as a stroke alert on 7/24 at 6:12PM.  LKW 5:20PM.    No anti-thrombotics use due to petechial hemorrhage on recent scans.     Pt was noted to be worsened aphasia and have more pronounced deficits on the R side when evaluated by nursing team.   She was last seen at her baseline around 5:20PM by nursing team. Baseline NIH: 22.  Blood Pressure: 133/66 . Pulse 60s. Glucose 141.  NIHSS 27 (see below).   STAT NCCTH showed hyperdense prox L M1, L BG acute ischemic stroke, CTA h/n showed prox L M1 long segment thrombus (much worse than prior CTAs).   Not a TNK candidate due to: recent stroke s/p TNK, thrombectomy.   Endovascular alert was activated upon detection of clot.     Neurology consulted for workup, management of stroke.     Consults    Historical Information   Past Medical History:   Diagnosis Date    Arthritis     Chronic pain     Colon polyp     Diverticulitis     GERD (gastroesophageal reflux disease)     Hypertension     Vertigo      Past Surgical History:   Procedure Laterality Date    CHOLECYSTECTOMY      CHOLECYSTECTOMY LAPAROSCOPIC N/A 12/06/2021    Procedure: SUBTOTAL CHOLECYSTECTOMY LAPAROSCOPIC;  Surgeon: Víctor Viveros MD;  Location:  MAIN OR;  Service: General    COLONOSCOPY      GALLBLADDER SURGERY  12/2021    Dr. Viveros    TUBAL LIGATION       Social History   Social History     Substance and  Sexual Activity   Alcohol Use Yes    Comment: 3 times a week     Social History     Substance and Sexual Activity   Drug Use Yes    Types: Marijuana    Comment: rarely/ last used 2 days ago     E-Cigarette/Vaping    E-Cigarette Use Never User      E-Cigarette/Vaping Substances     Social History     Tobacco Use   Smoking Status Never   Smokeless Tobacco Never     Family History:   Family History   Problem Relation Age of Onset    Stroke Mother     Diabetes Mother     Hypertension Mother     Heart disease Father     Hypertension Father     Heart disease Brother     Diabetes Brother     Hypertension Brother     Substance Abuse Neg Hx     Mental illness Neg Hx      Meds/Allergies   all current active meds have been reviewed, current meds:   Current Facility-Administered Medications   Medication Dose Route Frequency    acetaminophen (TYLENOL) tablet 650 mg  650 mg Oral Q6H PRN    ascorbic acid (VITAMIN C) tablet 1,000 mg  1,000 mg Oral Daily    [START ON 7/25/2024] aspirin chewable tablet 81 mg  81 mg Oral Daily    atorvastatin (LIPITOR) tablet 40 mg  40 mg Oral QPM    diltiazem (CARDIZEM) tablet 30 mg  30 mg Oral Q8H ANETA    [START ON 7/25/2024] enoxaparin (LOVENOX) subcutaneous injection 40 mg  40 mg Subcutaneous Q24H [facility]    lidocaine (LIDODERM) 5 % patch 1 patch  1 patch Topical Daily    lidocaine (PF) (XYLOCAINE-MPF) 1 % injection    PRN    midodrine (PROAMATINE) tablet 10 mg  10 mg Oral TID AC    multi-electrolyte (PLASMALYTE-A/ISOLYTE-S PH 7.4) IV solution  100 mL/hr Intravenous Continuous    oxyCODONE (ROXICODONE) split tablet 2.5 mg  2.5 mg Oral Q4H PRN    pantoprazole (PROTONIX) EC tablet 40 mg  40 mg Oral Daily Before Breakfast    phenylephrine (KENYA-SYNEPHRINE) 50 mg (STANDARD CONCENTRATION) in sodium chloride 0.9% 250 mL   mcg/min Intravenous Titrated    polyethylene glycol (MIRALAX) packet 17 g  17 g Oral Daily   , and PTA meds:   Prior to Admission Medications   Prescriptions Last Dose Informant  Patient Reported? Taking?   Ascorbic Acid (VITAMIN C) 1000 MG tablet  Self Yes No   Sig: Take 1,000 mg by mouth daily   Biotin 10 MG CAPS  Self Yes No   Sig: Take by mouth in the morning   HYDROcodone-acetaminophen (Norco) 5-325 mg per tablet   No No   Sig: Take 1 tablet by mouth every 8 (eight) hours as needed for pain Max Daily Amount: 3 tablets   apixaban (Eliquis) 5 mg   No No   Sig: Take 1 tablet (5 mg total) by mouth 2 (two) times a day   celecoxib (CeleBREX) 200 mg capsule  Self No No   Sig: Take 1 capsule (200 mg total) by mouth daily   diltiazem (CARDIZEM CD) 180 mg 24 hr capsule   No No   Sig: Take 1 capsule (180 mg total) by mouth daily   esomeprazole (NexIUM) 40 MG capsule  Self No No   Sig: TAKE 1 CAPSULE DAILY   furosemide (LASIX) 20 mg tablet  Self No No   Sig: Take 1 tablet (20 mg total) by mouth daily      Facility-Administered Medications: None     Allergies   Allergen Reactions    Tramadol Hives         ROS: see HPI    Objective:     Patient ID: Brianna Baum is a 74 y.o. female.    Vitals:   Vitals:    24 1400 24 1500 24 1600 24 1742   BP: 160/70 120/58 117/56 135/60   Pulse: 60 60 66 62   Resp: 20 20 22    Temp:   98.2 °F (36.8 °C)    TempSrc:   Oral    SpO2: 98% 97% 98%    Weight:       Height:          Body mass index is 30.62 kg/m².     Intake/Output Summary (Last 24 hours) at 2024 1917  Last data filed at 2024 1905  Gross per 24 hour   Intake 2262.17 ml   Output 3298 ml   Net -1035.83 ml       Temperature:   Temp (24hrs), Av.4 °F (36.9 °C), Min:98 °F (36.7 °C), Max:98.9 °F (37.2 °C)    Temperature: 98.2 °F (36.8 °C)    Invasive Devices:   Invasive Devices       Peripheral Intravenous Line  Duration             Peripheral IV 24 Dorsal (posterior);Left Wrist <1 day    Peripheral IV 24 Right;Ventral (anterior) Forearm <1 day              Drain  Duration             Closed/Suction Drain Right;Lateral RLQ Bulb 15 Fr. 961 days    External Urinary  Catheter 1 day                    Physical Exam   General: Ill appearing, aphasic    Neurologic Exam   MS: Requires stimulation/strong or painful stimulus to make movements. Globally aphasic.  Follows no commands.   CN: Absent blink to threat on RIGHT. eyes deviated to the left, but can be overcome. No nystagmus on primary gaze. Mild R central droop.   MOT: Flaccid on RUE, RLE. RUE- No movement (extensor posturing noted), RLE- withdraws minimally. No abnormal movements.    REFL: No clonus.   CEREB:Cannot test       NIH Stroke Scale    1a.Level of Consciousness 2- Requires repeated stimulation/strong or painful stimulus to make movements     1b. LOC Questions: Month, Age 2- Answers neither month nor age correctly     1c. LOC Commands 2- Performs neither task      2. Best Gaze: Horizontal eye movements 1- Partial gaze palsy: eyes deviated to the left, but can be overcome     3. Visual  2- Complete hemianopia- absent blink to threat on RIGHT      4. Facial Palsy 1- Minor paralysis - mild R central droop      5. Motor Arm RIGHT ARM  4- No movement (extensor posturing noted)     LEFT ARM  2- Some effort against gravity- leg falls by end of 5s, but has some anti gravity effort. Hits bed. (non purposeful movements noted upon noxious stim)    6. Motor Leg RIGHT LEG  3- No effort against gravity (withdraws minimally)    LEFT LEG  3- No effort against gravity     7. Limb Ataxia  0- Absent      8. Sensory 1- Mild-moderate (patient feels pinprick is less sharp/dull on affected RIGHT side, but aware of being touched)     9. Best Language  3-  Mute, global aphasia; no usable speech or auditory comprehension.      10. Dysarthria   2- Severe dysarthria; patient's speech is so slurred as to be unintelligible in the absence of or out of proportion to any dysphasia, or is mute/anarthric.      11. Extinction and Inattention (formerly Neglect) 0- No abnormality.        TOTAL SCORE: 27    Time NIHSS was completed: 6:17PM       Modified  Story Score:  5 (Severe disability; bedridden, incontinent and requiring constant nursing care and attention)- after the most recent stroke     Labs: I have personally reviewed pertinent reports.    Results from last 7 days   Lab Units 07/24/24  0519 07/23/24 0454 07/22/24 2057   WBC Thousand/uL 14.20* 10.82* 7.09   HEMOGLOBIN g/dL 13.7 13.1 13.2   HEMATOCRIT % 42.1 39.2 41.4   PLATELETS Thousands/uL 238 226 196   SEGS PCT %  --  79*  --    MONO PCT %  --  5  --    EOS PCT %  --  0  --       Results from last 7 days   Lab Units 07/24/24  1503 07/24/24  0519 07/23/24 0454   POTASSIUM mmol/L 4.3 3.5 4.6   CHLORIDE mmol/L 106 106 109*   CO2 mmol/L 26 24 24   BUN mg/dL 7 8 9   CREATININE mg/dL 0.84 0.69 0.73   CALCIUM mg/dL 8.8 9.7 8.2*     Results from last 7 days   Lab Units 07/24/24  1503 07/24/24  0519   MAGNESIUM mg/dL 2.7 2.1     Results from last 7 days   Lab Units 07/24/24  0519   PHOSPHORUS mg/dL 3.1      Results from last 7 days   Lab Units 07/24/24  0519 07/22/24 2057   INR  1.09 1.01   PTT seconds  --  24               Imaging and diagnostic studies:    Radiology Results: I have personally reviewed pertinent reports.   and I have personally reviewed pertinent films in PACS  CTA head and neck w wo contrast   Final Result by Ismael Bianchi MD (07/24 1850)         1. Findings compatible with acute infarct centered in the left basal ganglia. No evidence of hemorrhagic conversion or mass effect.   2. Abrupt cut off of the proximal left M1 segment with corresponding density on the noncontrast head CT suggesting occlusive thrombus throughout the M1 segment. Reasonable collateral flow in distal MCA branches.   3. No hemodynamically significant stenosis within the carotid or vertebral arteries.                  Workstation performed: [id number]         CT head wo contrast   Final Result by Chan Lin MD (07/24 8142)      Evolving acute to subacute infarct in the left basal ganglia. There is  evidence of superimposed petechial hemorrhage which is best seen on the recent MRI. There is no evidence for a discrete hematoma.                  Resident: Corinna Guzman      I, the attending radiologist, have reviewed the images and agree with the final report above.      Workstation performed: [id number]         MRI brain wo contrast   Final Result by Ismael Bianchi MD (07/24 0230)      Stable acute infarct centered in the left basal ganglia with superimposed petechial hemorrhage. No significant mass effect.      The study was marked in EPIC for immediate notification.      Workstation performed: [id number]         CT stroke alert brain   Final Result by Thomas Castillo MD (07/23 1302)      Area of low-attenuation within the left putamen, indicative of a developing acute infarct in this region. No associated hemorrhage is noted. No significant mass effect is identified.      Left middle cerebral artery distal M1/bifurcation occlusion is no longer visualized compatible with interval thrombectomy. Previous short segment occlusions of multiple left middle cerebral artery M2 branches at their origins also appear to have    resolved.      No new large vessel occlusion, or high-grade stenosis identified on CT angiogram of the head.      2 mm infundibulum again demonstrated at the origin of the left posterior communicating artery, unchanged.      No hemodynamically significant stenosis or dissection is identified on CT angiogram of the neck.      Findings were directly discussed with Dr. Palladino by Dr. Castillo on 7/23/2024, at 1:00 p.m.      Workstation performed: [id number]         CTA stroke alert (head/neck)   Final Result by Thomas Castillo MD (07/23 1302)      Area of low-attenuation within the left putamen, indicative of a developing acute infarct in this region. No associated hemorrhage is noted. No significant mass effect is identified.      Left middle cerebral artery distal M1/bifurcation occlusion is no  longer visualized compatible with interval thrombectomy. Previous short segment occlusions of multiple left middle cerebral artery M2 branches at their origins also appear to have    resolved.      No new large vessel occlusion, or high-grade stenosis identified on CT angiogram of the head.      2 mm infundibulum again demonstrated at the origin of the left posterior communicating artery, unchanged.      No hemodynamically significant stenosis or dissection is identified on CT angiogram of the neck.      Findings were directly discussed with Dr. Palladino by Dr. Castillo on 7/23/2024, at 1:00 p.m.      Workstation performed: [id number]         IR stroke alert    (Results Pending)   IR stroke alert    (Results Pending)       Other Diagnostic Testing: I have personally reviewed pertinent reports.      Active medications:  Current Facility-Administered Medications   Medication Dose Route Frequency    acetaminophen (TYLENOL) tablet 650 mg  650 mg Oral Q6H PRN    ascorbic acid (VITAMIN C) tablet 1,000 mg  1,000 mg Oral Daily    [START ON 7/25/2024] aspirin chewable tablet 81 mg  81 mg Oral Daily    atorvastatin (LIPITOR) tablet 40 mg  40 mg Oral QPM    diltiazem (CARDIZEM) tablet 30 mg  30 mg Oral Q8H ANETA    [START ON 7/25/2024] enoxaparin (LOVENOX) subcutaneous injection 40 mg  40 mg Subcutaneous Q24H ANETA    lidocaine (LIDODERM) 5 % patch 1 patch  1 patch Topical Daily    lidocaine (PF) (XYLOCAINE-MPF) 1 % injection    PRN    midodrine (PROAMATINE) tablet 10 mg  10 mg Oral TID AC    multi-electrolyte (PLASMALYTE-A/ISOLYTE-S PH 7.4) IV solution  100 mL/hr Intravenous Continuous    oxyCODONE (ROXICODONE) split tablet 2.5 mg  2.5 mg Oral Q4H PRN    pantoprazole (PROTONIX) EC tablet 40 mg  40 mg Oral Daily Before Breakfast    phenylephrine (KENYA-SYNEPHRINE) 50 mg (STANDARD CONCENTRATION) in sodium chloride 0.9% 250 mL   mcg/min Intravenous Titrated    polyethylene glycol (MIRALAX) packet 17 g  17 g Oral Daily       Prior  to Admission medications    Medication Sig Start Date End Date Taking? Authorizing Provider   apixaban (Eliquis) 5 mg Take 1 tablet (5 mg total) by mouth 2 (two) times a day 7/22/24   LADONNA Drew   Ascorbic Acid (VITAMIN C) 1000 MG tablet Take 1,000 mg by mouth daily    Historical Provider, MD   Biotin 10 MG CAPS Take by mouth in the morning    Historical Provider, MD   celecoxib (CeleBREX) 200 mg capsule Take 1 capsule (200 mg total) by mouth daily 2/26/24   LADONNA Drew   diltiazem (CARDIZEM CD) 180 mg 24 hr capsule Take 1 capsule (180 mg total) by mouth daily 7/18/24 1/14/25  LADONNA Drew   esomeprazole (NexIUM) 40 MG capsule TAKE 1 CAPSULE DAILY 1/16/24   LADONNA Drew   furosemide (LASIX) 20 mg tablet Take 1 tablet (20 mg total) by mouth daily 3/12/24   Ladan Fly, DO   HYDROcodone-acetaminophen (Norco) 5-325 mg per tablet Take 1 tablet by mouth every 8 (eight) hours as needed for pain Max Daily Amount: 3 tablets 7/18/24   LADONNA Drew         Code status and advanced directives:  Code Status: Level 1 - Full Code        ==  DINORAH Griffin  PGY-4, Neurology

## 2024-07-24 NOTE — PLAN OF CARE
Problem: Nutrition  Goal: Nutrition/Hydration status is improving  Description: Monitor and assess patient's nutrition/hydration status for malnutrition (ex- brittle hair, bruises, dry skin, pale skin and conjunctiva, muscle wasting, smooth red tongue, and disorientation). Collaborate with interdisciplinary team and initiate plan and interventions as ordered.  Monitor patient's weight and dietary intake as ordered or per policy. Utilize nutrition screening tool and intervene per policy. Determine patient's food preferences and provide high-protein, high-caloric foods as appropriate.     - Assist patient with eating.  - Allow adequate time for meals.  - Encourage patient to take dietary supplement as ordered.  - Collaborate with clinical nutritionist.  - Include patient/family/caregiver in decisions related to nutrition.  Outcome: Progressing

## 2024-07-25 ENCOUNTER — APPOINTMENT (INPATIENT)
Dept: RADIOLOGY | Facility: HOSPITAL | Age: 74
DRG: 023 | End: 2024-07-25
Payer: MEDICARE

## 2024-07-25 LAB
ALBUMIN SERPL BCG-MCNC: 3.9 G/DL (ref 3.5–5)
ALP SERPL-CCNC: 68 U/L (ref 34–104)
ALT SERPL W P-5'-P-CCNC: 30 U/L (ref 7–52)
ANION GAP SERPL CALCULATED.3IONS-SCNC: 10 MMOL/L (ref 4–13)
APTT PPP: 28 SECONDS (ref 23–37)
APTT PPP: 60 SECONDS (ref 23–37)
APTT PPP: 60 SECONDS (ref 23–37)
AST SERPL W P-5'-P-CCNC: 30 U/L (ref 13–39)
ATRIAL RATE: 135 BPM
ATRIAL RATE: 142 BPM
BASOPHILS # BLD AUTO: 0.03 THOUSANDS/ÂΜL (ref 0–0.1)
BASOPHILS NFR BLD AUTO: 0 % (ref 0–1)
BILIRUB SERPL-MCNC: 0.67 MG/DL (ref 0.2–1)
BUN SERPL-MCNC: 10 MG/DL (ref 5–25)
CA-I BLD-SCNC: 1.17 MMOL/L (ref 1.12–1.32)
CALCIUM SERPL-MCNC: 9.1 MG/DL (ref 8.4–10.2)
CHLORIDE SERPL-SCNC: 108 MMOL/L (ref 96–108)
CO2 SERPL-SCNC: 24 MMOL/L (ref 21–32)
CREAT SERPL-MCNC: 0.66 MG/DL (ref 0.6–1.3)
EOSINOPHIL # BLD AUTO: 0.04 THOUSAND/ÂΜL (ref 0–0.61)
EOSINOPHIL NFR BLD AUTO: 0 % (ref 0–6)
ERYTHROCYTE [DISTWIDTH] IN BLOOD BY AUTOMATED COUNT: 12.9 % (ref 11.6–15.1)
ERYTHROCYTE [DISTWIDTH] IN BLOOD BY AUTOMATED COUNT: 13 % (ref 11.6–15.1)
GFR SERPL CREATININE-BSD FRML MDRD: 87 ML/MIN/1.73SQ M
GLUCOSE SERPL-MCNC: 113 MG/DL (ref 65–140)
HCT VFR BLD AUTO: 41.1 % (ref 34.8–46.1)
HCT VFR BLD AUTO: 41.5 % (ref 34.8–46.1)
HGB BLD-MCNC: 13.6 G/DL (ref 11.5–15.4)
HGB BLD-MCNC: 13.6 G/DL (ref 11.5–15.4)
IMM GRANULOCYTES # BLD AUTO: 0.05 THOUSAND/UL (ref 0–0.2)
IMM GRANULOCYTES NFR BLD AUTO: 0 % (ref 0–2)
INR PPP: 1.13 (ref 0.84–1.19)
LYMPHOCYTES # BLD AUTO: 1.64 THOUSANDS/ÂΜL (ref 0.6–4.47)
LYMPHOCYTES NFR BLD AUTO: 13 % (ref 14–44)
MAGNESIUM SERPL-MCNC: 2.2 MG/DL (ref 1.9–2.7)
MCH RBC QN AUTO: 31 PG (ref 26.8–34.3)
MCH RBC QN AUTO: 31 PG (ref 26.8–34.3)
MCHC RBC AUTO-ENTMCNC: 32.8 G/DL (ref 31.4–37.4)
MCHC RBC AUTO-ENTMCNC: 33.1 G/DL (ref 31.4–37.4)
MCV RBC AUTO: 94 FL (ref 82–98)
MCV RBC AUTO: 95 FL (ref 82–98)
MONOCYTES # BLD AUTO: 0.88 THOUSAND/ÂΜL (ref 0.17–1.22)
MONOCYTES NFR BLD AUTO: 7 % (ref 4–12)
NEUTROPHILS # BLD AUTO: 10.1 THOUSANDS/ÂΜL (ref 1.85–7.62)
NEUTS SEG NFR BLD AUTO: 80 % (ref 43–75)
NRBC BLD AUTO-RTO: 0 /100 WBCS
PHOSPHATE SERPL-MCNC: 3.2 MG/DL (ref 2.3–4.1)
PLATELET # BLD AUTO: 202 THOUSANDS/UL (ref 149–390)
PLATELET # BLD AUTO: 240 THOUSANDS/UL (ref 149–390)
PMV BLD AUTO: 10.7 FL (ref 8.9–12.7)
PMV BLD AUTO: 11 FL (ref 8.9–12.7)
POTASSIUM SERPL-SCNC: 3.9 MMOL/L (ref 3.5–5.3)
PR INTERVAL: 0 MS
PR INTERVAL: 0 MS
PROT SERPL-MCNC: 6.5 G/DL (ref 6.4–8.4)
PROTHROMBIN TIME: 14.4 SECONDS (ref 11.6–14.5)
QRS AXIS: 22 DEGREES
QRS AXIS: 25 DEGREES
QRSD INTERVAL: 67 MS
QRSD INTERVAL: 67 MS
QT INTERVAL: 317 MS
QT INTERVAL: 333 MS
QTC INTERVAL: 476 MS
QTC INTERVAL: 484 MS
RBC # BLD AUTO: 4.39 MILLION/UL (ref 3.81–5.12)
RBC # BLD AUTO: 4.39 MILLION/UL (ref 3.81–5.12)
SODIUM SERPL-SCNC: 142 MMOL/L (ref 135–147)
T WAVE AXIS: 101 DEGREES
T WAVE AXIS: 265 DEGREES
VENTRICULAR RATE: 127 BPM
VENTRICULAR RATE: 135 BPM
WBC # BLD AUTO: 12.74 THOUSAND/UL (ref 4.31–10.16)
WBC # BLD AUTO: 14.36 THOUSAND/UL (ref 4.31–10.16)

## 2024-07-25 PROCEDURE — 84100 ASSAY OF PHOSPHORUS: CPT | Performed by: STUDENT IN AN ORGANIZED HEALTH CARE EDUCATION/TRAINING PROGRAM

## 2024-07-25 PROCEDURE — 80053 COMPREHEN METABOLIC PANEL: CPT | Performed by: STUDENT IN AN ORGANIZED HEALTH CARE EDUCATION/TRAINING PROGRAM

## 2024-07-25 PROCEDURE — 93010 ELECTROCARDIOGRAM REPORT: CPT | Performed by: INTERNAL MEDICINE

## 2024-07-25 PROCEDURE — 82330 ASSAY OF CALCIUM: CPT | Performed by: STUDENT IN AN ORGANIZED HEALTH CARE EDUCATION/TRAINING PROGRAM

## 2024-07-25 PROCEDURE — 85025 COMPLETE CBC W/AUTO DIFF WBC: CPT | Performed by: STUDENT IN AN ORGANIZED HEALTH CARE EDUCATION/TRAINING PROGRAM

## 2024-07-25 PROCEDURE — 99232 SBSQ HOSP IP/OBS MODERATE 35: CPT | Performed by: NURSE PRACTITIONER

## 2024-07-25 PROCEDURE — 70450 CT HEAD/BRAIN W/O DYE: CPT

## 2024-07-25 PROCEDURE — 85027 COMPLETE CBC AUTOMATED: CPT | Performed by: STUDENT IN AN ORGANIZED HEALTH CARE EDUCATION/TRAINING PROGRAM

## 2024-07-25 PROCEDURE — 83735 ASSAY OF MAGNESIUM: CPT | Performed by: STUDENT IN AN ORGANIZED HEALTH CARE EDUCATION/TRAINING PROGRAM

## 2024-07-25 PROCEDURE — 85610 PROTHROMBIN TIME: CPT | Performed by: STUDENT IN AN ORGANIZED HEALTH CARE EDUCATION/TRAINING PROGRAM

## 2024-07-25 PROCEDURE — 97168 OT RE-EVAL EST PLAN CARE: CPT

## 2024-07-25 PROCEDURE — 85730 THROMBOPLASTIN TIME PARTIAL: CPT | Performed by: EMERGENCY MEDICINE

## 2024-07-25 PROCEDURE — 92610 EVALUATE SWALLOWING FUNCTION: CPT

## 2024-07-25 PROCEDURE — 85730 THROMBOPLASTIN TIME PARTIAL: CPT | Performed by: STUDENT IN AN ORGANIZED HEALTH CARE EDUCATION/TRAINING PROGRAM

## 2024-07-25 PROCEDURE — 99291 CRITICAL CARE FIRST HOUR: CPT | Performed by: EMERGENCY MEDICINE

## 2024-07-25 PROCEDURE — 97164 PT RE-EVAL EST PLAN CARE: CPT

## 2024-07-25 RX ORDER — AMOXICILLIN 250 MG
1 CAPSULE ORAL
Status: DISCONTINUED | OUTPATIENT
Start: 2024-07-25 | End: 2024-07-31 | Stop reason: HOSPADM

## 2024-07-25 RX ORDER — POTASSIUM CHLORIDE 14.9 MG/ML
20 INJECTION INTRAVENOUS
Status: COMPLETED | OUTPATIENT
Start: 2024-07-25 | End: 2024-07-25

## 2024-07-25 RX ORDER — HEPARIN SODIUM 10000 [USP'U]/100ML
3-24 INJECTION, SOLUTION INTRAVENOUS
Status: DISCONTINUED | OUTPATIENT
Start: 2024-07-25 | End: 2024-07-30

## 2024-07-25 RX ORDER — BISACODYL 10 MG
10 SUPPOSITORY, RECTAL RECTAL DAILY PRN
Status: DISCONTINUED | OUTPATIENT
Start: 2024-07-25 | End: 2024-07-31 | Stop reason: HOSPADM

## 2024-07-25 RX ADMIN — DILTIAZEM HYDROCHLORIDE 30 MG: 30 TABLET ORAL at 23:34

## 2024-07-25 RX ADMIN — LIDOCAINE 5% 1 PATCH: 700 PATCH TOPICAL at 09:48

## 2024-07-25 RX ADMIN — DILTIAZEM HYDROCHLORIDE 30 MG: 30 TABLET ORAL at 12:52

## 2024-07-25 RX ADMIN — PHENYLEPHRINE HYDROCHLORIDE 110 MCG/MIN: 50 INJECTION INTRAVENOUS at 13:03

## 2024-07-25 RX ADMIN — ENOXAPARIN SODIUM 40 MG: 40 INJECTION SUBCUTANEOUS at 09:48

## 2024-07-25 RX ADMIN — POTASSIUM CHLORIDE 20 MEQ: 14.9 INJECTION, SOLUTION INTRAVENOUS at 08:43

## 2024-07-25 RX ADMIN — DILTIAZEM HYDROCHLORIDE 30 MG: 30 TABLET ORAL at 17:00

## 2024-07-25 RX ADMIN — PHENYLEPHRINE HYDROCHLORIDE 150 MCG/MIN: 50 INJECTION INTRAVENOUS at 19:30

## 2024-07-25 RX ADMIN — POTASSIUM CHLORIDE 20 MEQ: 14.9 INJECTION, SOLUTION INTRAVENOUS at 10:31

## 2024-07-25 RX ADMIN — MIDODRINE HYDROCHLORIDE 10 MG: 5 TABLET ORAL at 11:13

## 2024-07-25 RX ADMIN — PHENYLEPHRINE HYDROCHLORIDE 50 MCG/MIN: 50 INJECTION INTRAVENOUS at 01:52

## 2024-07-25 RX ADMIN — MIDODRINE HYDROCHLORIDE 10 MG: 5 TABLET ORAL at 16:59

## 2024-07-25 RX ADMIN — HEPARIN SODIUM 15 UNITS/KG/HR: 10000 INJECTION, SOLUTION INTRAVENOUS at 13:31

## 2024-07-25 RX ADMIN — ATORVASTATIN CALCIUM 40 MG: 40 TABLET, FILM COATED ORAL at 17:00

## 2024-07-25 RX ADMIN — DILTIAZEM HYDROCHLORIDE 1 MG/HR: 5 INJECTION, SOLUTION INTRAVENOUS at 00:40

## 2024-07-25 NOTE — SPEECH THERAPY NOTE
Speech-Language Pathology Bedside Swallow Evaluation      Patient Name: Brianna Baum    Today's Date: 7/25/2024     Problem List  Principal Problem:    Cerebrovascular accident (CVA) due to occlusion of left middle cerebral artery (HCC)      Past Medical History  Past Medical History:   Diagnosis Date    Arthritis     Chronic pain     Colon polyp     Diverticulitis     GERD (gastroesophageal reflux disease)     Hypertension     Vertigo        Past Surgical History  Past Surgical History:   Procedure Laterality Date    CHOLECYSTECTOMY      CHOLECYSTECTOMY LAPAROSCOPIC N/A 12/06/2021    Procedure: SUBTOTAL CHOLECYSTECTOMY LAPAROSCOPIC;  Surgeon: Víctor Viveros MD;  Location:  MAIN OR;  Service: General    COLONOSCOPY      GALLBLADDER SURGERY  12/2021    Dr. Viveros    TUBAL LIGATION         Summary   Pt presented with s/s suggestive of mild oral dysphagia characterized by min prolonged mastication time and some right side buccal pocketing with regular solids. She otherwise has adequate bite strength. Lingual residue is observed, but cleared. Cough x1 with initial sip of thin liquids. No additional s/s aspiration observed.     Risk/s for Aspiration: low     Recommended Diet: soft/level 3 diet and thin liquids   Recommended Form of Meds: whole with liquid   Aspiration precautions and swallowing strategies: upright posture, small bites/sips, and alternating bites and sips  Other Recommendations: Continue frequent oral care    Current Medical Status  Plan:  Patient with change in mental status and neurological exam shortly after arriving to stepdown 1 on P7. Patient had been doing well prior to the move- awake, alert, oriented and interactive.  Blood pressure at the time was in the mid 100s, approximate 140 mmHg.  Patient sent for stat CT head which showed a new left M1 occlusion.  Stroke alert activated.  Patient taken immediately to interventional radiology for successful thrombectomy.  Continue with ICU supportive  care.  Consider starting heparin infusion after stability and repeat head imaging due to previously noted petechial hemorrhaging on MRI and repeat CT earlier today.    Current Precautions:  Fall  Aspiration Delirium  Allergies:  No known food allergies  Past medical history:  Please see H&P for details    Social/Education/Vocational Hx:  Pt lives alone    Swallow Information   Current Risks for Dysphagia & Aspiration: recent surgery and recent intubation  Current Symptoms/Concerns: pocketing food  Current Diet: regular diet and thin liquids   Baseline Diet: regular diet and thin liquids    Baseline Assessment   Behavior/Cognition: alert, but lethargic   Speech/Language Status: able to participate in basic conversation and able to follow commands  Patient Positioning: upright in bed  Pain Status/Interventions/Response to Interventions: No report of or nonverbal indications of pain.     Swallow Mechanism Exam  Facial:  min right side facial weakness at rest   Labial: WFL  Lingual: WFL  Velum: unable to visualize  Mandible: adequate ROM  Dentition: adequate  Vocal quality:weak   Volitional Cough: strong/productive   Respiratory Status: on RA       Consistencies Assessed and Performance   Consistencies Administered: thin liquids, puree, and hard solids  Materials administered included applesauce and cookie with thin liquids     Oral Stage: mild  Mastication was min prolonged with the materials administered today.  Bolus formation and transfer were functional. Min right side buccal pocketing observed with regular solids. Cleared with lingual sweep and liquid wash. No overt s/s reduced oral control.    Pharyngeal Stage: WFL  Swallow Mechanics:  Swallowing initiation appeared prompt.  Laryngeal rise was palpated and judged to be within functional limits.  No coughing, throat clearing, change in vocal quality or respiratory status noted today.     Esophageal Concerns: none reported    Summary and Recommendations (see  above)    Results Reviewed with: patient, RN, and MD     Treatment Recommended: dysphagia therapy      Frequency of treatment: 2-3x week, as able     Patient Stated Goal: none stated     Dysphagia LTG  -Patient will demonstrate safe and effective oral intake (without overt s/s significant oral/pharyngeal dysphagia including s/s penetration or aspiration) for the highest appropriate diet level.     Short Term Goals:  -Pt will tolerate Dysphagia 3/advanced (dental soft) diet and thin liquid with no significant s/s oral or pharyngeal dysphagia across 1-3 diagnostic session/s.    -Patient will tolerate trials of upgraded food and/or liquid texture with no significant s/s of oral or pharyngeal dysphagia including aspiration across 1-3 diagnostic sessions     Speech Therapy Prognosis   Prognosis: fair    Prognosis Considerations: medical status

## 2024-07-25 NOTE — ASSESSMENT & PLAN NOTE
PPD 1 left M1 thrombectomy, TICI 3 with Dr. Akers 7/24/2024  PPD 3 attempted thrombectomy with Dr. Akers 7/22/2024  Presented 7/22 NIHSS 7 with aphasia, L gaze preference and facial droop  Left MCA bifurcation occlusion on CTA, TNK given.  Single pass thrombectomy attempted 7/22 without significant change. Non-occlusive lesion remained, TICI 2B.  Noted in Afib with RVR during hospitalization.  On 7/24, repeat stroke alert for L MCA syndrome.  On current exam, R facial droop, RUE/RLE weakness. Mild dysarthria. Otherwise intact. GCS 15.    Imaging:  CT head wo, 7/25/2024: Evolving acute to subacute left basal ganglia infarct. The superimposed petechial hemorrhage is best delineated on MRI and demonstrates subtle hyperdensity on CT. There is no evidence for a discrete intraparenchymal hematoma.   CTA stroke alert, 7/22/2024: Focal occlusion distal left M1 at the bifurcation.   CT stroke alert brain, 7/22/2024: Chronic microangiopathic changes. Underlying developing infarct not excluded. MRI can be performed for further evaluation if clinically warranted. Cerebral volume loss with mild ventriculomegaly, which could be secondary to the volume loss versus mild communicating hydrocephalus, similar to study of 2018.    Plan:  Continue to monitor neuro exam closely.  STAT CTA with decline in GCS > 2 pts in 1 hour.  Repeat MRI brain pending.  Further stroke management per neurology, started on heparin gtt.  Mobilize as tolerated.  DVT ppx: SCDs.    Neurosurgery will sign off. Call with questions. Follow up per neurology.

## 2024-07-25 NOTE — PROGRESS NOTES
Patient evaluated post endovascular intervention.  She still remains sleepy after sedation for procedure.  Plan to obtain CT scan with dual energy in a.m. to evaluate appropriateness for initiation of heparin.  Continue with aspirin daily.  Patient's is currently on Supa-Synephrine with a goal systolic blood pressure 120-160.  She continues to be in atrial flutter with heart rates as high as 150.  Plan to initiate Cardizem drip as patient cannot take p.o. Cardizem as she will need a formal speech and swallow evaluation with her right facial droop.    Exam post intervention:  Patient will wake up with verbal and tactile stimulation.  Left upper and lower extremity 5 out of 5 strength.  Patient requires prompting to participate with her right upper and lower extremity but does move her extremities and is antigravity in upper and lower extremities.  She is able to speak and answer questions appropriately she does not appear to have aphasia at this time.  She does have right-sided facial droop that is improved.    Plan to adjust Supa-Synephrine to achieve systolic blood pressure of 140.  Continue to monitor patient's neurologic exam.  Plan for emergent reimaging if patient has new focality to exam or decreasing GCS by greater than 2 points.  Continue to monitor vascular access site.  Vascular access site left groin.  No hematoma or palpable mass on examination.    Critical care time 44 minutes, critical care time does not include procedures or family update.

## 2024-07-25 NOTE — PHYSICAL THERAPY NOTE
PHYSICAL THERAPY RE- EVALUATION  NAME:  Brianna Baum  DATE: 07/25/24    AGE:   74 y.o.  Mrn:   [id number]  ADMIT DX:  Cerebrovascular accident (CVA), unspecified mechanism (HCC) [I63.9]    Past Medical History:   Diagnosis Date    Arthritis     Chronic pain     Colon polyp     Diverticulitis     GERD (gastroesophageal reflux disease)     Hypertension     Vertigo      Past Surgical History:   Procedure Laterality Date    CHOLECYSTECTOMY      CHOLECYSTECTOMY LAPAROSCOPIC N/A 12/06/2021    Procedure: SUBTOTAL CHOLECYSTECTOMY LAPAROSCOPIC;  Surgeon: Víctor Viveros MD;  Location:  MAIN OR;  Service: General    COLONOSCOPY      GALLBLADDER SURGERY  12/2021    Dr. Viveros    IR STROKE ALERT  7/23/2024    TUBAL LIGATION         Length Of Stay: 3  PHYSICAL THERAPY EVALUATION :    07/25/24 1020   PT Last Visit   PT Visit Date 07/25/24   Note Type   Note type Re-Evaluation   Pain Assessment   Pain Assessment Tool 0-10   Pain Score No Pain   Restrictions/Precautions   Weight Bearing Precautions Per Order No   Other Precautions Cognitive;Chair Alarm;Bed Alarm;Multiple lines;Telemetry;Fall Risk;Pain  (R UE> LE weakness)   Home Living   Additional Comments see IE   Prior Function   Level of [place] Independent with ADLs;Independent with IADLS   Lives With Alone   IADLs Independent with meal prep;Independent with medication management   Vocational Retired   Comments see IE   Cognition   Overall Cognitive Status Impaired   Attention Attends with cues to redirect   Orientation Level Oriented X4   Memory Decreased recall of precautions   Following Commands Follows one step commands with increased time or repetition   Comments flat affect- cues for attention to task   RUE Assessment   RUE Assessment X  (~ 75* shoulder flexion, decreased tricep strength and grasp.)   LUE Assessment   LUE Assessment WFL   RLE Assessment   RLE Assessment X  (2+-3-/5)   LLE Assessment   LLE Assessment WFL  (4/5)   Coordination   Movements are  "Fluid and Coordinated 0   Sensation WFL   Finger to Nose & Finger to Finger  Impaired   Light Touch   RLE Light Touch Grossly intact   LLE Light Touch Grossly intact   Sharp/Dull   RLE Sharp/Dull Grossly intact   LLE Sharp/Dull Grossly intact   Proprioception   RLE Proprioception Grossly intact   LLE Proprioception Grossly Intact   Bed Mobility   Supine to Sit 3  Moderate assistance   Additional items Assist x 1;Increased time required;Verbal cues;LE management   Sit to Supine 3  Moderate assistance   Additional items Assist x 2;Increased time required;Verbal cues;LE management   Additional Comments (S)  pt sat EOB x ~10 mins c/o \"feeling sick and dizzy\"- BP taken -112/68 EOB; - RN notified of pt below SBP goal. Repeat BP taken 97/58 (~2 mins following)- pt returned immediately to supine w/ max A x2 - repeat /83 in supine - RN Queta aware and present.   Transfers   Sit to Stand Unable to assess   Stand to Sit Unable to assess   Balance   Static Sitting Fair -   Dynamic Sitting Poor +   Endurance Deficit   Endurance Deficit Yes   Activity Tolerance   Activity Tolerance Patient limited by fatigue   Medical Staff Made Aware OT Pt was seen in conjunction w/ OT 2* unstable presentation; medical complexity; new precautions/ limitations + limited activity tolerance and regression from baseline functional mobility.   Nurse Made Aware yes- Queta   Assessment   Prognosis Good   Problem List Decreased strength;Decreased range of motion;Decreased endurance;Impaired balance;Decreased mobility;Decreased coordination;Decreased cognition;Impaired judgement;Decreased safety awareness;Pain   Assessment Pt is 74 y.o. female seen for PT RE evaluation s/p new onset AMS/ facial droop 7/23/24. Imaging showing new acute infarct centered in the left basal ganglia. No evidence of hemorrhagic conversion or mass effect.  Abrupt cut off of the proximal left M1 segment with corresponding density on the noncontrast head CT suggesting " "occlusive thrombus throughout the M1 segment . Pt  was given TNK and underwent emergent mechanical thrombectomy. On IE performed Tabitha for bed skills and modA x2 for transfer and gait 2-3' w/ HHA.  Currently,  on re-eval pt required modA for supine> sit ; sat EOB x ~10 mins c/o \"feeling sick and dizzy\"- BP taken and 112/68 EOB; - RN notified and repeat BP taken 97/58- pt return immediately to supine w/ max A x2 - repeat /83 in supine - RN Queta aware and present.   Pt presents functioning below baseline and currently w/ overall mobility deficits 2* to: decreased R>L strength deficits UE and LLE; limited flexibility;  generalized weakness/ deconditioning; decreased endurance; decreased activity tolerance; decreased coordination; impaired sitting balance;  balance decreased safety awareness; SOB/STINSON; fatigue; impaired safety and judgement; limited insight into current deficits; bed/ chair alarms; multiple lines; dizziness; fatiguel hypotension.   Pt currently at risk for falls.  (Please find additional objective findings from PT assessment regarding body systems outlined above.) Pt will continue to benefit from skilled PT interventions to address stated impairments; to maximize functional potential; for ongoing pt/ family training; and DME needs.  PT is recommending PT Resource Intensity Level  1 on d/c.  POC and STG as written on IE remain appropriate.   Barriers to Discharge Inaccessible home environment   Goals   Patient Goals to get home to my cats   STG Expiration Date 08/08/24  (goals remains appropriate w/ extended time frame)   PT Treatment Day 0   Plan   Treatment/Interventions ADL retraining;Functional transfer training;LE strengthening/ROM;Elevations;Therapeutic exercise;Endurance training;Cognitive reorientation;Patient/family training;Equipment eval/education;Bed mobility;Gait training;Compensatory technique education;Continued evaluation;Spoke to nursing;Spoke to case management;Spoke to advanced " practitioner;OT   PT Frequency 3-5x/wk   Discharge Recommendation   Rehab Resource Intensity Level, PT I (Maximum Resource Intensity)   Equipment Recommended   (TBD)   AM-PAC Basic Mobility Inpatient   Turning in Flat Bed Without Bedrails 2   Lying on Back to Sitting on Edge of Flat Bed Without Bedrails 2   Moving Bed to Chair 1   Standing Up From Chair Using Arms 1   Walk in Room 1   Climb 3-5 Stairs With Railing 1   Basic Mobility Inpatient Raw Score 8   Turning Head Towards Sound 4   Follow Simple Instructions 3   Low Function Basic Mobility Raw Score  15   Low Function Basic Mobility Standardized Score  23.9   [facility] Highest Level Of Mobility   -[facility] Goal 3: Sit at edge of bed   -[facility] Achieved 3: Sit at edge of bed     The patient's AM-PAC Basic Mobility Inpatient Short Form Raw Score is 8. A Raw score of less than or equal to 16 suggests the patient may benefit from discharge to post-acute rehabilitation services. Please also refer to the recommendation of the Physical Therapist for safe discharge planning.

## 2024-07-25 NOTE — PLAN OF CARE
Problem: Prexisting or High Potential for Compromised Skin Integrity  Goal: Skin integrity is maintained or improved  Description: INTERVENTIONS:  - Identify patients at risk for skin breakdown  - Assess and monitor skin integrity  - Assess and monitor nutrition and hydration status  - Monitor labs   - Assess for incontinence   - Turn and reposition patient  - Assist with mobility/ambulation  - Relieve pressure over bony prominences  - Avoid friction and shearing  - Provide appropriate hygiene as needed including keeping skin clean and dry  - Evaluate need for skin moisturizer/barrier cream  - Collaborate with interdisciplinary team   - Patient/family teaching  - Consider wound care consult   7/25/2024 0926 by Queta Chase RN  Outcome: Progressing  7/25/2024 0926 by Queta Chase RN  Outcome: Progressing     Problem: PAIN - ADULT  Goal: Verbalizes/displays adequate comfort level or baseline comfort level  Description: Interventions:  - Encourage patient to monitor pain and request assistance  - Assess pain using appropriate pain scale  - Administer analgesics based on type and severity of pain and evaluate response  - Implement non-pharmacological measures as appropriate and evaluate response  - Consider cultural and social influences on pain and pain management  - Notify physician/advanced practitioner if interventions unsuccessful or patient reports new pain  7/25/2024 0926 by Queta Chase RN  Outcome: Progressing  7/25/2024 0926 by Queta Chase RN  Outcome: Progressing     Problem: INFECTION - ADULT  Goal: Absence or prevention of progression during hospitalization  Description: INTERVENTIONS:  - Assess and monitor for signs and symptoms of infection  - Monitor lab/diagnostic results  - Monitor all insertion sites, i.e. indwelling lines, tubes, and drains  - Monitor endotracheal if appropriate and nasal secretions for changes in amount and color  - [place] appropriate cooling/warming therapies per  order  - Administer medications as ordered  - Instruct and encourage patient and family to use good hand hygiene technique  - Identify and instruct in appropriate isolation precautions for identified infection/condition  7/25/2024 0926 by Queta Chase RN  Outcome: Progressing  7/25/2024 0926 by Queta Chase RN  Outcome: Progressing  Goal: Absence of fever/infection during neutropenic period  Description: INTERVENTIONS:  - Monitor WBC    7/25/2024 0926 by Queta Chase RN  Outcome: Progressing  7/25/2024 0926 by Queta Chase RN  Outcome: Progressing     Problem: SAFETY ADULT  Goal: Patient will remain free of falls  Description: INTERVENTIONS:  - Educate patient/family on patient safety including physical limitations  - Instruct patient to call for assistance with activity   - Consult OT/PT to assist with strengthening/mobility   - Keep Call bell within reach  - Keep bed low and locked with side rails adjusted as appropriate  - Keep care items and personal belongings within reach  - Initiate and maintain comfort rounds  - Make Fall Risk Sign visible to staff  - Offer Toileting every 2 Hours, in advance of need  - Initiate/Maintain bed alarm  - Obtain necessary fall risk management equipment: bed alarm  - Apply yellow socks and bracelet for high fall risk patients  - Consider moving patient to room near nurses station  7/25/2024 0926 by Queta Chase RN  Outcome: Progressing  7/25/2024 0926 by Queta Chase RN  Outcome: Progressing  Goal: Maintain or return to baseline ADL function  Description: INTERVENTIONS:  -  Assess patient's ability to carry out ADLs; assess patient's baseline for ADL function and identify physical deficits which impact ability to perform ADLs (bathing, care of mouth/teeth, toileting, grooming, dressing, etc.)  - Assess/evaluate cause of self-care deficits   - Assess range of motion  - Assess patient's mobility; develop plan if impaired  - Assess patient's need for assistive devices and  provide as appropriate  - Encourage maximum independence but intervene and supervise when necessary  - Involve family in performance of ADLs  - Assess for home care needs following discharge   - Consider OT consult to assist with ADL evaluation and planning for discharge  - Provide patient education as appropriate  7/25/2024 0926 by Queta Chase RN  Outcome: Progressing  7/25/2024 0926 by Queta Chase RN  Outcome: Progressing  Goal: Maintains/Returns to pre admission functional level  Description: INTERVENTIONS:  - Perform AM-PAC 6 Click Basic Mobility/ Daily Activity assessment daily.  - Set and communicate daily mobility goal to care team and patient/family/caregiver.   - Collaborate with rehabilitation services on mobility goals if consulted  - Perform Range of Motion 3 times a day.  - Reposition patient every 2 hours.  - Dangle patient 2 times a day  - Stand patient 2 times a day  - Ambulate patient 2 times a day  - Out of bed to chair 2 times a day   - Out of bed for meals 3 times a day  - Out of bed for toileting  - Record patient progress and toleration of activity level   Outcome: Progressing     Problem: DISCHARGE PLANNING  Goal: Discharge to home or other facility with appropriate resources  Description: INTERVENTIONS:  - Identify barriers to discharge w/patient and caregiver  - Arrange for needed discharge resources and transportation as appropriate  - Identify discharge learning needs (meds, wound care, etc.)  - Arrange for interpretive services to assist at discharge as needed  - Refer to Case Management Department for coordinating discharge planning if the patient needs post-hospital services based on physician/advanced practitioner order or complex needs related to functional status, cognitive ability, or social support system  Outcome: Progressing     Problem: Knowledge Deficit  Goal: Patient/family/caregiver demonstrates understanding of disease process, treatment plan, medications, and  discharge instructions  Description: Complete learning assessment and assess knowledge base.  Interventions:  - Provide teaching at level of understanding  - Provide teaching via preferred learning methods  Outcome: Progressing     Problem: Neurological Deficit  Goal: Neurological status is stable or improving  Description: Interventions:  - Monitor and assess patient's level of consciousness, motor function, sensory function, and level of assistance needed for ADLs.   - Monitor and report changes from baseline. Collaborate with interdisciplinary team to initiate plan and implement interventions as ordered.   - Provide and maintain a safe environment.  - Consider seizure precautions.  - Consider fall precautions.  - Consider aspiration precautions.  - Consider bleeding precautions.  Outcome: Progressing     Problem: Activity Intolerance/Impaired Mobility  Goal: Mobility/activity is maintained at optimum level for patient  Description: Interventions:  - Assess and monitor patient  barriers to mobility and need for assistive/adaptive devices.  - Assess patient's emotional response to limitations.  - Collaborate with interdisciplinary team and initiate plans and interventions as ordered.  - Encourage independent activity per ability.  - Maintain proper body alignment.  - Perform active/passive rom as tolerated/ordered.  - Plan activities to conserve energy.  - Turn patient as appropriate  Outcome: Progressing     Problem: Communication Impairment  Goal: Ability to express needs and understand communication  Description: Assess patient's communication skills and ability to understand information.  Patient will demonstrate use of effective communication techniques, alternative methods of communication and understanding even if not able to speak.     - Encourage communication and provide alternate methods of communication as needed.  - Collaborate with case management/ for discharge needs.  - Include  patient/family/caregiver in decisions related to communication.  Outcome: Progressing     Problem: Potential for Aspiration  Goal: Non-ventilated patient's risk of aspiration is minimized  Description: Assess and monitor vital signs, respiratory status, and labs (WBC).  Monitor for signs of aspiration (tachypnea, cough, rales, wheezing, cyanosis, fever).    - Assess and monitor patient's ability to swallow.  - Place patient up in chair to eat if possible.  - HOB up at 90 degrees to eat if unable to get patient up into chair.  - Supervise patient during oral intake.   - Instruct patient/ family to take small bites.  - Instruct patient/ family to take small single sips when taking liquids.  - Follow patient-specific strategies generated by speech pathologist.  Outcome: Progressing  Goal: Ventilated patient's risk of aspiration is minimized  Description: Assess and monitor vital signs, respiratory status, airway cuff pressure, and labs (WBC).  Monitor for signs of aspiration (tachypnea, cough, rales, wheezing, cyanosis, fever).    - Elevate head of bed 30 degrees if patient has tube feeding.  - Monitor tube feeding.  Outcome: Progressing     Problem: Nutrition  Goal: Nutrition/Hydration status is improving  Description: Monitor and assess patient's nutrition/hydration status for malnutrition (ex- brittle hair, bruises, dry skin, pale skin and conjunctiva, muscle wasting, smooth red tongue, and disorientation). Collaborate with interdisciplinary team and initiate plan and interventions as ordered.  Monitor patient's weight and dietary intake as ordered or per policy. Utilize nutrition screening tool and intervene per policy. Determine patient's food preferences and provide high-protein, high-caloric foods as appropriate.     - Assist patient with eating.  - Allow adequate time for meals.  - Encourage patient to take dietary supplement as ordered.  - Collaborate with clinical nutritionist.  - Include  patient/family/caregiver in decisions related to nutrition.  Outcome: Progressing

## 2024-07-25 NOTE — PROGRESS NOTES
"[facility]  Progress Note  Name: Brianna Baum I  MRN: [id number]  Unit/Bed#: ICU 06 I Date of Admission: 7/22/2024   Date of Service: 7/25/2024 I Hospital Day: 3    Assessment & Plan   * Cerebrovascular accident (CVA) due to occlusion of left middle cerebral artery (HCC)  Assessment & Plan  PPD 1 left M1 thrombectomy, TICI 3 with Dr. Akers 7/24/2024  PPD 3 attempted thrombectomy with Dr. Akers 7/22/2024  Presented 7/22 NIHSS 7 with aphasia, L gaze preference and facial droop  Left MCA bifurcation occlusion on CTA, TNK given.  Single pass thrombectomy attempted 7/22 without significant change. Non-occlusive lesion remained, TICI 2B.  Noted in Afib with RVR during hospitalization.  On 7/24, repeat stroke alert for L MCA syndrome.  On current exam, R facial droop, RUE/RLE weakness. Mild dysarthria. Otherwise intact. GCS 15.    Imaging:  CT head wo, 7/25/2024: Evolving acute to subacute left basal ganglia infarct. The superimposed petechial hemorrhage is best delineated on MRI and demonstrates subtle hyperdensity on CT. There is no evidence for a discrete intraparenchymal hematoma.   CTA stroke alert, 7/22/2024: Focal occlusion distal left M1 at the bifurcation.   CT stroke alert brain, 7/22/2024: Chronic microangiopathic changes. Underlying developing infarct not excluded. MRI can be performed for further evaluation if clinically warranted. Cerebral volume loss with mild ventriculomegaly, which could be secondary to the volume loss versus mild communicating hydrocephalus, similar to study of 2018.    Plan:  Continue to monitor neuro exam closely.  STAT CTA with decline in GCS > 2 pts in 1 hour.  Repeat MRI brain pending.  Further stroke management per neurology, started on heparin gtt.  Mobilize as tolerated.  DVT ppx: SCDs.    Neurosurgery will sign off. Call with questions. Follow up per neurology.           Subjective/Objective   Chief Complaint: \"I'm ok.\"    Subjective: " "Complaining of mild headache.  Denies any new symptoms from overnight.  Continues to endorse weakness to right upper extremity right lower extremity.    Objective: NAD.  Flat affect.  Right facial droop.  Right upper extremity right lower extremity weakness.  Left groin access site clean dry and intact.  No surrounding ecchymosis.  No drainage or erythema.    I/O         07/23 0701 07/24 0700 07/24 0701 07/25 0700 07/25 0701 07/26 0700    P.O. 240 250 480    I.V. (mL/kg) 1229.7 (14.7) 1288.3 (15.4) 109.4 (1.3)    IV Piggyback 500 50 64.2    Total Intake(mL/kg) 1969.7 (23.6) 1588.3 (19) 653.6 (7.8)    Urine (mL/kg/hr) 3350 (1.7) 1348 (0.7) 225 (0.6)    Stool  0     Total Output 3350 1348 225    Net -1380.4 +240.3 +428.6           Unmeasured Urine Occurrence 2 x 1 x     Unmeasured Stool Occurrence  0 x             Invasive Devices       Peripheral Intravenous Line  Duration             Peripheral IV 07/24/24 Dorsal (posterior);Left Wrist 1 day    Peripheral IV 07/24/24 Right;Ventral (anterior) Forearm 1 day              Arterial Line  Duration             Arterial Line 07/24/24 Right Radial <1 day              Drain  Duration             Closed/Suction Drain Right;Lateral RLQ Bulb 15 Fr. 961 days    External Urinary Catheter <1 day                    Physical Exam:  Vitals: Blood pressure 103/62, pulse (!) 108, temperature 97.9 °F (36.6 °C), temperature source Oral, resp. rate 20, height 5' 5\" (1.651 m), weight 83.5 kg (184 lb), SpO2 98%.,Body mass index is 30.62 kg/m².        General appearance: alert, appears stated age, cooperative and no distress  Head: Normocephalic, without obvious abnormality, atraumatic  Eyes: EOMI, PERRL  Neck: supple, symmetrical, trachea midline and NT  Back: no kyphosis present, no tenderness to percussion or palpation  Lungs: non labored breathing  Heart: Irregular  Neurologic:   Mental status: Alert, oriented x 3, thought content appropriate  Cranial nerves: Right facial droop " "  Sensory: normal to light touch  Motor: RLE/RUE 4/5  Coordination: significant R dysmetria      Lab Results:  Results from last 7 days   Lab Units 07/25/24  0516 07/24/24  0519 07/23/24  0454   WBC Thousand/uL 12.74* 14.20* 10.82*   HEMOGLOBIN g/dL 13.6 13.7 13.1   HEMATOCRIT % 41.5 42.1 39.2   PLATELETS Thousands/uL 202 238 226   SEGS PCT % 80*  --  79*   MONO PCT % 7  --  5   EOS PCT % 0  --  0     Results from last 7 days   Lab Units 07/25/24  0516 07/24/24  1503 07/24/24  0519   SODIUM mmol/L 142 142 142   POTASSIUM mmol/L 3.9 4.3 3.5   CHLORIDE mmol/L 108 106 106   CO2 mmol/L 24 26 24   BUN mg/dL 10 7 8   CREATININE mg/dL 0.66 0.84 0.69   CALCIUM mg/dL 9.1 8.8 9.7   ALK PHOS U/L 68  --   --    ALT U/L 30  --   --    AST U/L 30  --   --      Results from last 7 days   Lab Units 07/25/24  0516 07/24/24  1503 07/24/24  0519   MAGNESIUM mg/dL 2.2 2.7 2.1     Results from last 7 days   Lab Units 07/25/24  0516 07/24/24  0519   PHOSPHORUS mg/dL 3.2 3.1     Results from last 7 days   Lab Units 07/24/24  0519 07/22/24  2057   INR  1.09 1.01   PTT seconds  --  24     No results found for: \"TROPONINT\"  ABG:No results found for: \"PHART\", \"XCY1QZF\", \"PO2ART\", \"DFF1CCN\", \"U5JOUBFH\", \"BEART\", \"SOURCE\"    Imaging Studies: I have personally reviewed pertinent reports.   and I have personally reviewed pertinent films in PACS    CT head wo contrast    Result Date: 7/25/2024  Impression: Evolving acute to subacute left basal ganglia infarct. The superimposed petechial hemorrhage is best delineated on MRI and demonstrates subtle hyperdensity on CT. There is no evidence for a discrete intraparenchymal hematoma. Moderate chronic microangiopathic ischemic changes. Workstation performed: [id number]     CTA head and neck w wo contrast    Result Date: 7/24/2024  Impression: 1. Findings compatible with acute infarct centered in the left basal ganglia. No evidence of hemorrhagic conversion or mass effect. 2. Abrupt cut off of the proximal " left M1 segment with corresponding density on the noncontrast head CT suggesting occlusive thrombus throughout the M1 segment. Reasonable collateral flow in distal MCA branches. 3. No hemodynamically significant stenosis within the carotid or vertebral arteries. Workstation performed: [id number]     CT head wo contrast    Result Date: 7/24/2024  Impression: Evolving acute to subacute infarct in the left basal ganglia. There is evidence of superimposed petechial hemorrhage which is best seen on the recent MRI. There is no evidence for a discrete hematoma. Resident: Corinna Guzman I, the attending radiologist, have reviewed the images and agree with the final report above. Workstation performed: [id number]     MRI brain wo contrast    Result Date: 7/24/2024  Impression: Stable acute infarct centered in the left basal ganglia with superimposed petechial hemorrhage. No significant mass effect. The study was marked in EPIC for immediate notification. Workstation performed: [id number]     CT stroke alert brain    Result Date: 7/23/2024  Impression: Area of low-attenuation within the left putamen, indicative of a developing acute infarct in this region. No associated hemorrhage is noted. No significant mass effect is identified. Left middle cerebral artery distal M1/bifurcation occlusion is no longer visualized compatible with interval thrombectomy. Previous short segment occlusions of multiple left middle cerebral artery M2 branches at their origins also appear to have resolved. No new large vessel occlusion, or high-grade stenosis identified on CT angiogram of the head. 2 mm infundibulum again demonstrated at the origin of the left posterior communicating artery, unchanged. No hemodynamically significant stenosis or dissection is identified on CT angiogram of the neck. Findings were directly discussed with Dr. Palladino by Dr. Castillo on 7/23/2024, at 1:00 p.m. Workstation performed: [id number]     CTA stroke alert  (head/neck)    Result Date: 7/23/2024  Impression: Area of low-attenuation within the left putamen, indicative of a developing acute infarct in this region. No associated hemorrhage is noted. No significant mass effect is identified. Left middle cerebral artery distal M1/bifurcation occlusion is no longer visualized compatible with interval thrombectomy. Previous short segment occlusions of multiple left middle cerebral artery M2 branches at their origins also appear to have resolved. No new large vessel occlusion, or high-grade stenosis identified on CT angiogram of the head. 2 mm infundibulum again demonstrated at the origin of the left posterior communicating artery, unchanged. No hemodynamically significant stenosis or dissection is identified on CT angiogram of the neck. Findings were directly discussed with Dr. Palladino by Dr. Castillo on 7/23/2024, at 1:00 p.m. Workstation performed: [id number]     X-ray chest 1 view portable    Result Date: 7/22/2024  Impression: No acute cardiopulmonary disease. Workstation performed: [id number]     CT stroke alert brain    Result Date: 7/22/2024  Impression: No acute intracranial abnormality. Chronic microangiopathic changes. Underlying developing infarct not excluded. MRI can be performed for further evaluation if clinically warranted. Cerebral volume loss with mild ventriculomegaly, which could be secondary to the volume loss versus mild communicating hydrocephalus, similar to study of 2018. Findings were directly discussed with Wilfred Rubio at 9:26 p.m. on 7/22/2024. Workstation performed: [id number]     CTA stroke alert (head/neck)    Result Date: 7/22/2024  Impression: Focal occlusion distal left M1 at the bifurcation. Findings were directly discussed with Wilfred Rubio at 9:26 p.m. on 7/22/2024. Workstation performed: [id number]       EKG, Pathology, and Other Studies: I have personally reviewed pertinent reports.      VTE Pharmacologic Prophylaxis: Sequential compression  device (Venodyne)  and Heparin    VTE Mechanical Prophylaxis: sequential compression device

## 2024-07-25 NOTE — OCCUPATIONAL THERAPY NOTE
"    Occupational Therapy Re-Evaluation     Patient Name: Brianna Baum  Today's Date: 7/25/2024  Problem List  Principal Problem:    Cerebrovascular accident (CVA) due to occlusion of left middle cerebral artery (HCC)    Past Medical History  Past Medical History:   Diagnosis Date    Arthritis     Chronic pain     Colon polyp     Diverticulitis     GERD (gastroesophageal reflux disease)     Hypertension     Vertigo      Past Surgical History  Past Surgical History:   Procedure Laterality Date    CHOLECYSTECTOMY      CHOLECYSTECTOMY LAPAROSCOPIC N/A 12/06/2021    Procedure: SUBTOTAL CHOLECYSTECTOMY LAPAROSCOPIC;  Surgeon: Víctor Viveros MD;  Location:  MAIN OR;  Service: General    COLONOSCOPY      GALLBLADDER SURGERY  12/2021    Dr. Viveros    TUBAL LIGATION             07/25/24 1023   OT Last Visit   OT Visit Date 07/25/24   Note Type   Note type Re-Evaluation   Pain Assessment   Pain Assessment Tool 0-10   Pain Score No Pain   Restrictions/Precautions   Weight Bearing Precautions Per Order No   Other Precautions Multiple lines;Telemetry;Fall Risk;Cognitive;Bed Alarm;Chair Alarm  (RUE weakness)   Home Living   Type of Home House   Home Layout One level  (2STE)   Additional Comments Please refer to IE   Prior Function   Level of [place] Independent with ADLs;Independent with IADLS   Lives With (S)  Alone   IADLs Independent with meal prep;Independent with medication management   Vocational Retired   Comments Please refer to IE   Lifestyle   Autonomy PTA, pt reports being I with ADLs, IADLs, fnxl mobility, (-)    Reciprocal Relationships Pt resides alone   Service to Others retired CNA   Intrinsic Gratification Likes being outside with her cats   Subjective   Subjective \"I feel sick\"   ADL   Where Assessed Edge of bed   Eating Assistance 5  Supervision/Setup   Grooming Assistance 4  Minimal Assistance   UB Bathing Assistance 3  Moderate Assistance   LB Bathing Assistance 2  Maximal Assistance   UB " Dressing Assistance 3  Moderate Assistance   LB Dressing Assistance 2  Maximal Assistance   Toileting Assistance  2  Maximal Assistance   Bed Mobility   Supine to Sit 3  Moderate assistance   Additional items Assist x 1;HOB elevated;Bedrails;Increased time required;LE management   Sit to Supine 3  Moderate assistance   Additional items Assist x 2;HOB elevated;Bedrails;Increased time required;LE management   Transfers   Sit to Stand Unable to assess   Stand to Sit Unable to assess   Additional Comments Pt BP at /68 then 97/58, RN requesting for pt to be returned to supine. BP once supine 148/83   Balance   Static Sitting Fair -   Dynamic Sitting Poor +   Activity Tolerance   Activity Tolerance Patient limited by fatigue   Medical Staff Made Aware PT Amanda   Nurse Made Aware RN clearance for session   RUE Assessment   RUE Assessment X  (~ 75* shoulder flexion, decreased tricep strength and grasp.)   LUE Assessment   LUE Assessment WFL   Hand Function   Fine Motor Coordination Impaired   Hand Function Comments decreased RUE finger to nose coordination and thumb opposition   Sensation   Light Touch No apparent deficits   Vision - Complex Assessment   Ocular Range of Motion Intact   Tracking Intact   Cognition   Overall Cognitive Status Impaired   Arousal/Participation Responsive;Cooperative   Attention Attends with cues to redirect   Orientation Level Oriented X4   Memory Decreased recall of precautions   Following Commands Follows one step commands with increased time or repetition   Comments Pt with semi-flat affect but pleasant and cooperative during session. At times, does require increased time to get words out   Assessment   Limitation Decreased ADL status;Decreased UE ROM;Decreased UE strength;Decreased Safe judgement during ADL;Decreased cognition;Decreased endurance;Decreased self-care trans;Decreased fine motor control;Decreased high-level ADLs   Prognosis Fair   Assessment Pt seen for OT re-evaluation.  Pt initially presented with L MCA territory stroke s/p TNK. Since prior session, pt with concern for additional stroke - noted to have aphasia and more pronounced R-sided defitis Imaging concern for L BG acute ischemic stroke and worse M1 long segment thrombus. Now s/p emergent thrombectomy. Please refer to IE for more detailed information regarding pt's PLOF/social history. PTA, pt was I for ADLs/IADLs. Upon re-evaluation, pt currently requires MOD A sup to sit, MOD A x 2 sit to sup. Unable to progress to standing at this time 2* BP goals/RN request. Exhibits decreased strength to RUE limiting performance in ADLs/transfers. Pt currently requires MIN A eating and grooming, MOD A UB ADLs, MAX A LB ADLs, and MAX A toileting. Pt is limited at this time 2*: pain, endurance, activity tolerance, functional mobility, balance, functional standing tolerance, decreased I w/ ADLS/IADLS, strength, cognitive impairments, decreased safety awareness, impaired fine motor skills, and coordination deficits.The following Occupational Performance Areas to address include: eating, grooming, bathing/shower, toilet hygiene, dressing, medication management, health maintenance, functional mobility, community mobility, and clothing management. Pt to benefit from immediate acute skilled OT to address above deficits, improve overall functional independence, maximize fnxl mobility and reduce caregiver burden. From OT standpoint, recommendation at time of d/c would be STR.  Pt was left after session with all current needs met. The patient's raw score on the AM-PAC Daily Activity Inpatient Short Form is 14. A raw score of less than 19 suggests the patient may benefit from discharge to post-acute rehabilitation services. Please refer to the recommendation of the Occupational Therapist for safe discharge planning.   Goals   LTG Time Frame 10-14   Plan   Treatment Interventions ADL retraining;Functional transfer training;UE  strengthening/ROM;Endurance training;Cognitive reorientation;Patient/family training;Neuromuscular reeducation;Equipment evaluation/education;Fine motor coordination activities;Compensatory technique education;Continued evaluation;Energy conservation;Activityengagement   Goal Expiration Date 08/08/24   OT Frequency 3-5x/wk   Discharge Recommendation   Rehab Resource Intensity Level, OT I (Maximum Resource Intensity)   AM-PAC Daily Activity Inpatient   Lower Body Dressing 2   Bathing 2   Toileting 2   Upper Body Dressing 2   Grooming 3   Eating 3   Daily Activity Raw Score 14   Daily Activity Standardized Score (Calc for Raw Score >=11) 33.39   AM-PAC Applied Cognition Inpatient   Following a Speech/Presentation 2   Understanding Ordinary Conversation 3   Taking Medications 3   Remembering Where Things Are Placed or Put Away 2   Remembering List of 4-5 Errands 2   Taking Care of Complicated Tasks 2   Applied Cognition Raw Score 14   Applied Cognition Standardized Score 32.02     GOALS    - Pt be oriented x4 for all OT sessions with use of environmental cues as appropriate.      - Pt will be attentive 100% of the time during ongoing cognitive assessment w/ G participation to assist w/ safe d/c planning/recommendations (as appropriate, may be limited by speech deficits)      - Pt will complete UB self care tasks w/ MOD I w/ use of DME/AD as appropriate.     - Pt will complete LB self care tasks w/ MOD I with use of DME/AD as appropriate.     - Pt will improve bed mobility to MOD I with use of compensatory strategies as appropriate.     - Pt will demonstrate G carryover of pt/caregiver education and training as appropriate w/o cues w/ good tolerance to increase safety during functional tasks     - Pt will maintain sitting upright with F+ balance for at least 15 minutes while completing a dynamic functional activity with good balance and endurance.     - Pt will participate in fine/gross motor  coordination/strenthening/dexterity exercises in order to increase participation in functional activities.     - Pt will complete toileting w/ MOD I w/ G hygiene/thoroughness using DME as needed    - Pt will improve functional transfers to MOD I on/off all surfaces using DME as needed w/ G balance/safety     - Pt will improve functional mobility during ADL/IADL/leisure tasks to MOD I using DME as needed w/ G balance/safety     - Pt will improve RUE strength by 1 MMT grade to improve performance in ADL tasks

## 2024-07-25 NOTE — PROGRESS NOTES
[facility]  Interval Progress Note: Critical Care  Name: Brianna Baum I  MRN: [id number]  Unit/Bed#: ICU 06 I Date of Admission: 7/22/2024   Date of Service: 7/25/2024 I Hospital Day: 3    Interval Events:  Patient admitted to the Neuro ICU POD#0 from emergent mechanical thrombectomy by Dr. Akers for acute R M1 occlusion. Returned to [facility] on Supa drip at 30. Pt reported to be in afib RVR during case, but was not treated at that time. Upon arrival to [facility], pt with irregular tachycardia on telemetry. BP stable. SBP goal 140-160mmHg. Left leg immobilizer in place. Left groin access site without hematoma. Previous right access site noted to have palpable hematoma and ecchymosis.     Pertinent New Data:   blood pressure, pulse, temperature, respirations, and pulse oximetry    Arterial Line  [place] /70  Arterial Line BP  Min: 72/38  Max: 138/76   MAP 94 mmHg  Arterial Line MAP (mmHg)  Min: 48 mmHg  Max: 100 mmHg     Physical Exam:   Neuro: Lethargic, easy to arouse. A&Ox3. No dysarthria noted. Right tongue deviation noted. Resolved right eyelid droop from prior. +right mouth droop. Denied sensation deficits.LUE/LLE 5/5 strength. RUE 3/5, RLE 4/5. PERRLA 2mm brisk.   HEENT: trachea midline, NC/AT.  CV: Irregular tachycardia, rate 120-130s. Pulses +2 throughout.   Pulm: room air, equal chest rise, no rhonchi, rales, or wheezing noted.   GI: abd NT/ND, bowel sounds present throughout  : external yarbrough in place, yellow urine noted in collection bag  Msk/skin: right groin with ecchymosis note and small palpable hematoma. Left groin with pressure dressing in place, no palpable hematoma noted. Skin warm and dry throughout.   Psych: flat affect    I have personally reviewed pertinent lab results.   I have personally reviewed pertinent reports.   and I have personally reviewed pertinent films in PACS      Assessment and Plan  SBP goal 140-160mmHg - Phenylephrine infusion to reach  goal  Cardizem infusion started for afib RVR with goal rate control <110bpm  Supa infusion   CTH dual energy on 7/25 @0400 pending   Pending determination of heparin infusion start time based on AM CT imaging  Continuous bp monitoring via a-line  Continuous pulse oximetry & telemetry monitoring  Neuro checks per post-thrombectomy protocol  Total flat time x3 hours  Speech eval pending 2/2 right mouth droop per protocol - continue NPO status  Monitor access site (left groin; no hematoma on exam)  Monitor previous access site (right groin, with ecchymoses noted and small soft palpable hematoma) - saline bag applied to right groin    Billing Level:  Critical Care Time Statement: Upon my evaluation, this patient had a high probability of imminent or life-threatening deterioration due to acute stroke, which required my direct attention, intervention, and personal management.  I spent a total of 15 minutes directly providing critical care services, including complex medical decision making (to support/prevent further life-threatening deterioration)., interpretation of hemodynamic data, titration of vasoactive medications, and titration of continuous IV medications (drips). This time is exclusive of procedures, teaching, family meetings, and any prior time recorded by providers other than myself.      SIGNATURE: LADONNA Mckinney

## 2024-07-25 NOTE — PLAN OF CARE
Problem: OCCUPATIONAL THERAPY ADULT  Goal: Performs self-care activities at highest level of function for planned discharge setting.  See evaluation for individualized goals.  Description: Treatment Interventions: ADL retraining, Functional transfer training, Endurance training, Cognitive reorientation, UE strengthening/ROM, Patient/family training, Equipment evaluation/education, Compensatory technique education, Continued evaluation, Energy conservation, Activityengagement          See flowsheet documentation for full assessment, interventions and recommendations.   Note: Limitation: Decreased ADL status, Decreased UE ROM, Decreased UE strength, Decreased Safe judgement during ADL, Decreased cognition, Decreased endurance, Decreased self-care trans, Decreased fine motor control, Decreased high-level ADLs  Prognosis: Fair  Assessment: Pt seen for OT re-evaluation. Pt initially presented with L MCA territory stroke s/p TNK. Since prior session, pt with concern for additional stroke - noted to have aphasia and more pronounced R-sided defitis Imaging concern for L BG acute ischemic stroke and worse M1 long segment thrombus. Now s/p emergent thrombectomy. Please refer to IE for more detailed information regarding pt's PLOF/social history. PTA, pt was I for ADLs/IADLs. Upon re-evaluation, pt currently requires MOD A sup to sit, MOD A x 2 sit to sup. Unable to progress to standing at this time 2* BP goals/RN request. Exhibits decreased strength to RUE limiting performance in ADLs/transfers. Pt currently requires MIN A eating and grooming, MOD A UB ADLs, MAX A LB ADLs, and MAX A toileting. Pt is limited at this time 2*: pain, endurance, activity tolerance, functional mobility, balance, functional standing tolerance, decreased I w/ ADLS/IADLS, strength, cognitive impairments, decreased safety awareness, impaired fine motor skills, and coordination deficits.The following Occupational Performance Areas to address include:  eating, grooming, bathing/shower, toilet hygiene, dressing, medication management, health maintenance, functional mobility, community mobility, and clothing management. Pt to benefit from immediate acute skilled OT to address above deficits, improve overall functional independence, maximize fnxl mobility and reduce caregiver burden. From OT standpoint, recommendation at time of d/c would be STR.  Pt was left after session with all current needs met. The patient's raw score on the -PAC Daily Activity Inpatient Short Form is 14. A raw score of less than 19 suggests the patient may benefit from discharge to post-acute rehabilitation services. Please refer to the recommendation of the Occupational Therapist for safe discharge planning.  Recommendation: Physiatry Consult  Rehab Resource Intensity Level, OT: I (Maximum Resource Intensity)

## 2024-07-25 NOTE — PROGRESS NOTES
NEUROLOGY RESIDENCY PROGRESS NOTE     Name: Brianna Baum   Age & Sex: 74 y.o. female   MRN: [id number]  Unit/Bed#: ICU 06   Encounter: [id number]    Brianna Baum will need follow up in 4-6 weeks with neurovascular attending/AP .  She will not require outpatient neurological testing.      Pending for discharge: Monitor for hemorrhage    ASSESSMENT & PLAN     * Cerebrovascular accident (CVA) due to occlusion of left middle cerebral artery (HCC)  Assessment & Plan  Brianna Baum is a 74 y.o. female with pertinent PMHx of AFib new dx, LE edema, GERD, chronic back pain. Stroke alert was called for evaluation of symptoms concerning for stroke consisting of near syncope and speech difficulties. Last known well shortly before stroke alert at 2053. Initial NIHSS initially 0. Upon return from CT, patient had an NIHSS of 7 with aphasia, left gaze preference, and right facial droop.  Initial BP Blood Pressure: 133/66, FSBG POC Glucose: 131. Arrival EKG atrial flutter at 115 bpm . NC CTH no acute intracranial abnormalities and CTA H/N focal occlusion distal left M1 at the bifurcation . Last normal apparently shortly before stroke alert, within TNK window thus was given at 2152 on 7/22.     On evening of 7/24, was noted to have worsened aphasia and have more pronounced deficits on the R side when evaluated by nursing team. NIH 27 as compared to 22 earlier today. Last seen at her baseline around 1720 by nursing team. Was not eligible for TNK. Underwent endovascular intervention with resultant TICI 3.     - Home Antiplatelet /Anticoagulants PTA: no antiplatelet or anticoagulants  - Stroke risk factors: HTN, HLD, Atrial Fibrillation, and Over weight  - Prior Stroke Hx: none  - Past Neurological Hx: no neurological problems    Pertinent scores:  Initial NIHSS: Total: 2   Modified Chloe Score: 0 (No baseline symptoms/disability)    BP over last 24 hours: Blood Pressure: 134/60  current BP: Blood Pressure: 134/60    Workup:  -  CTH: No acute intracranial abnormalities  - CTA: Focal occlusion distal left M1 at the bifurcation   - Repeat CTH / CTA H/N (7/23): Area of low-attenuation within the left putamen, indicative of a developing acute infarct in this region. No associated hemorrhage is noted. No significant mass effect is identified. Left MCA distal M1/bifurcation occlusion is no longer visualized compatible with interval thrombectomy. Previous short segment occlusions of multiple left middle cerebral artery M2 branches at their origins also appear to have resolved. No LVO or high-grade stenosis identified. 2 mm infundibulum again demonstrated at the origin of the left posterior communicating artery, unchanged. No hemodynamically significant stenosis or dissection is identified on CT angiogram of the neck.  - MRI WO (7/23): Stable acute infarct centered in the left basal ganglia with superimposed petechial hemorrhage. No significant mass effect.   - TTE (7/23): EF 55%, LA moderately dilated, no PFO  - CTH (7/24/2024): Evolving acute to subacute infarct in the left basal ganglia. There is evidence of superimposed petechial hemorrhage which is best seen on the recent MRI. There is no evidence for a discrete hematoma.  - CTA H/N (7/24/2024): Findings compatible with acute infarct centered in the left basal ganglia. No evidence of hemorrhagic conversion or mass effect. Abrupt cut off of the proximal left M1 segment with corresponding density on the noncontrast head CT suggesting occlusive thrombus throughout the M1 segment. Reasonable collateral flow in distal MCA branches. No hemodynamically significant stenosis within the carotid or vertebral arteries.  - CTH (7/25/2024): Evolving acute to subacute left basal ganglia infarct. The superimposed petechial hemorrhage is best delineated on MRI and demonstrates subtle hyperdensity on CT. There is no evidence for a discrete intraparenchymal hematoma.   - Labs: Hemoglobin A1c 5.8 (7/22/2024), LDL  120 (7/23/2024)    Impression: Distal left M1 occlusion in the setting of atrial fibrillation off of anticoagulation due to cost.  S/p TNK and thrombectomy, TICI 2B.  MRI shows stable infarct at left basal ganglia with superimposed hemorrhage. 2nd stroke alert on 7/24/2024 with CTA revealing proximal left M1 occlusion. Not a TNK candidate due to recent TNK administration. Underwent thrombectomy, TICI 3. Will initiate heparin gtt with repeat CTH after 2 therapeutic PTT's for stroke prevention in the setting of afib.     Plan: Discussed plan with neurology attending, Dr. Mccracken  Start stroke protocol heparin infusion, goal PTT 50-70  Repeat CT head after 2 therapeutic PTT's  Frequent neuro checks per protocol. If there is any acute changes in exam, please obtain stat CT head and notify neurology team  BP Goals: -160 mmHg   AP/AC agents: heparin gtt  Statin: Continue atorvastin 40 mg daily  Euthermic/Euglycemic  DVT PPx: heparin, SCDs  PT/OT/ST  Stroke education and counseling  Rest of other care per primary team appreciated    Disposition: PT Recommendations - Rehab Resource Intensity Level, PT: I (Maximum Resource Intensity)          SUBJECTIVE     Patient was seen and examined. No acute events overnight. Patient complains of 8/10 right sided headache this morning. She is cooperative and able to follow commands during exam. She is oriented to person, place, and month, but not to year.    Pertinent Negatives include: numbness, speech or visual changes, amaurosis, diplopia, other visual changes, numbness or tingling, involuntary movements.     Review of Systems  A 12 point ROS was completed. Other than the above mentioned complaints, all remaining systems were negative.     OBJECTIVE     Patient ID: Brianna Baum is a 74 y.o. female.    Vitals:    07/25/24 1200 07/25/24 1300 07/25/24 1400 07/25/24 1500   BP: 108/69 117/56 134/60    BP Location: Left arm Left arm Left arm    Pulse: 94 74 (!) 48 (!) 50   Resp: 21  20 19 20   Temp: 97.8 °F (36.6 °C)      TempSrc: Oral      SpO2: 98% 97% 98% 98%   Weight:       Height:          Temperature:   Temp (24hrs), Av.2 °F (36.8 °C), Min:97.8 °F (36.6 °C), Max:98.8 °F (37.1 °C)    Temperature: 97.8 °F (36.6 °C)      Physical Exam  Eyes:      Extraocular Movements: EOM normal.      Pupils: Pupils are equal, round, and reactive to light.   Neurological:      Coordination: Finger-Nose-Finger Test normal.   Psychiatric:         Speech: Speech normal.          Neurologic Exam     Mental Status   Oriented to person.   Oriented to place.   Disoriented to year. Oriented to month.   Follows commands: Unable to follow 4 step command.   Speech: speech is normal (Slow)  Level of consciousness: alert  Able to name object. Able to repeat.     Cranial Nerves     CN III, IV, VI   Pupils are equal, round, and reactive to light.  Extraocular motions are normal.     CN V   Facial sensation intact.     CN VII   Right facial weakness: central  Left facial weakness: none    CN VIII   Hearing: intact    CN IX, X   Palate: symmetric    CN XI   Right sternocleidomastoid strength: normal  Left sternocleidomastoid strength: normal  Right trapezius strength: normal  Left trapezius strength: normal    CN XII   Tongue: not atrophic  Fasciculations: absent  Tongue deviation: none    Motor Exam   Muscle bulk: normal  Right arm pronator drift: absent (Exam limited by weakness)  Left arm pronator drift: absent    Strength   Right deltoid: 3/5  Left deltoid: 5/5  Right biceps: 5/5  Left biceps: 5/5  Right triceps: 3/5  Left triceps: 5/5  Right iliopsoas: 4/5  Left iliopsoas: 5/5  Right quadriceps: 4/5  Left quadriceps: 5/5  Right hamstrin/5  Left hamstrin/5    Sensory Exam   Light touch normal.   Pinprick normal.     Gait, Coordination, and Reflexes     Coordination   Finger to nose coordination: normal    Tremor   Resting tremor: absent  Intention tremor: absent    Reflexes   Right Dykes: absent  Left  Donis: absent  Deferred gait testing          LABORATORY DATA     Labs: I have personally reviewed pertinent reports.    Results from last 7 days   Lab Units 07/25/24  1302 07/25/24  0516 07/24/24  0519 07/23/24  0454   WBC Thousand/uL 14.36* 12.74* 14.20* 10.82*   HEMOGLOBIN g/dL 13.6 13.6 13.7 13.1   HEMATOCRIT % 41.1 41.5 42.1 39.2   PLATELETS Thousands/uL 240 202 238 226   SEGS PCT %  --  80*  --  79*   MONO PCT %  --  7  --  5   EOS PCT %  --  0  --  0      Results from last 7 days   Lab Units 07/25/24  0516 07/24/24  1503 07/24/24  0519   SODIUM mmol/L 142 142 142   POTASSIUM mmol/L 3.9 4.3 3.5   CHLORIDE mmol/L 108 106 106   CO2 mmol/L 24 26 24   BUN mg/dL 10 7 8   CREATININE mg/dL 0.66 0.84 0.69   CALCIUM mg/dL 9.1 8.8 9.7   ALK PHOS U/L 68  --   --    ALT U/L 30  --   --    AST U/L 30  --   --      Results from last 7 days   Lab Units 07/25/24  0516 07/24/24  1503 07/24/24  0519   MAGNESIUM mg/dL 2.2 2.7 2.1     Results from last 7 days   Lab Units 07/25/24  0516 07/24/24  0519   PHOSPHORUS mg/dL 3.2 3.1      Results from last 7 days   Lab Units 07/25/24  1302 07/24/24  0519 07/22/24  2057   INR  1.13 1.09 1.01   PTT seconds 28  --  24               IMAGING & DIAGNOSTIC TESTING     Radiology Results: I have personally reviewed pertinent reports.   and I have personally reviewed pertinent films in PACS    CT head wo contrast   Final Result by Chan Lin MD (07/25 0850)      Evolving acute to subacute left basal ganglia infarct. The superimposed petechial hemorrhage is best delineated on MRI and demonstrates subtle hyperdensity on CT. There is no evidence for a discrete intraparenchymal hematoma.      Moderate chronic microangiopathic ischemic changes.                  Workstation performed: [id number]         CTA head and neck w wo contrast   Final Result by Ismael Bianchi MD (07/24 1790)         1. Findings compatible with acute infarct centered in the left basal ganglia. No evidence of  hemorrhagic conversion or mass effect.   2. Abrupt cut off of the proximal left M1 segment with corresponding density on the noncontrast head CT suggesting occlusive thrombus throughout the M1 segment. Reasonable collateral flow in distal MCA branches.   3. No hemodynamically significant stenosis within the carotid or vertebral arteries.                  Workstation performed: [id number]         CT head wo contrast   Final Result by Chan Lin MD (07/24 6034)      Evolving acute to subacute infarct in the left basal ganglia. There is evidence of superimposed petechial hemorrhage which is best seen on the recent MRI. There is no evidence for a discrete hematoma.                  Resident: Corinna Guzman I, the attending radiologist, have reviewed the images and agree with the final report above.      Workstation performed: [id number]         MRI brain wo contrast   Final Result by Ismael Bianchi MD (07/24 0230)      Stable acute infarct centered in the left basal ganglia with superimposed petechial hemorrhage. No significant mass effect.      The study was marked in EPIC for immediate notification.      Workstation performed: [id number]         CT stroke alert brain   Final Result by Thomas Castillo MD (07/23 1302)      Area of low-attenuation within the left putamen, indicative of a developing acute infarct in this region. No associated hemorrhage is noted. No significant mass effect is identified.      Left middle cerebral artery distal M1/bifurcation occlusion is no longer visualized compatible with interval thrombectomy. Previous short segment occlusions of multiple left middle cerebral artery M2 branches at their origins also appear to have    resolved.      No new large vessel occlusion, or high-grade stenosis identified on CT angiogram of the head.      2 mm infundibulum again demonstrated at the origin of the left posterior communicating artery, unchanged.      No hemodynamically significant  stenosis or dissection is identified on CT angiogram of the neck.      Findings were directly discussed with Dr. Palladino by Dr. Castillo on 7/23/2024, at 1:00 p.m.      Workstation performed: [id number]         CTA stroke alert (head/neck)   Final Result by Thomas Castillo MD (07/23 1302)      Area of low-attenuation within the left putamen, indicative of a developing acute infarct in this region. No associated hemorrhage is noted. No significant mass effect is identified.      Left middle cerebral artery distal M1/bifurcation occlusion is no longer visualized compatible with interval thrombectomy. Previous short segment occlusions of multiple left middle cerebral artery M2 branches at their origins also appear to have    resolved.      No new large vessel occlusion, or high-grade stenosis identified on CT angiogram of the head.      2 mm infundibulum again demonstrated at the origin of the left posterior communicating artery, unchanged.      No hemodynamically significant stenosis or dissection is identified on CT angiogram of the neck.      Findings were directly discussed with Dr. Palladino by Dr. Castillo on 7/23/2024, at 1:00 p.m.      Workstation performed: [id number]         IR stroke alert   Final Result by Guy Akers MD (07/25 1435)      Interval thrombolysis following TNK.      Small nonocclusive thrombus remains at the MCA bifurcation. Single pass aspiration performed with no change in appearance. Overall there is now TICI 2B flow.      Workstation performed: [id number]         IR stroke alert    (Results Pending)   MRI inpatient order    (Results Pending)       Other Diagnostic Testing: I have personally reviewed pertinent reports.      ACTIVE MEDICATIONS     Current Facility-Administered Medications   Medication Dose Route Frequency    acetaminophen (TYLENOL) tablet 650 mg  650 mg Oral Q6H PRN    ascorbic acid (VITAMIN C) tablet 1,000 mg  1,000 mg Oral Daily    atorvastatin (LIPITOR) tablet 40 mg  40 mg  Oral QPM    bisacodyl (DULCOLAX) rectal suppository 10 mg  10 mg Rectal Daily PRN    diltiazem (CARDIZEM) tablet 30 mg  30 mg Oral Q6H ANETA    heparin (porcine) 25,000 units in 0.45% NaCl 250 mL infusion (premix)  3-24 Units/kg/hr (Order-Specific) Intravenous Titrated    lidocaine (LIDODERM) 5 % patch 1 patch  1 patch Topical Daily    midodrine (PROAMATINE) tablet 10 mg  10 mg Oral TID AC    pantoprazole (PROTONIX) EC tablet 40 mg  40 mg Oral Daily Before Breakfast    phenylephrine (KENYA-SYNEPHRINE) 50 mg (STANDARD CONCENTRATION) in sodium chloride 0.9% 250 mL   mcg/min Intravenous Titrated    polyethylene glycol (MIRALAX) packet 17 g  17 g Oral Daily    senna-docusate sodium (SENOKOT S) 8.6-50 mg per tablet 1 tablet  1 tablet Oral HS       Prior to Admission medications    Medication Sig Start Date End Date Taking? Authorizing Provider   apixaban (Eliquis) 5 mg Take 1 tablet (5 mg total) by mouth 2 (two) times a day 7/22/24   LADONNA Drew   Ascorbic Acid (VITAMIN C) 1000 MG tablet Take 1,000 mg by mouth daily    Historical Provider, MD   Biotin 10 MG CAPS Take by mouth in the morning    Historical Provider, MD   celecoxib (CeleBREX) 200 mg capsule Take 1 capsule (200 mg total) by mouth daily 2/26/24   LADONNA Drew   diltiazem (CARDIZEM CD) 180 mg 24 hr capsule Take 1 capsule (180 mg total) by mouth daily 7/18/24 1/14/25  LADONNA Drew   esomeprazole (NexIUM) 40 MG capsule TAKE 1 CAPSULE DAILY 1/16/24   LADONNA Drew   furosemide (LASIX) 20 mg tablet Take 1 tablet (20 mg total) by mouth daily 3/12/24   Ladan Fly, DO   HYDROcodone-acetaminophen (Norco) 5-325 mg per tablet Take 1 tablet by mouth every 8 (eight) hours as needed for pain Max Daily Amount: 3 tablets 7/18/24   LADONNA Drew         VTE Pharmacologic Prophylaxis: VTE covered by:  heparin (porcine), Intravenous, 15 Units/kg/hr at 07/25/24 1331      VTE Mechanical Prophylaxis: sequential compression device    ======    I have  discussed the patient's history, physical exam findings, assessment, and plan in detail with attending, Dr. Mccracken    Thank you for allowing me to participate in the care of your patient, Brianna AVILES Bautista.    Israel Pleitez DO  [facility], PGY-II    Dwayne Hill, MS3

## 2024-07-25 NOTE — PROGRESS NOTES
"[facility]  Progress Note: Critical Care  Name: Brianna Baum 74 y.o. female I MRN: [id number]  Unit/Bed#: ICU 06 I Date of Admission: 7/22/2024   Date of Service: 7/25/2024 I Hospital Day: 3    Assessment & Plan   Neuro:   Diagnosis: Acute Left MCA CVA s/p TNK  7/22  CTA: focal occlusion of distal Left M1 at the bifurcation  CTH: No acute intracranial abnormality  S/p TNK @2152  7/23   24hr post-TNK MRI head: \"stable acute infarct centered in the left basal ganglia with superimposed petechial hemorrhage. No significant mass effect.\"  Lipid panel: cholesterol 187, triglycerides 50, HDL 57, ; Hgb A1c: 5.8%  TTE: LVEF 55%, no PFO, MV mild regurg, TV mild regurg, PV mild regurg  7/24: repeat stroke alert for L MCA syndrome s/p thrombectomy TICI 3   - CTH and CTA stroke alert: L M1 occlusion acute, evolving L BG stroke w/ mild petechial hemorrhage   - repeat CTH 07/25:evolving L BG stroke, minimal petechial hemorrhage on dual energy scan   Plan:   Q2 neuro checks, Q4   Given severe afib w/ RVR hadn 2nd stroke in 3 days  Will start heparin gtt stroke protocol   PTT 50-70  Repeat CTH after 2 therapeutic PTTs to ensure no further bleeds   Continue lipitor   Neurology following - recs appreciated  Neurosurgery following - recs appreciated  Hold DVT ppx today as suspect will likely start hep gtt   SBP goal 140-160mmHg   Continue midodrine 10mg TID once able to swallow  Wean cristy as able   Fall precautions  NIH score at discharge  PT/OT - pending eval  Stroke education  BG control 110-180     CV:   Diagnosis: Afib w/ RVR   7/18 new onset afib ntoed by PCP during visit for LE edema. Pt was prescribed Cardizem at that time. Eliquis also prescribed but was never started due to cost.   07/24L went into afib w/ RVR during thrombectomy and suspected cause of stroke   Plan:  Switched to diltiazem gtt   HR < 110 goal   Discuss long-term AC plan pending stability of 24hr post-TNK " imaging  Holding home lasix in the setting of SBP goals     Pulm:  No active issues  - Promote IS, deep breathing, OOB as tolerated, continuous pulse oximetry     GI:   Diagnosis: GERD  Plan: continue home PPI  BM: none recorded  Bowel regimen: miralax, sennokot, prn dulcolax     :   No active issues  - Guillen in place  - Strict I&Os              I/o: +240cc              Will give 1L isolyte  - Daily BMP, electrolytes     F/E/N:   F: n/a  E: Monitor and replete to maintain K >4, Phos >3, Mag >2  N: regular diet              Encourage fluid intake and gatorade        Heme/Onc:   No active issues  DVT ppx held until AM CTH       Endo:   No active issues     ID:   Leukocytosis              - given no fevers w/ stable vitals, higher suspicion for CNS cause rather than infectious              Plan               - continue to monitor fever curve and WBC               - monitor off antibx      MSK/Skin:   No active issues    Disposition: Critical care    ICU Core Measures     A: Assess, Prevent, and Manage Pain Has pain been assessed? Yes  Need for changes to pain regimen? Yes   B: Both SAT/SAT  N/A   C: Choice of Sedation RASS Goal: 0 Alert and Calm  Need for changes to sedation or analgesia regimen? No   D: Delirium CAM-ICU: Negative   E: Early Mobility  Plan for early mobility? Yes   F: Family Engagement Plan for family engagement today? Yes       Review of Invasive Devices:        Angelica Plan: Keep arterial line for hemodynamic monitoring, frequent ABGs, and frequent labs    Prophylaxis:  VTE VTE covered by:  enoxaparin, Subcutaneous       Stress Ulcer  covered byesomeprazole (NexIUM) 40 MG capsule [[id number]] (Long-Term Med), pantoprazole (PROTONIX) EC tablet 40 mg [[id number]]        Significant 24hr Events     24hr events: got thrombectomy and successful TICI 3 for L M1 occlusion   Supa was on 80 for -160  Was in afib w/ RVR and switched to diltiazem gtt given could nto get po   Got CTH dual energy scan       Subjective     Review of Systems: See HPI for Review of Systems     Objective                            Vitals I/O      Most Recent Min/Max in 24hrs   Temp 98 °F (36.7 °C) Temp  Min: 97.9 °F (36.6 °C)  Max: 98.8 °F (37.1 °C)   Pulse 102 Pulse  Min: 52  Max: 154   Resp 19 Resp  Min: 16  Max: 24   /62 BP  Min: 94/72  Max: 185/80   O2 Sat 97 % SpO2  Min: 95 %  Max: 98 %      Intake/Output Summary (Last 24 hours) at 7/25/2024 0716  Last data filed at 7/25/2024 0600  Gross per 24 hour   Intake 1588.31 ml   Output 1348 ml   Net 240.31 ml       Diet Regular; Regular House    Invasive Monitoring   Arterial Line  Angelica /114  Arterial Line BP  Min: 72/38  Max: 156/114    mmHg  Arterial Line MAP (mmHg)  Min: 48 mmHg  Max: 136 mmHg           Physical Exam   Physical Exam  Vitals and nursing note reviewed.   Eyes:      Extraocular Movements: Extraocular movements intact.      Pupils: Pupils are equal, round, and reactive to light.   HENT:      Head: Normocephalic and atraumatic.   Cardiovascular:      Rate and Rhythm: Normal rate.   Abdominal:      Palpations: Abdomen is soft.   Constitutional:       Appearance: She is well-developed.   Pulmonary:      Effort: Pulmonary effort is normal.   Neurological:      Mental Status: She is alert and oriented to person, place, and time.      Sensory: Sensation is intact.      Motor: Weakness.      Comments: improved right eyelid droop and subtle R mouth droop, RUE 3/5, RLE 4/5, LUE/LLE 5/5. Flat affect.            Diagnostic Studies      EKG: reviewed  Imaging:  I have personally reviewed pertinent reports.   and I have personally reviewed pertinent films in PACS     Medications:  Scheduled PRN   vitamin C, 1,000 mg, Daily  atorvastatin, 40 mg, QPM  enoxaparin, 40 mg, Q24H ANETA  lidocaine, 1 patch, Daily  midodrine, 10 mg, TID AC  pantoprazole, 40 mg, Daily Before Breakfast  polyethylene glycol, 17 g, Daily      acetaminophen, 650 mg, Q6H PRN  lidocaine (PF), ,  PRN  oxyCODONE, 2.5 mg, Q4H PRN       Continuous    diltiazem, 1-15 mg/hr, Last Rate: 3 mg/hr (07/25/24 0512)  phenylephine,  mcg/min, Last Rate: 80 mcg/min (07/25/24 0511)         Labs:    CBC    Recent Labs     07/24/24  0519 07/25/24  0516   WBC 14.20* 12.74*   HGB 13.7 13.6   HCT 42.1 41.5    202     BMP    Recent Labs     07/24/24  1503 07/25/24  0516   SODIUM 142 142   K 4.3 3.9    108   CO2 26 24   AGAP 10 10   BUN 7 10   CREATININE 0.84 0.66   CALCIUM 8.8 9.1       Coags    Recent Labs     07/24/24  0519   INR 1.09        Additional Electrolytes  Recent Labs     07/24/24  0519 07/24/24  1503 07/25/24  0516   MG 2.1 2.7 2.2   PHOS 3.1  --  3.2   CAIONIZED 1.14  --  1.17          Blood Gas    No recent results  No recent results LFTs  Recent Labs     07/25/24  0516   ALT 30   AST 30   ALKPHOS 68   ALB 3.9   TBILI 0.67       Infectious  No recent results  Glucose  Recent Labs     07/24/24  0519 07/24/24  1503 07/25/24  0516   GLUC 129 157* 113               Flaco Thompson DO

## 2024-07-25 NOTE — PLAN OF CARE
"  Problem: PHYSICAL THERAPY ADULT  Goal: Performs mobility at highest level of function for planned discharge setting.  See evaluation for individualized goals.  Description: Treatment/Interventions: OT, Spoke to case management, Spoke to nursing, Gait training, Bed mobility, Patient/family training, Endurance training, LE strengthening/ROM, Functional transfer training          See flowsheet documentation for full assessment, interventions and recommendations.  Note: Prognosis: Good  Problem List: Decreased strength, Decreased range of motion, Decreased endurance, Impaired balance, Decreased mobility, Decreased coordination, Decreased cognition, Impaired judgement, Decreased safety awareness, Pain  Assessment: Pt is 74 y.o. female seen for PT RE evaluation s/p new onset AMS/ facial droop 7/23/24. Imaging showing new acute infarct centered in the left basal ganglia. No evidence of hemorrhagic conversion or mass effect.  Abrupt cut off of the proximal left M1 segment with corresponding density on the noncontrast head CT suggesting occlusive thrombus throughout the M1 segment . Pt  was given TNK and underwent emergent mechanical thrombectomy. On IE performed Tabitha for bed skills and modA x2 for transfer and gait 2-3' w/ HHA.  Currently,  on re-eval pt required modA for supine> sit ; sat EOB x ~10 mins c/o \"feeling sick and dizzy\"- BP taken and 112/68 EOB; - RN notified and repeat BP taken 97/58- pt return immediately to supine w/ max A x2 - repeat /83 in supine - RN Queta aware and present.   Pt presents functioning below baseline and currently w/ overall mobility deficits 2* to: decreased R>L strength deficits UE and LLE; limited flexibility;  generalized weakness/ deconditioning; decreased endurance; decreased activity tolerance; decreased coordination; impaired sitting balance;  balance decreased safety awareness; SOB/STINSON; fatigue; impaired safety and judgement; limited insight into current deficits; bed/ " chair alarms; multiple lines; dizziness; fatiguel hypotension.   Pt currently at risk for falls.  (Please find additional objective findings from PT assessment regarding body systems outlined above.) Pt will continue to benefit from skilled PT interventions to address stated impairments; to maximize functional potential; for ongoing pt/ family training; and DME needs.  PT is recommending PT Resource Intensity Level  1 on d/c.  POC and STG as written on IE remain appropriate.  Barriers to Discharge: Inaccessible home environment     Rehab Resource Intensity Level, PT: I (Maximum Resource Intensity)    See flowsheet documentation for full assessment.

## 2024-07-25 NOTE — QUICK NOTE
Attempted to call son for update but was unable to get a hold of and unable to leave message given no identifiers on VM.

## 2024-07-25 NOTE — PLAN OF CARE
Problem: Prexisting or High Potential for Compromised Skin Integrity  Goal: Skin integrity is maintained or improved  Description: INTERVENTIONS:  - Identify patients at risk for skin breakdown  - Assess and monitor skin integrity  - Assess and monitor nutrition and hydration status  - Monitor labs   - Assess for incontinence   - Turn and reposition patient  - Assist with mobility/ambulation  - Relieve pressure over bony prominences  - Avoid friction and shearing  - Provide appropriate hygiene as needed including keeping skin clean and dry  - Evaluate need for skin moisturizer/barrier cream  - Collaborate with interdisciplinary team   - Patient/family teaching  - Consider wound care consult   Outcome: Progressing     Problem: PAIN - ADULT  Goal: Verbalizes/displays adequate comfort level or baseline comfort level  Description: Interventions:  - Encourage patient to monitor pain and request assistance  - Assess pain using appropriate pain scale  - Administer analgesics based on type and severity of pain and evaluate response  - Implement non-pharmacological measures as appropriate and evaluate response  - Consider cultural and social influences on pain and pain management  - Notify physician/advanced practitioner if interventions unsuccessful or patient reports new pain  Outcome: Progressing     Problem: INFECTION - ADULT  Goal: Absence or prevention of progression during hospitalization  Description: INTERVENTIONS:  - Assess and monitor for signs and symptoms of infection  - Monitor lab/diagnostic results  - Monitor all insertion sites, i.e. indwelling lines, tubes, and drains  - Monitor endotracheal if appropriate and nasal secretions for changes in amount and color  - [place] appropriate cooling/warming therapies per order  - Administer medications as ordered  - Instruct and encourage patient and family to use good hand hygiene technique  - Identify and instruct in appropriate isolation precautions for  identified infection/condition  Outcome: Progressing     Problem: SAFETY ADULT  Goal: Patient will remain free of falls  Description: INTERVENTIONS:  - Educate patient/family on patient safety including physical limitations  - Instruct patient to call for assistance with activity   - Consult OT/PT to assist with strengthening/mobility   - Keep Call bell within reach  - Keep bed low and locked with side rails adjusted as appropriate  - Keep care items and personal belongings within reach  - Initiate and maintain comfort rounds  - Make Fall Risk Sign visible to staff  - Offer Toileting every 2 Hours, in advance of need  - Apply yellow socks and bracelet for high fall risk patients  - Consider moving patient to room near nurses station  Outcome: Progressing  Goal: Maintain or return to baseline ADL function  Description: INTERVENTIONS:  -  Assess patient's ability to carry out ADLs; assess patient's baseline for ADL function and identify physical deficits which impact ability to perform ADLs (bathing, care of mouth/teeth, toileting, grooming, dressing, etc.)  - Assess/evaluate cause of self-care deficits   - Assess range of motion  - Assess patient's mobility; develop plan if impaired  - Assess patient's need for assistive devices and provide as appropriate  - Encourage maximum independence but intervene and supervise when necessary  - Involve family in performance of ADLs  - Assess for home care needs following discharge   - Consider OT consult to assist with ADL evaluation and planning for discharge  - Provide patient education as appropriate  Outcome: Progressing  Goal: Maintains/Returns to pre admission functional level  Description: INTERVENTIONS:  - Perform AM-PAC 6 Click Basic Mobility/ Daily Activity assessment daily.  - Set and communicate daily mobility goal to care team and patient/family/caregiver.   - Collaborate with rehabilitation services on mobility goals if consulted  - Reposition patient every 2  hours.  - Out of bed for toileting  - Record patient progress and toleration of activity level   Outcome: Progressing     Problem: DISCHARGE PLANNING  Goal: Discharge to home or other facility with appropriate resources  Description: INTERVENTIONS:  - Identify barriers to discharge w/patient and caregiver  - Arrange for needed discharge resources and transportation as appropriate  - Identify discharge learning needs (meds, wound care, etc.)  - Arrange for interpretive services to assist at discharge as needed  - Refer to Case Management Department for coordinating discharge planning if the patient needs post-hospital services based on physician/advanced practitioner order or complex needs related to functional status, cognitive ability, or social support system  Outcome: Progressing     Problem: Knowledge Deficit  Goal: Patient/family/caregiver demonstrates understanding of disease process, treatment plan, medications, and discharge instructions  Description: Complete learning assessment and assess knowledge base.  Interventions:  - Provide teaching at level of understanding  - Provide teaching via preferred learning methods  Outcome: Progressing     Problem: Neurological Deficit  Goal: Neurological status is stable or improving  Description: Interventions:  - Monitor and assess patient's level of consciousness, motor function, sensory function, and level of assistance needed for ADLs.   - Monitor and report changes from baseline. Collaborate with interdisciplinary team to initiate plan and implement interventions as ordered.   - Provide and maintain a safe environment.  - Consider seizure precautions.  - Consider fall precautions.  - Consider aspiration precautions.  - Consider bleeding precautions.  Outcome: Progressing     Problem: Activity Intolerance/Impaired Mobility  Goal: Mobility/activity is maintained at optimum level for patient  Description: Interventions:  - Assess and monitor patient  barriers to  mobility and need for assistive/adaptive devices.  - Assess patient's emotional response to limitations.  - Collaborate with interdisciplinary team and initiate plans and interventions as ordered.  - Encourage independent activity per ability.  - Maintain proper body alignment.  - Perform active/passive rom as tolerated/ordered.  - Plan activities to conserve energy.  - Turn patient as appropriate  Outcome: Progressing     Problem: Communication Impairment  Goal: Ability to express needs and understand communication  Description: Assess patient's communication skills and ability to understand information.  Patient will demonstrate use of effective communication techniques, alternative methods of communication and understanding even if not able to speak.     - Encourage communication and provide alternate methods of communication as needed.  - Collaborate with case management/ for discharge needs.  - Include patient/family/caregiver in decisions related to communication.  Outcome: Progressing     Problem: Potential for Aspiration  Goal: Non-ventilated patient's risk of aspiration is minimized  Description: Assess and monitor vital signs, respiratory status, and labs (WBC).  Monitor for signs of aspiration (tachypnea, cough, rales, wheezing, cyanosis, fever).    - Assess and monitor patient's ability to swallow.  - Place patient up in chair to eat if possible.  - HOB up at 90 degrees to eat if unable to get patient up into chair.  - Supervise patient during oral intake.   - Instruct patient/ family to take small bites.  - Instruct patient/ family to take small single sips when taking liquids.  - Follow patient-specific strategies generated by speech pathologist.  Outcome: Progressing     Problem: Nutrition  Goal: Nutrition/Hydration status is improving  Description: Monitor and assess patient's nutrition/hydration status for malnutrition (ex- brittle hair, bruises, dry skin, pale skin and conjunctiva,  muscle wasting, smooth red tongue, and disorientation). Collaborate with interdisciplinary team and initiate plan and interventions as ordered.  Monitor patient's weight and dietary intake as ordered or per policy. Utilize nutrition screening tool and intervene per policy. Determine patient's food preferences and provide high-protein, high-caloric foods as appropriate.     - Assist patient with eating.  - Allow adequate time for meals.  - Encourage patient to take dietary supplement as ordered.  - Collaborate with clinical nutritionist.  - Include patient/family/caregiver in decisions related to nutrition.  Outcome: Progressing

## 2024-07-26 ENCOUNTER — APPOINTMENT (OUTPATIENT)
Dept: RADIOLOGY | Facility: HOSPITAL | Age: 74
DRG: 023 | End: 2024-07-26
Payer: MEDICARE

## 2024-07-26 LAB
ALBUMIN SERPL BCG-MCNC: 3.6 G/DL (ref 3.5–5)
ALP SERPL-CCNC: 66 U/L (ref 34–104)
ALT SERPL W P-5'-P-CCNC: 29 U/L (ref 7–52)
ANION GAP SERPL CALCULATED.3IONS-SCNC: 10 MMOL/L (ref 4–13)
APTT PPP: 52 SECONDS (ref 23–37)
APTT PPP: 62 SECONDS (ref 23–37)
APTT PPP: 73 SECONDS (ref 23–37)
AST SERPL W P-5'-P-CCNC: 26 U/L (ref 13–39)
BACTERIA UR QL AUTO: ABNORMAL /HPF
BASOPHILS # BLD AUTO: 0.04 THOUSANDS/ÂΜL (ref 0–0.1)
BASOPHILS NFR BLD AUTO: 0 % (ref 0–1)
BILIRUB SERPL-MCNC: 0.62 MG/DL (ref 0.2–1)
BILIRUB UR QL STRIP: NEGATIVE
BUN SERPL-MCNC: 12 MG/DL (ref 5–25)
CA-I BLD-SCNC: 1.14 MMOL/L (ref 1.12–1.32)
CALCIUM SERPL-MCNC: 9 MG/DL (ref 8.4–10.2)
CHLORIDE SERPL-SCNC: 107 MMOL/L (ref 96–108)
CLARITY UR: ABNORMAL
CO2 SERPL-SCNC: 23 MMOL/L (ref 21–32)
COLOR UR: ABNORMAL
CORTIS SERPL-MCNC: 5 UG/DL
CREAT SERPL-MCNC: 0.71 MG/DL (ref 0.6–1.3)
EOSINOPHIL # BLD AUTO: 0.06 THOUSAND/ÂΜL (ref 0–0.61)
EOSINOPHIL NFR BLD AUTO: 0 % (ref 0–6)
ERYTHROCYTE [DISTWIDTH] IN BLOOD BY AUTOMATED COUNT: 12.9 % (ref 11.6–15.1)
GFR SERPL CREATININE-BSD FRML MDRD: 84 ML/MIN/1.73SQ M
GLUCOSE SERPL-MCNC: 125 MG/DL (ref 65–140)
GLUCOSE UR STRIP-MCNC: NEGATIVE MG/DL
HCT VFR BLD AUTO: 38.9 % (ref 34.8–46.1)
HGB BLD-MCNC: 12.8 G/DL (ref 11.5–15.4)
HGB UR QL STRIP.AUTO: NEGATIVE
IMM GRANULOCYTES # BLD AUTO: 0.07 THOUSAND/UL (ref 0–0.2)
IMM GRANULOCYTES NFR BLD AUTO: 1 % (ref 0–2)
KETONES UR STRIP-MCNC: NEGATIVE MG/DL
LEUKOCYTE ESTERASE UR QL STRIP: ABNORMAL
LYMPHOCYTES # BLD AUTO: 2.7 THOUSANDS/ÂΜL (ref 0.6–4.47)
LYMPHOCYTES NFR BLD AUTO: 19 % (ref 14–44)
MAGNESIUM SERPL-MCNC: 1.8 MG/DL (ref 1.9–2.7)
MCH RBC QN AUTO: 31.1 PG (ref 26.8–34.3)
MCHC RBC AUTO-ENTMCNC: 32.9 G/DL (ref 31.4–37.4)
MCV RBC AUTO: 94 FL (ref 82–98)
MONOCYTES # BLD AUTO: 1.21 THOUSAND/ÂΜL (ref 0.17–1.22)
MONOCYTES NFR BLD AUTO: 8 % (ref 4–12)
NEUTROPHILS # BLD AUTO: 10.49 THOUSANDS/ÂΜL (ref 1.85–7.62)
NEUTS SEG NFR BLD AUTO: 72 % (ref 43–75)
NITRITE UR QL STRIP: NEGATIVE
NON-SQ EPI CELLS URNS QL MICRO: ABNORMAL /HPF
NRBC BLD AUTO-RTO: 0 /100 WBCS
PH UR STRIP.AUTO: 5.5 [PH]
PHOSPHATE SERPL-MCNC: 4.3 MG/DL (ref 2.3–4.1)
PLATELET # BLD AUTO: 234 THOUSANDS/UL (ref 149–390)
PMV BLD AUTO: 11 FL (ref 8.9–12.7)
POTASSIUM SERPL-SCNC: 4.2 MMOL/L (ref 3.5–5.3)
PROT SERPL-MCNC: 6.1 G/DL (ref 6.4–8.4)
PROT UR STRIP-MCNC: NEGATIVE MG/DL
RBC # BLD AUTO: 4.12 MILLION/UL (ref 3.81–5.12)
RBC #/AREA URNS AUTO: ABNORMAL /HPF
SODIUM SERPL-SCNC: 140 MMOL/L (ref 135–147)
SP GR UR STRIP.AUTO: 1.01 (ref 1–1.03)
TSH SERPL DL<=0.05 MIU/L-ACNC: 1.07 UIU/ML (ref 0.45–4.5)
UROBILINOGEN UR STRIP-ACNC: <2 MG/DL
WBC # BLD AUTO: 14.57 THOUSAND/UL (ref 4.31–10.16)
WBC #/AREA URNS AUTO: ABNORMAL /HPF

## 2024-07-26 PROCEDURE — 82533 TOTAL CORTISOL: CPT

## 2024-07-26 PROCEDURE — 80053 COMPREHEN METABOLIC PANEL: CPT

## 2024-07-26 PROCEDURE — 84100 ASSAY OF PHOSPHORUS: CPT

## 2024-07-26 PROCEDURE — 85730 THROMBOPLASTIN TIME PARTIAL: CPT | Performed by: EMERGENCY MEDICINE

## 2024-07-26 PROCEDURE — 84443 ASSAY THYROID STIM HORMONE: CPT | Performed by: EMERGENCY MEDICINE

## 2024-07-26 PROCEDURE — 85730 THROMBOPLASTIN TIME PARTIAL: CPT

## 2024-07-26 PROCEDURE — 99291 CRITICAL CARE FIRST HOUR: CPT | Performed by: EMERGENCY MEDICINE

## 2024-07-26 PROCEDURE — 82330 ASSAY OF CALCIUM: CPT

## 2024-07-26 PROCEDURE — 81001 URINALYSIS AUTO W/SCOPE: CPT | Performed by: STUDENT IN AN ORGANIZED HEALTH CARE EDUCATION/TRAINING PROGRAM

## 2024-07-26 PROCEDURE — 70551 MRI BRAIN STEM W/O DYE: CPT

## 2024-07-26 PROCEDURE — 92526 ORAL FUNCTION THERAPY: CPT

## 2024-07-26 PROCEDURE — 85025 COMPLETE CBC W/AUTO DIFF WBC: CPT

## 2024-07-26 PROCEDURE — 83735 ASSAY OF MAGNESIUM: CPT

## 2024-07-26 RX ORDER — SODIUM CHLORIDE, SODIUM GLUCONATE, SODIUM ACETATE, POTASSIUM CHLORIDE, MAGNESIUM CHLORIDE, SODIUM PHOSPHATE, DIBASIC, AND POTASSIUM PHOSPHATE .53; .5; .37; .037; .03; .012; .00082 G/100ML; G/100ML; G/100ML; G/100ML; G/100ML; G/100ML; G/100ML
500 INJECTION, SOLUTION INTRAVENOUS ONCE
Status: COMPLETED | OUTPATIENT
Start: 2024-07-26 | End: 2024-07-26

## 2024-07-26 RX ORDER — ALBUMIN, HUMAN INJ 5% 5 %
25 SOLUTION INTRAVENOUS ONCE
Status: COMPLETED | OUTPATIENT
Start: 2024-07-26 | End: 2024-07-26

## 2024-07-26 RX ORDER — SODIUM CHLORIDE, SODIUM GLUCONATE, SODIUM ACETATE, POTASSIUM CHLORIDE, MAGNESIUM CHLORIDE, SODIUM PHOSPHATE, DIBASIC, AND POTASSIUM PHOSPHATE .53; .5; .37; .037; .03; .012; .00082 G/100ML; G/100ML; G/100ML; G/100ML; G/100ML; G/100ML; G/100ML
125 INJECTION, SOLUTION INTRAVENOUS CONTINUOUS
Status: DISPENSED | OUTPATIENT
Start: 2024-07-26 | End: 2024-07-26

## 2024-07-26 RX ORDER — SODIUM CHLORIDE, SODIUM GLUCONATE, SODIUM ACETATE, POTASSIUM CHLORIDE, MAGNESIUM CHLORIDE, SODIUM PHOSPHATE, DIBASIC, AND POTASSIUM PHOSPHATE .53; .5; .37; .037; .03; .012; .00082 G/100ML; G/100ML; G/100ML; G/100ML; G/100ML; G/100ML; G/100ML
100 INJECTION, SOLUTION INTRAVENOUS CONTINUOUS
Status: DISPENSED | OUTPATIENT
Start: 2024-07-26 | End: 2024-07-27

## 2024-07-26 RX ORDER — MAGNESIUM SULFATE HEPTAHYDRATE 40 MG/ML
2 INJECTION, SOLUTION INTRAVENOUS ONCE
Status: COMPLETED | OUTPATIENT
Start: 2024-07-26 | End: 2024-07-26

## 2024-07-26 RX ORDER — SODIUM CHLORIDE, SODIUM GLUCONATE, SODIUM ACETATE, POTASSIUM CHLORIDE, MAGNESIUM CHLORIDE, SODIUM PHOSPHATE, DIBASIC, AND POTASSIUM PHOSPHATE .53; .5; .37; .037; .03; .012; .00082 G/100ML; G/100ML; G/100ML; G/100ML; G/100ML; G/100ML; G/100ML
100 INJECTION, SOLUTION INTRAVENOUS CONTINUOUS
Status: DISCONTINUED | OUTPATIENT
Start: 2024-07-26 | End: 2024-07-26

## 2024-07-26 RX ADMIN — PHENYLEPHRINE HYDROCHLORIDE 180 MCG/MIN: 50 INJECTION INTRAVENOUS at 04:57

## 2024-07-26 RX ADMIN — MIDODRINE HYDROCHLORIDE 10 MG: 5 TABLET ORAL at 06:00

## 2024-07-26 RX ADMIN — ACETAMINOPHEN 650 MG: 325 TABLET, FILM COATED ORAL at 00:13

## 2024-07-26 RX ADMIN — SODIUM CHLORIDE, SODIUM GLUCONATE, SODIUM ACETATE, POTASSIUM CHLORIDE, MAGNESIUM CHLORIDE, SODIUM PHOSPHATE, DIBASIC, AND POTASSIUM PHOSPHATE 125 ML/HR: .53; .5; .37; .037; .03; .012; .00082 INJECTION, SOLUTION INTRAVENOUS at 11:58

## 2024-07-26 RX ADMIN — SODIUM CHLORIDE, SODIUM GLUCONATE, SODIUM ACETATE, POTASSIUM CHLORIDE, MAGNESIUM CHLORIDE, SODIUM PHOSPHATE, DIBASIC, AND POTASSIUM PHOSPHATE 125 ML/HR: .53; .5; .37; .037; .03; .012; .00082 INJECTION, SOLUTION INTRAVENOUS at 15:32

## 2024-07-26 RX ADMIN — MIDODRINE HYDROCHLORIDE 10 MG: 5 TABLET ORAL at 12:26

## 2024-07-26 RX ADMIN — OXYCODONE HYDROCHLORIDE AND ACETAMINOPHEN 1000 MG: 500 TABLET ORAL at 09:24

## 2024-07-26 RX ADMIN — PANTOPRAZOLE SODIUM 40 MG: 40 TABLET, DELAYED RELEASE ORAL at 06:00

## 2024-07-26 RX ADMIN — DILTIAZEM HYDROCHLORIDE 30 MG: 30 TABLET ORAL at 14:58

## 2024-07-26 RX ADMIN — ALBUMIN (HUMAN) 25 G: 12.5 INJECTION, SOLUTION INTRAVENOUS at 17:45

## 2024-07-26 RX ADMIN — PHENYLEPHRINE HYDROCHLORIDE 170 MCG/MIN: 50 INJECTION INTRAVENOUS at 10:07

## 2024-07-26 RX ADMIN — MIDODRINE HYDROCHLORIDE 10 MG: 5 TABLET ORAL at 15:21

## 2024-07-26 RX ADMIN — HEPARIN SODIUM 13 UNITS/KG/HR: 10000 INJECTION, SOLUTION INTRAVENOUS at 19:48

## 2024-07-26 RX ADMIN — HYDROCORTISONE SODIUM SUCCINATE 100 MG: 100 INJECTION, POWDER, FOR SOLUTION INTRAMUSCULAR; INTRAVENOUS at 19:40

## 2024-07-26 RX ADMIN — PHENYLEPHRINE HYDROCHLORIDE 180 MCG/MIN: 50 INJECTION INTRAVENOUS at 00:26

## 2024-07-26 RX ADMIN — POLYETHYLENE GLYCOL 3350 17 G: 17 POWDER, FOR SOLUTION ORAL at 09:24

## 2024-07-26 RX ADMIN — ATORVASTATIN CALCIUM 40 MG: 40 TABLET, FILM COATED ORAL at 17:53

## 2024-07-26 RX ADMIN — DILTIAZEM HYDROCHLORIDE 30 MG: 30 TABLET ORAL at 05:53

## 2024-07-26 RX ADMIN — HEPARIN SODIUM 15 UNITS/KG/HR: 10000 INJECTION, SOLUTION INTRAVENOUS at 01:27

## 2024-07-26 RX ADMIN — SODIUM CHLORIDE, SODIUM GLUCONATE, SODIUM ACETATE, POTASSIUM CHLORIDE, MAGNESIUM CHLORIDE, SODIUM PHOSPHATE, DIBASIC, AND POTASSIUM PHOSPHATE 500 ML: .53; .5; .37; .037; .03; .012; .00082 INJECTION, SOLUTION INTRAVENOUS at 10:55

## 2024-07-26 RX ADMIN — PHENYLEPHRINE HYDROCHLORIDE 60 MCG/MIN: 50 INJECTION INTRAVENOUS at 10:48

## 2024-07-26 RX ADMIN — LIDOCAINE 5% 1 PATCH: 700 PATCH TOPICAL at 09:24

## 2024-07-26 RX ADMIN — SODIUM CHLORIDE, SODIUM GLUCONATE, SODIUM ACETATE, POTASSIUM CHLORIDE, MAGNESIUM CHLORIDE, SODIUM PHOSPHATE, DIBASIC, AND POTASSIUM PHOSPHATE 100 ML/HR: .53; .5; .37; .037; .03; .012; .00082 INJECTION, SOLUTION INTRAVENOUS at 16:52

## 2024-07-26 RX ADMIN — MAGNESIUM SULFATE HEPTAHYDRATE 2 G: 40 INJECTION, SOLUTION INTRAVENOUS at 06:58

## 2024-07-26 NOTE — SPEECH THERAPY NOTE
"Speech Language/Pathology    Speech/Language Pathology Progress Note    Patient Name: Brianna Baum  Today's Date: 7/26/2024     Problem List  Principal Problem:    Cerebrovascular accident (CVA) due to occlusion of left middle cerebral artery (HCC)       Past Medical History  Past Medical History:   Diagnosis Date    Arthritis     Chronic pain     Colon polyp     Diverticulitis     GERD (gastroesophageal reflux disease)     Hypertension     Vertigo         Past Surgical History  Past Surgical History:   Procedure Laterality Date    CHOLECYSTECTOMY      CHOLECYSTECTOMY LAPAROSCOPIC N/A 12/06/2021    Procedure: SUBTOTAL CHOLECYSTECTOMY LAPAROSCOPIC;  Surgeon: Víctor Viveros MD;  Location:  MAIN OR;  Service: General    COLONOSCOPY      GALLBLADDER SURGERY  12/2021    Dr. Viveros    IR STROKE ALERT  7/23/2024    TUBAL LIGATION           Subjective:  \"I feel much better\" Patient is awake and alert today.     Objective:  The patient is seen for dysphagia therapy this am. She continues with slight right facial weakness, but appears improved since yesterday. She is agreeable to am snack and is assessed with regular peanut butter and jelly. Bite strength is adequate. Mastication time is min prolonged, but efficient. No right buccal pocketing observed today. The patient has lingual residue, which is eventually cleared. She takes small, consecutive sips of thin liquids via straw. No overt s/s aspiration observed.     Assessment:  The patient tolerated upgraded trials well.     Plan/Recommendations:  Recommend diet change to regular with thin liquids. No further ST appears warranted. Please re-consult with concerns.      "

## 2024-07-26 NOTE — PLAN OF CARE
Problem: Prexisting or High Potential for Compromised Skin Integrity  Goal: Skin integrity is maintained or improved  Description: INTERVENTIONS:  - Identify patients at risk for skin breakdown  - Assess and monitor skin integrity  - Assess and monitor nutrition and hydration status  - Monitor labs   - Assess for incontinence   - Turn and reposition patient  - Assist with mobility/ambulation  - Relieve pressure over bony prominences  - Avoid friction and shearing  - Provide appropriate hygiene as needed including keeping skin clean and dry  - Evaluate need for skin moisturizer/barrier cream  - Collaborate with interdisciplinary team   - Patient/family teaching  - Consider wound care consult   Outcome: Progressing     Problem: PAIN - ADULT  Goal: Verbalizes/displays adequate comfort level or baseline comfort level  Description: Interventions:  - Encourage patient to monitor pain and request assistance  - Assess pain using appropriate pain scale  - Administer analgesics based on type and severity of pain and evaluate response  - Implement non-pharmacological measures as appropriate and evaluate response  - Consider cultural and social influences on pain and pain management  - Notify physician/advanced practitioner if interventions unsuccessful or patient reports new pain  Outcome: Progressing     Problem: INFECTION - ADULT  Goal: Absence or prevention of progression during hospitalization  Description: INTERVENTIONS:  - Assess and monitor for signs and symptoms of infection  - Monitor lab/diagnostic results  - Monitor all insertion sites, i.e. indwelling lines, tubes, and drains  - Monitor endotracheal if appropriate and nasal secretions for changes in amount and color  - [place] appropriate cooling/warming therapies per order  - Administer medications as ordered  - Instruct and encourage patient and family to use good hand hygiene technique  - Identify and instruct in appropriate isolation precautions for  identified infection/condition  Outcome: Progressing     Problem: SAFETY ADULT  Goal: Patient will remain free of falls  Description: INTERVENTIONS:  - Educate patient/family on patient safety including physical limitations  - Instruct patient to call for assistance with activity   - Consult OT/PT to assist with strengthening/mobility   - Keep Call bell within reach  - Keep bed low and locked with side rails adjusted as appropriate  - Keep care items and personal belongings within reach  - Initiate and maintain comfort rounds  - Make Fall Risk Sign visible to staff  - Offer Toileting every 2 Hours, in advance of need  - Apply yellow socks and bracelet for high fall risk patients  - Consider moving patient to room near nurses station  Outcome: Progressing  Goal: Maintain or return to baseline ADL function  Description: INTERVENTIONS:  -  Assess patient's ability to carry out ADLs; assess patient's baseline for ADL function and identify physical deficits which impact ability to perform ADLs (bathing, care of mouth/teeth, toileting, grooming, dressing, etc.)  - Assess/evaluate cause of self-care deficits   - Assess range of motion  - Assess patient's mobility; develop plan if impaired  - Assess patient's need for assistive devices and provide as appropriate  - Encourage maximum independence but intervene and supervise when necessary  - Involve family in performance of ADLs  - Assess for home care needs following discharge   - Consider OT consult to assist with ADL evaluation and planning for discharge  - Provide patient education as appropriate  Outcome: Progressing  Goal: Maintains/Returns to pre admission functional level  Description: INTERVENTIONS:  - Perform AM-PAC 6 Click Basic Mobility/ Daily Activity assessment daily.  - Set and communicate daily mobility goal to care team and patient/family/caregiver.   - Collaborate with rehabilitation services on mobility goals if consulted  - Reposition patient every 2  hours.  - Out of bed for toileting  - Record patient progress and toleration of activity level   Outcome: Progressing     Problem: DISCHARGE PLANNING  Goal: Discharge to home or other facility with appropriate resources  Description: INTERVENTIONS:  - Identify barriers to discharge w/patient and caregiver  - Arrange for needed discharge resources and transportation as appropriate  - Identify discharge learning needs (meds, wound care, etc.)  - Arrange for interpretive services to assist at discharge as needed  - Refer to Case Management Department for coordinating discharge planning if the patient needs post-hospital services based on physician/advanced practitioner order or complex needs related to functional status, cognitive ability, or social support system  Outcome: Progressing     Problem: Knowledge Deficit  Goal: Patient/family/caregiver demonstrates understanding of disease process, treatment plan, medications, and discharge instructions  Description: Complete learning assessment and assess knowledge base.  Interventions:  - Provide teaching at level of understanding  - Provide teaching via preferred learning methods  Outcome: Progressing     Problem: Neurological Deficit  Goal: Neurological status is stable or improving  Description: Interventions:  - Monitor and assess patient's level of consciousness, motor function, sensory function, and level of assistance needed for ADLs.   - Monitor and report changes from baseline. Collaborate with interdisciplinary team to initiate plan and implement interventions as ordered.   - Provide and maintain a safe environment.  - Consider seizure precautions.  - Consider fall precautions.  - Consider aspiration precautions.  - Consider bleeding precautions.  Outcome: Progressing     Problem: Activity Intolerance/Impaired Mobility  Goal: Mobility/activity is maintained at optimum level for patient  Description: Interventions:  - Assess and monitor patient  barriers to  mobility and need for assistive/adaptive devices.  - Assess patient's emotional response to limitations.  - Collaborate with interdisciplinary team and initiate plans and interventions as ordered.  - Encourage independent activity per ability.  - Maintain proper body alignment.  - Perform active/passive rom as tolerated/ordered.  - Plan activities to conserve energy.  - Turn patient as appropriate  Outcome: Progressing     Problem: Communication Impairment  Goal: Ability to express needs and understand communication  Description: Assess patient's communication skills and ability to understand information.  Patient will demonstrate use of effective communication techniques, alternative methods of communication and understanding even if not able to speak.     - Encourage communication and provide alternate methods of communication as needed.  - Collaborate with case management/ for discharge needs.  - Include patient/family/caregiver in decisions related to communication.  Outcome: Progressing     Problem: Potential for Aspiration  Goal: Non-ventilated patient's risk of aspiration is minimized  Description: Assess and monitor vital signs, respiratory status, and labs (WBC).  Monitor for signs of aspiration (tachypnea, cough, rales, wheezing, cyanosis, fever).    - Assess and monitor patient's ability to swallow.  - Place patient up in chair to eat if possible.  - HOB up at 90 degrees to eat if unable to get patient up into chair.  - Supervise patient during oral intake.   - Instruct patient/ family to take small bites.  - Instruct patient/ family to take small single sips when taking liquids.  - Follow patient-specific strategies generated by speech pathologist.  Outcome: Progressing     Problem: Nutrition  Goal: Nutrition/Hydration status is improving  Description: Monitor and assess patient's nutrition/hydration status for malnutrition (ex- brittle hair, bruises, dry skin, pale skin and conjunctiva,  muscle wasting, smooth red tongue, and disorientation). Collaborate with interdisciplinary team and initiate plan and interventions as ordered.  Monitor patient's weight and dietary intake as ordered or per policy. Utilize nutrition screening tool and intervene per policy. Determine patient's food preferences and provide high-protein, high-caloric foods as appropriate.     - Assist patient with eating.  - Allow adequate time for meals.  - Encourage patient to take dietary supplement as ordered.  - Collaborate with clinical nutritionist.  - Include patient/family/caregiver in decisions related to nutrition.  Outcome: Progressing

## 2024-07-26 NOTE — PLAN OF CARE
Problem: Prexisting or High Potential for Compromised Skin Integrity  Goal: Skin integrity is maintained or improved  Description: INTERVENTIONS:  - Identify patients at risk for skin breakdown  - Assess and monitor skin integrity  - Assess and monitor nutrition and hydration status  - Monitor labs   - Assess for incontinence   - Turn and reposition patient  - Assist with mobility/ambulation  - Relieve pressure over bony prominences  - Avoid friction and shearing  - Provide appropriate hygiene as needed including keeping skin clean and dry  - Evaluate need for skin moisturizer/barrier cream  - Collaborate with interdisciplinary team   - Patient/family teaching  - Consider wound care consult   Outcome: Progressing     Problem: PAIN - ADULT  Goal: Verbalizes/displays adequate comfort level or baseline comfort level  Description: Interventions:  - Encourage patient to monitor pain and request assistance  - Assess pain using appropriate pain scale  - Administer analgesics based on type and severity of pain and evaluate response  - Implement non-pharmacological measures as appropriate and evaluate response  - Consider cultural and social influences on pain and pain management  - Notify physician/advanced practitioner if interventions unsuccessful or patient reports new pain  Outcome: Progressing     Problem: INFECTION - ADULT  Goal: Absence or prevention of progression during hospitalization  Description: INTERVENTIONS:  - Assess and monitor for signs and symptoms of infection  - Monitor lab/diagnostic results  - Monitor all insertion sites, i.e. indwelling lines, tubes, and drains  - Monitor endotracheal if appropriate and nasal secretions for changes in amount and color  - [place] appropriate cooling/warming therapies per order  - Administer medications as ordered  - Instruct and encourage patient and family to use good hand hygiene technique  - Identify and instruct in appropriate isolation precautions for  identified infection/condition  Outcome: Progressing  Goal: Absence of fever/infection during neutropenic period  Description: INTERVENTIONS:  - Monitor WBC    Outcome: Progressing     Problem: SAFETY ADULT  Goal: Patient will remain free of falls  Description: INTERVENTIONS:  - Educate patient/family on patient safety including physical limitations  - Instruct patient to call for assistance with activity   - Consult OT/PT to assist with strengthening/mobility   - Keep Call bell within reach  - Keep bed low and locked with side rails adjusted as appropriate  - Keep care items and personal belongings within reach  - Initiate and maintain comfort rounds  - Make Fall Risk Sign visible to staff  - Offer Toileting every 2 Hours, in advance of need  - Initiate/Maintain bed alarm  - Obtain necessary fall risk management equipment: bed alarm  - Apply yellow socks and bracelet for high fall risk patients  - Consider moving patient to room near nurses station  Outcome: Progressing  Goal: Maintain or return to baseline ADL function  Description: INTERVENTIONS:  -  Assess patient's ability to carry out ADLs; assess patient's baseline for ADL function and identify physical deficits which impact ability to perform ADLs (bathing, care of mouth/teeth, toileting, grooming, dressing, etc.)  - Assess/evaluate cause of self-care deficits   - Assess range of motion  - Assess patient's mobility; develop plan if impaired  - Assess patient's need for assistive devices and provide as appropriate  - Encourage maximum independence but intervene and supervise when necessary  - Involve family in performance of ADLs  - Assess for home care needs following discharge   - Consider OT consult to assist with ADL evaluation and planning for discharge  - Provide patient education as appropriate  Outcome: Progressing  Goal: Maintains/Returns to pre admission functional level  Description: INTERVENTIONS:  - Perform AM-PAC 6 Click Basic Mobility/ Daily  Activity assessment daily.  - Set and communicate daily mobility goal to care team and patient/family/caregiver.   - Collaborate with rehabilitation services on mobility goals if consulted  - Perform Range of Motion 3 times a day.  - Reposition patient every 2 hours.  - Dangle patient 2 times a day  - Stand patient 2 times a day  - Ambulate patient 2 times a day  - Out of bed to chair 2 times a day   - Out of bed for meals 3 times a day  - Out of bed for toileting  - Record patient progress and toleration of activity level   Outcome: Progressing     Problem: DISCHARGE PLANNING  Goal: Discharge to home or other facility with appropriate resources  Description: INTERVENTIONS:  - Identify barriers to discharge w/patient and caregiver  - Arrange for needed discharge resources and transportation as appropriate  - Identify discharge learning needs (meds, wound care, etc.)  - Arrange for interpretive services to assist at discharge as needed  - Refer to Case Management Department for coordinating discharge planning if the patient needs post-hospital services based on physician/advanced practitioner order or complex needs related to functional status, cognitive ability, or social support system  Outcome: Progressing     Problem: Knowledge Deficit  Goal: Patient/family/caregiver demonstrates understanding of disease process, treatment plan, medications, and discharge instructions  Description: Complete learning assessment and assess knowledge base.  Interventions:  - Provide teaching at level of understanding  - Provide teaching via preferred learning methods  Outcome: Progressing     Problem: Neurological Deficit  Goal: Neurological status is stable or improving  Description: Interventions:  - Monitor and assess patient's level of consciousness, motor function, sensory function, and level of assistance needed for ADLs.   - Monitor and report changes from baseline. Collaborate with interdisciplinary team to initiate plan and  implement interventions as ordered.   - Provide and maintain a safe environment.  - Consider seizure precautions.  - Consider fall precautions.  - Consider aspiration precautions.  - Consider bleeding precautions.  Outcome: Progressing     Problem: Activity Intolerance/Impaired Mobility  Goal: Mobility/activity is maintained at optimum level for patient  Description: Interventions:  - Assess and monitor patient  barriers to mobility and need for assistive/adaptive devices.  - Assess patient's emotional response to limitations.  - Collaborate with interdisciplinary team and initiate plans and interventions as ordered.  - Encourage independent activity per ability.  - Maintain proper body alignment.  - Perform active/passive rom as tolerated/ordered.  - Plan activities to conserve energy.  - Turn patient as appropriate  Outcome: Progressing     Problem: Communication Impairment  Goal: Ability to express needs and understand communication  Description: Assess patient's communication skills and ability to understand information.  Patient will demonstrate use of effective communication techniques, alternative methods of communication and understanding even if not able to speak.     - Encourage communication and provide alternate methods of communication as needed.  - Collaborate with case management/ for discharge needs.  - Include patient/family/caregiver in decisions related to communication.  Outcome: Progressing     Problem: Potential for Aspiration  Goal: Non-ventilated patient's risk of aspiration is minimized  Description: Assess and monitor vital signs, respiratory status, and labs (WBC).  Monitor for signs of aspiration (tachypnea, cough, rales, wheezing, cyanosis, fever).    - Assess and monitor patient's ability to swallow.  - Place patient up in chair to eat if possible.  - HOB up at 90 degrees to eat if unable to get patient up into chair.  - Supervise patient during oral intake.   - Instruct  patient/ family to take small bites.  - Instruct patient/ family to take small single sips when taking liquids.  - Follow patient-specific strategies generated by speech pathologist.  Outcome: Progressing  Goal: Ventilated patient's risk of aspiration is minimized  Description: Assess and monitor vital signs, respiratory status, airway cuff pressure, and labs (WBC).  Monitor for signs of aspiration (tachypnea, cough, rales, wheezing, cyanosis, fever).    - Elevate head of bed 30 degrees if patient has tube feeding.  - Monitor tube feeding.  Outcome: Progressing     Problem: Nutrition  Goal: Nutrition/Hydration status is improving  Description: Monitor and assess patient's nutrition/hydration status for malnutrition (ex- brittle hair, bruises, dry skin, pale skin and conjunctiva, muscle wasting, smooth red tongue, and disorientation). Collaborate with interdisciplinary team and initiate plan and interventions as ordered.  Monitor patient's weight and dietary intake as ordered or per policy. Utilize nutrition screening tool and intervene per policy. Determine patient's food preferences and provide high-protein, high-caloric foods as appropriate.     - Assist patient with eating.  - Allow adequate time for meals.  - Encourage patient to take dietary supplement as ordered.  - Collaborate with clinical nutritionist.  - Include patient/family/caregiver in decisions related to nutrition.  Outcome: Progressing

## 2024-07-26 NOTE — PROGRESS NOTES
NEUROLOGY RESIDENCY PROGRESS NOTE     Name: Brianna Baum   Age & Sex: 74 y.o. female   MRN: [id number]  Unit/Bed#: ICU 06   Encounter: [id number]    Brianna Baum will need follow up in 4-6 with neurovascular attending/AP .  She will not require outpatient neurological testing.      Pending for discharge: Monitor for hemorrhage    ASSESSMENT & PLAN     * Cerebrovascular accident (CVA) due to occlusion of left middle cerebral artery (HCC)  Assessment & Plan  Brianna Baum is a 74 y.o. female with pertinent PMHx of AFib new dx, LE edema, GERD, chronic back pain. Stroke alert was called for evaluation of symptoms concerning for stroke consisting of near syncope and speech difficulties. Last known well shortly before stroke alert at 2053. Initial NIHSS initially 0. Upon return from CT, patient had an NIHSS of 7 with aphasia, left gaze preference, and right facial droop.  Initial BP Blood Pressure: 133/66, FSBG POC Glucose: 131. Arrival EKG atrial flutter at 115 bpm . NC CTH no acute intracranial abnormalities and CTA H/N focal occlusion distal left M1 at the bifurcation . Last normal apparently shortly before stroke alert, within TNK window thus was given at 2152 on 7/22.     On evening of 7/24, was noted to have worsened aphasia and have more pronounced deficits on the R side when evaluated by nursing team. NIH 27 as compared to 22 earlier today. Last seen at her baseline around 1720 by nursing team. Was not eligible for TNK. Underwent endovascular intervention with resultant TICI 3.     - Home Antiplatelet /Anticoagulants PTA: no antiplatelet or anticoagulants  - Stroke risk factors: HTN, HLD, Atrial Fibrillation, and Over weight  - Prior Stroke Hx: none  - Past Neurological Hx: no neurological problems    Pertinent scores:  Initial NIHSS: Total: 2   Modified [place] Score: 0 (No baseline symptoms/disability)    BP over last 24 hours: Blood Pressure: 114/52  current BP: Blood Pressure: 114/52    Workup:  - CTH: No  acute intracranial abnormalities  - CTA: Focal occlusion distal left M1 at the bifurcation   - Repeat CTH / CTA H/N (7/23): Area of low-attenuation within the left putamen, indicative of a developing acute infarct in this region. No associated hemorrhage is noted. No significant mass effect is identified. Left MCA distal M1/bifurcation occlusion is no longer visualized compatible with interval thrombectomy. Previous short segment occlusions of multiple left middle cerebral artery M2 branches at their origins also appear to have resolved. No LVO or high-grade stenosis identified. 2 mm infundibulum again demonstrated at the origin of the left posterior communicating artery, unchanged. No hemodynamically significant stenosis or dissection is identified on CT angiogram of the neck.  - MRI WO (7/23): Stable acute infarct centered in the left basal ganglia with superimposed petechial hemorrhage. No significant mass effect.   - TTE (7/23): EF 55%, LA moderately dilated, no PFO  - CTH (7/24/2024): Evolving acute to subacute infarct in the left basal ganglia. There is evidence of superimposed petechial hemorrhage which is best seen on the recent MRI. There is no evidence for a discrete hematoma.  - CTA H/N (7/24/2024): Findings compatible with acute infarct centered in the left basal ganglia. No evidence of hemorrhagic conversion or mass effect. Abrupt cut off of the proximal left M1 segment with corresponding density on the noncontrast head CT suggesting occlusive thrombus throughout the M1 segment. Reasonable collateral flow in distal MCA branches. No hemodynamically significant stenosis within the carotid or vertebral arteries.  - CTH (7/25/2024): Evolving acute to subacute left basal ganglia infarct. The superimposed petechial hemorrhage is best delineated on MRI and demonstrates subtle hyperdensity on CT. There is no evidence for a discrete intraparenchymal hematoma.   - CTH (7/25/2024): Redemonstrated recent left MCA  territory infarct with grossly stable foci of petechial hemorrhage. No significant mass effect or midline shift.   - MRI WO (7/26/2024): Compared to MRI 7/23/2024, evolution and mild worsening of the recent left MCA territory infarct involving caudate, lentiform nucleus, anterior internal capsule, and smaller foci in the frontal operculum and insula. Grossly stable foci of petechial hemorrhage. No significant mass effect.  - Labs: Hemoglobin A1c 5.8 (7/22/2024),  (7/23/2024)    Impression: Distal left M1 occlusion in the setting of atrial fibrillation off of anticoagulation due to cost.  S/p TNK and thrombectomy, TICI 2B.  MRI shows stable infarct at left basal ganglia with superimposed hemorrhage. On 7/24 pt developed worsened aphasia and more pronounced deficits on the R side prompting stroke alert. CTA revealed proximal left M1 occlusion. Not a TNK candidate due to recent TNK administration. Underwent thrombectomy, TICI 3. Initiated on heparin gtt, PTT therapeutic, will repeat CTH tomorrow morning and make further decisions pending results.      Plan: Discussed plan with neurology attending, Dr. Mccracken  Continue stroke protocol heparin infusion, goal PTT 50-70  Repeat CT head after 24 hours after therapeutic PTT  Frequent neuro checks per protocol. If there is any acute changes in exam, please obtain stat CT head and notify neurology team  BP Goals: -160 mmHg   AP/AC agents: heparin gtt  Statin: Continue atorvastin 40 mg daily  Euthermic/Euglycemic  DVT PPx: heparin, SCDs  PT/OT/ST  Stroke education and counseling  Rest of other care per primary team appreciated    Disposition: PT Recommendations - Rehab Resource Intensity Level, PT: I (Maximum Resource Intensity)        SUBJECTIVE     Patient was seen and examined. No acute events overnight. Patient has no new complaints and is cooperative and able to follow commands during exam. She is oriented to person, place, and year. She denies numbness,  vision changes, headache, and involuntary movements.     Review of Systems  A 12 point ROS was completed. Other than the above mentioned complaints, all remaining systems were negative.    OBJECTIVE     Patient ID: Brianna Baum is a 74 y.o. female.    Vitals:    24 1000 24 1100 24 1200 24 1300   BP: 141/63 (!) 98/45 125/78 114/52   BP Location: Left arm Left arm Left arm Left arm   Pulse: (!) 50 64 74 58   Resp: 22 (!) 23 (!) 34 20   Temp:   97.9 °F (36.6 °C)    TempSrc:   Oral    SpO2: 98% 99% 97% 99%   Weight:       Height:          Temperature:   Temp (24hrs), Av.5 °F (36.9 °C), Min:97.8 °F (36.6 °C), Max:99.8 °F (37.7 °C)    Temperature: 97.9 °F (36.6 °C)      Physical Exam  Vitals reviewed.   Constitutional:       General: She is not in acute distress.     Appearance: Normal appearance. She is not ill-appearing, toxic-appearing or diaphoretic.   HENT:      Head: Normocephalic and atraumatic.   Eyes:      Extraocular Movements: EOM normal.      Pupils: Pupils are equal, round, and reactive to light.   Skin:     General: Skin is warm and dry.   Neurological:      Mental Status: She is alert and oriented to person, place, and time.      Coordination: Finger-Nose-Finger Test normal.      Deep Tendon Reflexes:      Reflex Scores:       Tricep reflexes are 2+ on the right side and 2+ on the left side.       Bicep reflexes are 2+ on the right side and 2+ on the left side.       Brachioradialis reflexes are 2+ on the right side and 2+ on the left side.       Patellar reflexes are 3+ on the right side and 3+ on the left side.       Achilles reflexes are 2+ on the right side and 2+ on the left side.  Psychiatric:         Speech: Speech normal.          Neurologic Exam     Mental Status   Oriented to person, place, and time.   Speech: speech is normal (Slow)  Level of consciousness: alert    Cranial Nerves     CN III, IV, VI   Pupils are equal, round, and reactive to light.  Extraocular  motions are normal.     CN V   Facial sensation intact.   Right facial sensation deficit: none  Left facial sensation deficit: none    CN VII   Right facial weakness: Right upper and lower droop.  Left facial weakness: none    CN VIII   Hearing: intact    CN IX, X   Palate: symmetric    CN XI   Right trapezius strength: normal  Left trapezius strength: normal    CN XII   Tongue: not atrophic  Fasciculations: absent  Tongue deviation: none    Motor Exam   Muscle bulk: normal  Overall muscle tone: normal  Right arm pronator drift: present (Limited by weakness)  Left arm pronator drift: absent  Strength 5/5 except as noted:   R biceps 4+/5  R triceps 4+/5  R iliopsoas 3+/5     Sensory Exam   Light touch normal.   Pinprick normal.     Gait, Coordination, and Reflexes     Coordination   Finger to nose coordination: normal    Tremor   Resting tremor: absent  Intention tremor: absent  Action tremor: absent    Reflexes   Right brachioradialis: 2+  Left brachioradialis: 2+  Right biceps: 2+  Left biceps: 2+  Right triceps: 2+  Left triceps: 2+  Right patellar: 3+  Left patellar: 3+  Right achilles: 2+  Left achilles: 2+  Right plantar: equivocal  Left plantar: equivocal  Deferred gait testing          LABORATORY DATA     Labs: I have personally reviewed pertinent reports.    Results from last 7 days   Lab Units 07/26/24  0433 07/25/24  1302 07/25/24  0516 07/24/24  0519 07/23/24  0454   WBC Thousand/uL 14.57* 14.36* 12.74*   < > 10.82*   HEMOGLOBIN g/dL 12.8 13.6 13.6   < > 13.1   HEMATOCRIT % 38.9 41.1 41.5   < > 39.2   PLATELETS Thousands/uL 234 240 202   < > 226   SEGS PCT % 72  --  80*  --  79*   MONO PCT % 8  --  7  --  5   EOS PCT % 0  --  0  --  0    < > = values in this interval not displayed.      Results from last 7 days   Lab Units 07/26/24  0433 07/25/24  0516 07/24/24  1503   SODIUM mmol/L 140 142 142   POTASSIUM mmol/L 4.2 3.9 4.3   CHLORIDE mmol/L 107 108 106   CO2 mmol/L 23 24 26   BUN mg/dL 12 10 7    CREATININE mg/dL 0.71 0.66 0.84   CALCIUM mg/dL 9.0 9.1 8.8   ALK PHOS U/L 66 68  --    ALT U/L 29 30  --    AST U/L 26 30  --      Results from last 7 days   Lab Units 07/26/24  0433 07/25/24  0516 07/24/24  1503   MAGNESIUM mg/dL 1.8* 2.2 2.7     Results from last 7 days   Lab Units 07/26/24  0433 07/25/24  0516 07/24/24  0519   PHOSPHORUS mg/dL 4.3* 3.2 3.1      Results from last 7 days   Lab Units 07/26/24  1050 07/26/24  0433 07/25/24  2127 07/25/24  1735 07/25/24  1302 07/24/24  0519 07/22/24  2057   INR   --   --   --   --  1.13 1.09 1.01   PTT seconds 62* 73* 60*   < > 28  --  24    < > = values in this interval not displayed.               IMAGING & DIAGNOSTIC TESTING     Radiology Results: I have personally reviewed pertinent reports.   and I have personally reviewed pertinent films in PACS    MRI brain wo contrast   Final Result by Gil Novak MD (07/26 0824)      1.  Compared to MRI 7/23/2024, evolution and mild worsening of the recent left MCA territory infarct involving caudate, lentiform nucleus, anterior internal capsule, and smaller foci in the frontal operculum and insula. Grossly stable foci of petechial    hemorrhage. No significant mass effect.   2.  Stable chronic bilateral cerebellar lacunar infarcts.   3.  Chronic microangiopathic change.                        Workstation performed: [id number]         CT head wo contrast   Final Result by Gil Novak MD (07/26 0879)      1.  Redemonstrated recent left MCA territory infarct with grossly stable foci of petechial hemorrhage. No significant mass effect or midline shift.   2.  Stable chronic bilateral cerebellar lacunar infarcts.   3.  Chronic microangiopathic change.                  Workstation performed: [id number]         CT head wo contrast   Final Result by Chan Lin MD (07/25 0847)      Evolving acute to subacute left basal ganglia infarct. The superimposed petechial hemorrhage is best delineated on MRI and  demonstrates subtle hyperdensity on CT. There is no evidence for a discrete intraparenchymal hematoma.      Moderate chronic microangiopathic ischemic changes.                  Workstation performed: [id number]         CTA head and neck w wo contrast   Final Result by Ismael Bianchi MD (07/24 1850)         1. Findings compatible with acute infarct centered in the left basal ganglia. No evidence of hemorrhagic conversion or mass effect.   2. Abrupt cut off of the proximal left M1 segment with corresponding density on the noncontrast head CT suggesting occlusive thrombus throughout the M1 segment. Reasonable collateral flow in distal MCA branches.   3. No hemodynamically significant stenosis within the carotid or vertebral arteries.                  Workstation performed: [id number]         CT head wo contrast   Final Result by Chan Lin MD (07/24 4676)      Evolving acute to subacute infarct in the left basal ganglia. There is evidence of superimposed petechial hemorrhage which is best seen on the recent MRI. There is no evidence for a discrete hematoma.                  Resident: Corinna Guzman      I, the attending radiologist, have reviewed the images and agree with the final report above.      Workstation performed: [id number]         MRI brain wo contrast   Final Result by Ismael Bianchi MD (07/24 0230)      Stable acute infarct centered in the left basal ganglia with superimposed petechial hemorrhage. No significant mass effect.      The study was marked in EPIC for immediate notification.      Workstation performed: [id number]         CT stroke alert brain   Final Result by Thomas Castillo MD (07/23 1302)      Area of low-attenuation within the left putamen, indicative of a developing acute infarct in this region. No associated hemorrhage is noted. No significant mass effect is identified.      Left middle cerebral artery distal M1/bifurcation occlusion is no longer visualized compatible with  interval thrombectomy. Previous short segment occlusions of multiple left middle cerebral artery M2 branches at their origins also appear to have    resolved.      No new large vessel occlusion, or high-grade stenosis identified on CT angiogram of the head.      2 mm infundibulum again demonstrated at the origin of the left posterior communicating artery, unchanged.      No hemodynamically significant stenosis or dissection is identified on CT angiogram of the neck.      Findings were directly discussed with Dr. Palladino by Dr. Castillo on 7/23/2024, at 1:00 p.m.      Workstation performed: [id number]         CTA stroke alert (head/neck)   Final Result by Thomas Castillo MD (07/23 1302)      Area of low-attenuation within the left putamen, indicative of a developing acute infarct in this region. No associated hemorrhage is noted. No significant mass effect is identified.      Left middle cerebral artery distal M1/bifurcation occlusion is no longer visualized compatible with interval thrombectomy. Previous short segment occlusions of multiple left middle cerebral artery M2 branches at their origins also appear to have    resolved.      No new large vessel occlusion, or high-grade stenosis identified on CT angiogram of the head.      2 mm infundibulum again demonstrated at the origin of the left posterior communicating artery, unchanged.      No hemodynamically significant stenosis or dissection is identified on CT angiogram of the neck.      Findings were directly discussed with Dr. Palladino by Dr. Castillo on 7/23/2024, at 1:00 p.m.      Workstation performed: [id number]         IR stroke alert   Final Result by Guy Akers MD (07/25 6095)      Interval thrombolysis following TNK.      Small nonocclusive thrombus remains at the MCA bifurcation. Single pass aspiration performed with no change in appearance. Overall there is now TICI 2B flow.      Workstation performed: [id number]         IR stroke alert    (Results  Pending)   CT head wo contrast    (Results Pending)       Other Diagnostic Testing: I have personally reviewed pertinent reports.      ACTIVE MEDICATIONS     Current Facility-Administered Medications   Medication Dose Route Frequency    acetaminophen (TYLENOL) tablet 650 mg  650 mg Oral Q6H PRN    ascorbic acid (VITAMIN C) tablet 1,000 mg  1,000 mg Oral Daily    atorvastatin (LIPITOR) tablet 40 mg  40 mg Oral QPM    bisacodyl (DULCOLAX) rectal suppository 10 mg  10 mg Rectal Daily PRN    diltiazem (CARDIZEM) tablet 30 mg  30 mg Oral Q8H ANETA    heparin (porcine) 25,000 units in 0.45% NaCl 250 mL infusion (premix)  3-24 Units/kg/hr (Order-Specific) Intravenous Titrated    lidocaine (LIDODERM) 5 % patch 1 patch  1 patch Topical Daily    midodrine (PROAMATINE) tablet 10 mg  10 mg Oral TID AC    multi-electrolyte (PLASMALYTE-A/ISOLYTE-S PH 7.4) IV solution  125 mL/hr Intravenous Continuous    pantoprazole (PROTONIX) EC tablet 40 mg  40 mg Oral Daily Before Breakfast    phenylephrine (KENYA-SYNEPHRINE) 50 mg (STANDARD CONCENTRATION) in sodium chloride 0.9% 250 mL   mcg/min Intravenous Titrated    polyethylene glycol (MIRALAX) packet 17 g  17 g Oral Daily    senna-docusate sodium (SENOKOT S) 8.6-50 mg per tablet 1 tablet  1 tablet Oral HS       Prior to Admission medications    Medication Sig Start Date End Date Taking? Authorizing Provider   apixaban (Eliquis) 5 mg Take 1 tablet (5 mg total) by mouth 2 (two) times a day 7/22/24   LADONNA Drew   Ascorbic Acid (VITAMIN C) 1000 MG tablet Take 1,000 mg by mouth daily    Historical Provider, MD   Biotin 10 MG CAPS Take by mouth in the morning    Historical Provider, MD   celecoxib (CeleBREX) 200 mg capsule Take 1 capsule (200 mg total) by mouth daily 2/26/24   LADONNA Drew   diltiazem (CARDIZEM CD) 180 mg 24 hr capsule Take 1 capsule (180 mg total) by mouth daily 7/18/24 1/14/25  LADONNA Drew   esomeprazole (NexIUM) 40 MG capsule TAKE 1 CAPSULE DAILY  1/16/24   LADONNA Drew   furosemide (LASIX) 20 mg tablet Take 1 tablet (20 mg total) by mouth daily 3/12/24   Ladan Baum DO   HYDROcodone-acetaminophen (Norco) 5-325 mg per tablet Take 1 tablet by mouth every 8 (eight) hours as needed for pain Max Daily Amount: 3 tablets 7/18/24   LADONNA Drew         VTE Pharmacologic Prophylaxis: VTE covered by:  heparin (porcine), Intravenous, 13 Units/kg/hr at 07/26/24 1146      VTE Mechanical Prophylaxis: sequential compression device    ======    I have discussed the patient's history, physical exam findings, assessment, and plan in detail with attending, Dr. Mccracken.    Thank you for allowing me to participate in the care of your patient, Brianna Baum.    Israel Pleitez DO  [facility], PGY-II    [place], M3

## 2024-07-26 NOTE — PROGRESS NOTES
"[facility]  Progress Note: Critical Care  Name: Brianna Baum 74 y.o. female I MRN: [id number]  Unit/Bed#: ICU 06 I Date of Admission: 7/22/2024   Date of Service: 7/26/2024 I Hospital Day: 4    Assessment & Plan   Neuro:   Diagnosis: Acute Left MCA CVA s/p TNK  7/22  CTA: focal occlusion of distal Left M1 at the bifurcation  CTH: No acute intracranial abnormality  S/p TNK @2152  7/23   24hr post-TNK MRI head: \"stable acute infarct centered in the left basal ganglia with superimposed petechial hemorrhage. No significant mass effect.\"  Lipid panel: cholesterol 187, triglycerides 50, HDL 57, ; Hgb A1c: 5.8%  TTE: LVEF 55%, no PFO, MV mild regurg, TV mild regurg, PV mild regurg  7/24: repeat stroke alert for L MCA syndrome s/p thrombectomy TICI 3              - CTH and CTA stroke alert: L M1 occlusion acute, evolving L BG stroke w/ mild petechial hemorrhage              - repeat CTH 07/25:evolving L BG stroke, minimal petechial hemorrhage on dual energy scan    - 7/26 CTH: Redemonstrated recent left MCA territory infarct with grossly stable foci of petechial hemorrhage. No significant mass effect or midline shift.     MRI brain: evolution and mild worsening of the recent left MCA territory infarct involving caudate, lentiform nucleus, anterior internal capsule, and smaller foci in the frontal operculum and insula. Grossly stable foci of petechial   hemorrhage. No significant mass effect.  Plan:   Q2 neuro checks, Q4 at night   AC: heparin gtt stroke protocol   PTT 50-70  Therapeutic and 2 PTT therapeutic and stable CTH  Was therapeutic since 4:30pm 07/25 and will likely be on for 24 hours once therapeutic before switching to oral DOAC  Continue lipitor   Neurology following - recs appreciated  Monitor on heparin gtt for 24 hours since PTT therapeutic at 0430. Repeat CTH at 5AM tomorrow morning.   Neurosurgery following - recs appreciated  SBP goal 110-140mmHg   Continue " midodrine 10mg TID once able to swallow  Wean cristy today 07/26  Fall precautions  NIH score at discharge  PT/OT - pending eval  Stroke education  BG control 110-180     CV:   Diagnosis: Afib w/ RVR   7/18 new onset afib ntoed by PCP during visit for LE edema. Pt was prescribed Cardizem at that time. Eliquis also prescribed but was never started due to cost.   07/24L went into afib w/ RVR during thrombectomy and suspected cause of stroke   Plan:  Po caridzem 30mg q8h  HR < 110 goal   Discuss long-term AC plan pending stability of 24hr post-TNK imaging  Holding home lasix in the setting of SBP goals     Pulm:  No active issues  - Promote IS, deep breathing, OOB as tolerated, continuous pulse oximetry     GI:   Diagnosis: GERD  Plan: continue home PPI  BM: 07/25  Bowel regimen: miralax, sennokot, prn dulcolax     :   No active issues  - Guillen in place  - Strict I&Os              I/o: +1010cc              Will give 500cc isolyte  Started 125cc/hr for 4 hours  - Daily BMP, electrolytes     F/E/N:   F: n/a  E: Monitor and replete to maintain K >4, Phos >3, Mag >2  N: regular diet              Encourage fluid intake and gatorade        Heme/Onc:   No active issues  DVT ppx: heparin gtt      Endo:   No active issues     ID:   Leukocytosis              - given no fevers w/ stable vitals, higher suspicion for CNS cause rather than infectious              Plan               - continue to monitor fever curve and WBC               - monitor off antibx      MSK/Skin:   No active issues      Lines/Drains/Guillen: LUIS EDUARDO Dominguez (removed)     Disposition: Critical care    ICU Core Measures     A: Assess, Prevent, and Manage Pain Has pain been assessed? Yes  Need for changes to pain regimen? No   B: Both SAT/SAT  N/A   C: Choice of Sedation RASS Goal: 0 Alert and Calm  Need for changes to sedation or analgesia regimen? No   D: Delirium CAM-ICU: Negative   E: Early Mobility  Plan for early mobility? Yes   F: Family Engagement Plan for family  engagement today? No       Review of Invasive Devices:        Angelica Plan: Discontinue arterial line    Prophylaxis:  VTE VTE covered by:  heparin (porcine), Intravenous, 15 Units/kg/hr at 07/26/24 0127       Stress Ulcer  covered byesomeprazole (NexIUM) 40 MG capsule [[id number]] (Long-Term Med), pantoprazole (PROTONIX) EC tablet 40 mg [[id number]]        Significant 24hr Events     24hr events: CTH and MRI done overnight    Supa was maxed @ 180 and bradycardic while sleeping and expected to increase while awake and weaning off supa      Subjective     Review of Systems: See HPI for Review of Systems     Objective                            Vitals I/O      Most Recent Min/Max in 24hrs   Temp 98.2 °F (36.8 °C) Temp  Min: 97.8 °F (36.6 °C)  Max: 99.8 °F (37.7 °C)   Pulse (!) 50 Pulse  Min: 48  Max: 110   Resp 20 Resp  Min: 18  Max: 24   /58 BP  Min: 108/69  Max: 167/58   O2 Sat 95 % SpO2  Min: 94 %  Max: 99 %      Intake/Output Summary (Last 24 hours) at 7/26/2024 0651  Last data filed at 7/26/2024 0559  Gross per 24 hour   Intake 1913.61 ml   Output 900 ml   Net 1013.61 ml       Diet Regular; Regular House; Dysphagia 3-Dental Soft; Thin Liquid    Invasive Monitoring   Arterial Line  Angelica /56  Arterial Line BP  Min: 94/82  Max: 156/56   MAP 88 mmHg  Arterial Line MAP (mmHg)  Min: 76 mmHg  Max: 114 mmHg           Physical Exam   Physical Exam  Vitals and nursing note reviewed.   Cardiovascular:      Rate and Rhythm: Bradycardia present.      Pulses: Normal pulses.   Abdominal:      Palpations: Abdomen is soft.   Pulmonary:      Effort: Pulmonary effort is normal.   Neurological:      Mental Status: She is alert and oriented to person, place, and time.      Cranial Nerves: Facial asymmetry present.      Sensory: Sensation is intact.      Motor: Weakness and pronator drift.      Coordination: Coordination is intact.        Reflexes are normal and symmetric.      Comments:   subtle R mouth droop, RUE 3/5, RLE  4/5, LUE/LLE 5/5. Flat affect.             Diagnostic Studies      EKG: reviewed  Imaging:  I have personally reviewed pertinent reports.       Medications:  Scheduled PRN   vitamin C, 1,000 mg, Daily  atorvastatin, 40 mg, QPM  diltiazem, 30 mg, Q6H ANETA  lidocaine, 1 patch, Daily  magnesium sulfate, 2 g, Once  midodrine, 10 mg, TID AC  pantoprazole, 40 mg, Daily Before Breakfast  polyethylene glycol, 17 g, Daily  senna-docusate sodium, 1 tablet, HS      acetaminophen, 650 mg, Q6H PRN  bisacodyl, 10 mg, Daily PRN       Continuous    heparin (porcine), 3-24 Units/kg/hr (Order-Specific), Last Rate: 15 Units/kg/hr (07/26/24 0127)  phenylephine,  mcg/min, Last Rate: 180 mcg/min (07/26/24 0457)         Labs:    CBC    Recent Labs     07/25/24  1302 07/26/24  0433   WBC 14.36* 14.57*   HGB 13.6 12.8   HCT 41.1 38.9    234     BMP    Recent Labs     07/25/24  0516 07/26/24  0433   SODIUM 142 140   K 3.9 4.2    107   CO2 24 23   AGAP 10 10   BUN 10 12   CREATININE 0.66 0.71   CALCIUM 9.1 9.0       Coags    Recent Labs     07/25/24  1302 07/25/24  1735 07/25/24  2127 07/26/24  0433   INR 1.13  --   --   --    PTT 28   < > 60* 73*    < > = values in this interval not displayed.        Additional Electrolytes  Recent Labs     07/25/24  0516 07/26/24  0433   MG 2.2 1.8*   PHOS 3.2 4.3*   CAIONIZED 1.17 1.14          Blood Gas    No recent results  No recent results LFTs  Recent Labs     07/25/24  0516 07/26/24  0433   ALT 30 29   AST 30 26   ALKPHOS 68 66   ALB 3.9 3.6   TBILI 0.67 0.62       Infectious  No recent results  Glucose  Recent Labs     07/24/24  1503 07/25/24  0516 07/26/24  0433   GLUC 157* 113 125               Flaco Thompson DO

## 2024-07-27 ENCOUNTER — APPOINTMENT (INPATIENT)
Dept: RADIOLOGY | Facility: HOSPITAL | Age: 74
DRG: 023 | End: 2024-07-27
Payer: MEDICARE

## 2024-07-27 LAB
ALBUMIN SERPL BCG-MCNC: 3.8 G/DL (ref 3.5–5)
ALP SERPL-CCNC: 60 U/L (ref 34–104)
ALT SERPL W P-5'-P-CCNC: 22 U/L (ref 7–52)
ANION GAP SERPL CALCULATED.3IONS-SCNC: 9 MMOL/L (ref 4–13)
APTT PPP: 59 SECONDS (ref 23–37)
APTT PPP: 66 SECONDS (ref 23–37)
AST SERPL W P-5'-P-CCNC: 17 U/L (ref 13–39)
BASOPHILS # BLD AUTO: 0.02 THOUSANDS/ÂΜL (ref 0–0.1)
BASOPHILS NFR BLD AUTO: 0 % (ref 0–1)
BILIRUB SERPL-MCNC: 0.4 MG/DL (ref 0.2–1)
BUN SERPL-MCNC: 12 MG/DL (ref 5–25)
CA-I BLD-SCNC: 1.19 MMOL/L (ref 1.12–1.32)
CALCIUM SERPL-MCNC: 9 MG/DL (ref 8.4–10.2)
CHLORIDE SERPL-SCNC: 106 MMOL/L (ref 96–108)
CO2 SERPL-SCNC: 25 MMOL/L (ref 21–32)
CREAT SERPL-MCNC: 0.68 MG/DL (ref 0.6–1.3)
EOSINOPHIL # BLD AUTO: 0 THOUSAND/ÂΜL (ref 0–0.61)
EOSINOPHIL NFR BLD AUTO: 0 % (ref 0–6)
ERYTHROCYTE [DISTWIDTH] IN BLOOD BY AUTOMATED COUNT: 13 % (ref 11.6–15.1)
GFR SERPL CREATININE-BSD FRML MDRD: 86 ML/MIN/1.73SQ M
GLUCOSE SERPL-MCNC: 168 MG/DL (ref 65–140)
HCT VFR BLD AUTO: 34.4 % (ref 34.8–46.1)
HGB BLD-MCNC: 11.1 G/DL (ref 11.5–15.4)
IMM GRANULOCYTES # BLD AUTO: 0.05 THOUSAND/UL (ref 0–0.2)
IMM GRANULOCYTES NFR BLD AUTO: 1 % (ref 0–2)
LYMPHOCYTES # BLD AUTO: 1.06 THOUSANDS/ÂΜL (ref 0.6–4.47)
LYMPHOCYTES NFR BLD AUTO: 11 % (ref 14–44)
MAGNESIUM SERPL-MCNC: 2.2 MG/DL (ref 1.9–2.7)
MCH RBC QN AUTO: 30.8 PG (ref 26.8–34.3)
MCHC RBC AUTO-ENTMCNC: 32.3 G/DL (ref 31.4–37.4)
MCV RBC AUTO: 96 FL (ref 82–98)
MONOCYTES # BLD AUTO: 0.36 THOUSAND/ÂΜL (ref 0.17–1.22)
MONOCYTES NFR BLD AUTO: 4 % (ref 4–12)
NEUTROPHILS # BLD AUTO: 8.52 THOUSANDS/ÂΜL (ref 1.85–7.62)
NEUTS SEG NFR BLD AUTO: 84 % (ref 43–75)
NRBC BLD AUTO-RTO: 0 /100 WBCS
PHOSPHATE SERPL-MCNC: 3.9 MG/DL (ref 2.3–4.1)
PLATELET # BLD AUTO: 189 THOUSANDS/UL (ref 149–390)
PMV BLD AUTO: 10.9 FL (ref 8.9–12.7)
POTASSIUM SERPL-SCNC: 4.1 MMOL/L (ref 3.5–5.3)
PROT SERPL-MCNC: 5.9 G/DL (ref 6.4–8.4)
RBC # BLD AUTO: 3.6 MILLION/UL (ref 3.81–5.12)
SODIUM SERPL-SCNC: 140 MMOL/L (ref 135–147)
WBC # BLD AUTO: 10.01 THOUSAND/UL (ref 4.31–10.16)

## 2024-07-27 PROCEDURE — NC001 PR NO CHARGE

## 2024-07-27 PROCEDURE — 70450 CT HEAD/BRAIN W/O DYE: CPT

## 2024-07-27 PROCEDURE — 84100 ASSAY OF PHOSPHORUS: CPT | Performed by: EMERGENCY MEDICINE

## 2024-07-27 PROCEDURE — 83735 ASSAY OF MAGNESIUM: CPT | Performed by: EMERGENCY MEDICINE

## 2024-07-27 PROCEDURE — 99291 CRITICAL CARE FIRST HOUR: CPT | Performed by: EMERGENCY MEDICINE

## 2024-07-27 PROCEDURE — 85730 THROMBOPLASTIN TIME PARTIAL: CPT

## 2024-07-27 PROCEDURE — 80053 COMPREHEN METABOLIC PANEL: CPT | Performed by: EMERGENCY MEDICINE

## 2024-07-27 PROCEDURE — 93005 ELECTROCARDIOGRAM TRACING: CPT

## 2024-07-27 PROCEDURE — 85025 COMPLETE CBC W/AUTO DIFF WBC: CPT | Performed by: EMERGENCY MEDICINE

## 2024-07-27 PROCEDURE — 85730 THROMBOPLASTIN TIME PARTIAL: CPT | Performed by: EMERGENCY MEDICINE

## 2024-07-27 PROCEDURE — 82330 ASSAY OF CALCIUM: CPT | Performed by: EMERGENCY MEDICINE

## 2024-07-27 RX ORDER — DILTIAZEM HYDROCHLORIDE 5 MG/ML
15 INJECTION INTRAVENOUS ONCE
Status: COMPLETED | OUTPATIENT
Start: 2024-07-28 | End: 2024-07-28

## 2024-07-27 RX ADMIN — HYDROCORTISONE SODIUM SUCCINATE 25 MG: 100 INJECTION, POWDER, FOR SOLUTION INTRAMUSCULAR; INTRAVENOUS at 12:08

## 2024-07-27 RX ADMIN — DILTIAZEM HYDROCHLORIDE 30 MG: 30 TABLET ORAL at 14:27

## 2024-07-27 RX ADMIN — SENNOSIDES AND DOCUSATE SODIUM 1 TABLET: 50; 8.6 TABLET ORAL at 22:09

## 2024-07-27 RX ADMIN — HEPARIN SODIUM 13 UNITS/KG/HR: 10000 INJECTION, SOLUTION INTRAVENOUS at 20:06

## 2024-07-27 RX ADMIN — ATORVASTATIN CALCIUM 40 MG: 40 TABLET, FILM COATED ORAL at 17:14

## 2024-07-27 RX ADMIN — HYDROCORTISONE SODIUM SUCCINATE 25 MG: 100 INJECTION, POWDER, FOR SOLUTION INTRAMUSCULAR; INTRAVENOUS at 08:17

## 2024-07-27 RX ADMIN — MIDODRINE HYDROCHLORIDE 10 MG: 5 TABLET ORAL at 11:26

## 2024-07-27 RX ADMIN — HYDROCORTISONE SODIUM SUCCINATE 50 MG: 100 INJECTION, POWDER, FOR SOLUTION INTRAMUSCULAR; INTRAVENOUS at 20:01

## 2024-07-27 RX ADMIN — MIDODRINE HYDROCHLORIDE 10 MG: 5 TABLET ORAL at 06:43

## 2024-07-27 RX ADMIN — HEPARIN SODIUM 13 UNITS/KG/HR: 10000 INJECTION, SOLUTION INTRAVENOUS at 20:02

## 2024-07-27 RX ADMIN — MIDODRINE HYDROCHLORIDE 10 MG: 5 TABLET ORAL at 17:14

## 2024-07-27 RX ADMIN — HEPARIN SODIUM 13 UNITS/KG/HR: 10000 INJECTION, SOLUTION INTRAVENOUS at 20:07

## 2024-07-27 RX ADMIN — OXYCODONE HYDROCHLORIDE AND ACETAMINOPHEN 1000 MG: 500 TABLET ORAL at 08:17

## 2024-07-27 RX ADMIN — POLYETHYLENE GLYCOL 3350 17 G: 17 POWDER, FOR SOLUTION ORAL at 08:19

## 2024-07-27 RX ADMIN — DILTIAZEM HYDROCHLORIDE 30 MG: 30 TABLET ORAL at 08:18

## 2024-07-27 RX ADMIN — PHENYLEPHRINE HYDROCHLORIDE 35 MCG/MIN: 50 INJECTION INTRAVENOUS at 04:35

## 2024-07-27 RX ADMIN — DILTIAZEM HYDROCHLORIDE 30 MG: 30 TABLET ORAL at 22:09

## 2024-07-27 RX ADMIN — PANTOPRAZOLE SODIUM 40 MG: 40 TABLET, DELAYED RELEASE ORAL at 06:43

## 2024-07-27 NOTE — PLAN OF CARE
Problem: Prexisting or High Potential for Compromised Skin Integrity  Goal: Skin integrity is maintained or improved  Description: INTERVENTIONS:  - Identify patients at risk for skin breakdown  - Assess and monitor skin integrity  - Assess and monitor nutrition and hydration status  - Monitor labs   - Assess for incontinence   - Turn and reposition patient  - Assist with mobility/ambulation  - Relieve pressure over bony prominences  - Avoid friction and shearing  - Provide appropriate hygiene as needed including keeping skin clean and dry  - Evaluate need for skin moisturizer/barrier cream  - Collaborate with interdisciplinary team   - Patient/family teaching  - Consider wound care consult   7/27/2024 0141 by Michelle Hunter RN  Outcome: Progressing  7/27/2024 0119 by Michelle Hunter RN  Outcome: Progressing     Problem: PAIN - ADULT  Goal: Verbalizes/displays adequate comfort level or baseline comfort level  Description: Interventions:  - Encourage patient to monitor pain and request assistance  - Assess pain using appropriate pain scale  - Administer analgesics based on type and severity of pain and evaluate response  - Implement non-pharmacological measures as appropriate and evaluate response  - Consider cultural and social influences on pain and pain management  - Notify physician/advanced practitioner if interventions unsuccessful or patient reports new pain  7/27/2024 0141 by Michelle Hunter RN  Outcome: Progressing  7/27/2024 0119 by Michelle Hunter RN  Outcome: Progressing     Problem: INFECTION - ADULT  Goal: Absence or prevention of progression during hospitalization  Description: INTERVENTIONS:  - Assess and monitor for signs and symptoms of infection  - Monitor lab/diagnostic results  - Monitor all insertion sites, i.e. indwelling lines, tubes, and drains  - Monitor endotracheal if appropriate and nasal secretions for changes in amount and color  - [place] appropriate cooling/warming  therapies per order  - Administer medications as ordered  - Instruct and encourage patient and family to use good hand hygiene technique  - Identify and instruct in appropriate isolation precautions for identified infection/condition  7/27/2024 0141 by Michelle Hunter RN  Outcome: Progressing  7/27/2024 0119 by Michelle Hunter RN  Outcome: Progressing  Goal: Absence of fever/infection during neutropenic period  Description: INTERVENTIONS:  - Monitor WBC    7/27/2024 0141 by Michelle Hunter RN  Outcome: Progressing  7/27/2024 0119 by Michelle Hunter RN  Outcome: Progressing     Problem: SAFETY ADULT  Goal: Patient will remain free of falls  Description: INTERVENTIONS:  - Educate patient/family on patient safety including physical limitations  - Instruct patient to call for assistance with activity   - Consult OT/PT to assist with strengthening/mobility   - Keep Call bell within reach  - Keep bed low and locked with side rails adjusted as appropriate  - Keep care items and personal belongings within reach  - Initiate and maintain comfort rounds  - Make Fall Risk Sign visible to staff  - Offer Toileting every  Hours, in advance of need  - Initiate/Maintain alarm  - Obtain necessary fall risk management equipment:  - Apply yellow socks and bracelet for high fall risk patents  - Consider moving patient to room near nurses station  7/27/2024 0141 by Michelle Hunter RN  Outcome: Progressing  7/27/2024 0119 by Michelle Hunter RN  Outcome: Progressing  Goal: Maintain or return to baseline ADL function  Description: INTERVENTIONS:  -  Assess patient's ability to carry out ADLs; assess patient's baseline for ADL function and identify physical deficits which impact ability to perform ADLs (bathing, care of mouth/teeth, toileting, grooming, dressing, etc.)  - Assess/evaluate cause of self-care deficits   - Assess range of motion  - Assess patient's mobility; develop plan if impaired  - Assess patient's need for  assistive devices and provide as appropriate  - Encourage maximum independence but intervene and supervise when necessary  - Involve family in performance of ADLs  - Assess for home care needs following discharge   - Consider OT consult to assist with ADL evaluation and planning for discharge  - Provide patient education as appropriate  7/27/2024 0141 by Michelle Hunter RN  Outcome: Progressing  7/27/2024 0119 by Michelle Hunter RN  Outcome: Progressing  Goal: Maintains/Returns to pre admission functional level  Description: INTERVENTIONS:  - Perform AM-PAC 6 Click Basic Mobility/ Daily Activity assessment daily.  - Set and communicate daily mobility goal to care team and patient/family/caregiver.   - Collaborate with rehabilitation services on mobility goals if consulted  - Perform Range of Motion  times a day.  - Reposition patient every  hours.  - Dangle patient  times a day  - Stand patient  times a day  - Ambulate patient times a day  - Out of bed to chair  times a day   - Out of bed for meals  times a day  - Out of bed for toileting  - Record patient progress and toleration of activity level   7/27/2024 0141 by Michelle Hunter RN  Outcome: Progressing  7/27/2024 0119 by Michelle Hunter RN  Outcome: Progressing     Problem: DISCHARGE PLANNING  Goal: Discharge to home or other facility with appropriate resources  Description: INTERVENTIONS:  - Identify barriers to discharge w/patient and caregiver  - Arrange for needed discharge resources and transportation as appropriate  - Identify discharge learning needs (meds, wound care, etc.)  - Arrange for interpretive services to assist at discharge as needed  - Refer to Case Management Department for coordinating discharge planning if the patient needs post-hospital services based on physician/advanced practitioner order or complex needs related to functional status, cognitive ability, or social support system  7/27/2024 0141 by Michelle Hunter  RN  Outcome: Progressing  7/27/2024 0119 by Michelle Hunter RN  Outcome: Progressing     Problem: Knowledge Deficit  Goal: Patient/family/caregiver demonstrates understanding of disease process, treatment plan, medications, and discharge instructions  Description: Complete learning assessment and assess knowledge base.  Interventions:  - Provide teaching at level of understanding  - Provide teaching via preferred learning methods  7/27/2024 0141 by Michelle Hunter RN  Outcome: Progressing  7/27/2024 0119 by Michelle Hunter RN  Outcome: Progressing     Problem: Neurological Deficit  Goal: Neurological status is stable or improving  Description: Interventions:  - Monitor and assess patient's level of consciousness, motor function, sensory function, and level of assistance needed for ADLs.   - Monitor and report changes from baseline. Collaborate with interdisciplinary team to initiate plan and implement interventions as ordered.   - Provide and maintain a safe environment.  - Consider seizure precautions.  - Consider fall precautions.  - Consider aspiration precautions.  - Consider bleeding precautions.  7/27/2024 0141 by Michelle Hunter RN  Outcome: Progressing  7/27/2024 0119 by Michelle Hunter RN  Outcome: Progressing     Problem: Activity Intolerance/Impaired Mobility  Goal: Mobility/activity is maintained at optimum level for patient  Description: Interventions:  - Assess and monitor patient  barriers to mobility and need for assistive/adaptive devices.  - Assess patient's emotional response to limitations.  - Collaborate with interdisciplinary team and initiate plans and interventions as ordered.  - Encourage independent activity per ability.  - Maintain proper body alignment.  - Perform active/passive rom as tolerated/ordered.  - Plan activities to conserve energy.  - Turn patient as appropriate  7/27/2024 0141 by Michelle Hunter RN  Outcome: Progressing  7/27/2024 0119 by Michelle Hunter  RN  Outcome: Progressing     Problem: Communication Impairment  Goal: Ability to express needs and understand communication  Description: Assess patient's communication skills and ability to understand information.  Patient will demonstrate use of effective communication techniques, alternative methods of communication and understanding even if not able to speak.     - Encourage communication and provide alternate methods of communication as needed.  - Collaborate with case management/ for discharge needs.  - Include patient/family/caregiver in decisions related to communication.  7/27/2024 0141 by Michelle Hunter RN  Outcome: Progressing  7/27/2024 0119 by Michelle Hunter RN  Outcome: Progressing     Problem: Potential for Aspiration  Goal: Non-ventilated patient's risk of aspiration is minimized  Description: Assess and monitor vital signs, respiratory status, and labs (WBC).  Monitor for signs of aspiration (tachypnea, cough, rales, wheezing, cyanosis, fever).    - Assess and monitor patient's ability to swallow.  - Place patient up in chair to eat if possible.  - HOB up at 90 degrees to eat if unable to get patient up into chair.  - Supervise patient during oral intake.   - Instruct patient/ family to take small bites.  - Instruct patient/ family to take small single sips when taking liquids.  - Follow patient-specific strategies generated by speech pathologist.  7/27/2024 0141 by Michelle Hunter RN  Outcome: Progressing  7/27/2024 0119 by Michelle Hunter RN  Outcome: Progressing  Goal: Ventilated patient's risk of aspiration is minimized  Description: Assess and monitor vital signs, respiratory status, airway cuff pressure, and labs (WBC).  Monitor for signs of aspiration (tachypnea, cough, rales, wheezing, cyanosis, fever).    - Elevate head of bed 30 degrees if patient has tube feeding.  - Monitor tube feeding.  7/27/2024 0141 by Michelle Hunter RN  Outcome:  Progressing  7/27/2024 0119 by Michelle Hunter RN  Outcome: Progressing     Problem: Nutrition  Goal: Nutrition/Hydration status is improving  Description: Monitor and assess patient's nutrition/hydration status for malnutrition (ex- brittle hair, bruises, dry skin, pale skin and conjunctiva, muscle wasting, smooth red tongue, and disorientation). Collaborate with interdisciplinary team and initiate plan and interventions as ordered.  Monitor patient's weight and dietary intake as ordered or per policy. Utilize nutrition screening tool and intervene per policy. Determine patient's food preferences and provide high-protein, high-caloric foods as appropriate.     - Assist patient with eating.  - Allow adequate time for meals.  - Encourage patient to take dietary supplement as ordered.  - Collaborate with clinical nutritionist.  - Include patient/family/caregiver in decisions related to nutrition.  7/27/2024 0141 by Michelle Hunter RN  Outcome: Progressing  7/27/2024 0119 by Michelle Hunter RN  Outcome: Progressing

## 2024-07-27 NOTE — PROGRESS NOTES
Critical Care Interval Transfer Note:    Brief Hospital Summary:   Patient is a 74yoF who presented to outside facility for evaluation of acute onset of dizziness and confusion. Stroke alert was initiated and patient found to have focal occlusion of left MCA. TNK given at outside facility. Patient transferred to \[facility]". Upon arrival, single pass thrombectomy was attempted. On 7/23 patient with increased confusion. CT performed which showed resolution of left MCA lesion and evolving developing acute left infarct. MRI 24 hours showed L BG stroke and noted hemorrhage superimposed. Weaned off neosynephrine and started on midodrine 10 mg 3 times daily. Patient was transferred to  as a SD1 and soon after developed L MCA syndrome. She was taken to CT, CTH and CTA showing a new proximal L M1 occlusion and had thrombectomy w/ TICI 3. Patient was kept on phenylephrine to keep -160 and was slowly weaned down to normotension w/ midodrine 10mg TID continued and IVF. Heparin gtt started for noted afib and after 2 therapeutic PTTs, had a CTH that did not show worsening bleed but noted petechial hemorrhage in L BG that was appreciated before. MRI showed L BG stroke and smaller foci in the frontal operculum and insula. Neurology recommended to stay on hep gtt for 24 hours after therapeutic and repeat CTH in AM. CTH this AM showing expected evolving appearance of left basal ganglia infarct with superimposed petechial hemorrhage. Slight interval increase in mass effect on the frontal horn left lateral ventricle. No hydrocephalus.       Barriers to discharge:   Transition to DOAC  Rehab/PT/OT     Consults: IP CONSULT TO NEUROSURGERY  IP CONSULT TO PHYSICAL MEDICINE REHAB  IP CONSULT TO NEUROLOGY  IP CONSULT TO CASE MANAGEMENT  IP CONSULT TO NUTRITION SERVICES  IP CONSULT TO CARDIOLOGY      Patient seen and evaluated by Critical Care today and deemed to be appropriate for transfer to [facility] with Telemetry. Spoke to Dr. Paez  from [facility] to accept transfer. Critical care can be contacted via [facility] with any questions or concerns.

## 2024-07-27 NOTE — PLAN OF CARE
Problem: Prexisting or High Potential for Compromised Skin Integrity  Goal: Skin integrity is maintained or improved  Description: INTERVENTIONS:  - Identify patients at risk for skin breakdown  - Assess and monitor skin integrity  - Assess and monitor nutrition and hydration status  - Monitor labs   - Assess for incontinence   - Turn and reposition patient  - Assist with mobility/ambulation  - Relieve pressure over bony prominences  - Avoid friction and shearing  - Provide appropriate hygiene as needed including keeping skin clean and dry  - Evaluate need for skin moisturizer/barrier cream  - Collaborate with interdisciplinary team   - Patient/family teaching  - Consider wound care consult   Outcome: Progressing     Problem: PAIN - ADULT  Goal: Verbalizes/displays adequate comfort level or baseline comfort level  Description: Interventions:  - Encourage patient to monitor pain and request assistance  - Assess pain using appropriate pain scale  - Administer analgesics based on type and severity of pain and evaluate response  - Implement non-pharmacological measures as appropriate and evaluate response  - Consider cultural and social influences on pain and pain management  - Notify physician/advanced practitioner if interventions unsuccessful or patient reports new pain  Outcome: Progressing     Problem: SAFETY ADULT  Goal: Patient will remain free of falls  Description: INTERVENTIONS:  - Educate patient/family on patient safety including physical limitations  - Instruct patient to call for assistance with activity   - Consult OT/PT to assist with strengthening/mobility   - Keep Call bell within reach  - Keep bed low and locked with side rails adjusted as appropriate  - Keep care items and personal belongings within reach  - Initiate and maintain comfort rounds  - Make Fall Risk Sign visible to staff  - Offer Toileting every 2 Hours, in advance of need  - Initiate/Maintain bedalarm  - Obtain necessary fall risk  management equipment:   - Apply yellow socks and bracelet for high fall risk patients  - Consider moving patient to room near nurses station  Outcome: Progressing

## 2024-07-27 NOTE — PLAN OF CARE
Problem: Prexisting or High Potential for Compromised Skin Integrity  Goal: Skin integrity is maintained or improved  Description: INTERVENTIONS:  - Identify patients at risk for skin breakdown  - Assess and monitor skin integrity  - Assess and monitor nutrition and hydration status  - Monitor labs   - Assess for incontinence   - Turn and reposition patient  - Assist with mobility/ambulation  - Relieve pressure over bony prominences  - Avoid friction and shearing  - Provide appropriate hygiene as needed including keeping skin clean and dry  - Evaluate need for skin moisturizer/barrier cream  - Collaborate with interdisciplinary team   - Patient/family teaching  - Consider wound care consult   Outcome: Progressing     Problem: PAIN - ADULT  Goal: Verbalizes/displays adequate comfort level or baseline comfort level  Description: Interventions:  - Encourage patient to monitor pain and request assistance  - Assess pain using appropriate pain scale  - Administer analgesics based on type and severity of pain and evaluate response  - Implement non-pharmacological measures as appropriate and evaluate response  - Consider cultural and social influences on pain and pain management  - Notify physician/advanced practitioner if interventions unsuccessful or patient reports new pain  Outcome: Progressing     Problem: INFECTION - ADULT  Goal: Absence or prevention of progression during hospitalization  Description: INTERVENTIONS:  - Assess and monitor for signs and symptoms of infection  - Monitor lab/diagnostic results  - Monitor all insertion sites, i.e. indwelling lines, tubes, and drains  - Monitor endotracheal if appropriate and nasal secretions for changes in amount and color  - [place] appropriate cooling/warming therapies per order  - Administer medications as ordered  - Instruct and encourage patient and family to use good hand hygiene technique  - Identify and instruct in appropriate isolation precautions for  identified infection/condition  Outcome: Progressing  Goal: Absence of fever/infection during neutropenic period  Description: INTERVENTIONS:  - Monitor WBC    Outcome: Progressing     Problem: SAFETY ADULT  Goal: Patient will remain free of falls  Description: INTERVENTIONS:  - Educate patient/family on patient safety including physical limitations  - Instruct patient to call for assistance with activity   - Consult OT/PT to assist with strengthening/mobility   - Keep Call bell within reach  - Keep bed low and locked with side rails adjusted as appropriate  - Keep care items and personal belongings within reach  - Initiate and maintain comfort rounds  - Make Fall Risk Sign visible to staff  - Offer Toileting every  Hours, in advance of need  - Initiate/Maintain alarm  - Obtain necessary fall risk management equipment:   - Apply yellow socks and bracelet for high fall risk patients  - Consider moving patient to room near nurses station  Outcome: Progressing  Goal: Maintain or return to baseline ADL function  Description: INTERVENTIONS:  -  Assess patient's ability to carry out ADLs; assess patient's baseline for ADL function and identify physical deficits which impact ability to perform ADLs (bathing, care of mouth/teeth, toileting, grooming, dressing, etc.)  - Assess/evaluate cause of self-care deficits   - Assess range of motion  - Assess patient's mobility; develop plan if impaired  - Assess patient's need for assistive devices and provide as appropriate  - Encourage maximum independence but intervene and supervise when necessary  - Involve family in performance of ADLs  - Assess for home care needs following discharge   - Consider OT consult to assist with ADL evaluation and planning for discharge  - Provide patient education as appropriate  Outcome: Progressing  Goal: Maintains/Returns to pre admission functional level  Description: INTERVENTIONS:  - Perform AM-PAC 6 Click Basic Mobility/ Daily Activity  assessment daily.  - Set and communicate daily mobility goal to care team and patient/family/caregiver.   - Collaborate with rehabilitation services on mobility goals if consulted  - Perform Range of Motion  times a day.  - Reposition patient every  hours.  - Dangle patient  times a day  - Stand patient  times a day  - Ambulate patient  times a day  - Out of bed to chair  times a day   - Out of bed for meals  times a day  - Out of bed for toileting  - Record patient progress and toleration of activity level   Outcome: Progressing     Problem: DISCHARGE PLANNING  Goal: Discharge to home or other facility with appropriate resources  Description: INTERVENTIONS:  - Identify barriers to discharge w/patient and caregiver  - Arrange for needed discharge resources and transportation as appropriate  - Identify discharge learning needs (meds, wound care, etc.)  - Arrange for interpretive services to assist at discharge as needed  - Refer to Case Management Department for coordinating discharge planning if the patient needs post-hospital services based on physician/advanced practitioner order or complex needs related to functional status, cognitive ability, or social support system  Outcome: Progressing     Problem: Knowledge Deficit  Goal: Patient/family/caregiver demonstrates understanding of disease process, treatment plan, medications, and discharge instructions  Description: Complete learning assessment and assess knowledge base.  Interventions:  - Provide teaching at level of understanding  - Provide teaching via preferred learning methods  Outcome: Progressing     Problem: Neurological Deficit  Goal: Neurological status is stable or improving  Description: Interventions:  - Monitor and assess patient's level of consciousness, motor function, sensory function, and level of assistance needed for ADLs.   - Monitor and report changes from baseline. Collaborate with interdisciplinary team to initiate plan and implement  interventions as ordered.   - Provide and maintain a safe environment.  - Consider seizure precautions.  - Consider fall precautions.  - Consider aspiration precautions.  - Consider bleeding precautions.  Outcome: Progressing     Problem: Activity Intolerance/Impaired Mobility  Goal: Mobility/activity is maintained at optimum level for patient  Description: Interventions:  - Assess and monitor patient  barriers to mobility and need for assistive/adaptive devices.  - Assess patient's emotional response to limitations.  - Collaborate with interdisciplinary team and initiate plans and interventions as ordered.  - Encourage independent activity per ability.  - Maintain proper body alignment.  - Perform active/passive rom as tolerated/ordered.  - Plan activities to conserve energy.  - Turn patient as appropriate  Outcome: Progressing     Problem: Communication Impairment  Goal: Ability to express needs and understand communication  Description: Assess patient's communication skills and ability to understand information.  Patient will demonstrate use of effective communication techniques, alternative methods of communication and understanding even if not able to speak.     - Encourage communication and provide alternate methods of communication as needed.  - Collaborate with case management/ for discharge needs.  - Include patient/family/caregiver in decisions related to communication.  Outcome: Progressing     Problem: Potential for Aspiration  Goal: Non-ventilated patient's risk of aspiration is minimized  Description: Assess and monitor vital signs, respiratory status, and labs (WBC).  Monitor for signs of aspiration (tachypnea, cough, rales, wheezing, cyanosis, fever).    - Assess and monitor patient's ability to swallow.  - Place patient up in chair to eat if possible.  - HOB up at 90 degrees to eat if unable to get patient up into chair.  - Supervise patient during oral intake.   - Instruct patient/  family to take small bites.  - Instruct patient/ family to take small single sips when taking liquids.  - Follow patient-specific strategies generated by speech pathologist.  Outcome: Progressing  Goal: Ventilated patient's risk of aspiration is minimized  Description: Assess and monitor vital signs, respiratory status, airway cuff pressure, and labs (WBC).  Monitor for signs of aspiration (tachypnea, cough, rales, wheezing, cyanosis, fever).    - Elevate head of bed 30 degrees if patient has tube feeding.  - Monitor tube feeding.  Outcome: Progressing     Problem: Nutrition  Goal: Nutrition/Hydration status is improving  Description: Monitor and assess patient's nutrition/hydration status for malnutrition (ex- brittle hair, bruises, dry skin, pale skin and conjunctiva, muscle wasting, smooth red tongue, and disorientation). Collaborate with interdisciplinary team and initiate plan and interventions as ordered.  Monitor patient's weight and dietary intake as ordered or per policy. Utilize nutrition screening tool and intervene per policy. Determine patient's food preferences and provide high-protein, high-caloric foods as appropriate.     - Assist patient with eating.  - Allow adequate time for meals.  - Encourage patient to take dietary supplement as ordered.  - Collaborate with clinical nutritionist.  - Include patient/family/caregiver in decisions related to nutrition.  Outcome: Progressing

## 2024-07-27 NOTE — PROGRESS NOTES
"[facility]  Progress Note: Critical Care  Name: Brianna Baum 74 y.o. female I MRN: [id number]  Unit/Bed#: [facility] 719-01 I Date of Admission: 7/22/2024   Date of Service: 7/27/2024 I Hospital Day: 5    Assessment & Plan   Neuro:   Diagnosis: Acute Left MCA CVA s/p TNK  7/22  CTA: focal occlusion of distal Left M1 at the bifurcation  CTH: No acute intracranial abnormality  S/p TNK @2152  7/23   24hr post-TNK MRI head: \"stable acute infarct centered in the left basal ganglia with superimposed petechial hemorrhage. No significant mass effect.\"  Lipid panel: cholesterol 187, triglycerides 50, HDL 57, ; Hgb A1c: 5.8%  TTE: LVEF 55%, no PFO, MV mild regurg, TV mild regurg, PV mild regurg  7/24: repeat stroke alert for L MCA syndrome s/p thrombectomy TICI 3  CTH and CTA stroke alert: L M1 occlusion acute, evolving L BG stroke w/ mild petechial hemorrhage  Repeat CTH 07/25:evolving L BG stroke, minimal petechial hemorrhage on dual energy scan   7/26 CTH: Redemonstrated recent left MCA territory infarct with grossly stable foci of petechial hemorrhage. No significant mass effect or midline shift.  7/26 MRI brain: evolution and mild worsening of the recent left MCA territory infarct involving caudate, lentiform nucleus, anterior internal capsule, and smaller foci in the frontal operculum and insula. Grossly stable foci of petechial hemorrhage. No significant mass effect.  7/27 CTH: Expected evolving appearance of left basal ganglia infarct with superimposed petechial hemorrhage. Slight interval increase in mass effect on the frontal horn left lateral ventricle. No hydrocephalus   Plan:   Q2 neuro checks, Q4 at night   AC: heparin gtt stroke protocol   PTT 50-70  Continue lipitor   Neurology following - recs appreciated  Neurosurgery following - recs appreciated  SBP goal 100-140mmHg   Continue midodrine 10mg TID  Wean Supa, goal OFF today  Fall precautions  NIH score at " discharge  PT/OT - pending eval  Stroke education  BG control 110-180     CV:   Diagnosis: Afib w/ RVR   7/18 new onset afib noted by PCP during visit for LE edema. Pt was prescribed Cardizem at that time. Eliquis also prescribed but was never started due to cost.   07/24: went into afib w/ RVR during thrombectomy and suspected cause of stroke   Plan:  PO caridzem 30mg q8h  HR < 110 goal   Discuss long-term AC plan pending stability of 24hr post-TNK imaging  Cardiology consulted  Holding home lasix in the setting of SBP goals     Pulm:  No active issues  - Promote IS, deep breathing, OOB as tolerated, continuous pulse oximetry     GI:   Diagnosis: GERD  Plan: continue home PPI  Bowel regimen: miralax, sennokot, prn dulcolax     :   No active issues  - Strict I&Os  - Daily BMP, electrolytes     F/E/N:   F: none  E: Monitor and replete to maintain K >4, Phos >3, Mag >2  N: regular diet       Heme/Onc:   No active issues  DVT ppx: heparin gtt      Endo:   No active issues     ID:   No acute issues   Monitor WBC count and temperature curve     MSK/Skin:   No active issues  PT/OT   Skin surveillance      Disposition: SD 2 once off Supa    ICU Core Measures     A: Assess, Prevent, and Manage Pain Has pain been assessed? Yes  Need for changes to pain regimen? No   B: Both SAT/SAT  N/A   C: Choice of Sedation N/A   D: Delirium CAM-ICU: Negative   E: Early Mobility  Plan for early mobility? Yes   F: Family Engagement Plan for family engagement today? Yes       Review of Invasive Devices:          Prophylaxis:  VTE VTE covered by:  heparin (porcine), Intravenous, 13 Units/kg/hr at 07/26/24 1948       Stress Ulcer  covered byesomeprazole (NexIUM) 40 MG capsule [[id number]] (Long-Term Med), pantoprazole (PROTONIX) EC tablet 40 mg [[id number]]        Significant 24hr Events     24hr events: No acute events overnight. Patient denies any acute complaints this AM and reports feels back to baseline. Tolerating an oral diet.       Subjective     Review of Systems: Review of Systems   Respiratory:  Negative for chest tightness and shortness of breath.    Cardiovascular:  Negative for chest pain.   Gastrointestinal:  Negative for abdominal pain, nausea and vomiting.   Neurological:  Negative for weakness, numbness and headaches.        Objective                            Vitals I/O      Most Recent Min/Max in 24hrs   Temp 98.7 °F (37.1 °C) Temp  Min: 97.8 °F (36.6 °C)  Max: 98.7 °F (37.1 °C)   Pulse 63 Pulse  Min: 50  Max: 74   Resp (!) 24 Resp  Min: 17  Max: 34   /60 BP  Min: 91/33  Max: 160/59   O2 Sat 93 % SpO2  Min: 93 %  Max: 99 %      Intake/Output Summary (Last 24 hours) at 7/27/2024 0728  Last data filed at 7/27/2024 0301  Gross per 24 hour   Intake 2661.32 ml   Output 1625 ml   Net 1036.32 ml       Diet Regular; Regular House    Invasive Monitoring           Physical Exam   Physical Exam  Vitals and nursing note reviewed.   Eyes:      Extraocular Movements: Extraocular movements intact.      Pupils: Pupils are equal, round, and reactive to light.   Skin:     General: Skin is warm and dry.      Capillary Refill: Capillary refill takes less than 2 seconds.   HENT:      Head: Normocephalic and atraumatic.      Nose: No rhinorrhea.      Mouth/Throat:      Mouth: Mucous membranes are moist.   Cardiovascular:      Rate and Rhythm: Normal rate and regular rhythm.      Pulses: Normal pulses.      Heart sounds: Normal heart sounds.   Musculoskeletal:         General: Normal range of motion.      Right lower leg: No edema.      Left lower leg: No edema.   Abdominal: General: Bowel sounds are normal.      Palpations: Abdomen is soft.      Tenderness: There is no abdominal tenderness.   Constitutional:       General: She is not in acute distress.     Appearance: She is obese.   Pulmonary:      Effort: Pulmonary effort is normal. No respiratory distress.      Breath sounds: Normal breath sounds.   Neurological:      General: No focal deficit  present.      Mental Status: She is alert.      Cranial Nerves: Facial asymmetry (R eyelid droop, subtle R mouth droop (unchanged)) present.      Sensory: No sensory deficit.      Motor: Strength full and intact in all extremities. No weakness.      Coordination: Finger-Nose-Finger Test normal.      Comments: Early in AM, only oriented x1 (to self), however later on rounds Ox3            Diagnostic Studies    Imaging:  I have personally reviewed pertinent films in PACS     Medications:  Scheduled PRN   vitamin C, 1,000 mg, Daily  atorvastatin, 40 mg, QPM  diltiazem, 30 mg, Q8H ANETA  hydrocortisone sodium succinate, 25 mg, Q12H  lidocaine, 1 patch, Daily  midodrine, 10 mg, TID AC  pantoprazole, 40 mg, Daily Before Breakfast  polyethylene glycol, 17 g, Daily  senna-docusate sodium, 1 tablet, HS      acetaminophen, 650 mg, Q6H PRN  bisacodyl, 10 mg, Daily PRN       Continuous    heparin (porcine), 3-24 Units/kg/hr (Order-Specific), Last Rate: 13 Units/kg/hr (07/26/24 1948)  phenylephine,  mcg/min, Last Rate: 35 mcg/min (07/27/24 0435)         Labs:  CBC    Recent Labs     07/26/24  0433 07/27/24  0356   WBC 14.57* 10.01   HGB 12.8 11.1*   HCT 38.9 34.4*    189     BMP    Recent Labs     07/26/24  0433 07/27/24  0416   SODIUM 140 140   K 4.2 4.1    106   CO2 23 25   AGAP 10 9   BUN 12 12   CREATININE 0.71 0.68   CALCIUM 9.0 9.0       Coags    Recent Labs     07/25/24  1302 07/25/24  1735 07/26/24  1521 07/27/24  0342   INR 1.13  --   --   --    PTT 28   < > 52* 66*    < > = values in this interval not displayed.        Additional Electrolytes  Recent Labs     07/26/24  0433 07/27/24  0000 07/27/24  0416   MG 1.8*  --  2.2   PHOS 4.3*  --  3.9   CAIONIZED 1.14 1.19  --           Blood Gas    No recent results  No recent results LFTs  Recent Labs     07/26/24  0433 07/27/24  0416   ALT 29 22   AST 26 17   ALKPHOS 66 60   ALB 3.6 3.8   TBILI 0.62 0.40       Infectious  No recent results  Glucose  Recent  Labs     07/26/24  0433 07/27/24  0416   GLUC 125 168*               Jose Zamora PA-C

## 2024-07-28 PROBLEM — I48.91 ATRIAL FIBRILLATION (HCC): Status: ACTIVE | Noted: 2024-07-28

## 2024-07-28 PROBLEM — R79.89 LOW SERUM CORTISOL LEVEL: Status: ACTIVE | Noted: 2024-07-28

## 2024-07-28 LAB
ALBUMIN SERPL BCG-MCNC: 3.4 G/DL (ref 3.5–5)
ALP SERPL-CCNC: 52 U/L (ref 34–104)
ALT SERPL W P-5'-P-CCNC: 17 U/L (ref 7–52)
ANION GAP SERPL CALCULATED.3IONS-SCNC: 7 MMOL/L (ref 4–13)
APTT PPP: 56 SECONDS (ref 23–37)
APTT PPP: 58 SECONDS (ref 23–37)
AST SERPL W P-5'-P-CCNC: 14 U/L (ref 13–39)
ATRIAL RATE: 59 BPM
ATRIAL RATE: 60 BPM
BILIRUB SERPL-MCNC: 0.29 MG/DL (ref 0.2–1)
BUN SERPL-MCNC: 17 MG/DL (ref 5–25)
CA-I BLD-SCNC: 1.2 MMOL/L (ref 1.12–1.32)
CALCIUM ALBUM COR SERPL-MCNC: 9.5 MG/DL (ref 8.3–10.1)
CALCIUM SERPL-MCNC: 9 MG/DL (ref 8.4–10.2)
CHLORIDE SERPL-SCNC: 105 MMOL/L (ref 96–108)
CO2 SERPL-SCNC: 29 MMOL/L (ref 21–32)
CREAT SERPL-MCNC: 0.75 MG/DL (ref 0.6–1.3)
GFR SERPL CREATININE-BSD FRML MDRD: 78 ML/MIN/1.73SQ M
GLUCOSE SERPL-MCNC: 143 MG/DL (ref 65–140)
MAGNESIUM SERPL-MCNC: 1.8 MG/DL (ref 1.9–2.7)
P AXIS: 60 DEGREES
P AXIS: 65 DEGREES
PHOSPHATE SERPL-MCNC: 3.9 MG/DL (ref 2.3–4.1)
POTASSIUM SERPL-SCNC: 4 MMOL/L (ref 3.5–5.3)
PR INTERVAL: 166 MS
PR INTERVAL: 170 MS
PROT SERPL-MCNC: 5.5 G/DL (ref 6.4–8.4)
QRS AXIS: -6 DEGREES
QRS AXIS: -6 DEGREES
QRSD INTERVAL: 74 MS
QRSD INTERVAL: 76 MS
QT INTERVAL: 438 MS
QT INTERVAL: 454 MS
QTC INTERVAL: 433 MS
QTC INTERVAL: 454 MS
SODIUM SERPL-SCNC: 141 MMOL/L (ref 135–147)
T WAVE AXIS: -11 DEGREES
T WAVE AXIS: -19 DEGREES
VENTRICULAR RATE: 59 BPM
VENTRICULAR RATE: 60 BPM

## 2024-07-28 PROCEDURE — NC001 PR NO CHARGE: Performed by: STUDENT IN AN ORGANIZED HEALTH CARE EDUCATION/TRAINING PROGRAM

## 2024-07-28 PROCEDURE — 82330 ASSAY OF CALCIUM: CPT | Performed by: INTERNAL MEDICINE

## 2024-07-28 PROCEDURE — 99222 1ST HOSP IP/OBS MODERATE 55: CPT | Performed by: STUDENT IN AN ORGANIZED HEALTH CARE EDUCATION/TRAINING PROGRAM

## 2024-07-28 PROCEDURE — 85730 THROMBOPLASTIN TIME PARTIAL: CPT | Performed by: EMERGENCY MEDICINE

## 2024-07-28 PROCEDURE — 84100 ASSAY OF PHOSPHORUS: CPT | Performed by: INTERNAL MEDICINE

## 2024-07-28 PROCEDURE — 99223 1ST HOSP IP/OBS HIGH 75: CPT | Performed by: INTERNAL MEDICINE

## 2024-07-28 PROCEDURE — 93010 ELECTROCARDIOGRAM REPORT: CPT | Performed by: INTERNAL MEDICINE

## 2024-07-28 PROCEDURE — 99232 SBSQ HOSP IP/OBS MODERATE 35: CPT | Performed by: STUDENT IN AN ORGANIZED HEALTH CARE EDUCATION/TRAINING PROGRAM

## 2024-07-28 PROCEDURE — 80053 COMPREHEN METABOLIC PANEL: CPT | Performed by: INTERNAL MEDICINE

## 2024-07-28 PROCEDURE — 83735 ASSAY OF MAGNESIUM: CPT | Performed by: INTERNAL MEDICINE

## 2024-07-28 PROCEDURE — NC001 PR NO CHARGE: Performed by: INTERNAL MEDICINE

## 2024-07-28 PROCEDURE — 85730 THROMBOPLASTIN TIME PARTIAL: CPT | Performed by: INTERNAL MEDICINE

## 2024-07-28 RX ORDER — DILTIAZEM HYDROCHLORIDE 120 MG/1
120 CAPSULE, COATED, EXTENDED RELEASE ORAL DAILY
Status: DISCONTINUED | OUTPATIENT
Start: 2024-07-29 | End: 2024-07-30

## 2024-07-28 RX ORDER — MAGNESIUM SULFATE HEPTAHYDRATE 40 MG/ML
2 INJECTION, SOLUTION INTRAVENOUS ONCE
Status: COMPLETED | OUTPATIENT
Start: 2024-07-28 | End: 2024-07-28

## 2024-07-28 RX ADMIN — MIDODRINE HYDROCHLORIDE 10 MG: 5 TABLET ORAL at 16:14

## 2024-07-28 RX ADMIN — MAGNESIUM SULFATE HEPTAHYDRATE 2 G: 40 INJECTION, SOLUTION INTRAVENOUS at 16:15

## 2024-07-28 RX ADMIN — OXYCODONE HYDROCHLORIDE AND ACETAMINOPHEN 1000 MG: 500 TABLET ORAL at 08:29

## 2024-07-28 RX ADMIN — HYDROCORTISONE SODIUM SUCCINATE 50 MG: 100 INJECTION, POWDER, FOR SOLUTION INTRAMUSCULAR; INTRAVENOUS at 08:29

## 2024-07-28 RX ADMIN — DILTIAZEM HYDROCHLORIDE 15 MG: 5 INJECTION INTRAVENOUS at 00:17

## 2024-07-28 RX ADMIN — ATORVASTATIN CALCIUM 40 MG: 40 TABLET, FILM COATED ORAL at 18:21

## 2024-07-28 RX ADMIN — MIDODRINE HYDROCHLORIDE 10 MG: 5 TABLET ORAL at 06:43

## 2024-07-28 RX ADMIN — HEPARIN SODIUM 13 UNITS/KG/HR: 10000 INJECTION, SOLUTION INTRAVENOUS at 18:23

## 2024-07-28 RX ADMIN — PANTOPRAZOLE SODIUM 40 MG: 40 TABLET, DELAYED RELEASE ORAL at 06:43

## 2024-07-28 RX ADMIN — DILTIAZEM HYDROCHLORIDE 30 MG: 30 TABLET ORAL at 06:42

## 2024-07-28 RX ADMIN — MIDODRINE HYDROCHLORIDE 10 MG: 5 TABLET ORAL at 12:08

## 2024-07-28 NOTE — ASSESSMENT & PLAN NOTE
Lab Results   Component Value Date    EGFR 78 07/28/2024    EGFR 86 07/27/2024    EGFR 84 07/26/2024    CREATININE 0.75 07/28/2024    CREATININE 0.68 07/27/2024    CREATININE 0.71 07/26/2024   Avoid nephrotoxins  Kidney function at baseline  Repeat BMP tomorrow

## 2024-07-28 NOTE — PROGRESS NOTES
[facility]  Progress Note  Name: Brianna Baum I  MRN: [id number]  Unit/Bed#: [facility] 719-01 I Date of Admission: 7/22/2024   Date of Service: 7/28/2024 I Hospital Day: 6    Assessment & Plan   Atrial fibrillation/A flutter (HCC)  Assessment & Plan  Recently diagnosed, not on anticoagulation due to cost  Currently in sinus rhythm  Cardiology input noted, plan to switch to diltiazem 120 mg daily starting on 7/29  Currently on heparin drip, timing of transition to NOAC will be deferred to neurology in the setting of petechial hemorrhages on CT  Outpatient cardiac follow-up    Low serum cortisol level  Assessment & Plan  Patient had a random cortisol level drawn on 7/26 that was 5  No suspicion of adrenal insufficiency as cortisol level was drawn in the evening  While in the ICU, required vasopressors for blood pressure support.   Started in the ICU on on hydrocortisone 50 mg IV twice daily and midodrine 10 mg 3 times daily AC  Patient evaluated by endocrinology, plan to hold morning Cortef on 7/29 and repeat cortisol and ACTH    CKD (chronic kidney disease) stage 2, GFR 60-89 ml/min  Assessment & Plan  Lab Results   Component Value Date    EGFR 78 07/28/2024    EGFR 86 07/27/2024    EGFR 84 07/26/2024    CREATININE 0.75 07/28/2024    CREATININE 0.68 07/27/2024    CREATININE 0.71 07/26/2024   Avoid nephrotoxins  Kidney function at baseline  Repeat BMP tomorrow    Hypertension  Assessment & Plan  BP reviewed and acceptable  Started on diltiazem by cardiology for A-fib    * Cerebrovascular accident (CVA) due to occlusion of left middle cerebral artery (HCC)  Assessment & Plan  74-year-old female with newly diagnosed A-fib that presented as a stroke alert with symptoms of near syncope and speech difficulties.  Initial NIHSS of 0.  It seems upon return from CT her NIHSS was 7 with aphasia, left gaze preference and right facial droop.   Blood pressure stable.  EKG with atrial  flutter  Initial CT head with no acute intracranial abnormalities  CTA head and neck with focal occlusion distal left M1 at the bifurcation  Patient received TNK on 7/22 at 9:52 PM  7/22 single pass thrombectomy attempted without significant change. Non-occlusive lesion remained, TICI 2B  MRI brain with Stable acute infarct centered in the left basal ganglia with superimposed petechial hemorrhage. No significant mass effect.  On 7/24 patient was noted to worsened aphasia, not eligible for TNK. Underwent endovascular intervention with resultant TICI 3.   Neurology and neurosurgery input noted  TTE (7/23): EF 55%, LA moderately dilated, no PFO  Repeat MRI brain 7/26 with evolution and mild worsening of the recent left MCA territory infarct involving caudate, lentiform nucleus, anterior internal capsule, and smaller foci in the frontal operculum and insula. Grossly stable foci of petechial hemorrhage. No significant mass effect.  Distal left M1 occlusion in the setting of atrial fibrillation off anticoagulation due to cost s/p TNK and trauma back to me  Continue atorvastatin and heparin drip for now  Goal -160 mmHg  Continue neurochecks                    VTE Pharmacologic Prophylaxis: VTE Score: 8 High Risk (Score >/= 5) - Pharmacological DVT Prophylaxis Ordered: heparin drip. Sequential Compression Devices Ordered.    Mobility:   Basic Mobility Inpatient Raw Score: 11  JH-HLM Goal: 4: Move to chair/commode  JH-HLM Achieved: 4: Move to chair/commode  JH-HLM Goal achieved. Continue to encourage appropriate mobility.    Patient Centered Rounds: I performed bedside rounds with nursing staff today.   Discussions with Specialists or Other Care Team Provider: Endocrinology     Education and Discussions with Family / Patient:  Patient.     Total Time Spent on Date of Encounter in care of patient: 35 mins. This time was spent on one or more of the following: performing physical exam; counseling and coordination of  care; obtaining or reviewing history; documenting in the medical record; reviewing/ordering tests, medications or procedures; communicating with other healthcare professionals and discussing with patient's family/caregivers.    Current Length of Stay: 6 day(s)  Current Patient Status: Inpatient   Certification Statement: The patient will continue to require additional inpatient hospital stay due to endocrinology workup, dispo planning, final neurology recommendations  Discharge Plan: Anticipate discharge in 48-72 hrs to rehab facility.    Code Status: Level 1 - Full Code    Subjective:   No events overnight.  Patient reports feeling well this morning.  Tolerating oral intake.  Reports feeling closer to baseline.    Objective:     Vitals:   Temp (24hrs), Av.4 °F (36.9 °C), Min:98 °F (36.7 °C), Max:98.8 °F (37.1 °C)    Temp:  [98 °F (36.7 °C)-98.8 °F (37.1 °C)] 98.8 °F (37.1 °C)  HR:  [] 59  Resp:  [16-20] 16  BP: ()/(49-67) 97/58  SpO2:  [96 %] 96 %  Body mass index is 30.77 kg/m².     Input and Output Summary (last 24 hours):     Intake/Output Summary (Last 24 hours) at 2024 1623  Last data filed at 2024 1701  Gross per 24 hour   Intake 641.6 ml   Output --   Net 641.6 ml       Physical Exam:   Physical Exam  Vitals and nursing note reviewed.   Constitutional:       General: She is not in acute distress.     Appearance: She is well-developed.   HENT:      Head: Normocephalic and atraumatic.   Eyes:      Conjunctiva/sclera: Conjunctivae normal.   Cardiovascular:      Rate and Rhythm: Normal rate and regular rhythm.      Heart sounds: No murmur heard.  Pulmonary:      Effort: Pulmonary effort is normal. No respiratory distress.      Breath sounds: Normal breath sounds.   Abdominal:      Palpations: Abdomen is soft.      Tenderness: There is no abdominal tenderness.   Musculoskeletal:         General: No swelling.      Cervical back: Neck supple.   Skin:     General: Skin is warm and dry.       Capillary Refill: Capillary refill takes less than 2 seconds.   Neurological:      Mental Status: She is alert.      Comments: Alert and oriented x 3  No facial droop  Face normal speech  Strength 4/5 from bilateral lower extremities, 5/5 on bilateral upper extremities              Additional Data:     Labs:  Results from last 7 days   Lab Units 07/27/24  0356   WBC Thousand/uL 10.01   HEMOGLOBIN g/dL 11.1*   HEMATOCRIT % 34.4*   PLATELETS Thousands/uL 189   SEGS PCT % 84*   LYMPHO PCT % 11*   MONO PCT % 4   EOS PCT % 0     Results from last 7 days   Lab Units 07/28/24  0415   SODIUM mmol/L 141   POTASSIUM mmol/L 4.0   CHLORIDE mmol/L 105   CO2 mmol/L 29   BUN mg/dL 17   CREATININE mg/dL 0.75   ANION GAP mmol/L 7   CALCIUM mg/dL 9.0   ALBUMIN g/dL 3.4*   TOTAL BILIRUBIN mg/dL 0.29   ALK PHOS U/L 52   ALT U/L 17   AST U/L 14   GLUCOSE RANDOM mg/dL 143*     Results from last 7 days   Lab Units 07/25/24  1302   INR  1.13     Results from last 7 days   Lab Units 07/24/24  1952 07/24/24  1740 07/22/24 2052   POC GLUCOSE mg/dl 131 141* 107     Results from last 7 days   Lab Units 07/22/24 2057   HEMOGLOBIN A1C % 5.8*           Lines/Drains:  Invasive Devices       Peripheral Intravenous Line  Duration             Peripheral IV 07/24/24 Dorsal (posterior);Left Wrist 4 days    Peripheral IV 07/24/24 Right;Ventral (anterior) Forearm 4 days    Peripheral IV 07/25/24 Right Antecubital 3 days              Drain  Duration             External Urinary Catheter 3 days                          Imaging: Reviewed radiology reports from this admission including: CT head and MRI brain    Recent Cultures (last 7 days):         Last 24 Hours Medication List:   Current Facility-Administered Medications   Medication Dose Route Frequency Provider Last Rate    acetaminophen  650 mg Oral Q6H PRN Brittany Rehman MD      vitamin C  1,000 mg Oral Daily Brittany Rehman MD      atorvastatin  40 mg Oral QPM Brittany eRhman MD      bisacodyl   10 mg Rectal Daily PRN Brittany Rehman MD      [START ON 7/29/2024] diltiazem  120 mg Oral Daily Arnold Clemente MD      diltiazem  30 mg Oral Q8H [facility] Arnold Clemente MD      heparin (porcine)  3-24 Units/kg/hr (Order-Specific) Intravenous Titrated Brittany Rehman MD 13 Units/kg/hr (07/27/24 2007)    hydrocortisone sodium succinate  50 mg Intravenous Q12H Sharon Sargent MD      lidocaine  1 patch Topical Daily Brittany Rehman MD      magnesium sulfate  2 g Intravenous Once Sharon Sargent MD 2 g (07/28/24 1615)    midodrine  10 mg Oral TID AC Brittany Rehman MD      pantoprazole  40 mg Oral Daily Before Breakfast Brittany Rehman MD      polyethylene glycol  17 g Oral Daily Brittany Rehman MD      senna-docusate sodium  1 tablet Oral HS Brittany Rehman MD          Today, Patient Was Seen By: Sharon Sargent MD    **Please Note: This note may have been constructed using a voice recognition system.**

## 2024-07-28 NOTE — CONSULTS
Consultation - Cardiology   Brianna Baum 74 y.o. female MRN: [id number]  Unit/Bed#: [facility] 719-01 Encounter: [id number]  07/28/24  1:39 PM  \    Impression:    74-year-old with recently diagnosed atrial flutter, was not on anticoagulation coming in with strokelike symptoms status post thrombolysis, unsuccessful endovascular intervention with subsequent worsening of symptoms, then underwent successful intervention-left MCA M1, with petechial hemorrhage noted and she is back on heparin at this time.    Echo overall is unremarkable except for showing left atrial dilatation.  She is currently in-sinus rhythm  She denies any residual deficits except that she is finding it hard to remember her previous phone numbers.    Plan:    Atrial fibrillation/flutter: At this point we will maintain her rate control strategy, considering normal EF, can switch diltiazem to 120 mg daily from tomorrow.  Thyroid profile is normal  Will defer anticoagulation currently on IV heparin, and timing of transition to NOAC to neurology considering petechial hemorrhages noted on head CT.  Outpatient follow-up and cardiac rhythm monitoring with 2 weeks of patch cardiac rhythm monitoring    Blood pressures are overall controlled    Dyslipidemia:LDL around 120, non-HDL around 130, currently on atorvastatin 40      Assessment:  Principal Problem:    Cerebrovascular accident (CVA) due to occlusion of left middle cerebral artery (HCC)  Active Problems:    Low serum cortisol level    No chief complaint on file.    Admitting diagnosis:  Cerebrovascular accident (CVA), unspecified mechanism (HCC) [I63.9]    History of Present Illness   Physician Requesting Consult: Sharon Sargent MD   Reason for Consult / Principal Problem: Atrial fibrillation  HPI: Brianna Baum is a 74-year-old with a listed history of hypertension (but was not on antihypertensives previously and patient denies), recently diagnosed atrial fibrillation-7/18/2024, placed on diltiazem and  apixaban but could not fill it due to cost, who presented with dizziness/confusion to [facility], stroke alert called, given TNK and endovascular therapy was unsuccessful with residual small nonocclusive thrombus, subsequently developed expressive aphasia,-left MCA M1 segment occlusion on imaging, underwent repeat endovascular therapy which is successful,, subsequent MRI demonstrating left MCA territory infarct with foci of petechial hemorrhage and chronic bilateral cerebellar lacunar infarcts with ECG.  In addition IV heparin had been given with subsequent CTA that showed worsening bleeding in the left basal ganglia  With recommendations by neurology to hold heparin for 24 hours with repeat imaging.  Reviewed all ECGs, initial diagnosis of atrial flutter was on 7/18/2024  As an outpatient when she was initialted on diltiazem, apixaban (patient could not fill due to cost) and an echocardiogram was ordered.  ECGs in the hospital have shown atrial fibrillation as well as atrial flutter but most recent ECG from yesterday showed normal sinus rhythm.    She is currently on diltiazem 30 mg 3 times daily,  as well as IV heparin.  Echo this admission shortness of breath at 55% with moderately dilated left atrium, no PFO and normal RV size and function.    Consults    Review of Systems:  feels well  Review of Systems   Constitutional: Negative.         Memory issues   HENT: Negative.     Eyes: Negative.    Respiratory: Negative.     Cardiovascular: Negative.    Endocrine: Negative.    Musculoskeletal: Negative.        Historical Information   Past Medical History:   Diagnosis Date    Arthritis     Chronic pain     Colon polyp     Diverticulitis     GERD (gastroesophageal reflux disease)     Hypertension     Vertigo      Past Surgical History:   Procedure Laterality Date    CHOLECYSTECTOMY      CHOLECYSTECTOMY LAPAROSCOPIC N/A 12/06/2021    Procedure: SUBTOTAL CHOLECYSTECTOMY LAPAROSCOPIC;  Surgeon: Víctor HAYDEN  MD Felice;  Location:  MAIN OR;  Service: General    COLONOSCOPY      GALLBLADDER SURGERY  12/2021    Dr. Viveros    IR STROKE ALERT  7/23/2024    IR STROKE ALERT  7/24/2024    TUBAL LIGATION       Social History     Substance and Sexual Activity   Alcohol Use Yes    Comment: 3 times a week     Social History     Substance and Sexual Activity   Drug Use Yes    Types: Marijuana    Comment: rarely/ last used 2 days ago     Social History     Tobacco Use   Smoking Status Never   Smokeless Tobacco Never     Family History:   Family History   Problem Relation Age of Onset    Stroke Mother     Diabetes Mother     Hypertension Mother     Heart disease Father     Hypertension Father     Heart disease Brother     Diabetes Brother     Hypertension Brother     Substance Abuse Neg Hx     Mental illness Neg Hx      No family history of premature CAD or Sudden Cardiac Death    Meds/Allergies     Current Facility-Administered Medications:     acetaminophen (TYLENOL) tablet 650 mg, 650 mg, Oral, Q6H PRN, Brittany Rehman MD, 650 mg at 07/26/24 0013    ascorbic acid (VITAMIN C) tablet 1,000 mg, 1,000 mg, Oral, Daily, Brittany Rehman MD, 1,000 mg at 07/28/24 0829    atorvastatin (LIPITOR) tablet 40 mg, 40 mg, Oral, QPM, Brittany Rehman MD, 40 mg at 07/27/24 1714    bisacodyl (DULCOLAX) rectal suppository 10 mg, 10 mg, Rectal, Daily PRN, Brittany Rehman MD    diltiazem (CARDIZEM) tablet 30 mg, 30 mg, Oral, Q8H ANETA, Brittany Rehman MD, 30 mg at 07/28/24 0642    heparin (porcine) 25,000 units in 0.45% NaCl 250 mL infusion (premix), 3-24 Units/kg/hr (Order-Specific), Intravenous, Titrated, Brittany Rehman MD, Last Rate: 10.4 mL/hr at 07/27/24 2007, 13 Units/kg/hr at 07/27/24 2007    hydrocortisone (Solu-CORTEF) injection 50 mg, 50 mg, Intravenous, Q12H, Jose Zamora PA-C, 50 mg at 07/28/24 0829    lidocaine (LIDODERM) 5 % patch 1 patch, 1 patch, Topical, Daily, Brittany Rehman MD, 1 patch at 07/26/24 0924    midodrine  "(PROAMATINE) tablet 10 mg, 10 mg, Oral, TID AC, Brittany Rehman MD, 10 mg at 07/28/24 1208    pantoprazole (PROTONIX) EC tablet 40 mg, 40 mg, Oral, Daily Before Breakfast, Brittany Rehman MD, 40 mg at 07/28/24 0643    polyethylene glycol (MIRALAX) packet 17 g, 17 g, Oral, Daily, Brittany Rehman MD, 17 g at 07/27/24 0819    senna-docusate sodium (SENOKOT S) 8.6-50 mg per tablet 1 tablet, 1 tablet, Oral, HS, Brittany Rehman MD, 1 tablet at 07/27/24 2209  Allergies   Allergen Reactions    Tramadol Hives       Objective   Vitals: Blood pressure 133/60, pulse 59, temperature 98 °F (36.7 °C), temperature source Oral, resp. rate 20, height 5' 5\" (1.651 m), weight 83.9 kg (184 lb 14.4 oz), SpO2 96%., Body mass index is 30.77 kg/m².,   Orthostatic Blood Pressures      Flowsheet Row Most Recent Value   Blood Pressure 133/60 filed at 07/28/2024 0630   Patient Position - Orthostatic VS Lying filed at 07/27/2024 2203              Intake/Output Summary (Last 24 hours) at 7/28/2024 1339  Last data filed at 7/27/2024 1701  Gross per 24 hour   Intake 641.6 ml   Output 350 ml   Net 291.6 ml       Weight (last 2 days)       Date/Time Weight    07/28/24 0547 83.9 (184.9)    07/27/24 0548 83.6 (184.3)            Invasive Devices       Peripheral Intravenous Line  Duration             Peripheral IV 07/24/24 Dorsal (posterior);Left Wrist 4 days    Peripheral IV 07/24/24 Right;Ventral (anterior) Forearm 4 days    Peripheral IV 07/25/24 Right Antecubital 3 days              Drain  Duration             External Urinary Catheter 3 days                      Physical Exam:  GEN: Brianna Baum appears okay, alert and oriented x 3, pleasant and cooperative   HEENT: pupils equal, round, and reactive to light; extraocular muscles intact  NECK: supple, no carotid bruits or JVD  HEART: regular rhythm, normal S1 and S2, no murmurs, no clicks, gallops or rubs   LUNGS: clear to auscultation bilaterally; no wheezes or rhonchi, no rales  ABDOMEN/GI: " "normal bowel sounds, soft, no tenderness, no distention  EXTREMITIES/Musculoskeltal: peripheral pulses normal; no clubbing, cyanosis, or edema  NEURO: no focal motor findings   SKIN: normal without suspicious lesions on exposed skin      Lab Results:   No results displayed because visit has over 200 results.            Imaging: I have personally reviewed pertinent reports.        EKG/Telemetry: Currently sinus rhythm    Counseling / Coordination of Care    Thank you for allowing us to participate in their care.     This note was completed in part utilizing [employer] direct voice recognition software.   Grammatical errors, random word insertion, spelling mistakes, occasional wrong word or \"sound-alike\" substitutions and incomplete sentences may be an occasional consequence of the system secondary to software limitations, ambient noise and hardware issues. At the time of dictation, efforts were made to edit, clarify and /or correct errors.  Please read the chart carefully and recognize, using context, where substitutions have occurred.  If you have any questions or concerns about the context, text or information contained within the body of this dictation, please contact myself, the provider, for further clarification.    Arnold Clemente MD      "

## 2024-07-28 NOTE — ASSESSMENT & PLAN NOTE
Admitted for management of strokelike symptoms with evolution and mild worsening of recent left MCA territory infarct.  S/p TNK.  While in the ICU, required vasopressors for blood pressure support.  Now on midodrine.  A random cortisol level was drawn on 7/26 at 1752 which showed a level of 5 mcg/dL.  Patient was started on hydrocortisone 50 mg every 12 hours.  Patient denies unintentional weight loss, changes in appetite, dizziness, nausea, vomiting, blurry vision, recent infections, recent steroid use, and prior brain or adrenal surgeries.  On lab reviews, patient has not had any episodes of hypoglycemia  Low suspicion for adrenal insufficiency-patient's cortisol level was drawn during the evening time normal low value could be from natural circadian rhythm fluctuation    Recommend holding night dose of hydrocortisone tonight with a morning evaluation tomorrow of cortisol at 8 AM with ACTH level at 8 AM  If cortisol and ACTH level, would evaluate with stimulation test while off of all steroids  Endocrinology continue to follow

## 2024-07-28 NOTE — ASSESSMENT & PLAN NOTE
Recently diagnosed, not on anticoagulation due to cost  Currently in sinus rhythm  Cardiology input noted, plan to switch to diltiazem 120 mg daily starting on 7/29  Currently on heparin drip, timing of transition to NOAC will be deferred to neurology in the setting of petechial hemorrhages on CT  Outpatient cardiac follow-up

## 2024-07-28 NOTE — QUICK NOTE
Assessment & Plan     Assessment:  This is a 74 y.o.-year-old female with Afib, admitted for Left MCA and M1 occlusion. Endocrine consulted for low random cortisol checked d/t concern for AI.     Patients cortisol level checked at 5pm when cortisol level of 5 would be expected. Clinically patient does not appear to be AI, hemodynamically stable now with normal lytes. Hence recommend holding her AM dose of cortef tomorrow morning, check AM cortisol and ACTH. If cortisol <18 can do a ACTH stim test to be certain and rule out AI before resuming steroids.     Consults    CC: Concern for low cortisol     History of Present Illness     HPI: Brianna Baum is a 74 y.o. year old female with PMHx of A.fib who presented with dizziness and confusion at [facility], tranferred to [facility] d/t stroke alert. Found to have left acute stroke M1 occlusion needing embolectomy with a repeat stroke 07/24 left MCA. Endocrine consulted d/t low cortisol level seen on labs checked 07/26/24 at 5pm 5.0 d/t concern for hypotension needing midodrine.     Patient reports she did lose 15lbs since last 1.5 months but intentionally. Denies any nausea, vomiting, GI issues, denies any orthostatic hypotension, dizziness, rash in body or any prior steroid use. Denies any illness/infection PTA. Reports feeling much better today since sitting In chair.     Review of Systems   Constitutional:  Negative for chills and fever.   HENT:  Negative for ear pain and sore throat.    Eyes:  Negative for pain and visual disturbance.   Respiratory:  Negative for cough and shortness of breath.    Cardiovascular:  Negative for chest pain and palpitations.   Gastrointestinal:  Negative for abdominal pain and vomiting.   Genitourinary:  Negative for dysuria and hematuria.   Musculoskeletal:  Negative for arthralgias and back pain.   Skin:  Negative for color change and rash.   Neurological:  Negative for seizures and syncope.   All other systems reviewed and are  negative.    Physical Exam  Constitutional:       Appearance: Normal appearance. She is obese.   Cardiovascular:      Rate and Rhythm: Normal rate and regular rhythm.      Pulses: Normal pulses.   Pulmonary:      Effort: Pulmonary effort is normal.   Abdominal:      General: Abdomen is flat. Bowel sounds are normal.      Palpations: Abdomen is soft.   Skin:     General: Skin is warm and dry.      Capillary Refill: Capillary refill takes less than 2 seconds.   Neurological:      General: No focal deficit present.      Mental Status: She is alert and oriented to person, place, and time.   Psychiatric:         Mood and Affect: Mood normal.         The history was obtained from the review of the chart, patient.    Lab Results:    Latest Reference Range & Units 07/28/24 04:15   Sodium 135 - 147 mmol/L 141   Potassium 3.5 - 5.3 mmol/L 4.0   Chloride 96 - 108 mmol/L 105   Carbon Dioxide 21 - 32 mmol/L 29   ANION GAP 4 - 13 mmol/L 7   BUN 5 - 25 mg/dL 17   Creatinine 0.60 - 1.30 mg/dL 0.75   GLUCOSE 65 - 140 mg/dL 143 (H)   Calcium 8.4 - 10.2 mg/dL 9.0   CORRECTED CALCIUM 8.3 - 10.1 mg/dL 9.5   AST 13 - 39 U/L 14   ALT 7 - 52 U/L 17   ALK PHOS 34 - 104 U/L 52   Total Protein 6.4 - 8.4 g/dL 5.5 (L)   Albumin 3.5 - 5.0 g/dL 3.4 (L)   Total Bilirubin 0.20 - 1.00 mg/dL 0.29   GFR, Calculated ml/min/1.73sq m 78   Phosphorus 2.3 - 4.1 mg/dL 3.9   MAGNESIUM 1.9 - 2.7 mg/dL 1.8 (L)   (H): Data is abnormally high  (L): Data is abnormally low     Latest Reference Range & Units 07/26/24 17:52   Cortisol, Random ug/dL 5.0   TSH 3RD GENERATON 0.450 - 4.500 uIU/mL 1.075     Imaging Studies: I have personally reviewed pertinent reports.

## 2024-07-28 NOTE — ASSESSMENT & PLAN NOTE
74-year-old female with newly diagnosed A-fib that presented as a stroke alert with symptoms of near syncope and speech difficulties.  Initial NIHSS of 0.  It seems upon return from CT her NIHSS was 7 with aphasia, left gaze preference and right facial droop.   Blood pressure stable.  EKG with atrial flutter  Initial CT head with no acute intracranial abnormalities  CTA head and neck with focal occlusion distal left M1 at the bifurcation  Patient received TNK on 7/22 at 9:52 PM  7/22 single pass thrombectomy attempted without significant change. Non-occlusive lesion remained, TICI 2B  MRI brain with Stable acute infarct centered in the left basal ganglia with superimposed petechial hemorrhage. No significant mass effect.  On 7/24 patient was noted to worsened aphasia, not eligible for TNK. Underwent endovascular intervention with resultant TICI 3.   Neurology and neurosurgery input noted  TTE (7/23): EF 55%, LA moderately dilated, no PFO  Repeat MRI brain 7/26 with evolution and mild worsening of the recent left MCA territory infarct involving caudate, lentiform nucleus, anterior internal capsule, and smaller foci in the frontal operculum and insula. Grossly stable foci of petechial hemorrhage. No significant mass effect.  Distal left M1 occlusion in the setting of atrial fibrillation off anticoagulation due to cost s/p TNK and trauma back to me  Continue atorvastatin and heparin drip for now  Goal -160 mmHg  Continue neurochecks

## 2024-07-28 NOTE — ASSESSMENT & PLAN NOTE
Patient had a random cortisol level drawn on 7/26 that was 5  No suspicion of adrenal insufficiency as cortisol level was drawn in the evening  While in the ICU, required vasopressors for blood pressure support.   Started in the ICU on on hydrocortisone 50 mg IV twice daily and midodrine 10 mg 3 times daily AC  Patient evaluated by endocrinology, plan to hold morning Cortef on 7/29 and repeat cortisol and ACTH

## 2024-07-28 NOTE — CONSULTS
[facility]  Consult  Name: Brianna Baum 74 y.o. female I MRN: [id number]  Unit/Bed#: [facility] 719-01 I Date of Admission: 7/22/2024   Date of Service: 7/28/2024 I Hospital Day: 6    Consults    Assessment & Plan   Low serum cortisol level  Assessment & Plan  Admitted for management of strokelike symptoms with evolution and mild worsening of recent left MCA territory infarct.  S/p TNK.  While in the ICU, required vasopressors for blood pressure support.  Now on midodrine.  A random cortisol level was drawn on 7/26 at 1752 which showed a level of 5 mcg/dL.  Patient was started on hydrocortisone 50 mg every 12 hours.  Patient denies unintentional weight loss, changes in appetite, dizziness, nausea, vomiting, blurry vision, recent infections, recent steroid use, and prior brain or adrenal surgeries.  On lab reviews, patient has not had any episodes of hypoglycemia  Low suspicion for adrenal insufficiency-patient's cortisol level was drawn during the evening time normal low value could be from natural circadian rhythm fluctuation    Recommend holding night dose of hydrocortisone tonight with a morning evaluation tomorrow of cortisol at 8 AM with ACTH level at 8 AM  If cortisol and ACTH level, would evaluate with stimulation test while off of all steroids  Endocrinology continue to follow           History of Present Illness:  Brianna Baum is a 74 y.o. female history of HTN, atrial fibrillation, and recent left MCA territory stroke.  Who was originally admitted to the critical care service due to acute left MCA s/p TNK and now transferred to medical  floor. We are consulted for evaluation of low cortisol.    Patient presented with near syncope and speech difficulties.  Patient was noted to have evolution and mild worsening of her recent left MCA territory infarct.  Patient is followed by neurology and neurosurgery.  During patient's stay in the critical care unit, required phenylephrine  for blood pressure support.  Currently off of pressors and on midodrine.  A random cortisol level was drawn on 7/26 at 1752 which showed a level of 5 mcg/dL.  Given this, patient was started on hydrocortisone 50 mg every 12 hours.    On patient evaluation, patient denies any unintentional changes in her weight, she has been trying to lose some weight and reports 15 pound loss in a month and a half.  Denies changes in her appetite, dizziness, nausea, vomiting, blurry vision and recent infections.  Denies recent use of steroids.  Denies prior brain or adrenal surgeries.    Review of Systems:  Review of Systems   Constitutional:  Negative for appetite change, diaphoresis, fatigue, fever and unexpected weight change.   Eyes:  Negative for photophobia and visual disturbance.   Respiratory:  Negative for chest tightness and shortness of breath.    Cardiovascular:  Negative for chest pain, palpitations and leg swelling.   Gastrointestinal:  Negative for abdominal pain, constipation, diarrhea, nausea and vomiting.   Genitourinary:  Negative for dysuria and hematuria.   Musculoskeletal:  Negative for arthralgias and back pain.   Skin:  Negative for color change and rash.   Neurological:  Negative for dizziness, weakness, light-headedness and headaches.   All other systems reviewed and are negative.       Past Medical and Surgical History:   Past Medical History:   Diagnosis Date    Arthritis     Chronic pain     Colon polyp     Diverticulitis     GERD (gastroesophageal reflux disease)     Hypertension     Vertigo        Past Surgical History:   Procedure Laterality Date    CHOLECYSTECTOMY      CHOLECYSTECTOMY LAPAROSCOPIC N/A 12/06/2021    Procedure: SUBTOTAL CHOLECYSTECTOMY LAPAROSCOPIC;  Surgeon: Víctor Viveros MD;  Location: [facility];  Service: General    COLONOSCOPY      GALLBLADDER SURGERY  12/2021    Dr. Viveros    IR STROKE ALERT  7/23/2024    IR STROKE ALERT  7/24/2024    TUBAL LIGATION         Meds/Allergies:  all  "medications and allergies reviewed    Allergies:   Allergies   Allergen Reactions    Tramadol Hives       Social History:  Marital Status:   Substance Use History:   Social History     Substance and Sexual Activity   Alcohol Use Yes    Comment: 3 times a week     Social History     Tobacco Use   Smoking Status Never   Smokeless Tobacco Never     Social History     Substance and Sexual Activity   Drug Use Yes    Types: Marijuana    Comment: rarely/ last used 2 days ago       Family History:  Family History   Problem Relation Age of Onset    Stroke Mother     Diabetes Mother     Hypertension Mother     Heart disease Father     Hypertension Father     Heart disease Brother     Diabetes Brother     Hypertension Brother     Substance Abuse Neg Hx     Mental illness Neg Hx        Physical Exam:   Vitals:   Blood Pressure: 133/60 (07/28/24 0630)  Pulse: 59 (07/28/24 0630)  Temperature: 98 °F (36.7 °C) (07/27/24 2203)  Temp Source: Oral (07/27/24 2203)  Respirations: 20 (07/27/24 2203)  Height: 5' 5\" (165.1 cm) (07/23/24 1100)  Weight - Scale: 83.9 kg (184 lb 14.4 oz) (07/28/24 0547)  SpO2: 96 % (07/28/24 0200)    Physical Exam  Vitals reviewed.   Constitutional:       Appearance: Normal appearance. She is obese. She is not ill-appearing or diaphoretic.   HENT:      Head: Normocephalic and atraumatic.   Eyes:      General: No scleral icterus.     Conjunctiva/sclera: Conjunctivae normal.   Cardiovascular:      Rate and Rhythm: Normal rate and regular rhythm.   Pulmonary:      Effort: Pulmonary effort is normal. No respiratory distress.   Musculoskeletal:      Cervical back: Normal range of motion.   Skin:     Coloration: Skin is not jaundiced or pale.   Neurological:      General: No focal deficit present.      Mental Status: She is alert and oriented to person, place, and time.   Psychiatric:         Mood and Affect: Mood normal.         Behavior: Behavior normal.          Additional Data:   Lab Results:    Results " from last 7 days   Lab Units 07/27/24  0356   WBC Thousand/uL 10.01   HEMOGLOBIN g/dL 11.1*   HEMATOCRIT % 34.4*   PLATELETS Thousands/uL 189   SEGS PCT % 84*   LYMPHO PCT % 11*   MONO PCT % 4   EOS PCT % 0     Results from last 7 days   Lab Units 07/28/24  0415   SODIUM mmol/L 141   POTASSIUM mmol/L 4.0   CHLORIDE mmol/L 105   CO2 mmol/L 29   BUN mg/dL 17   CREATININE mg/dL 0.75   ANION GAP mmol/L 7   CALCIUM mg/dL 9.0   ALBUMIN g/dL 3.4*   TOTAL BILIRUBIN mg/dL 0.29   ALK PHOS U/L 52   ALT U/L 17   AST U/L 14   GLUCOSE RANDOM mg/dL 143*     Results from last 7 days   Lab Units 07/25/24  1302   INR  1.13         Lab Results   Component Value Date    HGBA1C 5.8 (H) 07/22/2024    HGBA1C 5.4 03/28/2024    HGBA1C 5.1 02/08/2016     Results from last 7 days   Lab Units 07/24/24  1952 07/24/24  1740 07/22/24 2052   POC GLUCOSE mg/dl 131 141* 107           Imaging: Reviewed radiology reports from this admission including: CT head and MRI brain  CT head wo contrast   Final Result by Abhijit Gardner DO (07/27 0821)      Expected evolving appearance of left basal ganglia infarct with superimposed petechial hemorrhage. Slight interval increase in mass effect on the frontal horn left lateral ventricle. No hydrocephalus.                  Workstation performed: [id number]         MRI brain wo contrast   Final Result by Gil Novak MD (07/26 0838)      1.  Compared to MRI 7/23/2024, evolution and mild worsening of the recent left MCA territory infarct involving caudate, lentiform nucleus, anterior internal capsule, and smaller foci in the frontal operculum and insula. Grossly stable foci of petechial    hemorrhage. No significant mass effect.   2.  Stable chronic bilateral cerebellar lacunar infarcts.   3.  Chronic microangiopathic change.                        Workstation performed: [id number]         CT head wo contrast   Final Result by Gil Novak MD (07/26 0835)      1.  Redemonstrated recent left MCA territory  infarct with grossly stable foci of petechial hemorrhage. No significant mass effect or midline shift.   2.  Stable chronic bilateral cerebellar lacunar infarcts.   3.  Chronic microangiopathic change.                  Workstation performed: [id number]         CT head wo contrast   Final Result by Chan Lin MD (07/25 0868)      Evolving acute to subacute left basal ganglia infarct. The superimposed petechial hemorrhage is best delineated on MRI and demonstrates subtle hyperdensity on CT. There is no evidence for a discrete intraparenchymal hematoma.      Moderate chronic microangiopathic ischemic changes.                  Workstation performed: [id number]         IR stroke alert   Final Result by Guy Akers MD (07/26 1637)      Left MCA M1 occlusion,  TICI 0.      Successful mechanical thrombectomy with revascularization of the left MCA territory, TICI 3.         Workstation performed: [id number]         CTA head and neck w wo contrast   Final Result by Ismael Bianchi MD (07/24 9574)         1. Findings compatible with acute infarct centered in the left basal ganglia. No evidence of hemorrhagic conversion or mass effect.   2. Abrupt cut off of the proximal left M1 segment with corresponding density on the noncontrast head CT suggesting occlusive thrombus throughout the M1 segment. Reasonable collateral flow in distal MCA branches.   3. No hemodynamically significant stenosis within the carotid or vertebral arteries.                  Workstation performed: [id number]         CT head wo contrast   Final Result by Chan Lin MD (07/24 7218)      Evolving acute to subacute infarct in the left basal ganglia. There is evidence of superimposed petechial hemorrhage which is best seen on the recent MRI. There is no evidence for a discrete hematoma.                  Resident: Corinna Guzman      I, the attending radiologist, have reviewed the images and agree with the final report above.       Workstation performed: [id number]         MRI brain wo contrast   Final Result by Ismael Bianchi MD (07/24 0230)      Stable acute infarct centered in the left basal ganglia with superimposed petechial hemorrhage. No significant mass effect.      The study was marked in EPIC for immediate notification.      Workstation performed: [id number]         CT stroke alert brain   Final Result by Thomas Castillo MD (07/23 1302)      Area of low-attenuation within the left putamen, indicative of a developing acute infarct in this region. No associated hemorrhage is noted. No significant mass effect is identified.      Left middle cerebral artery distal M1/bifurcation occlusion is no longer visualized compatible with interval thrombectomy. Previous short segment occlusions of multiple left middle cerebral artery M2 branches at their origins also appear to have    resolved.      No new large vessel occlusion, or high-grade stenosis identified on CT angiogram of the head.      2 mm infundibulum again demonstrated at the origin of the left posterior communicating artery, unchanged.      No hemodynamically significant stenosis or dissection is identified on CT angiogram of the neck.      Findings were directly discussed with Dr. Palladino by Dr. Castillo on 7/23/2024, at 1:00 p.m.      Workstation performed: [id number]         CTA stroke alert (head/neck)   Final Result by Thomas Castillo MD (07/23 1302)      Area of low-attenuation within the left putamen, indicative of a developing acute infarct in this region. No associated hemorrhage is noted. No significant mass effect is identified.      Left middle cerebral artery distal M1/bifurcation occlusion is no longer visualized compatible with interval thrombectomy. Previous short segment occlusions of multiple left middle cerebral artery M2 branches at their origins also appear to have    resolved.      No new large vessel occlusion, or high-grade stenosis identified on CT  angiogram of the head.      2 mm infundibulum again demonstrated at the origin of the left posterior communicating artery, unchanged.      No hemodynamically significant stenosis or dissection is identified on CT angiogram of the neck.      Findings were directly discussed with Dr. Palladino by Dr. Castillo on 7/23/2024, at 1:00 p.m.      Workstation performed: [id number]         IR stroke alert   Final Result by Guy Akers MD (07/25 1435)      Interval thrombolysis following TNK.      Small nonocclusive thrombus remains at the MCA bifurcation. Single pass aspiration performed with no change in appearance. Overall there is now TICI 2B flow.      Workstation performed: [id number]           Today, patient was seen by:  Mukul Armenta MD  Endocrinology fellow, PGY-4      ** Please Note: This note may have been constructed using a voice recognition system.**

## 2024-07-28 NOTE — PLAN OF CARE
Problem: Prexisting or High Potential for Compromised Skin Integrity  Goal: Skin integrity is maintained or improved  Description: INTERVENTIONS:  - Identify patients at risk for skin breakdown  - Assess and monitor skin integrity  - Assess and monitor nutrition and hydration status  - Monitor labs   - Assess for incontinence   - Turn and reposition patient  - Assist with mobility/ambulation  - Relieve pressure over bony prominences  - Avoid friction and shearing  - Provide appropriate hygiene as needed including keeping skin clean and dry  - Evaluate need for skin moisturizer/barrier cream  - Collaborate with interdisciplinary team   - Patient/family teaching  - Consider wound care consult   Outcome: Progressing     Problem: PAIN - ADULT  Goal: Verbalizes/displays adequate comfort level or baseline comfort level  Description: Interventions:  - Encourage patient to monitor pain and request assistance  - Assess pain using appropriate pain scale  - Administer analgesics based on type and severity of pain and evaluate response  - Implement non-pharmacological measures as appropriate and evaluate response  - Consider cultural and social influences on pain and pain management  - Notify physician/advanced practitioner if interventions unsuccessful or patient reports new pain  Outcome: Progressing     Problem: INFECTION - ADULT  Goal: Absence or prevention of progression during hospitalization  Description: INTERVENTIONS:  - Assess and monitor for signs and symptoms of infection  - Monitor lab/diagnostic results  - Monitor all insertion sites, i.e. indwelling lines, tubes, and drains  - Monitor endotracheal if appropriate and nasal secretions for changes in amount and color  - Plush appropriate cooling/warming therapies per order  - Administer medications as ordered  - Instruct and encourage patient and family to use good hand hygiene technique  - Identify and instruct in appropriate isolation precautions for  identified infection/condition  Outcome: Progressing  Goal: Absence of fever/infection during neutropenic period  Description: INTERVENTIONS:  - Monitor WBC    Outcome: Progressing     Problem: SAFETY ADULT  Goal: Patient will remain free of falls  Description: INTERVENTIONS:  - Educate patient/family on patient safety including physical limitations  - Instruct patient to call for assistance with activity   - Consult OT/PT to assist with strengthening/mobility   - Keep Call bell within reach  - Keep bed low and locked with side rails adjusted as appropriate  - Keep care items and personal belongings within reach  - Initiate and maintain comfort rounds  - Make Fall Risk Sign visible to staff  - Offer Toileting every  Hours, in advance of need  - Initiate/Maintain alarm  - Obtain necessary fall risk management equipment:   - Apply yellow socks and bracelet for high fall risk patients  - Consider moving patient to room near nurses station  Outcome: Progressing  Goal: Maintain or return to baseline ADL function  Description: INTERVENTIONS:  -  Assess patient's ability to carry out ADLs; assess patient's baseline for ADL function and identify physical deficits which impact ability to perform ADLs (bathing, care of mouth/teeth, toileting, grooming, dressing, etc.)  - Assess/evaluate cause of self-care deficits   - Assess range of motion  - Assess patient's mobility; develop plan if impaired  - Assess patient's need for assistive devices and provide as appropriate  - Encourage maximum independence but intervene and supervise when necessary  - Involve family in performance of ADLs  - Assess for home care needs following discharge   - Consider OT consult to assist with ADL evaluation and planning for discharge  - Provide patient education as appropriate  Outcome: Progressing  Goal: Maintains/Returns to pre admission functional level  Description: INTERVENTIONS:  - Perform AM-PAC 6 Click Basic Mobility/ Daily Activity  assessment daily.  - Set and communicate daily mobility goal to care team and patient/family/caregiver.   - Collaborate with rehabilitation services on mobility goals if consulted  - Perform Range of Motion  times a day.  - Reposition patient every  hours.  - Dangle patient  times a day  - Stand patient  times a day  - Ambulate patient  times a day  - Out of bed to chair  times a day   - Out of bed for meals  times a day  - Out of bed for toileting  - Record patient progress and toleration of activity level   Outcome: Progressing     Problem: DISCHARGE PLANNING  Goal: Discharge to home or other facility with appropriate resources  Description: INTERVENTIONS:  - Identify barriers to discharge w/patient and caregiver  - Arrange for needed discharge resources and transportation as appropriate  - Identify discharge learning needs (meds, wound care, etc.)  - Arrange for interpretive services to assist at discharge as needed  - Refer to Case Management Department for coordinating discharge planning if the patient needs post-hospital services based on physician/advanced practitioner order or complex needs related to functional status, cognitive ability, or social support system  Outcome: Progressing     Problem: Knowledge Deficit  Goal: Patient/family/caregiver demonstrates understanding of disease process, treatment plan, medications, and discharge instructions  Description: Complete learning assessment and assess knowledge base.  Interventions:  - Provide teaching at level of understanding  - Provide teaching via preferred learning methods  Outcome: Progressing     Problem: Neurological Deficit  Goal: Neurological status is stable or improving  Description: Interventions:  - Monitor and assess patient's level of consciousness, motor function, sensory function, and level of assistance needed for ADLs.   - Monitor and report changes from baseline. Collaborate with interdisciplinary team to initiate plan and implement  interventions as ordered.   - Provide and maintain a safe environment.  - Consider seizure precautions.  - Consider fall precautions.  - Consider aspiration precautions.  - Consider bleeding precautions.  Outcome: Progressing     Problem: Activity Intolerance/Impaired Mobility  Goal: Mobility/activity is maintained at optimum level for patient  Description: Interventions:  - Assess and monitor patient  barriers to mobility and need for assistive/adaptive devices.  - Assess patient's emotional response to limitations.  - Collaborate with interdisciplinary team and initiate plans and interventions as ordered.  - Encourage independent activity per ability.  - Maintain proper body alignment.  - Perform active/passive rom as tolerated/ordered.  - Plan activities to conserve energy.  - Turn patient as appropriate  Outcome: Progressing     Problem: Communication Impairment  Goal: Ability to express needs and understand communication  Description: Assess patient's communication skills and ability to understand information.  Patient will demonstrate use of effective communication techniques, alternative methods of communication and understanding even if not able to speak.     - Encourage communication and provide alternate methods of communication as needed.  - Collaborate with case management/ for discharge needs.  - Include patient/family/caregiver in decisions related to communication.  Outcome: Progressing     Problem: Potential for Aspiration  Goal: Non-ventilated patient's risk of aspiration is minimized  Description: Assess and monitor vital signs, respiratory status, and labs (WBC).  Monitor for signs of aspiration (tachypnea, cough, rales, wheezing, cyanosis, fever).    - Assess and monitor patient's ability to swallow.  - Place patient up in chair to eat if possible.  - HOB up at 90 degrees to eat if unable to get patient up into chair.  - Supervise patient during oral intake.   - Instruct patient/  family to take small bites.  - Instruct patient/ family to take small single sips when taking liquids.  - Follow patient-specific strategies generated by speech pathologist.  Outcome: Progressing  Goal: Ventilated patient's risk of aspiration is minimized  Description: Assess and monitor vital signs, respiratory status, airway cuff pressure, and labs (WBC).  Monitor for signs of aspiration (tachypnea, cough, rales, wheezing, cyanosis, fever).    - Elevate head of bed 30 degrees if patient has tube feeding.  - Monitor tube feeding.  Outcome: Progressing     Problem: Nutrition  Goal: Nutrition/Hydration status is improving  Description: Monitor and assess patient's nutrition/hydration status for malnutrition (ex- brittle hair, bruises, dry skin, pale skin and conjunctiva, muscle wasting, smooth red tongue, and disorientation). Collaborate with interdisciplinary team and initiate plan and interventions as ordered.  Monitor patient's weight and dietary intake as ordered or per policy. Utilize nutrition screening tool and intervene per policy. Determine patient's food preferences and provide high-protein, high-caloric foods as appropriate.     - Assist patient with eating.  - Allow adequate time for meals.  - Encourage patient to take dietary supplement as ordered.  - Collaborate with clinical nutritionist.  - Include patient/family/caregiver in decisions related to nutrition.  Outcome: Progressing

## 2024-07-29 ENCOUNTER — DOCUMENTATION (OUTPATIENT)
Dept: NEUROLOGY | Facility: CLINIC | Age: 74
End: 2024-07-29

## 2024-07-29 LAB
ANION GAP SERPL CALCULATED.3IONS-SCNC: 7 MMOL/L (ref 4–13)
APTT PPP: 54 SECONDS (ref 23–37)
APTT PPP: 60 SECONDS (ref 23–37)
BASOPHILS # BLD AUTO: 0.03 THOUSANDS/ÂΜL (ref 0–0.1)
BASOPHILS NFR BLD AUTO: 0 % (ref 0–1)
BUN SERPL-MCNC: 18 MG/DL (ref 5–25)
CALCIUM SERPL-MCNC: 9 MG/DL (ref 8.4–10.2)
CHLORIDE SERPL-SCNC: 104 MMOL/L (ref 96–108)
CO2 SERPL-SCNC: 30 MMOL/L (ref 21–32)
CORTIS AM PEAK SERPL-MCNC: 12.3 UG/DL (ref 6.7–22.6)
CREAT SERPL-MCNC: 0.79 MG/DL (ref 0.6–1.3)
EOSINOPHIL # BLD AUTO: 0.05 THOUSAND/ÂΜL (ref 0–0.61)
EOSINOPHIL NFR BLD AUTO: 1 % (ref 0–6)
ERYTHROCYTE [DISTWIDTH] IN BLOOD BY AUTOMATED COUNT: 13.1 % (ref 11.6–15.1)
GFR SERPL CREATININE-BSD FRML MDRD: 73 ML/MIN/1.73SQ M
GLUCOSE SERPL-MCNC: 89 MG/DL (ref 65–140)
HCT VFR BLD AUTO: 31.4 % (ref 34.8–46.1)
HGB BLD-MCNC: 10.3 G/DL (ref 11.5–15.4)
IMM GRANULOCYTES # BLD AUTO: 0.03 THOUSAND/UL (ref 0–0.2)
IMM GRANULOCYTES NFR BLD AUTO: 0 % (ref 0–2)
LYMPHOCYTES # BLD AUTO: 2.79 THOUSANDS/ÂΜL (ref 0.6–4.47)
LYMPHOCYTES NFR BLD AUTO: 36 % (ref 14–44)
MCH RBC QN AUTO: 31.4 PG (ref 26.8–34.3)
MCHC RBC AUTO-ENTMCNC: 32.8 G/DL (ref 31.4–37.4)
MCV RBC AUTO: 96 FL (ref 82–98)
MONOCYTES # BLD AUTO: 0.61 THOUSAND/ÂΜL (ref 0.17–1.22)
MONOCYTES NFR BLD AUTO: 8 % (ref 4–12)
NEUTROPHILS # BLD AUTO: 4.35 THOUSANDS/ÂΜL (ref 1.85–7.62)
NEUTS SEG NFR BLD AUTO: 55 % (ref 43–75)
NRBC BLD AUTO-RTO: 0 /100 WBCS
PLATELET # BLD AUTO: 165 THOUSANDS/UL (ref 149–390)
PMV BLD AUTO: 10.7 FL (ref 8.9–12.7)
POTASSIUM SERPL-SCNC: 3.4 MMOL/L (ref 3.5–5.3)
RBC # BLD AUTO: 3.28 MILLION/UL (ref 3.81–5.12)
SODIUM SERPL-SCNC: 141 MMOL/L (ref 135–147)
WBC # BLD AUTO: 7.86 THOUSAND/UL (ref 4.31–10.16)

## 2024-07-29 PROCEDURE — 99232 SBSQ HOSP IP/OBS MODERATE 35: CPT | Performed by: INTERNAL MEDICINE

## 2024-07-29 PROCEDURE — 97530 THERAPEUTIC ACTIVITIES: CPT

## 2024-07-29 PROCEDURE — 99233 SBSQ HOSP IP/OBS HIGH 50: CPT | Performed by: INTERNAL MEDICINE

## 2024-07-29 PROCEDURE — 85025 COMPLETE CBC W/AUTO DIFF WBC: CPT | Performed by: STUDENT IN AN ORGANIZED HEALTH CARE EDUCATION/TRAINING PROGRAM

## 2024-07-29 PROCEDURE — 85730 THROMBOPLASTIN TIME PARTIAL: CPT | Performed by: STUDENT IN AN ORGANIZED HEALTH CARE EDUCATION/TRAINING PROGRAM

## 2024-07-29 PROCEDURE — 99232 SBSQ HOSP IP/OBS MODERATE 35: CPT | Performed by: STUDENT IN AN ORGANIZED HEALTH CARE EDUCATION/TRAINING PROGRAM

## 2024-07-29 PROCEDURE — NC001 PR NO CHARGE: Performed by: PSYCHIATRY & NEUROLOGY

## 2024-07-29 PROCEDURE — 97116 GAIT TRAINING THERAPY: CPT

## 2024-07-29 PROCEDURE — 82024 ASSAY OF ACTH: CPT | Performed by: STUDENT IN AN ORGANIZED HEALTH CARE EDUCATION/TRAINING PROGRAM

## 2024-07-29 PROCEDURE — 82533 TOTAL CORTISOL: CPT | Performed by: STUDENT IN AN ORGANIZED HEALTH CARE EDUCATION/TRAINING PROGRAM

## 2024-07-29 PROCEDURE — 85730 THROMBOPLASTIN TIME PARTIAL: CPT | Performed by: EMERGENCY MEDICINE

## 2024-07-29 PROCEDURE — 80048 BASIC METABOLIC PNL TOTAL CA: CPT | Performed by: STUDENT IN AN ORGANIZED HEALTH CARE EDUCATION/TRAINING PROGRAM

## 2024-07-29 RX ORDER — POTASSIUM CHLORIDE 20 MEQ/1
40 TABLET, EXTENDED RELEASE ORAL ONCE
Status: COMPLETED | OUTPATIENT
Start: 2024-07-29 | End: 2024-07-29

## 2024-07-29 RX ADMIN — MIDODRINE HYDROCHLORIDE 10 MG: 5 TABLET ORAL at 12:07

## 2024-07-29 RX ADMIN — MIDODRINE HYDROCHLORIDE 10 MG: 5 TABLET ORAL at 05:14

## 2024-07-29 RX ADMIN — HEPARIN SODIUM 13 UNITS/KG/HR: 10000 INJECTION, SOLUTION INTRAVENOUS at 17:14

## 2024-07-29 RX ADMIN — MIDODRINE HYDROCHLORIDE 10 MG: 5 TABLET ORAL at 17:13

## 2024-07-29 RX ADMIN — LIDOCAINE 5% 1 PATCH: 700 PATCH TOPICAL at 08:32

## 2024-07-29 RX ADMIN — SENNOSIDES AND DOCUSATE SODIUM 1 TABLET: 50; 8.6 TABLET ORAL at 22:52

## 2024-07-29 RX ADMIN — ATORVASTATIN CALCIUM 40 MG: 40 TABLET, FILM COATED ORAL at 17:14

## 2024-07-29 RX ADMIN — POTASSIUM CHLORIDE 40 MEQ: 1500 TABLET, EXTENDED RELEASE ORAL at 15:22

## 2024-07-29 RX ADMIN — DILTIAZEM HYDROCHLORIDE 120 MG: 120 CAPSULE, COATED, EXTENDED RELEASE ORAL at 08:32

## 2024-07-29 RX ADMIN — OXYCODONE HYDROCHLORIDE AND ACETAMINOPHEN 1000 MG: 500 TABLET ORAL at 08:32

## 2024-07-29 RX ADMIN — PANTOPRAZOLE SODIUM 40 MG: 40 TABLET, DELAYED RELEASE ORAL at 05:14

## 2024-07-29 NOTE — PROGRESS NOTES
NEUROLOGY RESIDENCY PROGRESS NOTE     Name: Brianna Baum   Age & Sex: 74 y.o. female   MRN: [id number]  Unit/Bed#: [facility] 726-01   Encounter: [id number]    Brianna Baum will need follow up in 4-6 weeks with neurovascular attending .  She will not require outpatient neurological testing.      Pending for discharge: Planning for outpatient anticoagulation     ASSESSMENT & PLAN     * Cerebrovascular accident (CVA) due to occlusion of left middle cerebral artery (HCC)  Assessment & Plan  Brianna Baum is a 74 y.o. female with pertinent PMHx of AFib new dx, LE edema, GERD, chronic back pain. Stroke alert was called for evaluation of symptoms concerning for stroke consisting of near syncope and speech difficulties. Last known well shortly before stroke alert at 2053. Initial NIHSS initially 0. Upon return from CT, patient had an NIHSS of 7 with aphasia, left gaze preference, and right facial droop.  Initial BP Blood Pressure: 133/66, FSBG POC Glucose: 131. Arrival EKG atrial flutter at 115 bpm . NC CTH no acute intracranial abnormalities and CTA H/N focal occlusion distal left M1 at the bifurcation . Last normal apparently shortly before stroke alert, within TNK window thus was given at 2152 on 7/22.     On evening of 7/24, was noted to have worsened aphasia and have more pronounced deficits on the R side when evaluated by nursing team. NIH 27 as compared to 22 earlier today. Last seen at her baseline around 1720 by nursing team. Was not eligible for TNK. Underwent endovascular intervention with resultant TICI 3.     - Home Antiplatelet /Anticoagulants PTA: no antiplatelet or anticoagulants  - Stroke risk factors: HTN, HLD, Atrial Fibrillation, and Over weight  - Prior Stroke Hx: none  - Past Neurological Hx: no neurological problems    Pertinent scores:  Initial NIHSS: Total: 2   Modified Imperial Score: 0 (No baseline symptoms/disability)    BP over last 24 hours: Blood Pressure: 114/52  current BP: Blood Pressure:  114/52    Workup:  - CTH: No acute intracranial abnormalities  - CTA: Focal occlusion distal left M1 at the bifurcation   - Repeat CTH / CTA H/N (7/23): Area of low-attenuation within the left putamen, indicative of a developing acute infarct in this region. No associated hemorrhage is noted. No significant mass effect is identified. Left MCA distal M1/bifurcation occlusion is no longer visualized compatible with interval thrombectomy. Previous short segment occlusions of multiple left middle cerebral artery M2 branches at their origins also appear to have resolved. No LVO or high-grade stenosis identified. 2 mm infundibulum again demonstrated at the origin of the left posterior communicating artery, unchanged. No hemodynamically significant stenosis or dissection is identified on CT angiogram of the neck.  - MRI WO (7/23): Stable acute infarct centered in the left basal ganglia with superimposed petechial hemorrhage. No significant mass effect.   - TTE (7/23): EF 55%, LA moderately dilated, no PFO  - CTH (7/24/2024): Evolving acute to subacute infarct in the left basal ganglia. There is evidence of superimposed petechial hemorrhage which is best seen on the recent MRI. There is no evidence for a discrete hematoma.  - CTA H/N (7/24/2024): Findings compatible with acute infarct centered in the left basal ganglia. No evidence of hemorrhagic conversion or mass effect. Abrupt cut off of the proximal left M1 segment with corresponding density on the noncontrast head CT suggesting occlusive thrombus throughout the M1 segment. Reasonable collateral flow in distal MCA branches. No hemodynamically significant stenosis within the carotid or vertebral arteries.  - CTH (7/25/2024): Evolving acute to subacute left basal ganglia infarct. The superimposed petechial hemorrhage is best delineated on MRI and demonstrates subtle hyperdensity on CT. There is no evidence for a discrete intraparenchymal hematoma.   - CTH (7/25/2024):  Redemonstrated recent left MCA territory infarct with grossly stable foci of petechial hemorrhage. No significant mass effect or midline shift.   - MRI WO (7/26/2024): Compared to MRI 7/23/2024, evolution and mild worsening of the recent left MCA territory infarct involving caudate, lentiform nucleus, anterior internal capsule, and smaller foci in the frontal operculum and insula. Grossly stable foci of petechial hemorrhage. No significant mass effect.  - Labs: Hemoglobin A1c 5.8 (7/22/2024),  (7/23/2024)    Impression: Distal left M1 occlusion in the setting of atrial fibrillation off of anticoagulation due to cost.  S/p TNK and thrombectomy, TICI 2B.  MRI shows stable infarct at left basal ganglia with superimposed hemorrhage. On 7/24 pt developed worsened aphasia and more pronounced deficits on the R side prompting stroke alert. CTA revealed proximal left M1 occlusion. Not a TNK candidate due to recent TNK administration. Underwent thrombectomy, TICI 3. Initiated on heparin gtt, PTT therapeutic.    Plan: Discussed plan with neurology attending, Dr. Jimenes  Continue stroke protocol heparin infusion, goal PTT 50-70  Frequent neuro checks per protocol. If there is any acute changes in exam, please obtain stat CT head and notify neurology team  BP Goals: -160 mmHg   AP/AC agents: heparin gtt  Working with case management to determine outpatient anticoagulation. Patient understands her need for AC and is willing to start Warfarin if she is unable to get approved for any DOACs.  Statin: Continue atorvastin 40 mg daily  Euthermic/Euglycemic  DVT PPx: heparin, SCDs  PT/OT/ST  Stroke education and counseling  Rest of other care per primary team appreciated    Disposition: PT Recommendations - Rehab Resource Intensity Level, PT: I (Maximum Resource Intensity)        SUBJECTIVE     Patient was seen and examined. No acute events overnight. Patient reports feeling achy but otherwise endorses no new complaints.  She is alert, cooperative, and able to follow commands during exam. She is oriented to person, place, month, and year.    Pertinent Negatives include: headaches, syncope, amaurosis, diplopia, other visual changes, numbness or tingling, involuntary movements     Review of Systems   Eyes: Negative.    Respiratory:  Negative for shortness of breath.    Cardiovascular:  Negative for chest pain.   Neurological:  Negative for dizziness, syncope, light-headedness and numbness.       OBJECTIVE     Patient ID: Brianna Baum is a 74 y.o. female.    Vitals:    24 2106 24 0600 24 0757 24 1207   BP: 99/70  148/62 (!) 119/46   BP Location: Right arm      Pulse: 62  55 75   Resp: 17      Temp: 98.4 °F (36.9 °C)  99 °F (37.2 °C)    TempSrc: Oral      SpO2: 95%  94% 94%   Weight:  83.1 kg (183 lb 3.2 oz)     Height:          Temperature:   Temp (24hrs), Av.7 °F (37.1 °C), Min:98.4 °F (36.9 °C), Max:99 °F (37.2 °C)    Temperature: 99 °F (37.2 °C)      Physical Exam  Vitals reviewed.   Constitutional:       General: She is not in acute distress.     Appearance: Normal appearance. She is not ill-appearing, toxic-appearing or diaphoretic.   HENT:      Head: Normocephalic and atraumatic.      Mouth/Throat:      Mouth: Mucous membranes are moist.      Pharynx: Oropharynx is clear.   Eyes:      Extraocular Movements: EOM normal.      Pupils: Pupils are equal, round, and reactive to light.   Skin:     General: Skin is warm and dry.   Neurological:      Mental Status: She is alert and oriented to person, place, and time.      Coordination: Finger-Nose-Finger Test and Heel to Shin Test normal.      Deep Tendon Reflexes:      Reflex Scores:       Tricep reflexes are 2+ on the right side and 2+ on the left side.       Bicep reflexes are 2+ on the right side and 2+ on the left side.       Brachioradialis reflexes are 2+ on the right side and 2+ on the left side.       Patellar reflexes are 3+ on the right side and 3+  on the left side.       Achilles reflexes are 2+ on the right side and 2+ on the left side.  Psychiatric:         Speech: Speech normal.          Neurologic Exam     Mental Status   Oriented to person, place, and time.   Speech: speech is normal   Level of consciousness: alert    Cranial Nerves     CN II   Right visual field deficit: none  Left visual field deficit: none     CN III, IV, VI   Pupils are equal, round, and reactive to light.  Extraocular motions are normal.     CN V   Facial sensation intact.     CN VII   Right facial weakness: peripheral  Left facial weakness: none    CN VIII   Hearing: intact    CN IX, X   Palate: symmetric    CN XI   Right sternocleidomastoid strength: normal  Left sternocleidomastoid strength: normal  Right trapezius strength: normal  Left trapezius strength: normal    CN XII   Tongue: not atrophic  Fasciculations: absent  Tongue deviation: none    Motor Exam   Muscle bulk: normal  Overall muscle tone: normal  Right arm pronator drift: absent  Left arm pronator drift: absent    Strength   Strength 5/5 except as noted.   Right strength: Hip flexion and extension 4/5 on RLE  Right iliopsoas: 4/5  Right quadriceps: 4/5  Right hamstrin/5    Sensory Exam   Light touch normal.   Right arm vibration: normal  Left arm vibration: normal  Right leg vibration: decreased from ankle  Left leg vibration: normal  Pinprick normal.     Gait, Coordination, and Reflexes     Coordination   Finger to nose coordination: normal  Heel to shin coordination: normal    Tremor   Resting tremor: absent  Intention tremor: absent  Action tremor: absent    Reflexes   Right brachioradialis: 2+  Left brachioradialis: 2+  Right biceps: 2+  Left biceps: 2+  Right triceps: 2+  Left triceps: 2+  Right patellar: 3+  Left patellar: 3+  Right achilles: 2+  Left achilles: 2+  Deferred gait testing          LABORATORY DATA     Labs: I have personally reviewed pertinent reports.    Results from last 7 days   Lab Units  07/29/24  0346 07/27/24  0356 07/26/24  0433   WBC Thousand/uL 7.86 10.01 14.57*   HEMOGLOBIN g/dL 10.3* 11.1* 12.8   HEMATOCRIT % 31.4* 34.4* 38.9   PLATELETS Thousands/uL 165 189 234   SEGS PCT % 55 84* 72   MONO PCT % 8 4 8   EOS PCT % 1 0 0      Results from last 7 days   Lab Units 07/29/24  0346 07/28/24  0415 07/27/24  0416 07/26/24  0433   SODIUM mmol/L 141 141 140 140   POTASSIUM mmol/L 3.4* 4.0 4.1 4.2   CHLORIDE mmol/L 104 105 106 107   CO2 mmol/L 30 29 25 23   BUN mg/dL 18 17 12 12   CREATININE mg/dL 0.79 0.75 0.68 0.71   CALCIUM mg/dL 9.0 9.0 9.0 9.0   ALK PHOS U/L  --  52 60 66   ALT U/L  --  17 22 29   AST U/L  --  14 17 26     Results from last 7 days   Lab Units 07/28/24  0415 07/27/24  0416 07/26/24  0433   MAGNESIUM mg/dL 1.8* 2.2 1.8*     Results from last 7 days   Lab Units 07/28/24  0415 07/27/24  0416 07/26/24  0433   PHOSPHORUS mg/dL 3.9 3.9 4.3*      Results from last 7 days   Lab Units 07/29/24  0346 07/28/24  1543 07/28/24  0344 07/25/24  1735 07/25/24  1302 07/24/24  0519 07/22/24  2057   INR   --   --   --   --  1.13 1.09 1.01   PTT seconds 60* 56* 58*   < > 28  --  24    < > = values in this interval not displayed.               IMAGING & DIAGNOSTIC TESTING     Radiology Results: I have personally reviewed pertinent reports.   and I have personally reviewed pertinent films in PACS    CT head wo contrast   Final Result by Abhijit Gardner DO (07/27 0821)      Expected evolving appearance of left basal ganglia infarct with superimposed petechial hemorrhage. Slight interval increase in mass effect on the frontal horn left lateral ventricle. No hydrocephalus.                  Workstation performed: [id number]         MRI brain wo contrast   Final Result by Gli Novak MD (07/26 4128)      1.  Compared to MRI 7/23/2024, evolution and mild worsening of the recent left MCA territory infarct involving caudate, lentiform nucleus, anterior internal capsule, and smaller foci in the frontal  operculum and insula. Grossly stable foci of petechial    hemorrhage. No significant mass effect.   2.  Stable chronic bilateral cerebellar lacunar infarcts.   3.  Chronic microangiopathic change.                        Workstation performed: [id number]         CT head wo contrast   Final Result by Gil Novak MD (07/26 0835)      1.  Redemonstrated recent left MCA territory infarct with grossly stable foci of petechial hemorrhage. No significant mass effect or midline shift.   2.  Stable chronic bilateral cerebellar lacunar infarcts.   3.  Chronic microangiopathic change.                  Workstation performed: [id number]         CT head wo contrast   Final Result by Chan Lin MD (07/25 0850)      Evolving acute to subacute left basal ganglia infarct. The superimposed petechial hemorrhage is best delineated on MRI and demonstrates subtle hyperdensity on CT. There is no evidence for a discrete intraparenchymal hematoma.      Moderate chronic microangiopathic ischemic changes.                  Workstation performed: [id number]         IR stroke alert   Final Result by Guy Akers MD (07/26 1637)      Left MCA M1 occlusion,  TICI 0.      Successful mechanical thrombectomy with revascularization of the left MCA territory, TICI 3.         Workstation performed: [id number]         CTA head and neck w wo contrast   Final Result by Ismael Bianchi MD (07/24 1850)         1. Findings compatible with acute infarct centered in the left basal ganglia. No evidence of hemorrhagic conversion or mass effect.   2. Abrupt cut off of the proximal left M1 segment with corresponding density on the noncontrast head CT suggesting occlusive thrombus throughout the M1 segment. Reasonable collateral flow in distal MCA branches.   3. No hemodynamically significant stenosis within the carotid or vertebral arteries.                  Workstation performed: [id number]         CT head wo contrast   Final Result by  Chan Lin MD (07/24 0811)      Evolving acute to subacute infarct in the left basal ganglia. There is evidence of superimposed petechial hemorrhage which is best seen on the recent MRI. There is no evidence for a discrete hematoma.                  Resident: Corinna Guzman      I, the attending radiologist, have reviewed the images and agree with the final report above.      Workstation performed: [id number]         MRI brain wo contrast   Final Result by Ismael Bianchi MD (07/24 0230)      Stable acute infarct centered in the left basal ganglia with superimposed petechial hemorrhage. No significant mass effect.      The study was marked in EPIC for immediate notification.      Workstation performed: [id number]         CT stroke alert brain   Final Result by Thomas Castillo MD (07/23 1302)      Area of low-attenuation within the left putamen, indicative of a developing acute infarct in this region. No associated hemorrhage is noted. No significant mass effect is identified.      Left middle cerebral artery distal M1/bifurcation occlusion is no longer visualized compatible with interval thrombectomy. Previous short segment occlusions of multiple left middle cerebral artery M2 branches at their origins also appear to have    resolved.      No new large vessel occlusion, or high-grade stenosis identified on CT angiogram of the head.      2 mm infundibulum again demonstrated at the origin of the left posterior communicating artery, unchanged.      No hemodynamically significant stenosis or dissection is identified on CT angiogram of the neck.      Findings were directly discussed with Dr. Palladino by Dr. Castillo on 7/23/2024, at 1:00 p.m.      Workstation performed: [id number]         CTA stroke alert (head/neck)   Final Result by Thomas Castillo MD (07/23 1300)      Area of low-attenuation within the left putamen, indicative of a developing acute infarct in this region. No associated hemorrhage is noted. No  significant mass effect is identified.      Left middle cerebral artery distal M1/bifurcation occlusion is no longer visualized compatible with interval thrombectomy. Previous short segment occlusions of multiple left middle cerebral artery M2 branches at their origins also appear to have    resolved.      No new large vessel occlusion, or high-grade stenosis identified on CT angiogram of the head.      2 mm infundibulum again demonstrated at the origin of the left posterior communicating artery, unchanged.      No hemodynamically significant stenosis or dissection is identified on CT angiogram of the neck.      Findings were directly discussed with Dr. Palladino by Dr. Castillo on 7/23/2024, at 1:00 p.m.      Workstation performed: [id number]         IR stroke alert   Final Result by Guy Akers MD (07/25 1435)      Interval thrombolysis following TNK.      Small nonocclusive thrombus remains at the MCA bifurcation. Single pass aspiration performed with no change in appearance. Overall there is now TICI 2B flow.      Workstation performed: [id number]             Other Diagnostic Testing: I have personally reviewed pertinent reports.      ACTIVE MEDICATIONS     Current Facility-Administered Medications   Medication Dose Route Frequency    acetaminophen (TYLENOL) tablet 650 mg  650 mg Oral Q6H PRN    ascorbic acid (VITAMIN C) tablet 1,000 mg  1,000 mg Oral Daily    atorvastatin (LIPITOR) tablet 40 mg  40 mg Oral QPM    bisacodyl (DULCOLAX) rectal suppository 10 mg  10 mg Rectal Daily PRN    diltiazem (CARDIZEM CD) 24 hr capsule 120 mg  120 mg Oral Daily    heparin (porcine) 25,000 units in 0.45% NaCl 250 mL infusion (premix)  3-24 Units/kg/hr (Order-Specific) Intravenous Titrated    lidocaine (LIDODERM) 5 % patch 1 patch  1 patch Topical Daily    midodrine (PROAMATINE) tablet 10 mg  10 mg Oral TID AC    pantoprazole (PROTONIX) EC tablet 40 mg  40 mg Oral Daily Before Breakfast    polyethylene glycol (MIRALAX) packet  17 g  17 g Oral Daily    potassium chloride (Klor-Con M20) CR tablet 40 mEq  40 mEq Oral Once    senna-docusate sodium (SENOKOT S) 8.6-50 mg per tablet 1 tablet  1 tablet Oral HS       Prior to Admission medications    Medication Sig Start Date End Date Taking? Authorizing Provider   apixaban (Eliquis) 5 mg Take 1 tablet (5 mg total) by mouth 2 (two) times a day 7/22/24   LADONNA Drew   Ascorbic Acid (VITAMIN C) 1000 MG tablet Take 1,000 mg by mouth daily    Historical Provider, MD   Biotin 10 MG CAPS Take by mouth in the morning    Historical Provider, MD   celecoxib (CeleBREX) 200 mg capsule Take 1 capsule (200 mg total) by mouth daily 2/26/24   LADONNA Drew   diltiazem (CARDIZEM CD) 180 mg 24 hr capsule Take 1 capsule (180 mg total) by mouth daily 7/18/24 1/14/25  LADONNA Drew   esomeprazole (NexIUM) 40 MG capsule TAKE 1 CAPSULE DAILY 1/16/24   LADONNA Drew   furosemide (LASIX) 20 mg tablet Take 1 tablet (20 mg total) by mouth daily 3/12/24   Ladan Bamu DO   HYDROcodone-acetaminophen (Norco) 5-325 mg per tablet Take 1 tablet by mouth every 8 (eight) hours as needed for pain Max Daily Amount: 3 tablets 7/18/24   LADONNA Drew         VTE Pharmacologic Prophylaxis: VTE covered by:  heparin (porcine), Intravenous, 13 Units/kg/hr at 07/28/24 1823      VTE Mechanical Prophylaxis: sequential compression device    ======    I have discussed the patient's history, physical exam findings, assessment, and plan in detail with attending, Dr. Jimenes    Thank you for allowing me to participate in the care of your patient, Brianna Baum.    Andres Mccauley  [facility] Neurology Residency, PGY-2    Dwayne Hill, JAYDON

## 2024-07-29 NOTE — PROGRESS NOTES
Met with patient at bedside in room 726. Introduced my role. Provided stroke education including the stroke education booklet and magnet.     Patient denies any questions or concerns at this time. Call bell placed within reach. Patient was appreciative.

## 2024-07-29 NOTE — ASSESSMENT & PLAN NOTE
Patient had a random cortisol level drawn on 7/26 that was 5  No suspicion of adrenal insufficiency as cortisol level was drawn in the evening  While in the ICU, required vasopressors for blood pressure support therefore was started in the ICU on on hydrocortisone 50 mg IV twice daily and midodrine 10 mg 3 times daily AC for concern of AI   Cortisol level this morning off Cortef was 13, discussed with endocrinology and no need for ACTH stim test.  Low suspicion for adrenal insufficiency  Will keep off Cortef.  Consider weaning midodrine in the next days

## 2024-07-29 NOTE — PHYSICAL THERAPY NOTE
PHYSICAL THERAPY NOTE          Patient Name: Brianna Baum  Today's Date: 7/29/2024 07/29/24 1204   PT Last Visit   PT Visit Date 07/29/24   Note Type   Note Type Treatment   Pain Assessment   Pain Assessment Tool 0-10   Pain Score No Pain   Restrictions/Precautions   Weight Bearing Precautions Per Order No   Other Precautions Chair Alarm;Bed Alarm;Cognitive;Multiple lines;Telemetry;Fall Risk  (R hemiparesis, L facial droop)   General   Chart Reviewed Yes   Response to Previous Treatment Patient with no complaints from previous session.   Family/Caregiver Present No   Cognition   Overall Cognitive Status Impaired   Arousal/Participation Cooperative   Attention Attends with cues to redirect   Orientation Level Oriented X4   Following Commands Follows one step commands with increased time or repetition   Comments easily distracted   Subjective   Subjective agreeable   Bed Mobility   Supine to Sit 3  Moderate assistance   Additional items Assist x 1;Increased time required;Verbal cues   Sit to Supine Unable to assess   Transfers   Sit to Stand 3  Moderate assistance   Additional items Assist x 1;Increased time required;Verbal cues  (progressing to minAx1 at times with increased repetitions)   Stand to Sit 4  Minimal assistance   Additional items Assist x 1;Increased time required;Verbal cues   Stand pivot 3  Moderate assistance   Additional items Assist x 1;Increased time required;Verbal cues   Toilet transfer 3  Moderate assistance   Additional items Assist x 1;Increased time required;Verbal cues;Commode   Additional Comments c HHA vs RW; x5 consecutive STS +4 additional STS   Ambulation/Elevation   Gait pattern Improper Weight shift;Decreased foot clearance;Short stride;Excessively slow;R Foot drag   Gait Assistance 4  Minimal assist   Additional items Assist x 2;Verbal cues  (vs minAx1 with RW)   Assistive Device   (HHA vs RW)    Distance 3'+10'+20'+20'x2 with HHA, 20'x2 with RW   Balance   Static Sitting Fair   Dynamic Sitting Fair -   Static Standing Poor +   Dynamic Standing Poor +   Ambulatory Poor +   Endurance Deficit   Endurance Deficit Yes   Activity Tolerance   Activity Tolerance Patient limited by fatigue   Medical Staff Made Aware SPT   Nurse Made Aware yes-cleared   Exercises   Balance training  static standing at sink with CGA x5 min   Assessment   Prognosis Good   Problem List Decreased strength;Decreased endurance;Impaired balance;Decreased mobility;Decreased cognition;Decreased coordination;Obesity   Assessment Pt agreeable to participate in PT session. Pt performed functional mobility and therex as outlined above. Mobility improved since IE however remains limited by impaired balance, strength, and endurance with short shuffling steps requiring cues for step length and foot clearance. Pt is motivated to improve and demonstrates appropriate activity tolerance for acute rehab. Pt left seated in chair with chair alarm, call bell, phone, and all personal needs within reach. Pt will continue to benefit from skilled acute care PT to further address their functional mobility limitations.   Barriers to Discharge Decreased caregiver support;Inaccessible home environment   Goals   Patient Goals to improve   STG Expiration Date 08/08/24   PT Treatment Day 1   Plan   Treatment/Interventions Functional transfer training;LE strengthening/ROM;Elevations;Therapeutic exercise;Cognitive reorientation;Endurance training;Patient/family training;Equipment eval/education;Bed mobility;Gait training;Spoke to nursing;Spoke to case management;OT   Progress Progressing toward goals   PT Frequency 3-5x/wk   Discharge Recommendation   Rehab Resource Intensity Level, PT I (Maximum Resource Intensity)   AM-PAC Basic Mobility Inpatient   Turning in Flat Bed Without Bedrails 3   Lying on Back to Sitting on Edge of Flat Bed Without Bedrails 2   Moving Bed  to Chair 2   Standing Up From Chair Using Arms 2   Walk in Room 2   Climb 3-5 Stairs With Railing 2   Basic Mobility Inpatient Raw Score 13   Basic Mobility Standardized Score 33.99   [facility] Highest Level Of Mobility   -[facility] Goal 4: Move to chair/commode   -HLM Achieved 7: Walk 25 feet or more   Dwayne Brooks, PT, DPT, NCS

## 2024-07-29 NOTE — PROGRESS NOTES
"Cardiology Progress Note - Team 2  Brianna Baum 74 y.o. female MRN: [id number]  Unit/Bed#: [facility] 726-01 Encounter: [id number]      Assessment:  # Paroxysmal atrial fibrillation/atrial flutter--currently patient is in sinus rhythm.  [id number] score 5.  Patient is indicated for anticoagulation.  Patient is currently on heparin drip due to petechial hemorrhage on CT.  heart rate is controlled with Cardizem.  Echo on 7/23 revealed LVEF 55%.    # Hypertension--controlled    # CVA s/p thrombectomy--neurology on board, on atorvastatin       Plan:  1.  Transition to oral anticoagulation per primary and neurology  2.  Continue Cardizem  mg p.o.        Subjective:   Patient seen and examined.  No significant events overnight.  Patient denies palpitation, heart racing, dizziness or chest pain.    Objective:     Vitals: Blood pressure 118/63, pulse 98, temperature 98.9 °F (37.2 °C), temperature source Oral, resp. rate 16, height 5' 5\" (1.651 m), weight 83.1 kg (183 lb 3.2 oz), SpO2 95%., Body mass index is 30.49 kg/m².,   Orthostatic Blood Pressures      Flowsheet Row Most Recent Value   Blood Pressure 118/63 filed at 07/29/2024 1551   Patient Position - Orthostatic VS Sitting filed at 07/29/2024 1551              Intake/Output Summary (Last 24 hours) at 7/29/2024 1709  Last data filed at 7/29/2024 1100  Gross per 24 hour   Intake 504.63 ml   Output --   Net 504.63 ml       Physical Exam  Constitutional:       General: She is not in acute distress.  HENT:      Head: Normocephalic.   Eyes:      Pupils: Pupils are equal, round, and reactive to light.   Cardiovascular:      Rate and Rhythm: Normal rate and regular rhythm.      Heart sounds: No murmur heard.  Pulmonary:      Effort: Pulmonary effort is normal.      Breath sounds: No wheezing or rales.   Abdominal:      General: There is no distension.      Tenderness: There is no abdominal tenderness.   Musculoskeletal:         General: No swelling.   Skin:     General: " Skin is warm.   Neurological:      Mental Status: She is alert and oriented to person, place, and time.   Psychiatric:         Mood and Affect: Mood normal.          Medications:      Current Facility-Administered Medications:     acetaminophen (TYLENOL) tablet 650 mg, 650 mg, Oral, Q6H PRN, Brittany Rehman MD, 650 mg at 07/26/24 0013    ascorbic acid (VITAMIN C) tablet 1,000 mg, 1,000 mg, Oral, Daily, Brittany Rehman MD, 1,000 mg at 07/29/24 0832    atorvastatin (LIPITOR) tablet 40 mg, 40 mg, Oral, QPM, Brittany Rehman MD, 40 mg at 07/28/24 1821    bisacodyl (DULCOLAX) rectal suppository 10 mg, 10 mg, Rectal, Daily PRN, Brittany Rehman MD    diltiazem (CARDIZEM CD) 24 hr capsule 120 mg, 120 mg, Oral, Daily, Arnold Clemente MD, 120 mg at 07/29/24 0832    heparin (porcine) 25,000 units in 0.45% NaCl 250 mL infusion (premix), 3-24 Units/kg/hr (Order-Specific), Intravenous, Titrated, Brittany Rehman MD, Last Rate: 10.4 mL/hr at 07/28/24 1823, 13 Units/kg/hr at 07/28/24 1823    lidocaine (LIDODERM) 5 % patch 1 patch, 1 patch, Topical, Daily, Brittany Rehman MD, 1 patch at 07/29/24 0832    midodrine (PROAMATINE) tablet 10 mg, 10 mg, Oral, TID AC, Brittany Rehman MD, 10 mg at 07/29/24 1207    pantoprazole (PROTONIX) EC tablet 40 mg, 40 mg, Oral, Daily Before Breakfast, Brittany Rehman MD, 40 mg at 07/29/24 0514    polyethylene glycol (MIRALAX) packet 17 g, 17 g, Oral, Daily, Brittany Rehman MD, 17 g at 07/27/24 0819    senna-docusate sodium (SENOKOT S) 8.6-50 mg per tablet 1 tablet, 1 tablet, Oral, HS, Brittany Rehman MD, 1 tablet at 07/27/24 3303     Labs & Results:      Results from last 7 days   Lab Units 07/29/24  0346 07/27/24  0356 07/26/24  0433   WBC Thousand/uL 7.86 10.01 14.57*   HEMOGLOBIN g/dL 10.3* 11.1* 12.8   HEMATOCRIT % 31.4* 34.4* 38.9   PLATELETS Thousands/uL 165 189 234     Results from last 7 days   Lab Units 07/23/24  0454   TRIGLYCERIDES mg/dL 50   HDL mg/dL 57     Results from last 7 days    Lab Units 07/29/24  0346 07/28/24  0415 07/27/24  0416 07/26/24  0433   POTASSIUM mmol/L 3.4* 4.0 4.1 4.2   CHLORIDE mmol/L 104 105 106 107   CO2 mmol/L 30 29 25 23   BUN mg/dL 18 17 12 12   CREATININE mg/dL 0.79 0.75 0.68 0.71   CALCIUM mg/dL 9.0 9.0 9.0 9.0   ALK PHOS U/L  --  52 60 66   ALT U/L  --  17 22 29   AST U/L  --  14 17 26     Results from last 7 days   Lab Units 07/29/24  1626 07/29/24  0346 07/28/24  1543 07/25/24  1735 07/25/24  1302 07/24/24  0519 07/22/24  2057   INR   --   --   --   --  1.13 1.09 1.01   PTT seconds 54* 60* 56*   < > 28  --  24    < > = values in this interval not displayed.     Results from last 7 days   Lab Units 07/28/24  0415 07/27/24  0416 07/26/24  0433   MAGNESIUM mg/dL 1.8* 2.2 1.8*       Previous STRESS TEST:  No results found for this or any previous visit.     No results found for this or any previous visit.    No results found for this or any previous visit.      Previous Cath/PCI:  No results found for this or any previous visit.    No results found for this or any previous visit.    No results found for this or any previous visit.      ECHO:  No results found for this or any previous visit.    Results for orders placed during the hospital encounter of 07/22/24    Echo complete w/ contrast if indicated    Interpretation Summary    Left Ventricle: Left ventricular cavity size is normal. Wall thickness is normal. The left ventricular ejection fraction is 55%. Systolic function is normal. Wall motion is normal. Diastolic function is normal.    Right Ventricle: Right ventricular cavity size is normal. Systolic function is normal.    Left Atrium: The atrium is moderately dilated (42-48 mL/m2).    Right Atrium: The atrium is normal in size.    Atrial Septum: No patent foramen ovale detected, confirmed at rest using agitated saline contrast, confirmed by provocation with cough, using agitated saline contrast and with valsalva, using agitated saline contrast.    Mitral Valve:  There is mild regurgitation.    Tricuspid Valve: There is mild regurgitation.    Pulmonic Valve: There is mild regurgitation.    Aorta: There is probably a calcified atheroma in the aortic arch.    Prior TTE study available for comparison. Prior study date: 10/20/2023. No significant changes noted compared to the prior study.      NATE:  No results found for this or any previous visit.    No results found for this or any previous visit.      CMR:  No results found for this or any previous visit.    No results found for this or any previous visit.    No results found for this or any previous visit.      HOLTER  No results found for this or any previous visit.    No results found for this or any previous visit.      Vida Claire MD  Cardiology Fellow   PGY-4

## 2024-07-29 NOTE — PLAN OF CARE
Problem: PHYSICAL THERAPY ADULT  Goal: Performs mobility at highest level of function for planned discharge setting.  See evaluation for individualized goals.  Description: Treatment/Interventions: OT, Spoke to case management, Spoke to nursing, Gait training, Bed mobility, Patient/family training, Endurance training, LE strengthening/ROM, Functional transfer training          See flowsheet documentation for full assessment, interventions and recommendations.  Outcome: Progressing  Note: Prognosis: Good  Problem List: Decreased strength, Decreased endurance, Impaired balance, Decreased mobility, Decreased cognition, Decreased coordination, Obesity  Assessment: Pt agreeable to participate in PT session. Pt performed functional mobility and therex as outlined above. Mobility improved since IE however remains limited by impaired balance, strength, and endurance with short shuffling steps requiring cues for step length and foot clearance. Pt is motivated to improve and demonstrates appropriate activity tolerance for acute rehab. Pt left seated in chair with chair alarm, call bell, phone, and all personal needs within reach. Pt will continue to benefit from skilled acute care PT to further address their functional mobility limitations.  Barriers to Discharge: Decreased caregiver support, Inaccessible home environment     Rehab Resource Intensity Level, PT: I (Maximum Resource Intensity)    See flowsheet documentation for full assessment.

## 2024-07-29 NOTE — ASSESSMENT & PLAN NOTE
74-year-old female with newly diagnosed A-fib that presented as a stroke alert with symptoms of near syncope and speech difficulties.  Initial NIHSS of 0.  It seems upon return from CT her NIHSS was 7 with aphasia, left gaze preference and right facial droop.   Blood pressure stable.  EKG with atrial flutter  Initial CT head with no acute intracranial abnormalities. CTA head and neck with focal occlusion distal left M1 at the bifurcation  Patient received TNK on 7/22 at 9:52 PM  7/22 single pass thrombectomy attempted without significant change. Non-occlusive lesion remained, TICI 2B  MRI brain with Stable acute infarct centered in the left basal ganglia with superimposed petechial hemorrhage. No significant mass effect.  On 7/24 patient was noted to worsened aphasia, not eligible for TNK. Underwent endovascular intervention with resultant TICI 3.   Neurology and neurosurgery input noted  TTE (7/23): EF 55%, LA moderately dilated, no PFO  Repeat MRI brain 7/26 with evolution and mild worsening of the recent left MCA territory infarct involving caudate, lentiform nucleus, anterior internal capsule, and smaller foci in the frontal operculum and insula. Grossly stable foci of petechial hemorrhage. No significant mass effect.  Distal left M1 occlusion in the setting of atrial fibrillation off anticoagulation due to cost s/p TNK and trauma back to me  Continue atorvastatin and heparin drip   Goal -160 mmHg  Continue neurochecks  Patient not able to afford NOACs, I did send Xarelto for pricing in the event this is cheaper than her Eliquis that we had price check before-pricing pending.   Discussed with neurology and they are evaluating initiation of Coumadin versus other blood thinners

## 2024-07-29 NOTE — QUICK NOTE
"AM cortisol was 12.3 at 0850 today. This is normal and not suggestive of adrenal insufficiency.    Endocrinology will sign off at this time. Thank you for involving us in the care of your patient. Please reconsult the service as needed.    Please text questions to the provider covering the \"[facility] Endocrinology Call\" role. Thank you.   "

## 2024-07-29 NOTE — PROGRESS NOTES
[facility]  Progress Note  Name: Brianna Baum I  MRN: [id number]  Unit/Bed#: [facility] 726-01 I Date of Admission: 7/22/2024   Date of Service: 7/29/2024 I Hospital Day: 7    Assessment & Plan   Atrial fibrillation/A flutter (HCC)  Assessment & Plan  Recently diagnosed, not on anticoagulation due to cost  Currently in sinus rhythm  Discussed with cardiology, plan to continue diltiazem and transition to oral anticoagulation once cleared by neurology  Discussed with neurology, currently evaluating Coumadin versus Lovenox as patient not able to afford for NOACs  Currently on heparin drip  Outpatient cardiac follow-up    Low serum cortisol level  Assessment & Plan  Patient had a random cortisol level drawn on 7/26 that was 5  No suspicion of adrenal insufficiency as cortisol level was drawn in the evening  While in the ICU, required vasopressors for blood pressure support therefore was started in the ICU on on hydrocortisone 50 mg IV twice daily and midodrine 10 mg 3 times daily AC for concern of AI   Cortisol level this morning off Cortef was 13, discussed with endocrinology and no need for ACTH stim test.  Low suspicion for adrenal insufficiency  Will keep off Cortef.  Consider weaning midodrine in the next days    CKD (chronic kidney disease) stage 2, GFR 60-89 ml/min  Assessment & Plan  Lab Results   Component Value Date    EGFR 73 07/29/2024    EGFR 78 07/28/2024    EGFR 86 07/27/2024    CREATININE 0.79 07/29/2024    CREATININE 0.75 07/28/2024    CREATININE 0.68 07/27/2024   Avoid nephrotoxins  Kidney function at baseline  Repeat BMP tomorrow    Hypertension  Assessment & Plan  BP reviewed and acceptable  Continue diltiazem 120 mg daily    * Cerebrovascular accident (CVA) due to occlusion of left middle cerebral artery (HCC)  Assessment & Plan  74-year-old female with newly diagnosed A-fib that presented as a stroke alert with symptoms of near syncope and speech difficulties.   Initial NIHSS of 0.  It seems upon return from CT her NIHSS was 7 with aphasia, left gaze preference and right facial droop.   Blood pressure stable.  EKG with atrial flutter  Initial CT head with no acute intracranial abnormalities. CTA head and neck with focal occlusion distal left M1 at the bifurcation  Patient received TNK on 7/22 at 9:52 PM  7/22 single pass thrombectomy attempted without significant change. Non-occlusive lesion remained, TICI 2B  MRI brain with Stable acute infarct centered in the left basal ganglia with superimposed petechial hemorrhage. No significant mass effect.  On 7/24 patient was noted to worsened aphasia, not eligible for TNK. Underwent endovascular intervention with resultant TICI 3.   Neurology and neurosurgery input noted  TTE (7/23): EF 55%, LA moderately dilated, no PFO  Repeat MRI brain 7/26 with evolution and mild worsening of the recent left MCA territory infarct involving caudate, lentiform nucleus, anterior internal capsule, and smaller foci in the frontal operculum and insula. Grossly stable foci of petechial hemorrhage. No significant mass effect.  Distal left M1 occlusion in the setting of atrial fibrillation off anticoagulation due to cost s/p TNK and trauma back to me  Continue atorvastatin and heparin drip   Goal -160 mmHg  Continue neurochecks  Patient not able to afford NOACs, I did send Xarelto for pricing in the event this is cheaper than her Eliquis that we had price check before-pricing pending.   Discussed with neurology and they are evaluating initiation of Coumadin versus other blood thinners                     VTE Pharmacologic Prophylaxis: VTE Score: 8 High Risk (Score >/= 5) - Pharmacological DVT Prophylaxis Ordered: heparin. Sequential Compression Devices Ordered.    Mobility:   Basic Mobility Inpatient Raw Score: 11  JH-HLM Goal: 4: Move to chair/commode  JH-HLM Achieved: 7: Walk 25 feet or more  JH-HLM Goal achieved. Continue to encourage  appropriate mobility.    Patient Centered Rounds: I performed bedside rounds with nursing staff today.   Discussions with Specialists or Other Care Team Provider: , neurology, cardiology, endocrinology    Education and Discussions with Family / Patient: Attempted to update  (son) via phone. Left voicemail.     Total Time Spent on Date of Encounter in care of patient: 35 mins. This time was spent on one or more of the following: performing physical exam; counseling and coordination of care; obtaining or reviewing history; documenting in the medical record; reviewing/ordering tests, medications or procedures; communicating with other healthcare professionals and discussing with patient's family/caregivers.    Current Length of Stay: 7 day(s)  Current Patient Status: Inpatient   Certification Statement: The patient will continue to require additional inpatient hospital stay due to awaiting neurology discussions regarding blood thinner, dispo planning  Discharge Plan: Anticipate discharge in 48-72 hrs to rehab facility.    Code Status: Level 1 - Full Code    Subjective:   No events overnight.  Patient feels tired this morning.  Tolerating oral intake.  No chest pain or shortness of breath.    Objective:     Vitals:   Temp (24hrs), Av.8 °F (37.1 °C), Min:98.4 °F (36.9 °C), Max:99 °F (37.2 °C)    Temp:  [98.4 °F (36.9 °C)-99 °F (37.2 °C)] 98.9 °F (37.2 °C)  HR:  [55-98] 98  Resp:  [16-17] 16  BP: ()/(46-70) 118/63  SpO2:  [94 %-95 %] 95 %  Body mass index is 30.49 kg/m².     Input and Output Summary (last 24 hours):     Intake/Output Summary (Last 24 hours) at 2024 1605  Last data filed at 2024 1100  Gross per 24 hour   Intake 504.63 ml   Output --   Net 504.63 ml       Physical Exam:   Physical Exam  Vitals and nursing note reviewed.   Constitutional:       General: She is not in acute distress.     Appearance: She is well-developed.   HENT:      Head: Normocephalic and  atraumatic.   Eyes:      Conjunctiva/sclera: Conjunctivae normal.   Cardiovascular:      Rate and Rhythm: Normal rate and regular rhythm.   Pulmonary:      Effort: Pulmonary effort is normal. No respiratory distress.      Breath sounds: Normal breath sounds. No wheezing or rhonchi.   Musculoskeletal:         General: No swelling.      Cervical back: Neck supple.   Skin:     General: Skin is warm and dry.   Neurological:      Mental Status: She is alert.   Psychiatric:         Mood and Affect: Mood normal.         Behavior: Behavior normal.          Additional Data:     Labs:  Results from last 7 days   Lab Units 07/29/24  0346   WBC Thousand/uL 7.86   HEMOGLOBIN g/dL 10.3*   HEMATOCRIT % 31.4*   PLATELETS Thousands/uL 165   SEGS PCT % 55   LYMPHO PCT % 36   MONO PCT % 8   EOS PCT % 1     Results from last 7 days   Lab Units 07/29/24  0346 07/28/24  0415   SODIUM mmol/L 141 141   POTASSIUM mmol/L 3.4* 4.0   CHLORIDE mmol/L 104 105   CO2 mmol/L 30 29   BUN mg/dL 18 17   CREATININE mg/dL 0.79 0.75   ANION GAP mmol/L 7 7   CALCIUM mg/dL 9.0 9.0   ALBUMIN g/dL  --  3.4*   TOTAL BILIRUBIN mg/dL  --  0.29   ALK PHOS U/L  --  52   ALT U/L  --  17   AST U/L  --  14   GLUCOSE RANDOM mg/dL 89 143*     Results from last 7 days   Lab Units 07/25/24  1302   INR  1.13     Results from last 7 days   Lab Units 07/24/24  1952 07/24/24  1740 07/22/24  2052   POC GLUCOSE mg/dl 131 141* 107     Results from last 7 days   Lab Units 07/22/24  2057   HEMOGLOBIN A1C % 5.8*           Lines/Drains:  Invasive Devices       Peripheral Intravenous Line  Duration             Peripheral IV 07/29/24 Left;Ventral (anterior) Wrist <1 day              Drain  Duration             External Urinary Catheter 4 days                      Telemetry:  Telemetry Orders (From admission, onward)               24 Hour Telemetry Monitoring  Continuous x 24 Hours (Telem)        Question:  Reason for 24 Hour Telemetry  Answer:  TIA/Suspected CVA/ Confirmed CVA                      Telemetry Reviewed: Normal Sinus Rhythm  Indication for Continued Telemetry Use: Arrthymias requiring medical therapy             Imaging: No pertinent imaging reviewed.    Recent Cultures (last 7 days):         Last 24 Hours Medication List:   Current Facility-Administered Medications   Medication Dose Route Frequency Provider Last Rate    acetaminophen  650 mg Oral Q6H PRN Brittany Rehman MD      vitamin C  1,000 mg Oral Daily Brittany Rehman MD      atorvastatin  40 mg Oral QPM Brittany Rehman MD      bisacodyl  10 mg Rectal Daily PRN Brittany Rehman MD      diltiazem  120 mg Oral Daily Arnold Clemente MD      heparin (porcine)  3-24 Units/kg/hr (Order-Specific) Intravenous Titrated Brittany Rehman MD 13 Units/kg/hr (07/28/24 1823)    lidocaine  1 patch Topical Daily Brittany Rehman MD      midodrine  10 mg Oral TID AC Brittany Rehman MD      pantoprazole  40 mg Oral Daily Before Breakfast Brittany Rehman MD      polyethylene glycol  17 g Oral Daily Brittany Rehman MD      senna-docusate sodium  1 tablet Oral HS Brittany Rehman MD          Today, Patient Was Seen By: Sharon Sargent MD    **Please Note: This note may have been constructed using a voice recognition system.**

## 2024-07-29 NOTE — RESTORATIVE TECHNICIAN NOTE
Restorative Technician Note      Patient Name: Brianna Baum     Note Type: Mobility  Patient Position Upon Consult: Bedside chair  Activity Performed: Ambulated; Dangled; Stood  Assistive Device: Roller walker  Patient Position at End of Consult: Supine; All needs within reach; Bed/Chair alarm activated    Soila ABRAHAM, Restorative Technician,

## 2024-07-29 NOTE — ASSESSMENT & PLAN NOTE
Lab Results   Component Value Date    EGFR 73 07/29/2024    EGFR 78 07/28/2024    EGFR 86 07/27/2024    CREATININE 0.79 07/29/2024    CREATININE 0.75 07/28/2024    CREATININE 0.68 07/27/2024   Avoid nephrotoxins  Kidney function at baseline  Repeat BMP tomorrow

## 2024-07-29 NOTE — ASSESSMENT & PLAN NOTE
Recently diagnosed, not on anticoagulation due to cost  Currently in sinus rhythm  Discussed with cardiology, plan to continue diltiazem and transition to oral anticoagulation once cleared by neurology  Discussed with neurology, currently evaluating Coumadin versus Lovenox as patient not able to afford for NOACs  Currently on heparin drip  Outpatient cardiac follow-up

## 2024-07-30 LAB
ACTH PLAS-MCNC: 68.8 PG/ML (ref 7.2–63.3)
ANION GAP SERPL CALCULATED.3IONS-SCNC: 7 MMOL/L (ref 4–13)
APTT PPP: 67 SECONDS (ref 23–37)
BASOPHILS # BLD AUTO: 0.04 THOUSANDS/ÂΜL (ref 0–0.1)
BASOPHILS NFR BLD AUTO: 1 % (ref 0–1)
BUN SERPL-MCNC: 14 MG/DL (ref 5–25)
CALCIUM SERPL-MCNC: 8.7 MG/DL (ref 8.4–10.2)
CHLORIDE SERPL-SCNC: 102 MMOL/L (ref 96–108)
CO2 SERPL-SCNC: 32 MMOL/L (ref 21–32)
CREAT SERPL-MCNC: 0.92 MG/DL (ref 0.6–1.3)
EOSINOPHIL # BLD AUTO: 0.11 THOUSAND/ÂΜL (ref 0–0.61)
EOSINOPHIL NFR BLD AUTO: 1 % (ref 0–6)
ERYTHROCYTE [DISTWIDTH] IN BLOOD BY AUTOMATED COUNT: 12.9 % (ref 11.6–15.1)
GFR SERPL CREATININE-BSD FRML MDRD: 61 ML/MIN/1.73SQ M
GLUCOSE SERPL-MCNC: 102 MG/DL (ref 65–140)
HCT VFR BLD AUTO: 33.4 % (ref 34.8–46.1)
HGB BLD-MCNC: 10.9 G/DL (ref 11.5–15.4)
IMM GRANULOCYTES # BLD AUTO: 0.05 THOUSAND/UL (ref 0–0.2)
IMM GRANULOCYTES NFR BLD AUTO: 1 % (ref 0–2)
LYMPHOCYTES # BLD AUTO: 1.89 THOUSANDS/ÂΜL (ref 0.6–4.47)
LYMPHOCYTES NFR BLD AUTO: 24 % (ref 14–44)
MCH RBC QN AUTO: 31.3 PG (ref 26.8–34.3)
MCHC RBC AUTO-ENTMCNC: 32.6 G/DL (ref 31.4–37.4)
MCV RBC AUTO: 96 FL (ref 82–98)
MONOCYTES # BLD AUTO: 0.7 THOUSAND/ÂΜL (ref 0.17–1.22)
MONOCYTES NFR BLD AUTO: 9 % (ref 4–12)
NEUTROPHILS # BLD AUTO: 5.18 THOUSANDS/ÂΜL (ref 1.85–7.62)
NEUTS SEG NFR BLD AUTO: 64 % (ref 43–75)
NRBC BLD AUTO-RTO: 0 /100 WBCS
PLATELET # BLD AUTO: 201 THOUSANDS/UL (ref 149–390)
PMV BLD AUTO: 10.7 FL (ref 8.9–12.7)
POTASSIUM SERPL-SCNC: 3.5 MMOL/L (ref 3.5–5.3)
RBC # BLD AUTO: 3.48 MILLION/UL (ref 3.81–5.12)
SODIUM SERPL-SCNC: 141 MMOL/L (ref 135–147)
WBC # BLD AUTO: 7.97 THOUSAND/UL (ref 4.31–10.16)

## 2024-07-30 PROCEDURE — 97530 THERAPEUTIC ACTIVITIES: CPT

## 2024-07-30 PROCEDURE — 97535 SELF CARE MNGMENT TRAINING: CPT

## 2024-07-30 PROCEDURE — 99232 SBSQ HOSP IP/OBS MODERATE 35: CPT | Performed by: FAMILY MEDICINE

## 2024-07-30 PROCEDURE — 80048 BASIC METABOLIC PNL TOTAL CA: CPT | Performed by: STUDENT IN AN ORGANIZED HEALTH CARE EDUCATION/TRAINING PROGRAM

## 2024-07-30 PROCEDURE — 97116 GAIT TRAINING THERAPY: CPT

## 2024-07-30 PROCEDURE — 85730 THROMBOPLASTIN TIME PARTIAL: CPT | Performed by: FAMILY MEDICINE

## 2024-07-30 PROCEDURE — 99232 SBSQ HOSP IP/OBS MODERATE 35: CPT | Performed by: INTERNAL MEDICINE

## 2024-07-30 PROCEDURE — NC001 PR NO CHARGE: Performed by: INTERNAL MEDICINE

## 2024-07-30 PROCEDURE — 85025 COMPLETE CBC W/AUTO DIFF WBC: CPT | Performed by: STUDENT IN AN ORGANIZED HEALTH CARE EDUCATION/TRAINING PROGRAM

## 2024-07-30 RX ORDER — DABIGATRAN ETEXILATE 150 MG/1
150 CAPSULE ORAL 2 TIMES DAILY
Qty: 60 CAPSULE | Refills: 3 | Status: SHIPPED | OUTPATIENT
Start: 2024-07-30 | End: 2024-07-31

## 2024-07-30 RX ORDER — DILTIAZEM HYDROCHLORIDE 180 MG/1
180 CAPSULE, COATED, EXTENDED RELEASE ORAL DAILY
Status: DISCONTINUED | OUTPATIENT
Start: 2024-07-31 | End: 2024-07-31 | Stop reason: HOSPADM

## 2024-07-30 RX ADMIN — APIXABAN 5 MG: 5 TABLET, FILM COATED ORAL at 17:21

## 2024-07-30 RX ADMIN — ACETAMINOPHEN 650 MG: 325 TABLET, FILM COATED ORAL at 18:23

## 2024-07-30 RX ADMIN — DILTIAZEM HYDROCHLORIDE 120 MG: 120 CAPSULE, COATED, EXTENDED RELEASE ORAL at 09:35

## 2024-07-30 RX ADMIN — MIDODRINE HYDROCHLORIDE 10 MG: 5 TABLET ORAL at 17:21

## 2024-07-30 RX ADMIN — PANTOPRAZOLE SODIUM 40 MG: 40 TABLET, DELAYED RELEASE ORAL at 05:41

## 2024-07-30 RX ADMIN — ACETAMINOPHEN 650 MG: 325 TABLET, FILM COATED ORAL at 05:42

## 2024-07-30 RX ADMIN — MIDODRINE HYDROCHLORIDE 10 MG: 5 TABLET ORAL at 05:42

## 2024-07-30 RX ADMIN — DILTIAZEM HYDROCHLORIDE 30 MG: 30 TABLET ORAL at 17:22

## 2024-07-30 RX ADMIN — MIDODRINE HYDROCHLORIDE 10 MG: 5 TABLET ORAL at 11:20

## 2024-07-30 RX ADMIN — LIDOCAINE 5% 1 PATCH: 700 PATCH TOPICAL at 09:36

## 2024-07-30 RX ADMIN — OXYCODONE HYDROCHLORIDE AND ACETAMINOPHEN 1000 MG: 500 TABLET ORAL at 09:35

## 2024-07-30 RX ADMIN — DILTIAZEM HYDROCHLORIDE 30 MG: 30 TABLET ORAL at 14:46

## 2024-07-30 RX ADMIN — ATORVASTATIN CALCIUM 40 MG: 40 TABLET, FILM COATED ORAL at 17:21

## 2024-07-30 NOTE — OCCUPATIONAL THERAPY NOTE
Occupational Therapy Progress Note     Patient Name: Brianna Baum  Today's Date: 7/30/2024  Problem List  Principal Problem:    Cerebrovascular accident (CVA) due to occlusion of left middle cerebral artery (HCC)  Active Problems:    Hypertension    CKD (chronic kidney disease) stage 2, GFR 60-89 ml/min    Low serum cortisol level    Atrial fibrillation/A flutter (HCC)              07/30/24 1349   OT Last Visit   OT Visit Date 07/30/24   Note Type   Note Type Treatment   Pain Assessment   Pain Assessment Tool 0-10   Pain Score No Pain   Restrictions/Precautions   Weight Bearing Precautions Per Order No   Other Precautions Chair Alarm;Bed Alarm;Multiple lines;Telemetry;Fall Risk   Lifestyle   Autonomy PTA, pt reports being I with ADLs, IADLs, fnxl mobility, (-)    Reciprocal Relationships Pt resides alone   Service to Others retired CNA   Intrinsic Gratification Likes being outside with her cats   ADL   LB Dressing Assistance 5  Supervision/Setup   LB Dressing Deficit Verbal cueing;Setup;Steadying;Increased time to complete;Supervision/safety   Bed Mobility   Supine to Sit 3  Moderate assistance   Additional items Assist x 1;HOB elevated;Increased time required;Verbal cues   Transfers   Sit to Stand 4  Minimal assistance   Additional items Assist x 1;Armrests;Increased time required;Verbal cues   Stand to Sit 4  Minimal assistance   Additional items Assist x 1;Armrests;Increased time required;Verbal cues   Additional Comments Pt required RW for transfers   Functional Mobility   Functional Mobility 4  Minimal assistance   Additional Comments Pt performed bed to chair mobility with the use of a RW and no overt LOB.   Additional items Rolling walker   Cognition   Overall Cognitive Status Impaired   Arousal/Participation Alert;Cooperative   Attention Within functional limits   Orientation Level Oriented X4   Memory Decreased recall of precautions   Following Commands Follows one step commands without difficulty    Comments Pt was pleasant to work with, required increased cues to redirect, poor insight into deficits and poor recall of safety precautions. Pt scored 1/3 on BIMS assessment for word recall.   Activity Tolerance   Activity Tolerance Patient limited by fatigue   Medical Staff Made Aware OT Annmarie   Assessment   Assessment Patient participated in skilled OT session on 7/30/2024 with interventions consisting of ADL retraining, energy conservation, safety awareness, increase activity tolerance, increase postural control and functional mobility. Pt was greeted in supine and requested a bedpan. Pt was greeted in supine and willing to participate in skilled OT treatment session. Pt performed LB dressing (Supervision). Pt scored 1/3 on BIMS assessment of word recall. The patient's raw score on the AM-PAC Daily Activity Inpatient Short Form is 15. A raw score of less than 19 suggests the patient may benefit from discharge to post-acute rehabilitation services. Please refer to the recommendation of the Occupational Therapist for safe discharge planning. From Ot standpoint, recommendation at time of d/c would be Level I Maximum Resource Intensity. Patient to benefit from continue occupational therapy while in hospital to address deficits   Plan   Treatment Interventions ADL retraining;Functional transfer training;Equipment evaluation/education;Compensatory technique education;Energy conservation;Activityengagement   Goal Expiration Date 08/06/24   Discharge Recommendation   Rehab Resource Intensity Level, OT I (Maximum Resource Intensity)   AM-PAC Daily Activity Inpatient   Lower Body Dressing 2   Bathing 2   Toileting 2   Upper Body Dressing 3   Grooming 3   Eating 3   Daily Activity Raw Score 15   Daily Activity Standardized Score (Calc for Raw Score >=11) 34.69   AM-PAC Applied Cognition Inpatient   Following a Speech/Presentation 2   Understanding Ordinary Conversation 4   Taking Medications 3   Remembering Where Things  Are Placed or Put Away 3   Remembering List of 4-5 Errands 3   Taking Care of Complicated Tasks 3   Applied Cognition Raw Score 18   Applied Cognition Standardized Score 38.07   Barthel Index   Feeding 10   Bathing 0   Grooming Score 5   Dressing Score 5   Bladder Score 5   Bowels Score 5   Toilet Use Score 5   Transfers (Bed/Chair) Score 5   Mobility (Level Surface) Score 0   Stairs Score 5   Barthel Index Score 45   End of Consult   Patient Position at End of Consult Bedside chair;Bed/Chair alarm activated;All needs within reach   Nurse Communication Nurse aware of consult       Tonie Guevara OTS

## 2024-07-30 NOTE — PLAN OF CARE
Problem: Prexisting or High Potential for Compromised Skin Integrity  Goal: Skin integrity is maintained or improved  Description: INTERVENTIONS:  - Identify patients at risk for skin breakdown  - Assess and monitor skin integrity  - Assess and monitor nutrition and hydration status  - Monitor labs   - Assess for incontinence   - Turn and reposition patient  - Assist with mobility/ambulation  - Relieve pressure over bony prominences  - Avoid friction and shearing  - Provide appropriate hygiene as needed including keeping skin clean and dry  - Evaluate need for skin moisturizer/barrier cream  - Collaborate with interdisciplinary team   - Patient/family teaching  - Consider wound care consult   Outcome: Progressing     Problem: PAIN - ADULT  Goal: Verbalizes/displays adequate comfort level or baseline comfort level  Description: Interventions:  - Encourage patient to monitor pain and request assistance  - Assess pain using appropriate pain scale  - Administer analgesics based on type and severity of pain and evaluate response  - Implement non-pharmacological measures as appropriate and evaluate response  - Consider cultural and social influences on pain and pain management  - Notify physician/advanced practitioner if interventions unsuccessful or patient reports new pain  Outcome: Progressing     Problem: INFECTION - ADULT  Goal: Absence or prevention of progression during hospitalization  Description: INTERVENTIONS:  - Assess and monitor for signs and symptoms of infection  - Monitor lab/diagnostic results  - Monitor all insertion sites, i.e. indwelling lines, tubes, and drains  - Monitor endotracheal if appropriate and nasal secretions for changes in amount and color  - [place] appropriate cooling/warming therapies per order  - Administer medications as ordered  - Instruct and encourage patient and family to use good hand hygiene technique  - Identify and instruct in appropriate isolation precautions for  identified infection/condition  Outcome: Progressing     Problem: SAFETY ADULT  Goal: Patient will remain free of falls  Description: INTERVENTIONS:  - Educate patient/family on patient safety including physical limitations  - Instruct patient to call for assistance with activity   - Consult OT/PT to assist with strengthening/mobility   - Keep Call bell within reach  - Keep bed low and locked with side rails adjusted as appropriate  - Keep care items and personal belongings within reach  - Initiate and maintain comfort rounds  - Make Fall Risk Sign visible to staff  - Offer Toileting every 2  Hours, in advance of need  - Initiate/Maintain 2 alarm  - Obtain necessary fall risk management equipment: 2  - Apply yellow socks and bracelet for high fall risk patients  - Consider moving patient to room near nurses station  Outcome: Progressing  Goal: Maintain or return to baseline ADL function  Description: INTERVENTIONS:  -  Assess patient's ability to carry out ADLs; assess patient's baseline for ADL function and identify physical deficits which impact ability to perform ADLs (bathing, care of mouth/teeth, toileting, grooming, dressing, etc.)  - Assess/evaluate cause of self-care deficits   - Assess range of motion  - Assess patient's mobility; develop plan if impaired  - Assess patient's need for assistive devices and provide as appropriate  - Encourage maximum independence but intervene and supervise when necessary  - Involve family in performance of ADLs  - Assess for home care needs following discharge   - Consider OT consult to assist with ADL evaluation and planning for discharge  - Provide patient education as appropriate  Outcome: Progressing  Goal: Maintains/Returns to pre admission functional level  Description: INTERVENTIONS:  - Perform AM-PAC 6 Click Basic Mobility/ Daily Activity assessment daily.  - Set and communicate daily mobility goal to care team and patient/family/caregiver.   - Collaborate with  rehabilitation services on mobility goals if consulted  - Perform Range of Motion 2 times a day.  - Reposition patient every 2 hours.  - Dangle patient 2 times a day  - Stand patient 2 times a day  - Ambulate patient 2 times a day  - Out of bed to chair 2 times a day   - Out of bed for meals 2 times a day  - Out of bed for toileting  - Record patient progress and toleration of activity level   Outcome: Progressing     Problem: DISCHARGE PLANNING  Goal: Discharge to home or other facility with appropriate resources  Description: INTERVENTIONS:  - Identify barriers to discharge w/patient and caregiver  - Arrange for needed discharge resources and transportation as appropriate  - Identify discharge learning needs (meds, wound care, etc.)  - Arrange for interpretive services to assist at discharge as needed  - Refer to Case Management Department for coordinating discharge planning if the patient needs post-hospital services based on physician/advanced practitioner order or complex needs related to functional status, cognitive ability, or social support system  Outcome: Progressing     Problem: Knowledge Deficit  Goal: Patient/family/caregiver demonstrates understanding of disease process, treatment plan, medications, and discharge instructions  Description: Complete learning assessment and assess knowledge base.  Interventions:  - Provide teaching at level of understanding  - Provide teaching via preferred learning methods  Outcome: Progressing     Problem: Neurological Deficit  Goal: Neurological status is stable or improving  Description: Interventions:  - Monitor and assess patient's level of consciousness, motor function, sensory function, and level of assistance needed for ADLs.   - Monitor and report changes from baseline. Collaborate with interdisciplinary team to initiate plan and implement interventions as ordered.   - Provide and maintain a safe environment.  - Consider seizure precautions.  - Consider fall  precautions.  - Consider aspiration precautions.  - Consider bleeding precautions.  Outcome: Progressing     Problem: Activity Intolerance/Impaired Mobility  Goal: Mobility/activity is maintained at optimum level for patient  Description: Interventions:  - Assess and monitor patient  barriers to mobility and need for assistive/adaptive devices.  - Assess patient's emotional response to limitations.  - Collaborate with interdisciplinary team and initiate plans and interventions as ordered.  - Encourage independent activity per ability.  - Maintain proper body alignment.  - Perform active/passive rom as tolerated/ordered.  - Plan activities to conserve energy.  - Turn patient as appropriate  Outcome: Progressing     Problem: Potential for Aspiration  Goal: Non-ventilated patient's risk of aspiration is minimized  Description: Assess and monitor vital signs, respiratory status, and labs (WBC).  Monitor for signs of aspiration (tachypnea, cough, rales, wheezing, cyanosis, fever).    - Assess and monitor patient's ability to swallow.  - Place patient up in chair to eat if possible.  - HOB up at 90 degrees to eat if unable to get patient up into chair.  - Supervise patient during oral intake.   - Instruct patient/ family to take small bites.  - Instruct patient/ family to take small single sips when taking liquids.  - Follow patient-specific strategies generated by speech pathologist.  Outcome: Progressing  Goal: Ventilated patient's risk of aspiration is minimized  Description: Assess and monitor vital signs, respiratory status, airway cuff pressure, and labs (WBC).  Monitor for signs of aspiration (tachypnea, cough, rales, wheezing, cyanosis, fever).    - Elevate head of bed 30 degrees if patient has tube feeding.  - Monitor tube feeding.  Outcome: Progressing     Problem: Nutrition  Goal: Nutrition/Hydration status is improving  Description: Monitor and assess patient's nutrition/hydration status for malnutrition (ex-  brittle hair, bruises, dry skin, pale skin and conjunctiva, muscle wasting, smooth red tongue, and disorientation). Collaborate with interdisciplinary team and initiate plan and interventions as ordered.  Monitor patient's weight and dietary intake as ordered or per policy. Utilize nutrition screening tool and intervene per policy. Determine patient's food preferences and provide high-protein, high-caloric foods as appropriate.     - Assist patient with eating.  - Allow adequate time for meals.  - Encourage patient to take dietary supplement as ordered.  - Collaborate with clinical nutritionist.  - Include patient/family/caregiver in decisions related to nutrition.  Outcome: Progressing

## 2024-07-30 NOTE — CASE MANAGEMENT
Case Management Discharge Planning Note    Patient name Brianna AVILES Fly  [facility] MRN [id number]  : 1950 Date 2024       Current Admission Date: 2024  Current Admission Diagnosis:Cerebrovascular accident (CVA) due to occlusion of left middle cerebral artery (HCC)   Patient Active Problem List    Diagnosis Date Noted Date Diagnosed    Low serum cortisol level 2024     Atrial fibrillation/A flutter (HCC) 2024     Cerebrovascular accident (CVA) due to occlusion of left middle cerebral artery (HCC) 2024     Atrial flutter by electrocardiogram (HCC) 2024     Continuous opioid dependence (HCC) 2024     Situational anxiety 2024     CKD (chronic kidney disease) stage 2, GFR 60-89 ml/min 2024     Vertigo 2022     Mass of soft tissue of hand 12/15/2021     Hypertension      Acute cholecystitis 2021     Primary osteoarthritis of left hand 2020     Varicose veins of both lower extremities without ulcer or inflammation 2020     Obesity (BMI 30.0-34.9) 2019     Chronic neck pain 2018     Chronic bilateral low back pain with bilateral sciatica 2018     Gastroesophageal reflux disease without esophagitis 2018     Mixed hyperlipidemia 2018     Myalgia 2018     Urge incontinence of urine 2018       LOS (days): 8  Geometric Mean LOS (GMLOS) (days): 4  Days to GMLOS:-3.5     OBJECTIVE:  Risk of Unplanned Readmission Score: 12.83         Current admission status: Inpatient   Preferred Pharmacy:   [facility]  for [place], [place] - [address] [place] [address]  Phone: [phone] Fax: [phone]    [facility] - [place], [place] - [address] [place] [address]  Phone: [phone] Fax: [phone]    [facility] - [place], [place] - [address]  [address]  [place] [address]  Phone: [phone] Fax: [phone]    [facility] - [place] - [address]  [address]  [facility] [address]  Phone: [phone] Fax: [phone]    Primary Care Provider: LADONNA Madden    Primary Insurance: MEDICARE  Secondary Insurance:  FOR LIFE    DISCHARGE DETAILS:             Additional Comments: Dr Andres Mccauley asked CM about prescription cost.  Patient confirmed prescription coverage through secondary insurance  for Life.  Dr Mccauley was made aware of prescription coverage via Secure Chat.  Jackson is in agreement to [facility] referral so information was uploaded into [employer] and referral lsent.  Awaiting determination.  CM callled [facility] for price check and was informed pharmacy DOES NOT take  for Life.  Jackson states she uses [facility] [address] [place] [address] Phone: [phone] Fax: [phone].  This pharmacy information was sent to Dr Mccauley for DC needs.  CM to be available        11:40am  A VM was left for son Zachariah to discuss DC to [facility] and awaiting a call back for DC needs      12:35pm  Patient and son both said [facility] the preferred [facility] for [facility].  [facility] liaison Imelda said there is a bed available today.  Dr Andres Mccauley informed CM that he sent prescriptions to [facility] pharmacy for price check around 12:15pm.  CM to f/u with price for DC needs.  Awaiting price check determination to see if patient needs to transition to coumadin prior to DC to [facility].  CM to be available

## 2024-07-30 NOTE — ASSESSMENT & PLAN NOTE
Recently diagnosed, not on anticoagulation due to cost  Currently in sinus rhythm  Discussed with cardiology, plan to continue diltiazem and transition to oral anticoagulation once cleared by neurology  Discussed with neurology, clear for oral AC  Currently on heparin drip- switch to Eliquis  Outpatient cardiac follow-up

## 2024-07-30 NOTE — PROGRESS NOTES
Patient:    MRN:  [id number]    Justine Request ID:  [id number]    Level of care reserved:  Inpatient Rehab Facility    Partner Reserved:  [place] [address] [phone]    Clinical needs requested:    Geography searched:  10 miles around [id number]    Start of Service:    Request sent:  9:34am EDT on 7/30/2024 by Libertad Gallego    Partner reserved:  12:28pm EDT on 7/30/2024 by Libertad Gallego    Choice list shared:  12:27pm EDT on 7/30/2024 by Libertad Gallego

## 2024-07-30 NOTE — PLAN OF CARE
Problem: OCCUPATIONAL THERAPY ADULT  Goal: Performs self-care activities at highest level of function for planned discharge setting.  See evaluation for individualized goals.  Description: Treatment Interventions: ADL retraining, Functional transfer training, Endurance training, Cognitive reorientation, UE strengthening/ROM, Patient/family training, Equipment evaluation/education, Compensatory technique education, Continued evaluation, Energy conservation, Activityengagement          See flowsheet documentation for full assessment, interventions and recommendations.   Note: Limitation: Decreased ADL status, Decreased UE ROM, Decreased UE strength, Decreased Safe judgement during ADL, Decreased cognition, Decreased endurance, Decreased self-care trans, Decreased fine motor control, Decreased high-level ADLs  Prognosis: Fair  Assessment: Patient participated in skilled OT session on 7/30/2024 with interventions consisting of ADL retraining, energy conservation, safety awareness, increase activity tolerance, increase postural control and functional mobility. Pt was greeted in supine and requested a bedpan. Pt was greeted in supine and willing to participate in skilled OT treatment session. Pt performed LB dressing (Supervision). Pt scored 1/3 on BIMS assessment of word recall. The patient's raw score on the -PAC Daily Activity Inpatient Short Form is 15. A raw score of less than 19 suggests the patient may benefit from discharge to post-acute rehabilitation services. Please refer to the recommendation of the Occupational Therapist for safe discharge planning. From Ot standpoint, recommendation at time of d/c would be Level I Maximum Resource Intensity. Patient to benefit from continue occupational therapy while in hospital to address deficits  Recommendation: Physiatry Consult  Rehab Resource Intensity Level, OT: I (Maximum Resource Intensity)

## 2024-07-30 NOTE — ARC ADMISSION
ARC  met with patient at bedside. Reviewed ARC program, acute rehab criteria and approval process, medicare benefit, as well as [facility] locations and preferences. Patient's preferred [facility] location is [facility] only. [facility] Rehab folder left with patient and all questions answered. Patient was made aware that ARC Reviewer will keep their  updated regarding referral status.

## 2024-07-30 NOTE — PROGRESS NOTES
[facility]  Progress Note  Name: Brianna Baum I  MRN: [id number]  Unit/Bed#: [facility] 726-01 I Date of Admission: 7/22/2024   Date of Service: 7/30/2024 I Hospital Day: 8    Assessment & Plan   * Cerebrovascular accident (CVA) due to occlusion of left middle cerebral artery (HCC)  Assessment & Plan  74-year-old female with newly diagnosed A-fib that presented as a stroke alert with symptoms of near syncope and speech difficulties.  Initial NIHSS of 0.  It seems upon return from CT her NIHSS was 7 with aphasia, left gaze preference and right facial droop.   Blood pressure stable.  EKG with atrial flutter  Initial CT head with no acute intracranial abnormalities. CTA head and neck with focal occlusion distal left M1 at the bifurcation  Patient received TNK on 7/22 at 9:52 PM  7/22 single pass thrombectomy attempted without significant change. Non-occlusive lesion remained, TICI 2B  MRI brain with Stable acute infarct centered in the left basal ganglia with superimposed petechial hemorrhage. No significant mass effect.  On 7/24 patient was noted to worsened aphasia, not eligible for TNK. Underwent endovascular intervention with resultant TICI 3.   Neurology and neurosurgery input noted  TTE (7/23): EF 55%, LA moderately dilated, no PFO  Repeat MRI brain 7/26 with evolution and mild worsening of the recent left MCA territory infarct involving caudate, lentiform nucleus, anterior internal capsule, and smaller foci in the frontal operculum and insula. Grossly stable foci of petechial hemorrhage. No significant mass effect.  Distal left M1 occlusion in the setting of atrial fibrillation off anticoagulation due to cost s/p TNK and trauma back to me  Continue atorvastatin and heparin drip   Goal -160 mmHg  Continue neurochecks  Was on heparin infusion. However after discussion with CM, decision was reached to initiate Eliquis and discharge patient to rehab.          Atrial  fibrillation/A flutter (HCC)  Assessment & Plan  Recently diagnosed, not on anticoagulation due to cost  Currently in sinus rhythm  Discussed with cardiology, plan to continue diltiazem and transition to oral anticoagulation once cleared by neurology  Discussed with neurology, clear for oral AC  Currently on heparin drip- switch to Eliquis  Outpatient cardiac follow-up    Low serum cortisol level  Assessment & Plan  Patient had a random cortisol level drawn on 7/26 that was 5  No suspicion of adrenal insufficiency as cortisol level was drawn in the evening  While in the ICU, required vasopressors for blood pressure support therefore was started in the ICU on on hydrocortisone 50 mg IV twice daily and midodrine 10 mg 3 times daily AC for concern of AI   Cortisol level this morning off Cortef was 13, discussed with endocrinology and no need for ACTH stim test.  Low suspicion for adrenal insufficiency  Will keep off Cortef.  Consider weaning midodrine in the next days    CKD (chronic kidney disease) stage 2, GFR 60-89 ml/min  Assessment & Plan  Lab Results   Component Value Date    EGFR 61 07/30/2024    EGFR 73 07/29/2024    EGFR 78 07/28/2024    CREATININE 0.92 07/30/2024    CREATININE 0.79 07/29/2024    CREATININE 0.75 07/28/2024   Avoid nephrotoxins  Kidney function at baseline  Repeat BMP tomorrow    Hypertension  Assessment & Plan  BP reviewed and acceptable  Continue diltiazem 120 mg daily            VTE Pharmacologic Prophylaxis:   Pharmacologic: Apixaban (Eliquis)  Mechanical VTE Prophylaxis in Place: Yes    Patient Centered Rounds: I have performed bedside rounds with nursing staff today.    Discussions with Specialists or Other Care Team Provider: neurology       Current Length of Stay: 8 day(s)    Current Patient Status: Inpatient   Certification Statement: The patient will continue to require additional inpatient hospital stay due to pending placement in rehab    Discharge Plan: tomorrow to rehab    Code  Status: Level 1 - Full Code      Subjective:   Patient examined at bedside.  Sitting up on bedside sofa.  Alert, oriented x 3.  Currently on room air.  Denies any chest pain or dyspnea.  However reports of increased fatigue and tiredness.  No other complaints offered      Objective:     Vitals:   Temp (24hrs), Av.6 °F (37 °C), Min:97.7 °F (36.5 °C), Max:99.1 °F (37.3 °C)    Temp:  [97.7 °F (36.5 °C)-99.1 °F (37.3 °C)] 98.8 °F (37.1 °C)  HR:  [] 92  Resp:  [16-24] 24  BP: (113-132)/(54-91) 130/73  SpO2:  [93 %-96 %] 95 %  Body mass index is 30.45 kg/m².     Input and Output Summary (last 24 hours):       Intake/Output Summary (Last 24 hours) at 2024 1700  Last data filed at 2024 1240  Gross per 24 hour   Intake 688.8 ml   Output --   Net 688.8 ml       Physical Exam:     Physical Exam  Constitutional:       Appearance: She is well-developed.   HENT:      Head: Normocephalic and atraumatic.   Pulmonary:      Effort: Pulmonary effort is normal. No respiratory distress.      Breath sounds: Normal breath sounds.   Musculoskeletal:      Cervical back: Normal range of motion.   Skin:     General: Skin is warm and dry.          Labs:    Results from last 7 days   Lab Units 24  0630   WBC Thousand/uL 7.97   HEMOGLOBIN g/dL 10.9*   HEMATOCRIT % 33.4*   PLATELETS Thousands/uL 201   SEGS PCT % 64   LYMPHO PCT % 24   MONO PCT % 9   EOS PCT % 1     Results from last 7 days   Lab Units 24  0630 24  0346 24  0415   SODIUM mmol/L 141   < > 141   POTASSIUM mmol/L 3.5   < > 4.0   CHLORIDE mmol/L 102   < > 105   CO2 mmol/L 32   < > 29   BUN mg/dL 14   < > 17   CREATININE mg/dL 0.92   < > 0.75   ANION GAP mmol/L 7   < > 7   CALCIUM mg/dL 8.7   < > 9.0   ALBUMIN g/dL  --   --  3.4*   TOTAL BILIRUBIN mg/dL  --   --  0.29   ALK PHOS U/L  --   --  52   ALT U/L  --   --  17   AST U/L  --   --  14   GLUCOSE RANDOM mg/dL 102   < > 143*    < > = values in this interval not displayed.     Results from  last 7 days   Lab Units 07/25/24  1302   INR  1.13     Results from last 7 days   Lab Units 07/24/24  1952 07/24/24  1740   POC GLUCOSE mg/dl 131 141*                    Recent Cultures (last 7 days):           Last 24 Hours Medication List:   Current Facility-Administered Medications   Medication Dose Route Frequency Provider Last Rate    acetaminophen  650 mg Oral Q6H PRN Brittany Rehman MD      apixaban  5 mg Oral BID Franco Dickinson MD      vitamin C  1,000 mg Oral Daily Brittany Rehman MD      atorvastatin  40 mg Oral QPM Brittany Rehman MD      bisacodyl  10 mg Rectal Daily PRN Brittany Rehman MD      [START ON 7/31/2024] diltiazem  180 mg Oral Daily Vida Claire MD      diltiazem  30 mg Oral Q6H [facility] Vida Claire MD      lidocaine  1 patch Topical Daily Brittany Rehman MD      midodrine  10 mg Oral TID AC Brittany Rehman MD      pantoprazole  40 mg Oral Daily Before Breakfast Brittany Rehman MD      polyethylene glycol  17 g Oral Daily Brittany Rehman MD      senna-docusate sodium  1 tablet Oral HS Brittany Rehman MD          Today, Patient Was Seen By: Franco Dickinson MD    ** Please Note: Dictation voice to text software may have been used in the creation of this document. **

## 2024-07-30 NOTE — CASE MANAGEMENT
Case Management Discharge Planning Note    Patient name Brianna AVILES Fly  Location [facility] 726/[facility] 726-01 MRN [id number]  : 1950 Date 2024       Current Admission Date: 2024  Current Admission Diagnosis:Cerebrovascular accident (CVA) due to occlusion of left middle cerebral artery (HCC)   Patient Active Problem List    Diagnosis Date Noted Date Diagnosed    Low serum cortisol level 2024     Atrial fibrillation/A flutter (HCC) 2024     Cerebrovascular accident (CVA) due to occlusion of left middle cerebral artery (HCC) 2024     Atrial flutter by electrocardiogram (HCC) 2024     Continuous opioid dependence (HCC) 2024     Situational anxiety 2024     CKD (chronic kidney disease) stage 2, GFR 60-89 ml/min 2024     Vertigo 2022     Mass of soft tissue of hand 12/15/2021     Hypertension      Acute cholecystitis 2021     Primary osteoarthritis of left hand 2020     Varicose veins of both lower extremities without ulcer or inflammation 2020     Obesity (BMI 30.0-34.9) 2019     Chronic neck pain 2018     Chronic bilateral low back pain with bilateral sciatica 2018     Gastroesophageal reflux disease without esophagitis 2018     Mixed hyperlipidemia 2018     Myalgia 2018     Urge incontinence of urine 2018       LOS (days): 8  Geometric Mean LOS (GMLOS) (days): 4  Days to GMLOS:-3.7     OBJECTIVE:  Risk of Unplanned Readmission Score: 13.15         Current admission status: Inpatient   Preferred Pharmacy:   [facility]  for [place], [place] - [address] [place] [address]  Phone: [phone] Fax: [phone]    [facility] - [place], [place] - [address] [place] [address]  Phone: [phone] Fax: [phone]    [facility] - [place], [place] - [address]  [address]  [place] [address]  Phone: [phone] Fax: [phone]    [facility] - [place] - [address]  [address]  [place] [address]  Phone: [phone] Fax: [phone]    Primary Care Provider: LADONNA Madden    Primary Insurance: MEDICARE  Secondary Insurance:  FOR LIFE    DISCHARGE DETAILS:                 Additional Comments: CM spoke with [facility] in [place], pt home pharmacy. CM advised pt  is incorrect and they have been unable to submit to the insurance co., usually they just zero out the cost however these are expensive meds. DYLON spoke with Julia at [facility] [phone] case # [id number] to change pt . Pt must provide a birth certificate or passport in order for her  to be changed. Pt does not have a passport. CM reached out to pt son Zachariah [phone] advised of the situation with scripts. CM suggested that this could be worked on while pt is in rehab so that when she is DC she will be able to access her meds. CM will f/u with [facility] and see if this is something they can help the son with. DYLON spoke with Imelda @ [facility] [phone] relayed the current issue with the prescription meds. Pabltio will reach out to the CM and see wht they can do and then call the son with an update. DYLON notified providers of prescription concerns

## 2024-07-30 NOTE — PHYSICAL THERAPY NOTE
PHYSICAL THERAPY NOTE          Patient Name: Brianna Baum  Today's Date: 7/30/2024 07/30/24 1334   PT Last Visit   PT Visit Date 07/30/24   Note Type   Note Type Treatment   Pain Assessment   Pain Assessment Tool 0-10   Pain Score No Pain   Restrictions/Precautions   Weight Bearing Precautions Per Order No   Other Precautions Chair Alarm;Bed Alarm;Multiple lines;Telemetry;Fall Risk;Cognitive  (R sided weakness LE weaker than UE)   General   Chart Reviewed Yes   Response to Previous Treatment Patient with no complaints from previous session.   Family/Caregiver Present Yes  (guanaco Campbell)   Cognition   Overall Cognitive Status Impaired   Arousal/Participation Cooperative   Attention Attends with cues to redirect   Orientation Level Oriented X4   Memory Decreased recall of precautions   Following Commands Follows one step commands with increased time or repetition   Comments pt pleasant and cooperative   Subjective   Subjective pt reports that she is tired   Bed Mobility   Supine to Sit 3  Moderate assistance   Additional items Assist x 1;HOB elevated;Increased time required;Verbal cues   Sit to Supine Unable to assess   Transfers   Sit to Stand 4  Minimal assistance   Additional items Assist x 1;Armrests;Increased time required;Verbal cues   Stand to Sit 4  Minimal assistance   Additional items Assist x 1;Armrests;Increased time required;Verbal cues   Additional Comments c RW   Ambulation/Elevation   Gait pattern Improper Weight shift;Decreased foot clearance;Short stride;Excessively slow   Gait Assistance 4  Minimal assist   Additional items Assist x 1   Assistive Device Rolling walker   Distance 30+80+40   Stair Management Assistance Not tested   Balance   Static Sitting Fair +   Dynamic Sitting Fair   Static Standing Fair -   Dynamic Standing Poor +   Ambulatory Poor +   Endurance Deficit   Endurance Deficit Yes   Endurance  Deficit Description pt limited by fatigue, dizziness, decreased strength and decreased activity tolerance   Activity Tolerance   Activity Tolerance Patient limited by fatigue  (+dizziness (BP stable))   Medical Staff Made Aware PT Dwayne   Nurse Made Aware yes-cleared   Exercises   Balance training  attempt to stand at table to participate in coloring activity with CGA, pt limited by dizziness requesting to sit down after 1 minute   Assessment   Prognosis Good   Problem List Decreased strength;Decreased endurance;Impaired balance;Decreased mobility;Decreased coordination;Decreased cognition;Obesity   Assessment Pt agreeable and cooperative to work with PT. Completes mobility and therex as outlined above. Pt able to walk further distances today however continues to be limited by mild R sided weakness and fatigue. Pt complains of dizziness 3x during mobility requesting to sit, BP stable and dizziness subsides during rest. Pt progressing towards her goals however fatigues quickly with exercise and will continue to benefit from skilled PT to address deficits and improve mobility and endurance. Pt left in chair with chair alarm donned, call bell and personal items within reach and all needs met.   Barriers to Discharge Decreased caregiver support   Goals   Patient Goals to get better food for dinner   STG Expiration Date 08/08/24   PT Treatment Day 2   Plan   Treatment/Interventions Functional transfer training;LE strengthening/ROM;Therapeutic exercise;Endurance training;Cognitive reorientation;Patient/family training;Bed mobility;Gait training;Spoke to nursing;Elevations;Equipment eval/education   Progress Progressing toward goals   PT Frequency 3-5x/wk   Discharge Recommendation   Rehab Resource Intensity Level, PT I (Maximum Resource Intensity)   Equipment Recommended Walker   Walker Package Recommended Wheeled walker   AM-PAC Basic Mobility Inpatient   Turning in Flat Bed Without Bedrails 3   Lying on Back to Sitting  on Edge of Flat Bed Without Bedrails 2   Moving Bed to Chair 3   Standing Up From Chair Using Arms 3   Walk in Room 3   Climb 3-5 Stairs With Railing 2   Basic Mobility Inpatient Raw Score 16   Basic Mobility Standardized Score 38.32   Turning Head Towards Sound 4   Follow Simple Instructions 3   Low Function Basic Mobility Raw Score  23   Low Function Basic Mobility Standardized Score  37.22   [facility] Highest Level Of Mobility   -[facility] Goal 5: Stand one or more mins   -HLM Achieved 7: Walk 25 feet or more   Shahana Jaimes, SPT

## 2024-07-30 NOTE — ASSESSMENT & PLAN NOTE
Lab Results   Component Value Date    EGFR 61 07/30/2024    EGFR 73 07/29/2024    EGFR 78 07/28/2024    CREATININE 0.92 07/30/2024    CREATININE 0.79 07/29/2024    CREATININE 0.75 07/28/2024   Avoid nephrotoxins  Kidney function at baseline  Repeat BMP tomorrow

## 2024-07-30 NOTE — ASSESSMENT & PLAN NOTE
74-year-old female with newly diagnosed A-fib that presented as a stroke alert with symptoms of near syncope and speech difficulties.  Initial NIHSS of 0.  It seems upon return from CT her NIHSS was 7 with aphasia, left gaze preference and right facial droop.   Blood pressure stable.  EKG with atrial flutter  Initial CT head with no acute intracranial abnormalities. CTA head and neck with focal occlusion distal left M1 at the bifurcation  Patient received TNK on 7/22 at 9:52 PM  7/22 single pass thrombectomy attempted without significant change. Non-occlusive lesion remained, TICI 2B  MRI brain with Stable acute infarct centered in the left basal ganglia with superimposed petechial hemorrhage. No significant mass effect.  On 7/24 patient was noted to worsened aphasia, not eligible for TNK. Underwent endovascular intervention with resultant TICI 3.   Neurology and neurosurgery input noted  TTE (7/23): EF 55%, LA moderately dilated, no PFO  Repeat MRI brain 7/26 with evolution and mild worsening of the recent left MCA territory infarct involving caudate, lentiform nucleus, anterior internal capsule, and smaller foci in the frontal operculum and insula. Grossly stable foci of petechial hemorrhage. No significant mass effect.  Distal left M1 occlusion in the setting of atrial fibrillation off anticoagulation due to cost s/p TNK and trauma back to me  Continue atorvastatin and heparin drip   Goal -160 mmHg  Continue neurochecks  Was on heparin infusion. However after discussion with CM, decision was reached to initiate Eliquis and discharge patient to rehab.

## 2024-07-30 NOTE — PLAN OF CARE
Problem: PHYSICAL THERAPY ADULT  Goal: Performs mobility at highest level of function for planned discharge setting.  See evaluation for individualized goals.  Description: Treatment/Interventions: OT, Spoke to case management, Spoke to nursing, Gait training, Bed mobility, Patient/family training, Endurance training, LE strengthening/ROM, Functional transfer training          See flowsheet documentation for full assessment, interventions and recommendations.  Outcome: Progressing  Note: Prognosis: Good  Problem List: Decreased strength, Decreased endurance, Impaired balance, Decreased mobility, Decreased coordination, Decreased cognition, Obesity  Assessment: Pt agreeable and cooperative to work with PT. Completes mobility and therex as outlined above. Pt able to walk further distances today however continues to be limited by mild R sided weakness and fatigue. Pt complains of dizziness 3x during mobility requesting to sit, BP stable and dizziness subsides during rest. Pt progressing towards her goals however fatigues quickly with exercise and will continue to benefit from skilled PT to address deficits and improve mobility and endurance. Pt left in chair with chair alarm donned, call bell and personal items within reach and all needs met.  Barriers to Discharge: Decreased caregiver support     Rehab Resource Intensity Level, PT: I (Maximum Resource Intensity)    See flowsheet documentation for full assessment.

## 2024-07-31 VITALS
SYSTOLIC BLOOD PRESSURE: 118 MMHG | OXYGEN SATURATION: 95 % | WEIGHT: 183.3 LBS | RESPIRATION RATE: 20 BRPM | DIASTOLIC BLOOD PRESSURE: 62 MMHG | BODY MASS INDEX: 30.54 KG/M2 | TEMPERATURE: 98.8 F | HEART RATE: 68 BPM | HEIGHT: 65 IN

## 2024-07-31 LAB — APTT PPP: 32 SECONDS (ref 23–37)

## 2024-07-31 PROCEDURE — 99239 HOSP IP/OBS DSCHRG MGMT >30: CPT | Performed by: FAMILY MEDICINE

## 2024-07-31 PROCEDURE — NC001 PR NO CHARGE: Performed by: INTERNAL MEDICINE

## 2024-07-31 PROCEDURE — 85730 THROMBOPLASTIN TIME PARTIAL: CPT | Performed by: FAMILY MEDICINE

## 2024-07-31 RX ORDER — MIDODRINE HYDROCHLORIDE 10 MG/1
5 TABLET ORAL
Start: 2024-07-31

## 2024-07-31 RX ORDER — MIDODRINE HYDROCHLORIDE 10 MG/1
10 TABLET ORAL
Start: 2024-07-31 | End: 2024-07-31

## 2024-07-31 RX ORDER — ATORVASTATIN CALCIUM 40 MG/1
40 TABLET, FILM COATED ORAL EVERY EVENING
Start: 2024-07-31

## 2024-07-31 RX ORDER — POLYETHYLENE GLYCOL 3350 17 G/17G
17 POWDER, FOR SOLUTION ORAL DAILY
Start: 2024-07-31

## 2024-07-31 RX ORDER — AMOXICILLIN 250 MG
1 CAPSULE ORAL
Start: 2024-07-31

## 2024-07-31 RX ADMIN — PANTOPRAZOLE SODIUM 40 MG: 40 TABLET, DELAYED RELEASE ORAL at 07:00

## 2024-07-31 RX ADMIN — DILTIAZEM HYDROCHLORIDE 180 MG: 180 CAPSULE, COATED, EXTENDED RELEASE ORAL at 09:02

## 2024-07-31 RX ADMIN — MIDODRINE HYDROCHLORIDE 10 MG: 5 TABLET ORAL at 07:00

## 2024-07-31 RX ADMIN — APIXABAN 5 MG: 5 TABLET, FILM COATED ORAL at 09:02

## 2024-07-31 RX ADMIN — OXYCODONE HYDROCHLORIDE AND ACETAMINOPHEN 1000 MG: 500 TABLET ORAL at 09:02

## 2024-07-31 NOTE — RESTORATIVE TECHNICIAN NOTE
Restorative Technician Note      Patient Name: Brianna Baum     Note Type: Mobility  Patient Position Upon Consult: Bedside chair  Activity Performed: Ambulated; Dangled; Stood  Assistive Device: Roller walker; Other (Comment) (Assist x1 with chair follow.)  Education Provided: Yes  Patient Position at End of Consult: Bedside chair; All needs within reach; Bed/Chair alarm activated    Soila ABRAHAM, Restorative Technician,

## 2024-07-31 NOTE — DISCHARGE SUMMARY
[facility]  Discharge- Brianna AVILES Fly 1950, 74 y.o. female MRN: [id number]  [facility]: [id number]  Primary Care Provider: LADONNA Madden   Date and time admitted to hospital: 7/22/2024 11:27 PM    * Cerebrovascular accident (CVA) due to occlusion of left middle cerebral artery (HCC)  Assessment & Plan  74-year-old female with newly diagnosed A-fib that presented as a stroke alert with symptoms of near syncope and speech difficulties.  Initial NIHSS of 0.  It seems upon return from CT her NIHSS was 7 with aphasia, left gaze preference and right facial droop.   Initial CT head with no acute intracranial abnormalities. CTA head and neck with focal occlusion distal left M1 at the bifurcation  Patient received TNK on 7/22 at 9:52 PM  7/22 single pass thrombectomy attempted without significant change. Non-occlusive lesion remained, TICI 2B  MRI brain with Stable acute infarct centered in the left basal ganglia with superimposed petechial hemorrhage. No significant mass effect.  On 7/24 patient was noted to worsened aphasia, not eligible for TNK. Underwent endovascular intervention with resultant TICI 3.   Neurology and neurosurgery input noted  TTE (7/23): EF 55%, LA moderately dilated, no PFO  Repeat MRI brain 7/26 with evolution and mild worsening of the recent left MCA territory infarct involving caudate, lentiform nucleus, anterior internal capsule, and smaller foci in the frontal operculum and insula. Grossly stable foci of petechial hemorrhage. No significant mass effect.  Distal left M1 occlusion in the setting of atrial fibrillation off anticoagulation due to cost s/p TNK   Continue atorvastatin   Was on heparin infusion. However after discussion with CM, decision was reached to initiate Eliquis and discharge patient to rehab.          Atrial fibrillation/A flutter (HCC)  Assessment & Plan  Recently diagnosed, not on anticoagulation due to  cost  Currently in sinus rhythm  Discussed with cardiology, plan to continue diltiazem and transition to oral anticoagulation once cleared by neurology  Discussed with neurology, clear for oral AC  Currently on heparin drip- switch to Eliquis  Outpatient cardiac follow-up    Low serum cortisol level  Assessment & Plan  Patient had a random cortisol level drawn on 7/26 that was 5  No suspicion of adrenal insufficiency as cortisol level was drawn in the evening  While in the ICU, required vasopressors for blood pressure support therefore was started in the ICU on on hydrocortisone 50 mg IV twice daily and midodrine 10 mg 3 times daily AC for concern of AI   Cortisol level this morning off Cortef was 13, discussed with endocrinology and no need for ACTH stim test.  Low suspicion for adrenal insufficiency  Will keep off Cortef.  BP has been stable, wean down midodrine on discharge from 10 mg take 2 to 5 mg 3 times daily.  May discontinue on rehab if BP continues to be stable.    CKD (chronic kidney disease) stage 2, GFR 60-89 ml/min  Assessment & Plan  Lab Results   Component Value Date    EGFR 61 07/30/2024    EGFR 73 07/29/2024    EGFR 78 07/28/2024    CREATININE 0.92 07/30/2024    CREATININE 0.79 07/29/2024    CREATININE 0.75 07/28/2024   Avoid nephrotoxins  Kidney function at baseline  Repeat BMP tomorrow    Hypertension  Assessment & Plan  BP reviewed and acceptable  Continue diltiazem 120 mg daily       Discharging Physician / Practitioner: Franco Dickinson MD  PCP: LADONNA Madden  Admission Date:   Admission Orders (From admission, onward)       Ordered        07/23/24 0049  INPATIENT ADMISSION  Once                          Discharge Date: 07/31/24    Medical Problems       Resolved Problems  Date Reviewed: 7/30/2024   None         Consultations During Hospital Stay:  Cardiology  Neurology  Endocrinology     Procedures Performed:   Narrative & Impression   PROCEDURE: IR STROKE ALERT     HISTORY:    "  FLUOROSCOPY TIME:  15.2 MINS minutes total.        TIMES:  Groin puncture: 23:21  First Pass: 23:26, ADAPT  Revascularization: 23:26     COMPARISON: CTA on 7/22/2024     OPERATORS: Oswald     VESSELS SELECTED: Left internal carotid artery; left middle cerebral artery; right femoral artery.     COMPLICATIONS: None.     CONTRAST: 130 mL of iodixanol (VISIPAQUE) mL of Visipaque given intra-arterially. Nonionic contrast was utilized for the patient's safety.     SEDATION: Monitored Anesthesia Care administered by the Anesthesiology Department.     TECHNIQUE and INTERVENTION:  The procedure, its risks, benefits, and alternatives were discussed in detail with the patient's son. Risks include but are not limited to: bleeding, infection, nerve injury, vessel injury, groin hematoma, pain, radiation exposure, alopecia, renal   toxicity, renal failure, and allergic reaction to contrast, stroke, blindness, coma, paralysis, death, and the need for additional treatments.  All questions were answered and no guarantees were provided.     After informed consent the patient was brought into the angiography suite and placed supine on the angiographic table.  The right groin was prepped and draped using sterile technique. The skin overlying the right femoral artery was locally anesthetized   with lidocaine. Ultrasound guidance was used to evaluate the right groin site and patency of the right femoral artery was noted.  Using standard micropuncture kit with ultrasound guidance under realtime visualization the micropuncture needle was advanced   into the right femoral artery, intravascular location of the needle tip was confirmed on ultrasound and documented in PACS. A small skin incision was made, and a 6-Croatian 80 cm sheath was placed. Using an 0.035\" Glidewire Advantage wire and 5-Croatian VTK   catheter, the above vessels were selectively catheterized for angiography.     With the sheath tip positioned in the cervical left internal " carotid artery a construct consisting of a Zoom 71 reperfusion catheter with Phenom 27 over a Fathom microwire was navigated into the distal left ICA. Next a phenom 27 microcatheter was   navigated into the left middle cerebral artery M2 division using a Fathom microwire. The Zoom 71 reperfusion catheter was advanced to the point of the occlusion and the microcatheter was withdrawn. Next, using the ADAPT technique, the Zoom 71 reperfusion   catheter was withdrawn while under continuous suction, as well as continuous aspiration through sheath. This technique was performed for a total of one pass.        At the completion of the procedure, the catheter and sheath were removed and hemostasis in the right groin obtained by placement of a 6-Faroese Angio-seal arteriotomy closure device. The device was deployed without complication.        No new neurological deficits or complications were encountered during or immediately following the procedure.        FINDINGS:  A bovine aortic arch is noted, a normal anatomic variant.        LEFT INTERNAL CAROTID ARTERY, LEFT MIDDLE CEREBRAL ARTERY INJECTION, HEAD VIEWS: Compared to the prior CTA there has been interval thrombolysis with restoration of flow in the left MCA territory. There is however a small nonocclusive thrombus adherent to   the wall of the MCA bifurcation. Overall there is now TICI 2B reperfusion. Following thrombectomy small nonocclusive thrombus remains however distal flow is good.     RIGHT COMMON FEMORAL ARTERY: Single BEAN oblique projection of the right common femoral artery was performed. Normal angiographic appearance of the femoral artery bifurcation with vessel suitable for placement of a vascular closure device.        IMPRESSION:     Interval thrombolysis following TNK.     Small nonocclusive thrombus remains at the MCA bifurcation. Single pass aspiration performed with no change in appearance. Overall there is now TICI 2B flow.     Narrative & Impression  "  PROCEDURE: IR STROKE ALERT     HISTORY: Left MCA syndrome     TIMES:     PUNCTURE: 18:52  FIRST PASS: 18:55 , ADAPT  RECAN: 18:56 , TICI 3     COMPARISON: 7/22/2024     OPERATORS: Oswald     VESSELS SELECTED: Left internal carotid artery; left femoral artery.     COMPLICATIONS: None.     CONTRAST: 130 mL of iodixanol (VISIPAQUE) mL of Visipaque given intra-arterially. Nonionic contrast was utilized for the patient's safety.     SEDATION: Monitored Anesthesia Care administered by the Anesthesiology Department.     TECHNIQUE and INTERVENTION:  The procedure, its risks, benefits, and alternatives were discussed in detail with the patient's son. Risks include but are not limited to: bleeding, infection, nerve injury, vessel injury, groin hematoma, pain, radiation exposure, alopecia, renal   toxicity, renal failure, and allergic reaction to contrast, stroke, blindness, coma, paralysis, death, and the need for additional treatments.  All questions were answered and no guarantees were provided.     After informed consent the patient was brought into the angiography suite and placed supine on the angiographic table.  The left groin was prepped and draped using sterile technique. The skin overlying the left femoral artery was locally anesthetized   with lidocaine. Ultrasound guidance was used to evaluate the left groin site and patency of the left femoral artery was noted.  Using standard micropuncture kit with ultrasound guidance under realtime visualization the micropuncture needle was advanced   into the left femoral artery, intravascular location of the needle tip was confirmed on ultrasound and documented in PACS. A small skin incision was made, and a 6-Indian 80 cm sheath was placed. Using an 0.035\" Glidewire Advantage wire and 5-Indian VTK   catheter, the above vessels were selectively catheterized for angiography.     With the neuron Max sheath tip positioned in the cervical left internal carotid artery a " construct consisting of a Zoom 71 reperfusion catheter with Phenom 27 over a Fathom microwire was navigated into the distal left ICA. Next a phenom 27 microcatheter   was navigated into the left middle cerebral artery M2 division using a Fathom microwire. The Zoom 71 reperfusion catheter was advanced to the point of the occlusion and the microcatheter was withdrawn. Next, using the ADAPT technique, the Zoom 71   reperfusion catheter was withdrawn while under continuous suction, as well as continuous aspiration through sheath. This technique was performed for a total of one pass.        At the completion of the procedure, the catheter and sheath were removed and hemostasis in the left groin obtained by placement of a 6-Bermudian Angio-seal arteriotomy closure device. The device was deployed without complication.        No new neurological deficits or complications were encountered during or immediately following the procedure.        FINDINGS:  A bovine aortic arch is noted, a normal anatomic variant.        LEFT INTERNAL CAROTID ARTERY INJECTION, HEAD VIEWS: There is occlusion of the proximal left middle cerebral artery M1 segment, TICI 0. Following mechanical thrombectomy there is revascularization of the left MCA territory, TICI 3.     LEFT COMMON FEMORAL ARTERY: Single Latvian oblique projection of the left common femoral artery was performed. Normal angiographic appearance of the femoral artery bifurcation with vessel suitable for placement of a vascular closure device.       IMPRESSION:     Left MCA M1 occlusion,  TICI 0.     Successful mechanical thrombectomy with revascularization of the left MCA territory, TICI 3.     Significant Findings / Test Results:   Narrative & Impression   MRI BRAIN WITHOUT CONTRAST     INDICATION: Stroke.     COMPARISON:   Brain MRI 7/23/2024.     TECHNIQUE:  Multiplanar, multisequence imaging of the brain was performed.        IMAGE QUALITY:  Diagnostic.     FINDINGS:     BRAIN  PARENCHYMA:     Interval evolution and mild worsening of the left MCA territory infarct involving caudate, lentiform nucleus, anterior internal capsule, and smaller foci in the frontal operculum and insula. Grossly stable foci of petechial hemorrhage. There is edema   without significant mass effect. No midline shift.     Stable chronic bilateral cerebellar lacunar infarcts.     Parenchymal volume loss. Nonspecific  foci of T2/FLAIR hyperintensities involving periventricular and subcortical white matter, most compatible with mild to moderate microangiopathic change.        VENTRICLES:  Normal for the patient's age.     SELLA AND PITUITARY GLAND:  Normal.     ORBITS:  Normal.     PARANASAL SINUSES:  Normal.     VASCULATURE:  Evaluation of the major intracranial vasculature demonstrates appropriate flow voids.     CALVARIUM AND SKULL BASE:  Normal.     Minimal right mastoid effusion.     EXTRACRANIAL SOFT TISSUES:  Normal.     IMPRESSION:     1.  Compared to MRI 7/23/2024, evolution and mild worsening of the recent left MCA territory infarct involving caudate, lentiform nucleus, anterior internal capsule, and smaller foci in the frontal operculum and insula. Grossly stable foci of petechial   hemorrhage. No significant mass effect.  2.  Stable chronic bilateral cerebellar lacunar infarcts.  3.  Chronic microangiopathic change.                 Narrative & Impression   MRI BRAIN WITHOUT CONTRAST     INDICATION: Stroke.     COMPARISON:   Brain MRI 7/23/2024.     TECHNIQUE:  Multiplanar, multisequence imaging of the brain was performed.        IMAGE QUALITY:  Diagnostic.     FINDINGS:     BRAIN PARENCHYMA:     Interval evolution and mild worsening of the left MCA territory infarct involving caudate, lentiform nucleus, anterior internal capsule, and smaller foci in the frontal operculum and insula. Grossly stable foci of petechial hemorrhage. There is edema   without significant mass effect. No midline shift.     Stable  chronic bilateral cerebellar lacunar infarcts.     Parenchymal volume loss. Nonspecific  foci of T2/FLAIR hyperintensities involving periventricular and subcortical white matter, most compatible with mild to moderate microangiopathic change.        VENTRICLES:  Normal for the patient's age.     SELLA AND PITUITARY GLAND:  Normal.     ORBITS:  Normal.     PARANASAL SINUSES:  Normal.     VASCULATURE:  Evaluation of the major intracranial vasculature demonstrates appropriate flow voids.     CALVARIUM AND SKULL BASE:  Normal.     Minimal right mastoid effusion.     EXTRACRANIAL SOFT TISSUES:  Normal.     IMPRESSION:     1.  Compared to MRI 7/23/2024, evolution and mild worsening of the recent left MCA territory infarct involving caudate, lentiform nucleus, anterior internal capsule, and smaller foci in the frontal operculum and insula. Grossly stable foci of petechial   hemorrhage. No significant mass effect.  2.  Stable chronic bilateral cerebellar lacunar infarcts.  3.  Chronic microangiopathic change.                      Reason for Admission: stroke like sx    Hospital Course:     Brianna Baum is a 74 y.o. female patient who originally presented to the hospital on 7/22/2024 with recent diagnosis of A-fib not on Eliquis, presented to [facility] with complaint of dizziness/confusion, speech abnormality.  Patient noted to be in a flutter/A-fib.  Patient received TNK on 7/22 at 9:52 PM. 7/22 single pass thrombectomy attempted without significant change. Non-occlusive lesion remained, TICI 2B. MRI brain with Stable acute infarct centered in the left basal ganglia with superimposed petechial hemorrhage. No significant mass effect. On 7/24 patient was noted to worsened aphasia, not eligible for TNK. Underwent endovascular intervention with resultant TICI 3.  Patient required critical care stay briefly for vasopressors due to hypotension, subsequently was weaned off of it and started on midodrine.   "Endocrinology was consulted for concern for adrenal insufficiency however thought to be low risk.  Patient started on heparin infusion after neurology cleared patient.  Patient subsequently switched over to Eliquis.  Patient is ready for discharge to rehab.    Please see above list of diagnoses and related plan for additional information.     Condition at Discharge: good     Discharge Day Visit / Exam:     Subjective: Patient denies any headache, nausea, vomiting.      Vitals: Blood Pressure: 118/62 (07/31/24 0727)  Pulse: 68 (07/31/24 0727)  Temperature: 98.8 °F (37.1 °C) (07/31/24 0727)  Temp Source: Oral (07/30/24 1917)  Respirations: 20 (07/31/24 0727)  Height: 5' 5\" (165.1 cm) (07/23/24 1100)  Weight - Scale: 83.1 kg (183 lb 4.8 oz) (07/31/24 0600)  SpO2: 95 % (07/31/24 0727)  Exam:   Physical Exam  Constitutional:       Appearance: She is well-developed.   HENT:      Head: Normocephalic and atraumatic.   Pulmonary:      Effort: Pulmonary effort is normal. No respiratory distress.      Breath sounds: Normal breath sounds.   Musculoskeletal:      Cervical back: Normal range of motion.   Skin:     General: Skin is warm and dry.           Discharge instructions/Information to patient and family:   See after visit summary for information provided to patient and family.      Provisions for Follow-Up Care:  See after visit summary for information related to follow-up care and any pertinent home health orders.      Disposition:     Other Skilled Nursing Facility at [facility]       Planned Readmission: no     Discharge Statement:  I spent 45 minutes discharging the patient. This time was spent on the day of discharge. I had direct contact with the patient on the day of discharge. Greater than 50% of the total time was spent examining patient, answering all patient questions, arranging and discussing plan of care with patient as well as directly providing post-discharge instructions.  Additional time then spent on " discharge activities.    Discharge Medications:  See after visit summary for reconciled discharge medications provided to patient and family.      ** Please Note: This note has been constructed using a voice recognition system **

## 2024-07-31 NOTE — CASE MANAGEMENT
Case Management Discharge Planning Note    Patient name Brianna AVILES Fly  Location [facility] 726/[facility] 726-01 MRN [id number]  : 1950 Date 2024       Current Admission Date: 2024  Current Admission Diagnosis:Cerebrovascular accident (CVA) due to occlusion of left middle cerebral artery (HCC)   Patient Active Problem List    Diagnosis Date Noted Date Diagnosed    Low serum cortisol level 2024     Atrial fibrillation/A flutter (HCC) 2024     Cerebrovascular accident (CVA) due to occlusion of left middle cerebral artery (HCC) 2024     Atrial flutter by electrocardiogram (HCC) 2024     Continuous opioid dependence (HCC) 2024     Situational anxiety 2024     CKD (chronic kidney disease) stage 2, GFR 60-89 ml/min 2024     Vertigo 2022     Mass of soft tissue of hand 12/15/2021     Hypertension      Acute cholecystitis 2021     Primary osteoarthritis of left hand 2020     Varicose veins of both lower extremities without ulcer or inflammation 2020     Obesity (BMI 30.0-34.9) 2019     Chronic neck pain 2018     Chronic bilateral low back pain with bilateral sciatica 2018     Gastroesophageal reflux disease without esophagitis 2018     Mixed hyperlipidemia 2018     Myalgia 2018     Urge incontinence of urine 2018       LOS (days): 9  Geometric Mean LOS (GMLOS) (days): 4  Days to GMLOS:-4.4     OBJECTIVE:  Risk of Unplanned Readmission Score: 12.38         Current admission status: Inpatient   Preferred Pharmacy:   [facility]  for [place], [place] - [address] [place] [address]  Phone: [phone] Fax: [phone]    [facility] - [place], [place] - [address] [place] [address]  Phone: [phone] Fax: [phone]    [facility] - [place], [place] - [address]  [address]  [place] [address]  Phone: [phone] Fax: [phone]    [facility] - [place] - [address]  [address]  [place] [address]  Phone: [phone] Fax: [phone]    Primary Care Provider: LADONNA Madden    Primary Insurance: MEDICARE  Secondary Insurance:  FOR LIFE    DISCHARGE DETAILS:    Discharge planning discussed with:: Pt son Zachariah  Freedom of Choice: Yes  Comments - Freedom of Choice: Pt and son in agreement with [facility]  CM contacted family/caregiver?: Yes  Were Treatment Team discharge recommendations reviewed with patient/caregiver?: Yes  Did patient/caregiver verbalize understanding of patient care needs?: Yes  Were patient/caregiver advised of the risks associated with not following Treatment Team discharge recommendations?: Yes    Contacts  Patient Contacts: Zachariah Eason  Relationship to Patient:: Family (son)  Contact Method: Phone  Phone Number: [phone]  Reason/Outcome: Continuity of Care, Emergency Contact, Discharge Planning    Requested Home Health Care         Is the patient interested in HHC at discharge?: No    DME Referral Provided  Referral made for DME?: No    Other Referral/Resources/Interventions Provided:  Interventions: Acute Rehab, Prescription Price Check  Referral Comments: Price check completed to [facility]. Pt  is incorrect with [facility] (prescription plan) unable to chage this until documentation is received. [facility] CM will assist pt son in getting documentation together    Would you like to participate in our [facility] service program?  : No - Declined    Treatment Team Recommendation: Acute Rehab  Discharge Destination Plan:: Acute Rehab  Transport at Discharge : Family           ETA of Transport (Date): 24  ETA of Transport (Time): 1100        Accompanied by: Family member      Additional Comments: Pt son will  pt at 11am and transport to [facility]

## 2024-07-31 NOTE — ASSESSMENT & PLAN NOTE
74-year-old female with newly diagnosed A-fib that presented as a stroke alert with symptoms of near syncope and speech difficulties.  Initial NIHSS of 0.  It seems upon return from CT her NIHSS was 7 with aphasia, left gaze preference and right facial droop.   Initial CT head with no acute intracranial abnormalities. CTA head and neck with focal occlusion distal left M1 at the bifurcation  Patient received TNK on 7/22 at 9:52 PM  7/22 single pass thrombectomy attempted without significant change. Non-occlusive lesion remained, TICI 2B  MRI brain with Stable acute infarct centered in the left basal ganglia with superimposed petechial hemorrhage. No significant mass effect.  On 7/24 patient was noted to worsened aphasia, not eligible for TNK. Underwent endovascular intervention with resultant TICI 3.   Neurology and neurosurgery input noted  TTE (7/23): EF 55%, LA moderately dilated, no PFO  Repeat MRI brain 7/26 with evolution and mild worsening of the recent left MCA territory infarct involving caudate, lentiform nucleus, anterior internal capsule, and smaller foci in the frontal operculum and insula. Grossly stable foci of petechial hemorrhage. No significant mass effect.  Distal left M1 occlusion in the setting of atrial fibrillation off anticoagulation due to cost s/p TNK   Continue atorvastatin   Was on heparin infusion. However after discussion with CM, decision was reached to initiate Eliquis and discharge patient to rehab.

## 2024-07-31 NOTE — ASSESSMENT & PLAN NOTE
Patient had a random cortisol level drawn on 7/26 that was 5  No suspicion of adrenal insufficiency as cortisol level was drawn in the evening  While in the ICU, required vasopressors for blood pressure support therefore was started in the ICU on on hydrocortisone 50 mg IV twice daily and midodrine 10 mg 3 times daily AC for concern of AI   Cortisol level this morning off Cortef was 13, discussed with endocrinology and no need for ACTH stim test.  Low suspicion for adrenal insufficiency  Will keep off Cortef.  BP has been stable, wean down midodrine on discharge from 10 mg take 2 to 5 mg 3 times daily.  May discontinue on rehab if BP continues to be stable.

## 2024-07-31 NOTE — PROGRESS NOTES
"Cardiology Progress Note - Team 2  Brianna Baum 74 y.o. female MRN: [id number]  Unit/Bed#: [facility] 726-01 Encounter: [id number]      Assessment:      # Paroxysmal atrial fibrillation/atrial flutter--patient spontaneously converted back to sinus rhythm. SFE1OT1-MPLw score 5.  Patient is indicated for anticoagulation.   Echo on 7/23 revealed LVEF 55%. Patient is on Cardizem po.   Heparin drip has been switched to Eliquis.    # Hypertension--controlled     # CVA s/p thrombectomy--neurology on board, on atorvastatin         Plan:  1.  Continue Cardizem  mg p.o. daily.   2.  Continue anticoagulation with Eliquis  3.  Outpatient follow-up with cardiology    Subjective:   Patient seen and examined.  No significant events overnight.  Patient denies chest pain, shortness of breath, or palpitation.    Objective:     Vitals: Blood pressure 118/62, pulse 68, temperature 98.8 °F (37.1 °C), resp. rate 20, height 5' 5\" (1.651 m), weight 83.1 kg (183 lb 4.8 oz), SpO2 95%., Body mass index is 30.5 kg/m².,   Orthostatic Blood Pressures      Flowsheet Row Most Recent Value   Blood Pressure 118/62 filed at 07/31/2024 0727   Patient Position - Orthostatic VS Lying filed at 07/30/2024 1917              Intake/Output Summary (Last 24 hours) at 7/31/2024 1713  Last data filed at 7/31/2024 0901  Gross per 24 hour   Intake 330 ml   Output 280 ml   Net 50 ml       Physical Exam  Constitutional:       General: She is not in acute distress.     Appearance: She is obese.   HENT:      Head: Normocephalic.   Eyes:      Pupils: Pupils are equal, round, and reactive to light.   Cardiovascular:      Rate and Rhythm: Normal rate and regular rhythm.      Heart sounds: No murmur heard.  Pulmonary:      Effort: Pulmonary effort is normal.      Breath sounds: No wheezing or rales.   Abdominal:      General: There is no distension.      Tenderness: There is no abdominal tenderness.   Musculoskeletal:         General: No swelling.      Right lower leg: " No edema.      Left lower leg: No edema.   Skin:     General: Skin is warm.   Neurological:      Mental Status: She is alert and oriented to person, place, and time.   Psychiatric:         Mood and Affect: Mood normal.          Medications:    No current facility-administered medications for this encounter.    Current Outpatient Medications:     apixaban (Eliquis) 5 mg, Take 1 tablet (5 mg total) by mouth 2 (two) times a day, Disp: 60 tablet, Rfl: 3    atorvastatin (LIPITOR) 40 mg tablet, Take 1 tablet (40 mg total) by mouth every evening, Disp: , Rfl:     midodrine (PROAMATINE) 10 MG tablet, Take 0.5 tablets (5 mg total) by mouth 3 (three) times a day before meals, Disp: , Rfl:     polyethylene glycol (MIRALAX) 17 g packet, Take 17 g by mouth daily, Disp: , Rfl:     senna-docusate sodium (SENOKOT S) 8.6-50 mg per tablet, Take 1 tablet by mouth daily at bedtime, Disp: , Rfl:     Ascorbic Acid (VITAMIN C) 1000 MG tablet, Take 1,000 mg by mouth daily, Disp: , Rfl:     Biotin 10 MG CAPS, Take by mouth in the morning, Disp: , Rfl:     diltiazem (CARDIZEM CD) 180 mg 24 hr capsule, Take 1 capsule (180 mg total) by mouth daily, Disp: 30 capsule, Rfl: 5    esomeprazole (NexIUM) 40 MG capsule, TAKE 1 CAPSULE DAILY, Disp: 90 capsule, Rfl: 3     Labs & Results:      Results from last 7 days   Lab Units 07/30/24  0630 07/29/24  0346 07/27/24  0356   WBC Thousand/uL 7.97 7.86 10.01   HEMOGLOBIN g/dL 10.9* 10.3* 11.1*   HEMATOCRIT % 33.4* 31.4* 34.4*   PLATELETS Thousands/uL 201 165 189         Results from last 7 days   Lab Units 07/30/24  0630 07/29/24  0346 07/28/24  0415 07/27/24  0416 07/26/24  0433   POTASSIUM mmol/L 3.5 3.4* 4.0 4.1 4.2   CHLORIDE mmol/L 102 104 105 106 107   CO2 mmol/L 32 30 29 25 23   BUN mg/dL 14 18 17 12 12   CREATININE mg/dL 0.92 0.79 0.75 0.68 0.71   CALCIUM mg/dL 8.7 9.0 9.0 9.0 9.0   ALK PHOS U/L  --   --  52 60 66   ALT U/L  --   --  17 22 29   AST U/L  --   --  14 17 26     Results from last 7  days   Lab Units 07/31/24  0916 07/30/24  0724 07/29/24  1626 07/25/24  1735 07/25/24  1302   INR   --   --   --   --  1.13   PTT seconds 32 67* 54*   < > 28    < > = values in this interval not displayed.     Results from last 7 days   Lab Units 07/28/24  0415 07/27/24  0416 07/26/24  0433   MAGNESIUM mg/dL 1.8* 2.2 1.8*         Vida Claire MD  Cardiology Fellow   PGY-4

## 2024-08-01 ENCOUNTER — PATIENT OUTREACH (OUTPATIENT)
Dept: CASE MANAGEMENT | Facility: OTHER | Age: 74
End: 2024-08-01

## 2024-08-01 NOTE — PROGRESS NOTES
Outpatient care management referral via HRR report 8/1/24. Discharged to [facility] 7/31/24. Email sent to facility to inform them I will be following the patient during their skilled stay.  This Admin Coordinator will continue to monitor via chart review.

## 2024-08-05 ENCOUNTER — TRANSITIONAL CARE MANAGEMENT (OUTPATIENT)
Dept: FAMILY MEDICINE CLINIC | Facility: HOSPITAL | Age: 74
End: 2024-08-05

## 2024-08-07 ENCOUNTER — PATIENT OUTREACH (OUTPATIENT)
Dept: CASE MANAGEMENT | Facility: OTHER | Age: 74
End: 2024-08-07

## 2024-08-07 ENCOUNTER — TELEPHONE (OUTPATIENT)
Dept: NEUROLOGY | Facility: CLINIC | Age: 74
End: 2024-08-07

## 2024-08-07 NOTE — PROGRESS NOTES
Call placed to [facility] who confirmed the patient is currently admitted to their facility for STR no LCD at this time. This Admin Coordinator will continue to monitor via chart review throughout the SNF/STR Surveillance episode.

## 2024-08-07 NOTE — TELEPHONE ENCOUNTER
Post CVA Discharge Follow Up  Hospitalization: 7/22/24-7/31/24    According to chart, patient discharged to [facility].  Called facility and reached the . Requested to speak with the patient's nurse to obtain an update. Line rang multiple times with no answer. No option to leave a voice message.

## 2024-08-09 NOTE — TELEPHONE ENCOUNTER
Post CVA Discharge Follow Up  Hospitalization: 7/22/24-7/31/24    According to chart, patient discharged to [facility].  Called facility and reached the . Requested to speak with the patient's nurse to obtain an update. Line rang multiple times with no answer. No option to leave a voice message.

## 2024-08-15 ENCOUNTER — PATIENT OUTREACH (OUTPATIENT)
Dept: CASE MANAGEMENT | Facility: OTHER | Age: 74
End: 2024-08-15

## 2024-08-15 ENCOUNTER — PATIENT OUTREACH (OUTPATIENT)
Dept: FAMILY MEDICINE CLINIC | Facility: HOSPITAL | Age: 74
End: 2024-08-15

## 2024-08-15 DIAGNOSIS — Z71.89 COMPLEX CARE COORDINATION: Primary | ICD-10-CM

## 2024-08-15 NOTE — PROGRESS NOTES
Call placed to [facility] who confirmed the patient is currently admitted to their facility for STR no LCD at this time. This Admin Coordinator will continue to monitor via chart review throughout the SNF/STR Surveillance episode.

## 2024-08-15 NOTE — PROGRESS NOTES
Email received from [facility] the patient discharged 8/10/24 to Home. I have removed myself off of the care team and sent a inbasket to the appropriate care  to notifying them of the SNF discharge and HRR Referal.  Ambulatory referral placed for complex care management.  Discharge paperwork scanned into EPIC under media tab.

## 2024-08-16 ENCOUNTER — PATIENT OUTREACH (OUTPATIENT)
Dept: CASE MANAGEMENT | Facility: OTHER | Age: 74
End: 2024-08-16

## 2024-08-16 DIAGNOSIS — Z71.89 COMPLEX CARE COORDINATION: Primary | ICD-10-CM

## 2024-08-16 NOTE — PROGRESS NOTES
Outpatient Care Management Note:    New HRR hand off received.  Patient was hospitalized from 7/22-7/31/24 with CVA.  She presented with complaints of dizziness, confusion  and speech abnormality. MRI showed stable, acute infarct. Noted to be in Afib/flutter. Notes state she was not on anticoagulation due to cost. Eventually, cleared by neurology for eliquis.  Discharged to [facility] and then discharged to home on 8/10.     CM called Brianna, introduced myself and the care management program. She states that [facility] is with her currently.  CM will call back another time.     CM called Brianna back.  She states that she has overall weakness. She denies any other residual effects from her stroke. We did discuss warning signs of stroke and that she should call 911 with any of these symptoms. Brianna was uncertain if she will be getting physical therapy. CM will call [facility].      Brianna states that she had a friend helping her with groceries, housework etc, but he is no longer helping. She states that she orders her groceries on line.  She uses [facility] and they deliver her meds.     Brianna manages her own medications. We completed a med review. She is now taking several meds that are not on her epic med list: 1. Acetaminophen: 325 mg, 2 tablets, every 6 hours as needed. 2. Pantoprazole: 40 mg, 1 tablet, daily. 3. Simethicone 125 mg chewable, 4 times per day, as needed.     She is taking her biotin, 10 mg, 2 tablets daily not 1 tablet as ordered.     She is no longer taking nexium, midodrine, miralax or senokot.     We did review blood thinner safety. We reviewed that she should seek emergency care for any injury with head impact.  Brianna states that the eliquis was going to cost her $500 which she can not afford.  She received the first month free. She agreed to outreach by our social work team to see if she would qualify for a patient assistance program.      DYLON gave Brianna  my contact information. She knows that my phone goes through my computer and I do not get messages after hours or on weekends. She is aware to call her PCP office directly with any immediate questions.      CM called [facility].  Brianna will be followed by nursing, physical therapy and social work.

## 2024-08-19 ENCOUNTER — TELEPHONE (OUTPATIENT)
Age: 74
End: 2024-08-19

## 2024-08-19 ENCOUNTER — PATIENT OUTREACH (OUTPATIENT)
Dept: CASE MANAGEMENT | Facility: OTHER | Age: 74
End: 2024-08-19

## 2024-08-19 NOTE — PROGRESS NOTES
[facility]   Farzaneh DOUGHERTY    Assessment/Plan:   1. Cerebrovascular accident (CVA) due to occlusion of left middle cerebral artery (HCC)  Assessment & Plan:  Recently hospitalized on 7/22/24 for CVA d/t focal occlusion distal left M1 at bifurcation.  S/p TNK & single pass thrombectomy which was unsuccessful.  MRI Brain +stable left basal ganglia infarct with superimposed petechial hemorrhage. Stroke alert on 7/24/24; underwent endovascular intervention.    -stabilized and discharged to [facility] on Eliquis, Cardizem and atorvastatin.  Completed therapy and discharged home 8/10/24 with [facility] PT/OT.   -denies new/worsening neurologic s/s today.  Checking Bp, daily weights and O2 sats which are stable.  Adherent to medicinal interventions without s/e, s/s bleed.   -f/u scheduled with [facility] 9/9/24  -cardiology f/u 9/27/24  -neurology f/u 11/24    2. Atrial fibrillation, unspecified type (HCC)  Assessment & Plan:  New onset prior to recent hospitalization.  Had not picked up eliquis due to cost.  S/p recent CVA left M1  -denies dyspnea, chest pressure/pain/palpitations.  Adherent to Cardizem, eliquis, statin  -S1S2 regular on exam.   -cardiology f/u scheduled 9/27/24    Echo 7/23/24:    Left Ventricle: Left ventricular cavity size is normal. Wall thickness is normal. The left ventricular ejection fraction is 55%. Systolic function is normal. Wall motion is normal. Diastolic function is normal.    Right Ventricle: Right ventricular cavity size is normal. Systolic function is normal.    Left Atrium: The atrium is moderately dilated (42-48 mL/m2).    Right Atrium: The atrium is normal in size.    Atrial Septum: No patent foramen ovale detected, confirmed at rest using agitated saline contrast, confirmed by provocation with cough, using agitated saline contrast and with valsalva, using agitated saline contrast.    Mitral Valve: There is mild regurgitation.    Tricuspid Valve: There is mild regurgitation.     Pulmonic Valve: There is mild regurgitation.    Aorta: There is probably a calcified atheroma in the aortic arch.    Prior TTE study available for comparison. Prior study date: 10/20/2023. No significant changes noted compared to the prior study.     3. Primary hypertension  Assessment & Plan:  Bp stable.  Continue Cardizem  Orders:  -     Comprehensive metabolic panel; Future  4. CKD (chronic kidney disease) stage 2, GFR 60-89 ml/min  Assessment & Plan:  Lab Results   Component Value Date    EGFR 61 07/30/2024    EGFR 73 07/29/2024    EGFR 78 07/28/2024    CREATININE 0.92 07/30/2024    CREATININE 0.79 07/29/2024    CREATININE 0.75 07/28/2024    Kidney function at baseline.  Avoid nephrotoxic agents.  Optimize hydration/nutrition  -recheck CMP  Orders:  -     Comprehensive metabolic panel; Future  5. Low serum cortisol level  Assessment & Plan:  Bp stable off midodrine since hospital discharge.  Endocrine r/o adrenal insufficiency as inpatient.   6. Sacral back pain  -     XR hips bilateral 3-4 vw w pelvis if performed; Future; Expected date: 08/20/2024  7. B12 deficiency  -     Vitamin B12; Future  8. Anemia, unspecified type  -     CBC and differential; Future      Xray pelvis  Recheck CBCD, CMP, B12 level  Schedule eye exam with your ophthalmologist  Return in about 4 weeks (around 9/17/2024) for Recheck.  Patient may call or return to office with any questions or concerns.     ______________________________________________________________________  Subjective:     Patient ID: Brianna Baum is a 74 y.o. female.  Brianna Baum  Chief Complaint   Patient presents with    Transition of Care Management     Here for transition of care.  Hospitalized on 7/22/24 after presenting to the hospital with dizziness/confusion and impaired speech.  Recent dx of Afl/fib and was put on Cardizem/Eliquis.  Unfortunately she had not picked up the Eliquis due to cost.  She received TNK; single pass thrombectomy attempted without  improvement.  Non-occlusive lesion remained.  MRI brain +stable acute infarct left basal ganglia with superimposed petechial hemorrhage.  Stroke alert called 7/24 for worsening aphasia and underwent endovascular intervention at that time.  She had hypotension requiring vasopressors -transitioned to midodrine.  Endocrine r/o adrenal insufficiency.  Was discharged to [facility] on 7/29/24.  She complete therapy and was discharge home with [facility] on 8/10/24.    Today she denies any new/worsening stroke-like symptoms.  Persistent daily HA since CVA; 7/10 currently left sided.  Unchanged since onset.  Taking tylenol.  Mild MILY weakness improving.  Frustrated over word finding trouble.  While she is ambulating independently she requires some assistance with ADLs mainly dressing.  She admits to a fall on her buttocks while getting into her tub when she got home from [facility] on 8/10/24.  Her son helped her up; she did not seek healthcare services at that time.  Denies head strike/LOC but reports painful coccyx region.  Has to change positions frequently for comfort.          Getting blood pressure, O2 sat and weighing self daily.    Able to get eliquis through mailorder for $38  Has f/u with [facility] doctor scheduled for 9/9/24  Getting homecare 3x weekly PT/OT             The following portions of the patient's history were reviewed and updated as appropriate: allergies, current medications, past family history, past medical history, past social history, past surgical history, and problem list.    Review of Systems   Constitutional: Negative.  Negative for activity change, appetite change, chills, fatigue and fever.   HENT: Negative.  Negative for congestion, ear pain, nosebleeds, postnasal drip and sinus pain.    Eyes:  Positive for visual disturbance.   Respiratory: Negative.  Negative for cough, chest tightness and shortness of breath.    Cardiovascular:  Positive for palpitations. Negative for chest pain and leg swelling.    Gastrointestinal: Negative.  Negative for constipation and diarrhea.        Moves bowels daily   Endocrine: Negative.    Genitourinary: Negative.  Negative for difficulty urinating and dysuria.   Musculoskeletal:  Positive for back pain.   Skin: Negative.    Allergic/Immunologic: Negative.  Negative for immunocompromised state.   Neurological:  Positive for facial asymmetry, speech difficulty, weakness and headaches. Negative for dizziness, seizures and light-headedness.   Hematological: Negative.  Does not bruise/bleed easily.   Psychiatric/Behavioral: Negative.  Negative for dysphoric mood and sleep disturbance.          Objective:      Vitals:    08/20/24 1447   BP: 116/70   Pulse: 80   SpO2: 96%      Physical Exam  Vitals and nursing note reviewed. Exam conducted with a chaperone present.   Constitutional:       Appearance: Normal appearance.   HENT:      Head: Normocephalic and atraumatic.      Right Ear: Tympanic membrane, ear canal and external ear normal.      Left Ear: Tympanic membrane, ear canal and external ear normal.      Nose: Nose normal.      Mouth/Throat:      Mouth: Mucous membranes are moist.      Pharynx: Oropharynx is clear.   Eyes:      Extraocular Movements: Extraocular movements intact.      Conjunctiva/sclera: Conjunctivae normal.      Pupils: Pupils are equal, round, and reactive to light.   Cardiovascular:      Rate and Rhythm: Normal rate and regular rhythm.      Pulses: Normal pulses.      Heart sounds: Normal heart sounds.   Pulmonary:      Effort: Pulmonary effort is normal.      Breath sounds: Normal breath sounds.   Abdominal:      General: Bowel sounds are normal.      Palpations: Abdomen is soft.   Musculoskeletal:         General: Normal range of motion.      Cervical back: Normal range of motion and neck supple.      Comments: Sacral/coccyx tenderness with palpation.  No bruising   Skin:     General: Skin is warm and dry.   Neurological:      General: No focal deficit present.  "     Mental Status: She is alert and oriented to person, place, and time.      Cranial Nerves: Facial asymmetry present.      Motor: Weakness present.      Comments: Mild MILY weakness.   Psychiatric:         Mood and Affect: Mood normal.         Behavior: Behavior is slowed.         Thought Content: Thought content normal.         Judgment: Judgment normal.      Comments: Trouble word finding.           Portions of the record may have been created with voice recognition software. Occasional wrong word or \"sound alike\" substitutions may have occurred due to the inherent limitations of voice recognition software. Please review the chart carefully and recognize, using context, where substitutions/typographical errors may have occurred.       "

## 2024-08-19 NOTE — TELEPHONE ENCOUNTER
Pt called back in and stats still has some trouble with forgetting things but feels okay.     Please contact pt with any further questions. Thank you.

## 2024-08-19 NOTE — TELEPHONE ENCOUNTER
Post CVA Discharge Follow Up  Hospitalization: 7/22/24-7/31/24    Called patient to obtain an update. There was no answer. Left a voice message requesting for a return call. Provided the office's phone number.

## 2024-08-19 NOTE — TELEPHONE ENCOUNTER
Pt called in to make an appt.  Advised she has a hfu appt set up    Pt was not sure why she calling to make an appointment since she feels good, just forgetting things.     If anything needed a call back is okay  [phone]

## 2024-08-19 NOTE — TELEPHONE ENCOUNTER
Post CVA Discharge Follow Up  Hospitalization: 7/22/24-7/31/24    Returned call to the patient to obtain an update. Since discharge, she denies experiencing any new or worsening stroke-like symptoms.      Patient reports she continues to have the following symptoms: residual right arm weakness and speech difficulties. She describes her speech difficulties as difficulty with remembering words at times. She claims her symptoms has improved since discharge.    She is ambulating independently as well as preforming her own ADLs. Patient manages her own medications, appointments, and affairs.    Reviewed appointments - patient is scheduled for the following: PCP appointment tomorrow on 8/20/24, urology on 8/21/24, cardiology on 9/27/24, and neurology on 11/12/24. Offered to mail neurology appointment reminder card, patient is agreeable to this. Placed in mail.     Reviewed neurology-related medications with her. Reports she is taking as prescribed with no medication side effects or signs of bleeding.    During this call, we reviewed stroke-like symptoms, personal risk factors and management, medications.    As for risk factors, patient reports monitoring her BP at home. <130/80  She is a non smoker.   Encouraged patient to follow a well balanced diet.     All of her questions were addressed. At the conclusion of the conversation, patient denies having any further questions or concerns.

## 2024-08-19 NOTE — PROGRESS NOTES
OP CM rcvd referral on pt for the cost of Eliquis.  Reviewed income limits for [employer] and pt states she is over income.  Explained there is a program through  and I can email her the form and she can complete and bring to PCP to complete PCP part as well.  Pt confirms she has a printer.  Pt advised she will also need last years tax return, a letter from her RX showing she spent at least 3 percent of her income on medications and a copy of her prescription cards.  Once this is completed the form can be faxed to [facility] at [phone].  Pt also made aware to speak to prescidevin Boogie in regards to alternatives as this application can take up to a couple months for determination.  Pt states she has to get off the phone.      UPDATE:  Neither of pts emails are working.  Called back to pt for updated email and pt is not available so left message requesting call back.

## 2024-08-20 ENCOUNTER — PATIENT OUTREACH (OUTPATIENT)
Dept: CASE MANAGEMENT | Facility: OTHER | Age: 74
End: 2024-08-20

## 2024-08-20 ENCOUNTER — OFFICE VISIT (OUTPATIENT)
Dept: FAMILY MEDICINE CLINIC | Facility: HOSPITAL | Age: 74
End: 2024-08-20
Payer: MEDICARE

## 2024-08-20 VITALS
DIASTOLIC BLOOD PRESSURE: 70 MMHG | HEART RATE: 80 BPM | SYSTOLIC BLOOD PRESSURE: 116 MMHG | OXYGEN SATURATION: 96 % | BODY MASS INDEX: 28.96 KG/M2 | WEIGHT: 174 LBS

## 2024-08-20 DIAGNOSIS — I63.512 CEREBROVASCULAR ACCIDENT (CVA) DUE TO OCCLUSION OF LEFT MIDDLE CEREBRAL ARTERY (HCC): Primary | ICD-10-CM

## 2024-08-20 DIAGNOSIS — E53.8 B12 DEFICIENCY: ICD-10-CM

## 2024-08-20 DIAGNOSIS — R79.89 LOW SERUM CORTISOL LEVEL: ICD-10-CM

## 2024-08-20 DIAGNOSIS — I10 PRIMARY HYPERTENSION: ICD-10-CM

## 2024-08-20 DIAGNOSIS — I48.91 ATRIAL FIBRILLATION, UNSPECIFIED TYPE (HCC): ICD-10-CM

## 2024-08-20 DIAGNOSIS — N18.2 CKD (CHRONIC KIDNEY DISEASE) STAGE 2, GFR 60-89 ML/MIN: ICD-10-CM

## 2024-08-20 DIAGNOSIS — M53.3 SACRAL BACK PAIN: ICD-10-CM

## 2024-08-20 DIAGNOSIS — D64.9 ANEMIA, UNSPECIFIED TYPE: ICD-10-CM

## 2024-08-20 PROBLEM — I48.92 ATRIAL FLUTTER BY ELECTROCARDIOGRAM (HCC): Status: RESOLVED | Noted: 2024-07-18 | Resolved: 2024-08-20

## 2024-08-20 PROBLEM — M79.10 MYALGIA: Status: RESOLVED | Noted: 2018-05-01 | Resolved: 2024-08-20

## 2024-08-20 PROBLEM — F11.20 CONTINUOUS OPIOID DEPENDENCE (HCC): Status: RESOLVED | Noted: 2024-03-13 | Resolved: 2024-08-20

## 2024-08-20 PROCEDURE — 99214 OFFICE O/P EST MOD 30 MIN: CPT | Performed by: NURSE PRACTITIONER

## 2024-08-20 PROCEDURE — G2211 COMPLEX E/M VISIT ADD ON: HCPCS | Performed by: NURSE PRACTITIONER

## 2024-08-20 RX ORDER — PANTOPRAZOLE SODIUM 20 MG/1
20 TABLET, DELAYED RELEASE ORAL DAILY
COMMUNITY
Start: 2024-08-09 | End: 2024-08-29 | Stop reason: SDUPTHER

## 2024-08-20 RX ORDER — ACETAMINOPHEN 325 MG/1
TABLET ORAL
COMMUNITY
Start: 2024-08-09

## 2024-08-20 NOTE — ASSESSMENT & PLAN NOTE
Bp stable off midodrine since hospital discharge.  Endocrine r/o adrenal insufficiency as inpatient.

## 2024-08-20 NOTE — PROGRESS NOTES
"OP CM called back to pt and explained neither of her emails worked to send the Eliquis PAP.  Pt then states she got three months free from  and CM requested the number on her pill container that her hsb was in the army.  Called to  [phone] and spoke to Isai and he verified pts RX for 3 months through mail order is $38 for the three months.  Pt is very thrilled with this news.  Secure Chat sent to PCP to inform her of this information.  Pt does not drive.  Pt states her friend Adrian will be taking her to her appt today.  Pt also belongs to [facility] [employer] and pays $4 each way per trip if needed.  Pt denies any other needs.      Pt states her hsb passed 3 years ago and she misses him like crazy.  Emotional support provided to pt.  Offered pt [facility] and she is not interested.  Pt states she has a good friend Adrian.  Pt also has a Zachariah \"Refugio\" who lives in [place] so he resides 1.5 hours away.  Pt states he jut visited with her and she is so proud of the man he is .  Pt states she is closer to her son than she has ever been.  Pt states she has strong asmita in God and that is what keeps her inspired and grateful for life.  Pt will call back OP CM with any concerns.    "

## 2024-08-22 NOTE — ASSESSMENT & PLAN NOTE
New onset prior to recent hospitalization.  Had not picked up eliquis due to cost.  S/p recent CVA left M1  -denies dyspnea, chest pressure/pain/palpitations.  Adherent to Cardizem, eliquis, statin  -S1S2 regular on exam.   -cardiology f/u scheduled 9/27/24    Echo 7/23/24:    Left Ventricle: Left ventricular cavity size is normal. Wall thickness is normal. The left ventricular ejection fraction is 55%. Systolic function is normal. Wall motion is normal. Diastolic function is normal.    Right Ventricle: Right ventricular cavity size is normal. Systolic function is normal.    Left Atrium: The atrium is moderately dilated (42-48 mL/m2).    Right Atrium: The atrium is normal in size.    Atrial Septum: No patent foramen ovale detected, confirmed at rest using agitated saline contrast, confirmed by provocation with cough, using agitated saline contrast and with valsalva, using agitated saline contrast.    Mitral Valve: There is mild regurgitation.    Tricuspid Valve: There is mild regurgitation.    Pulmonic Valve: There is mild regurgitation.    Aorta: There is probably a calcified atheroma in the aortic arch.    Prior TTE study available for comparison. Prior study date: 10/20/2023. No significant changes noted compared to the prior study.

## 2024-08-22 NOTE — ASSESSMENT & PLAN NOTE
Recently hospitalized on 7/22/24 for CVA d/t focal occlusion distal left M1 at bifurcation.  S/p TNK & single pass thrombectomy which was unsuccessful.  MRI Brain +stable left basal ganglia infarct with superimposed petechial hemorrhage. Stroke alert on 7/24/24; underwent endovascular intervention.    -stabilized and discharged to [facility] on Eliquis, Cardizem and atorvastatin.  Completed therapy and discharged home 8/10/24 with [facility] PT/OT.   -denies new/worsening neurologic s/s today.  Checking Bp, daily weights and O2 sats which are stable.  Adherent to medicinal interventions without s/e, s/s bleed.   -f/u scheduled with [facility] 9/9/24  -cardiology f/u 9/27/24  -neurology f/u 11/24

## 2024-08-22 NOTE — ASSESSMENT & PLAN NOTE
Lab Results   Component Value Date    EGFR 61 07/30/2024    EGFR 73 07/29/2024    EGFR 78 07/28/2024    CREATININE 0.92 07/30/2024    CREATININE 0.79 07/29/2024    CREATININE 0.75 07/28/2024    Kidney function at baseline.  Avoid nephrotoxic agents.  Optimize hydration/nutrition  -recheck CMP

## 2024-08-23 ENCOUNTER — PATIENT OUTREACH (OUTPATIENT)
Dept: CASE MANAGEMENT | Facility: OTHER | Age: 74
End: 2024-08-23

## 2024-08-23 NOTE — PROGRESS NOTES
Outpatient Care Management Note:    DYLON called Brianna. She states that she has not completed her hip xrays or blood work. She has a friend, named Adrian, that will take her.  She denies any pain symptoms. She also needs to schedule with ophthalmology and has a name to call and schedule.     Today, Brianna's BP=92/60. She denies any symptoms of dizziness or lightheadedness.  Her oxygen level was 97% and her weight was 172lbs.  She had [facility]NA staff with her presently. DYLON will call another day to work on initial assessment.

## 2024-08-29 DIAGNOSIS — I63.9 CEREBROVASCULAR ACCIDENT (CVA), UNSPECIFIED MECHANISM (HCC): ICD-10-CM

## 2024-08-29 DIAGNOSIS — I48.92 ATRIAL FLUTTER BY ELECTROCARDIOGRAM (HCC): ICD-10-CM

## 2024-08-29 DIAGNOSIS — K21.9 GASTROESOPHAGEAL REFLUX DISEASE WITHOUT ESOPHAGITIS: Primary | ICD-10-CM

## 2024-08-30 RX ORDER — ATORVASTATIN CALCIUM 40 MG/1
40 TABLET, FILM COATED ORAL EVERY EVENING
Qty: 90 TABLET | Refills: 1 | Status: SHIPPED | OUTPATIENT
Start: 2024-08-30

## 2024-08-30 RX ORDER — DILTIAZEM HYDROCHLORIDE 180 MG/1
180 CAPSULE, COATED, EXTENDED RELEASE ORAL DAILY
Qty: 90 CAPSULE | Refills: 1 | Status: SHIPPED | OUTPATIENT
Start: 2024-08-30 | End: 2025-02-26

## 2024-08-30 RX ORDER — PANTOPRAZOLE SODIUM 20 MG/1
20 TABLET, DELAYED RELEASE ORAL DAILY
Qty: 90 TABLET | Refills: 1 | Status: SHIPPED | OUTPATIENT
Start: 2024-08-30

## 2024-09-04 ENCOUNTER — TELEPHONE (OUTPATIENT)
Dept: FAMILY MEDICINE CLINIC | Facility: HOSPITAL | Age: 74
End: 2024-09-04

## 2024-09-04 NOTE — TELEPHONE ENCOUNTER
Patient's care worker called reporting that patient has ringworm again and is requesting a refill of the medication that was previously prescribed to [facility]

## 2024-09-04 NOTE — TELEPHONE ENCOUNTER
Dr Baum prescribed Lotrisone cream in October 2023 for ringworm. Do you want to prescribe this for her?

## 2024-09-05 DIAGNOSIS — B35.4 TINEA CORPORIS: Primary | ICD-10-CM

## 2024-09-05 RX ORDER — CLOTRIMAZOLE AND BETAMETHASONE DIPROPIONATE 10; .64 MG/G; MG/G
CREAM TOPICAL 2 TIMES DAILY
Qty: 45 G | Refills: 5 | Status: SHIPPED | OUTPATIENT
Start: 2024-09-05

## 2024-09-06 ENCOUNTER — PATIENT OUTREACH (OUTPATIENT)
Dept: CASE MANAGEMENT | Facility: OTHER | Age: 74
End: 2024-09-06

## 2024-09-06 NOTE — PROGRESS NOTES
Outpatient Care Management Note:    Care manager called Brianna. She states that she is still being followed by [facility] for nursing and therapy services.     She has not gotten her blood work completed. CM gave her the number for mobile lab to call and schedule. She does not wish to complete her hip xrays, because she states everything is healed.     She will call her ophthalmologist and schedule a follow up post stroke. She has [facility] transport.     Brianna states that she still gets headaches on and off. I encouraged her to use tylenol for relief. We discussed that she should not take motrin, aleve, advil or ibuprofen, because she is on eliquis.     Brianna states she is getting ankle swelling. Her lasix was stopped in the hospital. I encouraged her to elevate her legs and watch her salt intake. She noted that her legs feel very tight. She denies any open areas or sores.  CM will forward to PCP.    Brianna has my contact information and will call with any questions.

## 2024-09-09 ENCOUNTER — TELEPHONE (OUTPATIENT)
Dept: NEUROLOGY | Facility: CLINIC | Age: 74
End: 2024-09-09

## 2024-09-10 ENCOUNTER — TELEPHONE (OUTPATIENT)
Dept: LAB | Facility: HOSPITAL | Age: 74
End: 2024-09-10

## 2024-09-17 ENCOUNTER — APPOINTMENT (OUTPATIENT)
Dept: RADIOLOGY | Facility: HOSPITAL | Age: 74
End: 2024-09-17
Payer: MEDICARE

## 2024-09-17 ENCOUNTER — TELEPHONE (OUTPATIENT)
Dept: FAMILY MEDICINE CLINIC | Facility: HOSPITAL | Age: 74
End: 2024-09-17

## 2024-09-17 ENCOUNTER — PATIENT OUTREACH (OUTPATIENT)
Dept: CASE MANAGEMENT | Facility: OTHER | Age: 74
End: 2024-09-17

## 2024-09-17 ENCOUNTER — HOSPITAL ENCOUNTER (EMERGENCY)
Facility: HOSPITAL | Age: 74
Discharge: HOME | End: 2024-09-17
Attending: EMERGENCY MEDICINE
Payer: MEDICARE

## 2024-09-17 ENCOUNTER — APPOINTMENT (EMERGENCY)
Dept: RADIOLOGY | Facility: HOSPITAL | Age: 74
End: 2024-09-17
Payer: MEDICARE

## 2024-09-17 VITALS
TEMPERATURE: 98.7 F | SYSTOLIC BLOOD PRESSURE: 125 MMHG | DIASTOLIC BLOOD PRESSURE: 58 MMHG | OXYGEN SATURATION: 99 % | RESPIRATION RATE: 19 BRPM | HEART RATE: 95 BPM

## 2024-09-17 DIAGNOSIS — M25.572 BILATERAL ANKLE PAIN: Primary | ICD-10-CM

## 2024-09-17 DIAGNOSIS — M25.571 BILATERAL ANKLE PAIN: Primary | ICD-10-CM

## 2024-09-17 DIAGNOSIS — I48.92 ATRIAL FLUTTER (HCC): ICD-10-CM

## 2024-09-17 LAB
ALBUMIN SERPL BCG-MCNC: 4.1 G/DL (ref 3.5–5)
ALP SERPL-CCNC: 65 U/L (ref 34–104)
ALT SERPL W P-5'-P-CCNC: 15 U/L (ref 7–52)
ANION GAP SERPL CALCULATED.3IONS-SCNC: 4 MMOL/L (ref 4–13)
APTT PPP: 30 SECONDS (ref 23–34)
AST SERPL W P-5'-P-CCNC: 21 U/L (ref 13–39)
BASOPHILS # BLD AUTO: 0.01 THOUSANDS/ΜL (ref 0–0.1)
BASOPHILS NFR BLD AUTO: 0 % (ref 0–1)
BILIRUB SERPL-MCNC: 0.4 MG/DL (ref 0.2–1)
BUN SERPL-MCNC: 18 MG/DL (ref 5–25)
CALCIUM SERPL-MCNC: 9.4 MG/DL (ref 8.4–10.2)
CARDIAC TROPONIN I PNL SERPL HS: 5 NG/L
CHLORIDE SERPL-SCNC: 106 MMOL/L (ref 96–108)
CO2 SERPL-SCNC: 31 MMOL/L (ref 21–32)
CREAT SERPL-MCNC: 0.86 MG/DL (ref 0.6–1.3)
EOSINOPHIL # BLD AUTO: 0.05 THOUSAND/ΜL (ref 0–0.61)
EOSINOPHIL NFR BLD AUTO: 1 % (ref 0–6)
ERYTHROCYTE [DISTWIDTH] IN BLOOD BY AUTOMATED COUNT: 12.4 % (ref 11.6–15.1)
GFR SERPL CREATININE-BSD FRML MDRD: 66 ML/MIN/1.73SQ M
GLUCOSE SERPL-MCNC: 96 MG/DL (ref 65–140)
HCT VFR BLD AUTO: 41.3 % (ref 34.8–46.1)
HGB BLD-MCNC: 13.7 G/DL (ref 11.5–15.4)
IMM GRANULOCYTES # BLD AUTO: 0.02 THOUSAND/UL (ref 0–0.2)
IMM GRANULOCYTES NFR BLD AUTO: 0 % (ref 0–2)
INR PPP: 1.22 (ref 0.85–1.19)
LYMPHOCYTES # BLD AUTO: 2.07 THOUSANDS/ΜL (ref 0.6–4.47)
LYMPHOCYTES NFR BLD AUTO: 29 % (ref 14–44)
MCH RBC QN AUTO: 31.3 PG (ref 26.8–34.3)
MCHC RBC AUTO-ENTMCNC: 33.2 G/DL (ref 31.4–37.4)
MCV RBC AUTO: 94 FL (ref 82–98)
MONOCYTES # BLD AUTO: 0.47 THOUSAND/ΜL (ref 0.17–1.22)
MONOCYTES NFR BLD AUTO: 7 % (ref 4–12)
NEUTROPHILS # BLD AUTO: 4.41 THOUSANDS/ΜL (ref 1.85–7.62)
NEUTS SEG NFR BLD AUTO: 63 % (ref 43–75)
NRBC BLD AUTO-RTO: 0 /100 WBCS
PLATELET # BLD AUTO: 183 THOUSANDS/UL (ref 149–390)
PMV BLD AUTO: 10.5 FL (ref 8.9–12.7)
POTASSIUM SERPL-SCNC: 3.9 MMOL/L (ref 3.5–5.3)
PROT SERPL-MCNC: 6.5 G/DL (ref 6.4–8.4)
PROTHROMBIN TIME: 15.9 SECONDS (ref 12.3–15)
RBC # BLD AUTO: 4.38 MILLION/UL (ref 3.81–5.12)
SODIUM SERPL-SCNC: 141 MMOL/L (ref 135–147)
WBC # BLD AUTO: 7.03 THOUSAND/UL (ref 4.31–10.16)

## 2024-09-17 PROCEDURE — 36415 COLL VENOUS BLD VENIPUNCTURE: CPT | Performed by: PHYSICIAN ASSISTANT

## 2024-09-17 PROCEDURE — 99285 EMERGENCY DEPT VISIT HI MDM: CPT | Performed by: EMERGENCY MEDICINE

## 2024-09-17 PROCEDURE — 80053 COMPREHEN METABOLIC PANEL: CPT | Performed by: PHYSICIAN ASSISTANT

## 2024-09-17 PROCEDURE — 84484 ASSAY OF TROPONIN QUANT: CPT | Performed by: PHYSICIAN ASSISTANT

## 2024-09-17 PROCEDURE — 85730 THROMBOPLASTIN TIME PARTIAL: CPT | Performed by: PHYSICIAN ASSISTANT

## 2024-09-17 PROCEDURE — 96374 THER/PROPH/DIAG INJ IV PUSH: CPT

## 2024-09-17 PROCEDURE — 71045 X-RAY EXAM CHEST 1 VIEW: CPT

## 2024-09-17 PROCEDURE — 73610 X-RAY EXAM OF ANKLE: CPT

## 2024-09-17 PROCEDURE — 96361 HYDRATE IV INFUSION ADD-ON: CPT

## 2024-09-17 PROCEDURE — 85610 PROTHROMBIN TIME: CPT | Performed by: PHYSICIAN ASSISTANT

## 2024-09-17 PROCEDURE — 85025 COMPLETE CBC W/AUTO DIFF WBC: CPT | Performed by: PHYSICIAN ASSISTANT

## 2024-09-17 PROCEDURE — 93005 ELECTROCARDIOGRAM TRACING: CPT

## 2024-09-17 PROCEDURE — 99285 EMERGENCY DEPT VISIT HI MDM: CPT

## 2024-09-17 RX ORDER — DILTIAZEM HYDROCHLORIDE 5 MG/ML
0.25 INJECTION INTRAVENOUS ONCE
Status: COMPLETED | OUTPATIENT
Start: 2024-09-17 | End: 2024-09-17

## 2024-09-17 RX ADMIN — SODIUM CHLORIDE 1000 ML: 0.9 INJECTION, SOLUTION INTRAVENOUS at 16:15

## 2024-09-17 RX ADMIN — DILTIAZEM HYDROCHLORIDE 20 MG: 5 INJECTION, SOLUTION INTRAVENOUS at 17:19

## 2024-09-17 NOTE — ED PROVIDER NOTES
1. Bilateral ankle pain    2. Atrial flutter (HCC)      ED Disposition       ED Disposition   Discharge    Condition   Stable    Date/Time   Tue Sep 17, 2024  5:41 PM    Comment   Brianna AVILES Fly discharge to home/self care.                   Assessment & Plan       Medical Decision Making  Amount and/or Complexity of Data Reviewed  Labs: ordered.  Radiology: ordered.    Risk  Prescription drug management.      Patient with ankle swelling x 1 year off and on, no acute abnormalities on XRay. No erythema or warmth to ankle, no concern for septic arthritis or gout.  Patient with tachycardia, aflutter on EKG, will obtain labs, CXR to r/o electrolyte abnormality, anemia, pleural effusion.  Patient's rate improved with IV fliuds and cardizem, states she wants to go home.  She states she has an appt with PCP tomorrow for recheck.  Return precautions given.     HEART Risk Score      Flowsheet Row Most Recent Value   Heart Score Risk Calculator    History 0 Filed at: 09/17/2024 1750   ECG 1 Filed at: 09/17/2024 1750   Age 2 Filed at: 09/17/2024 1750   Risk Factors 1 Filed at: 09/17/2024 1750   Troponin 0 Filed at: 09/17/2024 1750   HEART Score 4 Filed at: 09/17/2024 1750               ED Course as of 09/17/24 1751   Tue Sep 17, 2024   1610 Patient found to be in afib with rvr, will obtain labs, give IV fluids.    1717 Patient's heart rate continues to remain in 130s, will give cardizem bolus.    1732 Patient's heart rate improved, currently 74bpm, BP stable.        Medications   sodium chloride 0.9 % bolus 1,000 mL (1,000 mL Intravenous New Bag 9/17/24 1615)   diltiazem (CARDIZEM) injection 20 mg (20 mg Intravenous Given 9/17/24 1719)       History of Present Illness       HPI    Patient is a 75 y/o F that presents to the ED with b/l ankle swelling that has been going on for 1 year.  She states that swelling comes and goes, but seems worse today.  She has pain with the swelling.  No erythema or bruising.  No recent trauma.  No  fevers/chills. She states this morning she felt a fluttering in her chest.  She denies chest pain or SOB.  SHe did take her medications as prescribed.  No headaches, numbness or weakness.     Review of Systems   Constitutional:  Negative for chills and fever.   HENT: Negative.     Respiratory:  Negative for cough and shortness of breath.    Cardiovascular:  Positive for palpitations. Negative for chest pain and leg swelling.   Gastrointestinal:  Negative for abdominal pain, diarrhea, nausea and vomiting.   Musculoskeletal:  Negative for back pain and neck pain.        B/l lateral ankle pain and swelling.    Skin:  Negative for color change, pallor and rash.   Neurological:  Negative for dizziness, facial asymmetry, speech difficulty, weakness, light-headedness, numbness and headaches.   Psychiatric/Behavioral:  Negative for confusion.    All other systems reviewed and are negative.          Objective     ED Triage Vitals   Temperature Pulse Blood Pressure Respirations SpO2 Patient Position - Orthostatic VS   09/17/24 1258 09/17/24 1258 09/17/24 1300 09/17/24 1258 09/17/24 1258 09/17/24 1300   98.7 °F (37.1 °C) 69 149/99 18 99 % Sitting      Temp Source Heart Rate Source BP Location FiO2 (%) Pain Score    09/17/24 1258 09/17/24 1258 09/17/24 1300 -- 09/17/24 1713    Temporal Monitor Right arm  1        Physical Exam  Vitals and nursing note reviewed.   Constitutional:       General: She is not in acute distress.     Appearance: Normal appearance. She is well-developed and well-groomed. She is not ill-appearing or diaphoretic.   HENT:      Head: Normocephalic and atraumatic.      Right Ear: Hearing normal.      Left Ear: Hearing normal.      Nose: Nose normal.   Eyes:      Conjunctiva/sclera: Conjunctivae normal.   Cardiovascular:      Rate and Rhythm: Tachycardia present. Rhythm irregular.   Pulmonary:      Effort: Pulmonary effort is normal.      Breath sounds: Normal breath sounds. No wheezing, rhonchi or rales.    Musculoskeletal:      Cervical back: Normal range of motion and neck supple.      Right knee: Normal.      Left knee: Normal.      Right lower leg: Normal. No edema.      Left lower leg: Normal. No edema.      Right ankle: Swelling (lateral malleolus) present. No deformity or ecchymosis.      Left ankle: Swelling (lateral malleolus) present. No deformity or ecchymosis.      Comments: NO erythema or warmth to ankles.  No swelling to medial malleolus.  No edema to leg.  No tenderness to b/l calf.    Skin:     General: Skin is warm and dry.      Coloration: Skin is not pale.      Findings: No bruising, erythema or wound.   Neurological:      Mental Status: She is alert and oriented to person, place, and time.      Sensory: Sensation is intact.      Motor: Motor function is intact.   Psychiatric:         Behavior: Behavior is cooperative.         Labs Reviewed   PROTIME-INR - Abnormal       Result Value    Protime 15.9 (*)     INR 1.22 (*)     Narrative:     INR Therapeutic Range    Indication                                             INR Range      Atrial Fibrillation                                               2.0-3.0  Hypercoagulable State                                    2.0.2.3  Left Ventricular Asist Device                            2.0-3.0  Mechanical Heart Valve                                  -    Aortic(with afib, MI, embolism, HF, LA enlargement,    and/or coagulopathy)                                     2.0-3.0 (2.5-3.5)     Mitral                                                             2.5-3.5  Prosthetic/Bioprosthetic Heart Valve               2.0-3.0  Venous thromboembolism (VTE: VT, PE        2.0-3.0   APTT - Normal    PTT 30     HS TROPONIN I 0HR - Normal    hs TnI 0hr 5     CBC AND DIFFERENTIAL    WBC 7.03      RBC 4.38      Hemoglobin 13.7      Hematocrit 41.3      MCV 94      MCH 31.3      MCHC 33.2      RDW 12.4      MPV 10.5      Platelets 183      nRBC 0      Segmented % 63       Immature Grans % 0      Lymphocytes % 29      Monocytes % 7      Eosinophils Relative 1      Basophils Relative 0      Absolute Neutrophils 4.41      Absolute Immature Grans 0.02      Absolute Lymphocytes 2.07      Absolute Monocytes 0.47      Eosinophils Absolute 0.05      Basophils Absolute 0.01     COMPREHENSIVE METABOLIC PANEL    Sodium 141      Potassium 3.9      Chloride 106      CO2 31      ANION GAP 4      BUN 18      Creatinine 0.86      Glucose 96      Calcium 9.4      AST 21      ALT 15      Alkaline Phosphatase 65      Total Protein 6.5      Albumin 4.1      Total Bilirubin 0.40      eGFR 66      Narrative:     National Kidney Disease Foundation guidelines for Chronic Kidney Disease (CKD):     Stage 1 with normal or high GFR (GFR > 90 mL/min/1.73 square meters)    Stage 2 Mild CKD (GFR = 60-89 mL/min/1.73 square meters)    Stage 3A Moderate CKD (GFR = 45-59 mL/min/1.73 square meters)    Stage 3B Moderate CKD (GFR = 30-44 mL/min/1.73 square meters)    Stage 4 Severe CKD (GFR = 15-29 mL/min/1.73 square meters)    Stage 5 End Stage CKD (GFR <15 mL/min/1.73 square meters)  Note: GFR calculation is accurate only with a steady state creatinine   HS TROPONIN I 2HR     XR chest 1 view portable   Final Interpretation by Michelle Everett MD (09/17 1634)   No acute consolidation no congestion   Mild cardiomegaly            Workstation performed: [id number]         XR ankle 3+ views LEFT   Final Interpretation by George Childers MD (09/17 1400)      No acute osseous abnormality.         Computerized Assisted Algorithm (CAA) may have been used to analyze all applicable images.               Workstation performed: [id number]             ECG 12 Lead Documentation Only    Date/Time: 9/17/2024 3:56 PM    Performed by: Libertad Subramanian PA-C  Authorized by: Libertad Subramanian PA-C    Indications / Diagnosis:  Tachycardia  ECG reviewed by me, the ED Provider: yes    Patient location:  ED  Previous ECG:      Previous ECG:  Compared to current    Comparison ECG info:  Aflutter now present    Similarity:  Changes noted  Rate:     ECG rate:  119  Rhythm:     Rhythm: atrial flutter    ST segments:     ST segments:  Normal         Libertad Subramanian PA-C  09/17/24 [id number]

## 2024-09-17 NOTE — DISCHARGE INSTRUCTIONS
Follow up with PCP in 1-2 days for recheck.  Return to ER if symptoms worsen.  Continue your current medications.

## 2024-09-17 NOTE — TELEPHONE ENCOUNTER
Spoke with Anjali rashid's HC nurse she reports pt's BP was 144/118 is am, then it was retake 5 mins later at 12/115 and her HR was 122. Pt then took it again at home with her BP cuff and it was 145/107 with . I review this with Brook You and she advise pt go to the Ed for evaluation. Pt aware and will go to the ED

## 2024-09-17 NOTE — PROGRESS NOTES
Outpatient Care Management Note:    ADT alert received. Patient is currently in ER with complaints of ankle swelling, elevated BP and Heart rate by VNA nurse. Nurse called PCP office and was instructed to send Brianna to ER.

## 2024-09-18 ENCOUNTER — VBI (OUTPATIENT)
Dept: FAMILY MEDICINE CLINIC | Facility: HOSPITAL | Age: 74
End: 2024-09-18

## 2024-09-18 ENCOUNTER — OFFICE VISIT (OUTPATIENT)
Dept: FAMILY MEDICINE CLINIC | Facility: HOSPITAL | Age: 74
End: 2024-09-18
Payer: MEDICARE

## 2024-09-18 ENCOUNTER — PATIENT OUTREACH (OUTPATIENT)
Dept: CASE MANAGEMENT | Facility: OTHER | Age: 74
End: 2024-09-18

## 2024-09-18 VITALS
OXYGEN SATURATION: 99 % | RESPIRATION RATE: 18 BRPM | HEART RATE: 66 BPM | DIASTOLIC BLOOD PRESSURE: 72 MMHG | SYSTOLIC BLOOD PRESSURE: 126 MMHG | WEIGHT: 173 LBS | BODY MASS INDEX: 28.79 KG/M2

## 2024-09-18 DIAGNOSIS — I63.512 CEREBROVASCULAR ACCIDENT (CVA) DUE TO OCCLUSION OF LEFT MIDDLE CEREBRAL ARTERY (HCC): Primary | ICD-10-CM

## 2024-09-18 DIAGNOSIS — I48.0 PAROXYSMAL ATRIAL FIBRILLATION (HCC): ICD-10-CM

## 2024-09-18 DIAGNOSIS — I10 PRIMARY HYPERTENSION: ICD-10-CM

## 2024-09-18 LAB
ATRIAL RATE: 330 BPM
QRS AXIS: 15 DEGREES
QRSD INTERVAL: 72 MS
QT INTERVAL: 334 MS
QTC INTERVAL: 469 MS
T WAVE AXIS: 109 DEGREES
VENTRICULAR RATE: 119 BPM

## 2024-09-18 PROCEDURE — 99214 OFFICE O/P EST MOD 30 MIN: CPT | Performed by: NURSE PRACTITIONER

## 2024-09-18 PROCEDURE — 93010 ELECTROCARDIOGRAM REPORT: CPT | Performed by: INTERNAL MEDICINE

## 2024-09-18 PROCEDURE — G2211 COMPLEX E/M VISIT ADD ON: HCPCS | Performed by: NURSE PRACTITIONER

## 2024-09-18 NOTE — PATIENT INSTRUCTIONS
Schedule eye exam with your ophthalmologist  Follow up with cardiology 9/27/24  Follow up with neurology on 11/12/24  Reconsider Flu/Pneumonia vaccine.    Consider shingles and RSV vaccine.

## 2024-09-18 NOTE — ASSESSMENT & PLAN NOTE
Recent CVA in July 2024.  Continues home health services including PT OT nursing 3 times a week.  [facility]24 deemed stable.  She denies any new worsening neurologic signs or symptoms today.  Adherent to diltiazem 180 mg p.o. daily, atorvastatin 40 mg daily, Eliquis 5 mg p.o. daily twice daily.  -Neurology scheduled follow-up 11/12/2024.

## 2024-09-18 NOTE — ASSESSMENT & PLAN NOTE
History of hypertension blood pressure stable continue Cardizem 180 mg p.o. daily.  Recent electrolyte and kidney function stable.  Although up with cardiology scheduled for 9/23/2024.

## 2024-09-18 NOTE — PROGRESS NOTES
Outpatient Care Management Note:    ADT alert received that patient was in ER yesterday with ankle pain and swelling.  No redness or warmth to ankle. No abnormalities noted on xrays. She was found to be in Afib with RVR in 130s. Her rates improved to 74 with cardizem bolus. She is to follow up with her PCP and is scheduled for today.     CM called Brianna. She states that she is feeling much better. She denies any further complaints of dizziness, swelling or elevated heart rate.  She knows to seek emergency care if her symptoms would return. 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 Brianna states that the ER felt she was not taking her medications correctly. She states that she uses a weekly med box and is taking everything as ordered. She denied any questions.     Brianna states that she has an adjustable bed. She elevated the feet and her swelling improved. She is scheduled to see Brook You this afternoon and her friend will be transporting her. We also reviewed that she is scheduled to see cardiology on 9/27.     Brianna states her weight today was 169 lbs. She continues to be seen by [facility]'s [facility]: nursing and therapy. Speech will be ending this week.     Brianna has not scheduled her ophthalmology appt. I encouraged her to work on it.     She has my contact information and will call withany questions.

## 2024-09-18 NOTE — ASSESSMENT & PLAN NOTE
New onset atrial fibs/flutter associated with recent CVA in July 2024.  Seen in the ER yesterday 9/17/2024 for recurrent atrial fibs flutter with RVR.  Diagnostics unremarkable, rate improved with IV Cardizem and IV fluids.  -Reports she was completely asymptomatic as far as denying dyspnea, chest pressure pain, palpitations.  She did transient ankle edema which improved with ER treatment.  -Rate controlled on today's assessment.  Cardiology follow-up scheduled for 9/27/2024.

## 2024-09-18 NOTE — TELEPHONE ENCOUNTER
09/18/24 10:58 AM    Patient contacted post ED visit, first outreach attempt made. Message was left for patient to return a call to the VBI Department at [facility]: Phone [phone].    Thank you.  Livia Rodríguez MA  [facility]

## 2024-09-18 NOTE — PROGRESS NOTES
[facility]   Farzaneh DOUGHERTY    Assessment/Plan:   1. Cerebrovascular accident (CVA) due to occlusion of left middle cerebral artery (HCC)  Assessment & Plan:  Recent CVA in July 2024.  Continues home health services including PT OT nursing 3 times a week.  Recent follow-up with [facility] rehab on 9/9/2024 deemed stable.  She denies any new worsening neurologic signs or symptoms today.  Adherent to diltiazem 180 mg p.o. daily, atorvastatin 40 mg daily, Eliquis 5 mg p.o. daily twice daily.  -Neurology scheduled follow-up 11/12/2024.  2. Paroxysmal atrial fibrillation (HCC)  Assessment & Plan:  New onset atrial fibs/flutter associated with recent CVA in July 2024.  Seen in the ER yesterday 9/17/2024 for recurrent atrial fibs flutter with RVR.  Diagnostics unremarkable, rate improved with IV Cardizem and IV fluids.  -Reports she was completely asymptomatic as far as denying dyspnea, chest pressure pain, palpitations.  She did transient ankle edema which improved with ER treatment.  -Rate controlled on today's assessment.  Cardiology follow-up scheduled for 9/27/2024.  3. Primary hypertension  Assessment & Plan:  History of hypertension blood pressure stable continue Cardizem 180 mg p.o. daily.  Recent electrolyte and kidney function stable.  Although up with cardiology scheduled for 9/23/2024.      Schedule eye exam with your ophthalmologist  Follow up with cardiology 9/27/24  Follow up with neurology on 11/12/24  Reconsider Flu/Pneumonia vaccine.    Consider shingles and RSV vaccine.   Return in about 3 months (around 12/18/2024) for Recheck.  Patient may call or return to office with any questions or concerns.     ______________________________________________________________________  Subjective:     Patient ID: Brianna Baum is a 74 y.o. female.  Brianna Baum  Chief Complaint   Patient presents with    Follow-up     Here for follow-up.   Home care nurse called office yesterday reporting  elevated blood pressure as well as tachycardia. Ms. Baum had noted ankle swelling but denies any dyspnea, chest pressure pain nor palpitations at that time.  Ms. Baum was advised to go to the ER for further eval/treatment and was found to be in A-fib/flutter with variable block.   She was given IV Cardizem and fluids.  Labs and chest x-ray unremarkable.  She was discharged in stable condition.  Today she feels she is at baseline.  Persistent daily headaches since stroke relieved with Tylenol use.  She has not gotten her eye exam as recommended.  She denies any chest pain pressure palpitations dyspnea with recurrent A-fib flutter.  She is adherent to Cardizem 180 mg daily and Eliquis 5 mg twice daily.  She denies any positional dizziness nor falls.  She has not gotten her pelvic x-ray.  She will be getting repeat blood work on 9/23/2024.  She continues with PT OT 3 times a week as well as nursing through [facility].  Her speech difficulty improving.  She had follow-up with [facility] rehab on 9/9/2024 and was deemed stable at that time.               The following portions of the patient's history were reviewed and updated as appropriate: allergies, current medications, past family history, past medical history, past social history, past surgical history, and problem list.    Review of Systems   Constitutional: Negative.  Negative for activity change, appetite change, chills, fatigue and fever.   HENT: Negative.  Negative for congestion, ear pain, postnasal drip and sinus pain.    Eyes:  Positive for visual disturbance.        Unchanged since CVA   Respiratory: Negative.  Negative for cough, chest tightness and shortness of breath.    Cardiovascular: Negative.  Negative for chest pain, palpitations and leg swelling.   Gastrointestinal: Negative.  Negative for abdominal pain, constipation and diarrhea.   Endocrine: Negative.    Genitourinary: Negative.  Negative for difficulty urinating and dysuria.   Musculoskeletal:   Positive for arthralgias.   Skin: Negative.    Allergic/Immunologic: Negative.  Negative for immunocompromised state.   Neurological:  Positive for facial asymmetry, weakness and headaches. Negative for dizziness and light-headedness.   Hematological: Negative.    Psychiatric/Behavioral: Negative.  Negative for sleep disturbance.          Objective:      Vitals:    09/18/24 1543   BP: 126/72   Pulse: 66   Resp: 18   SpO2: 99%      Physical Exam  Vitals and nursing note reviewed.   Constitutional:       Appearance: Normal appearance.   HENT:      Head: Normocephalic and atraumatic.      Right Ear: Tympanic membrane, ear canal and external ear normal.      Left Ear: Tympanic membrane, ear canal and external ear normal.      Nose: Nose normal.      Mouth/Throat:      Mouth: Mucous membranes are moist.      Pharynx: Oropharynx is clear.   Eyes:      Extraocular Movements: Extraocular movements intact.      Conjunctiva/sclera: Conjunctivae normal.      Pupils: Pupils are equal, round, and reactive to light.   Cardiovascular:      Rate and Rhythm: Normal rate. Rhythm irregular.      Pulses: Normal pulses.      Heart sounds: Normal heart sounds.   Pulmonary:      Effort: Pulmonary effort is normal.      Breath sounds: Normal breath sounds.   Abdominal:      General: Bowel sounds are normal.      Palpations: Abdomen is soft.   Musculoskeletal:         General: No swelling. Normal range of motion.      Cervical back: Normal range of motion and neck supple.      Right hip: Bony tenderness present.      Left hip: Bony tenderness present.      Right lower leg: No edema.      Left lower leg: No edema.   Skin:     General: Skin is warm and dry.   Neurological:      General: No focal deficit present.      Mental Status: She is alert and oriented to person, place, and time.      Cranial Nerves: Facial asymmetry present.      Motor: Weakness present.      Comments: Mild MILY weakness present   Psychiatric:         Mood and Affect:  "Mood normal.         Speech: Speech normal.         Behavior: Behavior is slowed. Behavior is cooperative.         Thought Content: Thought content normal.         Judgment: Judgment normal.           Portions of the record may have been created with voice recognition software. Occasional wrong word or \"sound alike\" substitutions may have occurred due to the inherent limitations of voice recognition software. Please review the chart carefully and recognize, using context, where substitutions/typographical errors may have occurred.       "

## 2024-09-19 NOTE — TELEPHONE ENCOUNTER
09/19/24 9:08 AM    Patient contacted post ED visit, VBI department spoke with patient/caregiver and outreach was successful.    Thank you.  Livia Rodríguez MA  [facility]

## 2024-09-23 ENCOUNTER — LAB (OUTPATIENT)
Dept: LAB | Facility: HOSPITAL | Age: 74
End: 2024-09-23
Payer: MEDICARE

## 2024-09-23 DIAGNOSIS — I10 PRIMARY HYPERTENSION: ICD-10-CM

## 2024-09-23 DIAGNOSIS — I48.92 ATRIAL FLUTTER BY ELECTROCARDIOGRAM (HCC): ICD-10-CM

## 2024-09-23 DIAGNOSIS — Z13.29 SCREENING FOR THYROID DISORDER: ICD-10-CM

## 2024-09-23 DIAGNOSIS — E53.8 B12 DEFICIENCY: ICD-10-CM

## 2024-09-23 DIAGNOSIS — N18.2 CKD (CHRONIC KIDNEY DISEASE) STAGE 2, GFR 60-89 ML/MIN: ICD-10-CM

## 2024-09-23 DIAGNOSIS — D64.9 ANEMIA, UNSPECIFIED TYPE: ICD-10-CM

## 2024-09-23 LAB
ALBUMIN SERPL BCG-MCNC: 4.1 G/DL (ref 3.5–5)
ALP SERPL-CCNC: 63 U/L (ref 34–104)
ALT SERPL W P-5'-P-CCNC: 16 U/L (ref 7–52)
ANION GAP SERPL CALCULATED.3IONS-SCNC: 8 MMOL/L (ref 4–13)
AST SERPL W P-5'-P-CCNC: 19 U/L (ref 13–39)
BASOPHILS # BLD AUTO: 0.02 THOUSANDS/ΜL (ref 0–0.1)
BASOPHILS NFR BLD AUTO: 0 % (ref 0–1)
BILIRUB SERPL-MCNC: 0.38 MG/DL (ref 0.2–1)
BUN SERPL-MCNC: 11 MG/DL (ref 5–25)
CALCIUM SERPL-MCNC: 9.6 MG/DL (ref 8.4–10.2)
CHLORIDE SERPL-SCNC: 104 MMOL/L (ref 96–108)
CO2 SERPL-SCNC: 29 MMOL/L (ref 21–32)
CREAT SERPL-MCNC: 0.87 MG/DL (ref 0.6–1.3)
EOSINOPHIL # BLD AUTO: 0.05 THOUSAND/ΜL (ref 0–0.61)
EOSINOPHIL NFR BLD AUTO: 1 % (ref 0–6)
ERYTHROCYTE [DISTWIDTH] IN BLOOD BY AUTOMATED COUNT: 12.8 % (ref 11.6–15.1)
GFR SERPL CREATININE-BSD FRML MDRD: 65 ML/MIN/1.73SQ M
GLUCOSE SERPL-MCNC: 124 MG/DL (ref 65–140)
HCT VFR BLD AUTO: 42.5 % (ref 34.8–46.1)
HGB BLD-MCNC: 13.7 G/DL (ref 11.5–15.4)
IMM GRANULOCYTES # BLD AUTO: 0.03 THOUSAND/UL (ref 0–0.2)
IMM GRANULOCYTES NFR BLD AUTO: 0 % (ref 0–2)
LYMPHOCYTES # BLD AUTO: 1.9 THOUSANDS/ΜL (ref 0.6–4.47)
LYMPHOCYTES NFR BLD AUTO: 26 % (ref 14–44)
MAGNESIUM SERPL-MCNC: 2.1 MG/DL (ref 1.9–2.7)
MCH RBC QN AUTO: 30.9 PG (ref 26.8–34.3)
MCHC RBC AUTO-ENTMCNC: 32.2 G/DL (ref 31.4–37.4)
MCV RBC AUTO: 96 FL (ref 82–98)
MONOCYTES # BLD AUTO: 0.42 THOUSAND/ΜL (ref 0.17–1.22)
MONOCYTES NFR BLD AUTO: 6 % (ref 4–12)
NEUTROPHILS # BLD AUTO: 4.96 THOUSANDS/ΜL (ref 1.85–7.62)
NEUTS SEG NFR BLD AUTO: 67 % (ref 43–75)
NRBC BLD AUTO-RTO: 0 /100 WBCS
PLATELET # BLD AUTO: 191 THOUSANDS/UL (ref 149–390)
PMV BLD AUTO: 11 FL (ref 8.9–12.7)
POTASSIUM SERPL-SCNC: 4 MMOL/L (ref 3.5–5.3)
PROT SERPL-MCNC: 6.6 G/DL (ref 6.4–8.4)
RBC # BLD AUTO: 4.43 MILLION/UL (ref 3.81–5.12)
SODIUM SERPL-SCNC: 141 MMOL/L (ref 135–147)
TSH SERPL DL<=0.05 MIU/L-ACNC: 0.95 UIU/ML (ref 0.45–4.5)
VIT B12 SERPL-MCNC: 238 PG/ML (ref 180–914)
WBC # BLD AUTO: 7.38 THOUSAND/UL (ref 4.31–10.16)

## 2024-09-23 PROCEDURE — 36415 COLL VENOUS BLD VENIPUNCTURE: CPT

## 2024-09-23 PROCEDURE — 84443 ASSAY THYROID STIM HORMONE: CPT

## 2024-09-23 PROCEDURE — 82607 VITAMIN B-12: CPT

## 2024-09-23 PROCEDURE — 83735 ASSAY OF MAGNESIUM: CPT

## 2024-09-23 PROCEDURE — 80053 COMPREHEN METABOLIC PANEL: CPT

## 2024-09-23 PROCEDURE — 85025 COMPLETE CBC W/AUTO DIFF WBC: CPT

## 2024-09-25 NOTE — TELEPHONE ENCOUNTER
Hospitalist Progress Note    NAME:   Justin Castillo   : 1975   MRN: [id number]     Date/Time: 2024 1:07 PM  Patient PCP: Sahy Aviles MD    Estimated discharge date:   Barriers: EGD, GI clearance      Assessment / Plan:    Likely upper GI bleed  Hematemesis  Melena  Supratherapeutic INR  Transfuse if hemoglobin is less than 7.0 or if hemodynamically unstable.  Continue octreotide drip  Continue PPI twice daily  Continue Rocephin  GI on board, timing of EGD as per GI  INR to be checked this afternoon  N.p.o. after midnight    Mechanical aortic valve  Supratherapeutic INR  With patient is status post mechanical aortic valve and is on Coumadin.  He reports that his goal INR is between 2-3 as per his cardiac surgeon.  Patient has not been checking his INR as he should in general.  INR 10 on presentation  Currently holding warfarin, INR improving  FFP if bleeding  Appreciate cardiology help     Alcohol use disorder  Abnormal LFT  Hepatic steatosis  Patient reports drinking at least 5 beers per day.  Patient has elevated AST and ALT secondary to alcohol.  Hepatic steatosis is also secondary to alcohol.  Start on CIWA protocol.  Follow-up with serial CMP.  Patient counseled about the importance of alcohol cessation.           Medical Decision Making:   I personally reviewed labs: CBC, BMP, LFT, INR  I personally reviewed imaging:CT abdomen pelvis  I personally reviewed EKG:  Toxic drug monitoring:  Discussed case with: RN, patient    Code Status: Full code  DVT Prophylaxis: SCDs  Baseline: Independent from home    Subjective:     Chief Complaint / Reason for Physician Visit  No further episode of melena.  Tolerating liquid diet      Objective:     VITALS:   Last 24hrs VS reviewed since prior progress note. Most recent are:  Patient Vitals for the past 24 hrs:   BP Temp Temp src Pulse Resp SpO2   24 1017 (!) 133/90 97.8 °F (36.6 °C) Oral 60 16 --   24 0730 -- 98.9 °F (37.2 °C)  Post CVA Discharge Follow Up  Hospitalization: 7/22/24-7/31/24    Called patient to obtain an update. Patient reports she is doing okay at home. About a few weeks ago, she developed b/l leg heaviness that is impacting her mobilization. She now requires intermittent use of a cane. The b/l leg heaviness worsens after sitting for prolong periods of time. She reports b/l ankle swelling that improves with elevation of her legs. Denies any speech issues, vision issues, sudden severe headache, sudden dizziness/fainting/lightheadedness, or sudden weakness/numbness/tingling on one side of the body. Inquired if patient reviewed her symptoms with the PCP at the recent office visit on 9/18/24, patient does not believe she did. Will send message to Dr. Jimenes for his review.    Patient reports she continues to have the following symptoms:moderate headaches across her forehead that occur a few times weekly and forgetfulness. Denies any worsening symptoms. Tylenol resolves the headaches.    She is independent with ADLs.     Reviewed appointments - patient successfully followed up with PCP. Scheduled for the following: cardiology on 9/27/24, neurology on 11/12/24.     Patient is receiving home health services.     Reviewed neurology-related medications with her. Reports she is taking as prescribed with no medication side effects or signs of bleeding.      During this call, we reviewed stroke-like symptoms, personal risk factors and management, medications.    As for risk factors, patient reports monitoring her BP at home. BP at PCP visit on 9/18/24 on 126/72.   She is non smoker.  Encouraged patient to follow a well balanced diet.     All of her questions were addressed. At the conclusion of the conversation, patient denies having any further questions or concerns.

## 2024-09-25 NOTE — TELEPHONE ENCOUNTER
While there are certainly several things that can cause leg swelling,   And that can lead to bilateral leg heaviness.  She was prescribed Eliquis it looks like, so as long as she is still taking that the likelihood of her having DVT, particularly without leg pain, is quite low.  I would actually recommend she bring this up with the cardiologist when she sees them on the 27th and that she places a call to her primary care team in this regard.  If the swelling is quite significant, if she notices shortness of breath or inability to lay flat, or if she is having strokelike symptoms of course she should proceed to the hospital

## 2024-09-25 NOTE — TELEPHONE ENCOUNTER
Called patient to notify her of Dr. Jimenes's response. There was no answer. Left a detailed voice message stating Dr. Jimenes's response. Provided the office's phone number if patient has any additional questions.

## 2024-09-27 ENCOUNTER — CONSULT (OUTPATIENT)
Dept: CARDIOLOGY CLINIC | Facility: CLINIC | Age: 74
End: 2024-09-27
Payer: MEDICARE

## 2024-09-27 VITALS
HEART RATE: 84 BPM | SYSTOLIC BLOOD PRESSURE: 120 MMHG | HEIGHT: 65 IN | WEIGHT: 170 LBS | DIASTOLIC BLOOD PRESSURE: 80 MMHG | BODY MASS INDEX: 28.32 KG/M2

## 2024-09-27 DIAGNOSIS — N18.2 CKD (CHRONIC KIDNEY DISEASE) STAGE 2, GFR 60-89 ML/MIN: ICD-10-CM

## 2024-09-27 DIAGNOSIS — I48.92 ATRIAL FLUTTER BY ELECTROCARDIOGRAM (HCC): ICD-10-CM

## 2024-09-27 DIAGNOSIS — I63.512 CEREBROVASCULAR ACCIDENT (CVA) DUE TO OCCLUSION OF LEFT MIDDLE CEREBRAL ARTERY (HCC): Primary | ICD-10-CM

## 2024-09-27 DIAGNOSIS — E78.2 MIXED HYPERLIPIDEMIA: Chronic | ICD-10-CM

## 2024-09-27 DIAGNOSIS — I70.0 AORTIC ATHEROSCLEROSIS (HCC): ICD-10-CM

## 2024-09-27 PROCEDURE — 93000 ELECTROCARDIOGRAM COMPLETE: CPT | Performed by: INTERNAL MEDICINE

## 2024-09-27 PROCEDURE — 99204 OFFICE O/P NEW MOD 45 MIN: CPT | Performed by: INTERNAL MEDICINE

## 2024-09-27 RX ORDER — DILTIAZEM HYDROCHLORIDE 240 MG/1
240 CAPSULE, COATED, EXTENDED RELEASE ORAL DAILY
Qty: 90 CAPSULE | Refills: 3 | Status: SHIPPED | OUTPATIENT
Start: 2024-09-27 | End: 2025-09-22

## 2024-09-27 NOTE — PATIENT INSTRUCTIONS
You were seen today in the Cardiology office for follow up evaluation after recent hospitalization.     Please continue your current cardiac medications as prescribed.    Please make the following changes to your cardiac medications:  Increase Cardizem to 240mg once daily.     Thank you for choosing [facility].    Please call our office or use Going with any questions.

## 2024-09-27 NOTE — PROGRESS NOTES
[facility]    Name:Brianna Baum   DOS: 9/27/2024     Chief Complaint:   Chief Complaint   Patient presents with    Follow-up     HFU one week     Palpitations     Random     Dizziness     Occurs with quick turns        HISTORY OF PRESENT ILLNESS:    HPI:  Brianna Baum is a 74 y.o. female. She  has a past medical history of Arthritis, Chronic pain, Colon polyp, Diverticulitis, GERD (gastroesophageal reflux disease), Hypertension, and Vertigo.    She presents to establish care with an outpatient cardiologist after recent hospitalization in July 2024 for strokelike symptoms in the setting of recently diagnosed atrial fibrillation/flutter.  Historically, patient had not been on oral anticoagulation.  Presented to the hospital with strokelike symptoms, for which she underwent thrombectomy.  Seen by neurology.  Was started on Eliquis 5 mg twice daily in the hospital, noted to have spontaneously converted to sinus rhythm prior to discharge.  Notably back in atrial flutter by ECG on 9/17/2024.    Today, she reports continued episodic palpitations, occurring randomly with no clear pattern.  No clear relation to exertion.  She experiences transient dizziness with quick changes in position, otherwise no dizziness with exertion or at rest.  She otherwise denies chest pain, shortness of breath, diaphoresis, orthopnea.  There is no significant edema, although she does subjectively feel fullness in her legs, L>R which is chronic from prior to her CVA.  She has had no syncopal episodes.  No issues with bruising or bleeding on Eliquis.       ROS    ROS: Pertinent positives and negatives as described in History of Present Illness. Remainder of a 14 point review of systems was negative.     Allergies   Allergen Reactions    Tramadol Hives        Current Outpatient Medications on File Prior to Visit   Medication Sig Dispense Refill    acetaminophen (TYLENOL) 325 mg tablet       apixaban (Eliquis) 5 mg Take 1  tablet (5 mg total) by mouth 2 (two) times a day 180 tablet 1    Ascorbic Acid (VITAMIN C) 1000 MG tablet Take 1,000 mg by mouth daily      atorvastatin (LIPITOR) 40 mg tablet Take 1 tablet (40 mg total) by mouth every evening 90 tablet 1    Biotin 10 MG CAPS Take by mouth in the morning      clotrimazole-betamethasone (LOTRISONE) 1-0.05 % cream Apply topically 2 (two) times a day 45 g 5    pantoprazole (PROTONIX) 20 mg tablet Take 1 tablet (20 mg total) by mouth daily 90 tablet 1    [DISCONTINUED] diltiazem (CARDIZEM CD) 180 mg 24 hr capsule Take 1 capsule (180 mg total) by mouth daily 90 capsule 1     No current facility-administered medications on file prior to visit.       Past Medical History:   Diagnosis Date    Arthritis     Chronic pain     Colon polyp     Diverticulitis     GERD (gastroesophageal reflux disease)     Hypertension     Vertigo        Past Surgical History:   Procedure Laterality Date    CHOLECYSTECTOMY      CHOLECYSTECTOMY LAPAROSCOPIC N/A 12/06/2021    Procedure: SUBTOTAL CHOLECYSTECTOMY LAPAROSCOPIC;  Surgeon: Víctor Viveros MD;  Location:  MAIN OR;  Service: General    COLONOSCOPY      GALLBLADDER SURGERY  12/2021    Dr. Viveros    IR STROKE ALERT  7/23/2024    IR STROKE ALERT  7/24/2024    TUBAL LIGATION         Family History   Problem Relation Age of Onset    Stroke Mother     Diabetes Mother     Hypertension Mother     Heart disease Father     Hypertension Father     Heart disease Brother     Diabetes Brother     Hypertension Brother     Substance Abuse Neg Hx     Mental illness Neg Hx        Social History     Socioeconomic History    Marital status:      Spouse name: Not on file    Number of children: Not on file    Years of education: Not on file    Highest education level: Not on file   Occupational History    Not on file   Tobacco Use    Smoking status: Never    Smokeless tobacco: Never   Vaping Use    Vaping status: Never Used   Substance and Sexual Activity    Alcohol  use: Yes     Comment: 3 times a week    Drug use: Yes     Types: Marijuana     Comment: rarely/ last used 2 days ago    Sexual activity: Not Currently   Other Topics Concern    Not on file   Social History Narrative    Lives at home with     Feels safe at home    No living will    Rare dental care     Social Determinants of Health     Financial Resource Strain: Low Risk  (12/1/2022)    Overall Financial Resource Strain (CARDIA)     Difficulty of Paying Living Expenses: Not hard at all   Food Insecurity: Patient Unable To Answer (7/23/2024)    Hunger Vital Sign     Worried About Running Out of Food in the Last Year: Patient unable to answer     Ran Out of Food in the Last Year: Patient unable to answer   Transportation Needs: Patient Unable To Answer (7/23/2024)    PRAPARE - Transportation     Lack of Transportation (Medical): Patient unable to answer     Lack of Transportation (Non-Medical): Patient unable to answer   Physical Activity: Insufficiently Active (7/22/2020)    Exercise Vital Sign     Days of Exercise per Week: 7 days     Minutes of Exercise per Session: 20 min   Stress: Stress Concern Present (7/22/2020)    [place] [employer] - Occupational Stress Questionnaire     Feeling of Stress : Rather much   Social Connections: Moderately Isolated (7/22/2020)    Social Connection and Isolation Panel [NHANES]     Frequency of Communication with Friends and Family: Three times a week     Frequency of Social Gatherings with Friends and Family: Once a week     Attends Restorationist Services: Never     Active Member of Clubs or Organizations: No     Attends Club or Organization Meetings: Never     Marital Status:    Intimate Partner Violence: Not At Risk (7/22/2020)    Humiliation, Afraid, Rape, and Kick questionnaire     Fear of Current or Ex-Partner: No     Emotionally Abused: No     Physically Abused: No     Sexually Abused: No   Housing Stability: Patient Unable To Answer  "(7/23/2024)    Housing Stability Vital Sign     Unable to Pay for Housing in the Last Year: Patient unable to answer     Number of Times Moved in the Last Year: 0     Homeless in the Last Year: Patient unable to answer       OBJECTIVE:    /80 (BP Location: Left arm, Patient Position: Sitting, Cuff Size: Standard)   Pulse 84   Ht 5' 5\" (1.651 m)   Wt 77.1 kg (170 lb)   BMI 28.29 kg/m²      BP Readings from Last 3 Encounters:   09/27/24 120/80   09/18/24 126/72   09/17/24 125/58       Wt Readings from Last 3 Encounters:   09/27/24 77.1 kg (170 lb)   09/18/24 78.5 kg (173 lb)   08/20/24 78.9 kg (174 lb)         Physical Exam  Vitals reviewed.   Constitutional:       General: She is not in acute distress.     Appearance: Normal appearance. She is not diaphoretic.   HENT:      Head: Normocephalic and atraumatic.   Eyes:      Conjunctiva/sclera: Conjunctivae normal.   Neck:      Vascular: No JVD.   Cardiovascular:      Rate and Rhythm: Normal rate and regular rhythm.      Pulses: Normal pulses.      Heart sounds: Normal heart sounds. No murmur heard.     No friction rub. No gallop.   Pulmonary:      Effort: Pulmonary effort is normal.      Breath sounds: Normal breath sounds. No wheezing, rhonchi or rales.   Abdominal:      General: Abdomen is flat. Bowel sounds are normal.   Musculoskeletal:      Right lower leg: No edema.      Left lower leg: No edema.   Skin:     General: Skin is warm and dry.   Neurological:      General: No focal deficit present.      Mental Status: She is alert and oriented to person, place, and time.   Psychiatric:         Mood and Affect: Mood normal.         Behavior: Behavior normal.                                                       Cardiac testing:   EKG reviewed personally as performed in office today. My read: Sinus rhythm with occasional PACs.     EKG personally reviewed from 9/17/24. Atrial flutter with variable block.      ECHO 7/23/24    Left Ventricle: Left ventricular " cavity size is normal. Wall thickness is normal. The left ventricular ejection fraction is 55%. Systolic function is normal. Wall motion is normal. Diastolic function is normal.    Right Ventricle: Right ventricular cavity size is normal. Systolic function is normal.    Left Atrium: The atrium is moderately dilated (42-48 mL/m2).    Right Atrium: The atrium is normal in size.    Atrial Septum: No patent foramen ovale detected, confirmed at rest using agitated saline contrast, confirmed by provocation with cough, using agitated saline contrast and with valsalva, using agitated saline contrast.    Mitral Valve: There is mild regurgitation.    Tricuspid Valve: There is mild regurgitation.    Pulmonic Valve: There is mild regurgitation.    Aorta: There is probably a calcified atheroma in the aortic arch.    Prior TTE study available for comparison. Prior study date: 10/20/2023. No significant changes noted compared to the prior study.      LABS:  Lab Results   Component Value Date    GLUCOSE 93 04/23/2014    BUN 11 09/23/2024    CREATININE 0.87 09/23/2024    CALCIUM 9.6 09/23/2024     04/23/2014    K 4.0 09/23/2024    CO2 29 09/23/2024     09/23/2024    ALKPHOS 63 09/23/2024    BILITOT 0.4 04/23/2014    PROT 6.5 04/23/2014    AST 19 09/23/2024    ALT 16 09/23/2024    ANIONGAP 7 04/23/2014        Lab Results   Component Value Date    WBC 7.38 09/23/2024    HGB 13.7 09/23/2024    HCT 42.5 09/23/2024    MCV 96 09/23/2024     09/23/2024       Lab Results   Component Value Date    HDL 57 07/23/2024    LDLCALC 120 (H) 07/23/2024    TRIG 50 07/23/2024       Lab Results   Component Value Date    HGBA1C 5.8 (H) 07/22/2024       ASSESSMENT/PLAN:  Diagnoses and all orders for this visit:    Cerebrovascular accident (CVA) due to occlusion of left middle cerebral artery (HCC)    Atrial flutter by electrocardiogram (HCC)  -     Ambulatory Referral to Cardiology  -     POCT ECG    CKD (chronic kidney disease) stage  "2, GFR 60-89 ml/min    Mixed hyperlipidemia    Aortic atherosclerosis (HCC)    Plan:  Continue Eliquis 5 mg twice daily.  Continue atorvastatin 40 mg once daily.  Repeat lipids now.  Uptitrate diltiazem to 240 mg once daily.  14-day cardiac patch rhythm monitor, Zio patch XT to assess underlying arrhythmia frequency.  3-month follow-up to review the results of testing.      MD Rosetta Theodore MD      Portions of the record may have been created with voice recognition software. Occasional wrong word or \"sound alike\" substitutions may have occurred due to the inherent limitations of voice recognition software. Please review the chart carefully and recognize, using context, where substitutions/typographical errors may have occurred.     "

## 2024-09-30 NOTE — TELEPHONE ENCOUNTER
Called patient and notified her of Dr. Jimenes's response. She expressed understanding. Patient reports how her legs feel much better and how she is walking much better. She was appreciative for the call.

## 2024-10-02 ENCOUNTER — PATIENT OUTREACH (OUTPATIENT)
Dept: CASE MANAGEMENT | Facility: OTHER | Age: 74
End: 2024-10-02

## 2024-10-02 NOTE — PROGRESS NOTES
Outpatient Care Management Note:    Care manager called Brianna. We again reviewed signs and symptoms of stroke. She knows to call 911 if she would experience any of these symptoms.     She continues to be followed by [facility]'s [facility]. Brianna thinks they will be ending in 1-2 weeks.     Brianna still has not scheduled an eye exam. She knows that she should call to schedule.     She did see cardiology and has a zio monitor in place.  She is waiting to receive her diltiazem prescription from [facility] CM did review that cardiology order a lipid panel. Brianna declined completing this.     Brianna denies any questions or further needs. CM will close the referral. She will call her PCP with any questions.

## 2024-10-10 ENCOUNTER — OFFICE VISIT (OUTPATIENT)
Dept: FAMILY MEDICINE CLINIC | Facility: HOSPITAL | Age: 74
End: 2024-10-10
Payer: MEDICARE

## 2024-10-10 VITALS
OXYGEN SATURATION: 96 % | DIASTOLIC BLOOD PRESSURE: 68 MMHG | HEIGHT: 65 IN | HEART RATE: 75 BPM | BODY MASS INDEX: 28.49 KG/M2 | WEIGHT: 171 LBS | SYSTOLIC BLOOD PRESSURE: 122 MMHG

## 2024-10-10 DIAGNOSIS — M79.622 PAIN IN LEFT UPPER ARM: ICD-10-CM

## 2024-10-10 DIAGNOSIS — I63.9 CEREBROVASCULAR ACCIDENT (CVA), UNSPECIFIED MECHANISM (HCC): ICD-10-CM

## 2024-10-10 DIAGNOSIS — R29.6 FALLS: Primary | ICD-10-CM

## 2024-10-10 DIAGNOSIS — M79.621 PAIN IN RIGHT UPPER ARM: ICD-10-CM

## 2024-10-10 DIAGNOSIS — R41.3 MEMORY CHANGES: ICD-10-CM

## 2024-10-10 PROCEDURE — 99214 OFFICE O/P EST MOD 30 MIN: CPT | Performed by: INTERNAL MEDICINE

## 2024-10-10 PROCEDURE — G2211 COMPLEX E/M VISIT ADD ON: HCPCS | Performed by: INTERNAL MEDICINE

## 2024-10-10 NOTE — PROGRESS NOTES
Assessment/Plan:     Diagnosis ICD-10-CM Associated Orders   1. Falls  R29.6 XR shoulder 2+ vw left     XR elbow 3+ vw left     Referral to [facility]'s [facility]      2. Pain in right upper arm  M79.621 XR shoulder 2+ vw left     XR elbow 3+ vw left      3. Cerebrovascular accident (CVA), unspecified mechanism (HCC)  I63.9 Ambulatory Referral to Social Work Care Management Program     Referral to [facility]'s [facility]NA      4. Memory changes  R41.3 Ambulatory Referral to Social Work Care Management Program     Referral to [facility]          Problem List Items Addressed This Visit    None  Visit Diagnoses       Falls    -  Primary    Relevant Orders    XR shoulder 2+ vw left    XR elbow 3+ vw left    Referral to [facility]'s [facility]    Pain in right upper arm        Relevant Orders    XR shoulder 2+ vw left    XR elbow 3+ vw left    Cerebrovascular accident (CVA), unspecified mechanism (HCC)        Relevant Orders    Ambulatory Referral to Social Work Care Management Program    Referral to [facility]    Memory changes        Relevant Orders    Ambulatory Referral to Social Work Care Management Program    Referral to [facility]              No follow-ups on file.      Subjective:    Patient ID: Brianna Baum is a 74 y.o. female    Had a fall 2 weeks ago in garage while carrying out  trash.  Was holding door knob and went down on knees and has pain in upper arm and elbow- ongoing pain and unable to lift with arm due to pain.    Did not seek medical attention  Having some memory issues   Unable to drive and took uber today    Arm Injury   The incident occurred more than 1 week ago.       The following portions of the patient's history were reviewed and updated as appropriate: allergies, current medications and problem list.     Review of Systems   Constitutional:  Positive for fatigue.   HENT:  Positive for ear pain.         Itching in ears  "  Respiratory:  Negative for cough and shortness of breath.    Gastrointestinal:  Negative for abdominal pain, constipation and diarrhea.         Objective:      Current Outpatient Medications:     acetaminophen (TYLENOL) 325 mg tablet, , Disp: , Rfl:     apixaban (Eliquis) 5 mg, Take 1 tablet (5 mg total) by mouth 2 (two) times a day, Disp: 180 tablet, Rfl: 1    Ascorbic Acid (VITAMIN C) 1000 MG tablet, Take 1,000 mg by mouth daily, Disp: , Rfl:     atorvastatin (LIPITOR) 40 mg tablet, Take 1 tablet (40 mg total) by mouth every evening, Disp: 90 tablet, Rfl: 1    Biotin 10 MG CAPS, Take by mouth in the morning, Disp: , Rfl:     clotrimazole-betamethasone (LOTRISONE) 1-0.05 % cream, Apply topically 2 (two) times a day, Disp: 45 g, Rfl: 5    diltiazem (CARDIZEM CD) 240 mg 24 hr capsule, Take 1 capsule (240 mg total) by mouth daily, Disp: 90 capsule, Rfl: 3    pantoprazole (PROTONIX) 20 mg tablet, Take 1 tablet (20 mg total) by mouth daily, Disp: 90 tablet, Rfl: 1    Blood pressure 122/68, pulse 75, height 5' 5\" (1.651 m), weight 77.6 kg (171 lb), SpO2 96%.     Physical Exam  Vitals and nursing note reviewed.   Constitutional:       Appearance: She is not ill-appearing.   HENT:      Head: Normocephalic.      Right Ear: Tympanic membrane normal.      Left Ear: Tympanic membrane normal.      Ears:      Comments: Scaling in canals     Nose: No congestion.      Mouth/Throat:      Mouth: Mucous membranes are dry.   Eyes:      General:         Right eye: No discharge.         Left eye: No discharge.      Extraocular Movements: Extraocular movements intact.      Pupils: Pupils are equal, round, and reactive to light.   Cardiovascular:      Rate and Rhythm: Normal rate and regular rhythm.      Heart sounds: No murmur heard.  Pulmonary:      Breath sounds: No wheezing or rhonchi.   Abdominal:      Tenderness: There is no abdominal tenderness.   Musculoskeletal:         General: Tenderness present.      Cervical back: No " tenderness.      Comments: Left elbow and distal humerus tenderness   Lymphadenopathy:      Cervical: No cervical adenopathy.   Skin:     Findings: No erythema or rash.   Neurological:      Mental Status: She is alert.      Comments: Mild word finding issues

## 2024-10-14 ENCOUNTER — PATIENT OUTREACH (OUTPATIENT)
Dept: CASE MANAGEMENT | Facility: OTHER | Age: 74
End: 2024-10-14

## 2024-10-14 NOTE — PROGRESS NOTES
OP CM rcvd referral for pt for waiver.  Called to pt and she states that she resides alone on [address] along with an in-ground swimming pool.  Pt states she fell in the garage about a month ago.  Pt also had two strokes.  Pt states she is able to bathe and dress herself at this point.  Pt uses a cane and a walker.  Pt has [employer].  Pt states her son makes her food.  Pts son Zachariah lives over an hour away.  Pt states she has a dtr but her dtr has not spoken to her since 2021.  Explained home health waiver but pt is over income.  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  Pts hsb was a .  Pt given the number for [employer] to see if they can assist her with aides.  OP CM to continue to follow.

## 2024-10-16 ENCOUNTER — TELEPHONE (OUTPATIENT)
Age: 74
End: 2024-10-16

## 2024-10-16 ENCOUNTER — HOSPITAL ENCOUNTER (OUTPATIENT)
Dept: RADIOLOGY | Facility: HOSPITAL | Age: 74
Discharge: HOME | DRG: 092 | End: 2024-10-16
Payer: MEDICARE

## 2024-10-16 DIAGNOSIS — S52.124D CLOSED NONDISPLACED FRACTURE OF HEAD OF RIGHT RADIUS WITH ROUTINE HEALING, SUBSEQUENT ENCOUNTER: Primary | ICD-10-CM

## 2024-10-16 DIAGNOSIS — R29.6 FALLS: ICD-10-CM

## 2024-10-16 DIAGNOSIS — M79.621 PAIN IN RIGHT UPPER ARM: ICD-10-CM

## 2024-10-16 PROCEDURE — 73080 X-RAY EXAM OF ELBOW: CPT

## 2024-10-16 PROCEDURE — 73030 X-RAY EXAM OF SHOULDER: CPT

## 2024-10-16 NOTE — TELEPHONE ENCOUNTER
Pt called to inform Dr Baum that she completed her arm xray. Denies further questions or concerns at this time.

## 2024-10-17 ENCOUNTER — TELEPHONE (OUTPATIENT)
Age: 74
End: 2024-10-17

## 2024-10-17 ENCOUNTER — TELEPHONE (OUTPATIENT)
Dept: OBGYN CLINIC | Facility: HOSPITAL | Age: 74
End: 2024-10-17

## 2024-10-17 ENCOUNTER — OFFICE VISIT (OUTPATIENT)
Dept: OBGYN CLINIC | Facility: CLINIC | Age: 74
End: 2024-10-17
Payer: MEDICARE

## 2024-10-17 VITALS
DIASTOLIC BLOOD PRESSURE: 68 MMHG | BODY MASS INDEX: 28.82 KG/M2 | HEIGHT: 65 IN | SYSTOLIC BLOOD PRESSURE: 118 MMHG | WEIGHT: 173 LBS

## 2024-10-17 DIAGNOSIS — S46.912A LEFT SHOULDER STRAIN, INITIAL ENCOUNTER: Primary | ICD-10-CM

## 2024-10-17 DIAGNOSIS — S52.124D CLOSED NONDISPLACED FRACTURE OF HEAD OF RIGHT RADIUS WITH ROUTINE HEALING, SUBSEQUENT ENCOUNTER: ICD-10-CM

## 2024-10-17 PROCEDURE — 99214 OFFICE O/P EST MOD 30 MIN: CPT | Performed by: ORTHOPAEDIC SURGERY

## 2024-10-17 NOTE — ASSESSMENT & PLAN NOTE
Findings consistent with left shoulder strain.  Discussed findings and treatment options with the patient.  I reviewed patient's left shoulder, left elbow x-ray, and radiology report with her.  I discussed with her that her left elbow images do not demonstrate an acute radial head fracture the x-ray finding is most likely degenerative changes in nature since she has no tenderness to palpation along her radial head as well as no  pain and discomfort in her elbow with range of motion.  I discussed with her that her symptoms and exam are consistent with a left shoulder strain and I would like her to begin outpatient physical therapy.  She can continue to use ice and over-the-counter medication as needed for pain relief.  She can follow-up in approximately 6 to 8 weeks for reevaluation.  All patient's questions were answered to her satisfaction.  This note is created using dictation transcription.  It may contain typographical errors, grammatical errors, improperly dictated words, background noise and other errors.

## 2024-10-17 NOTE — TELEPHONE ENCOUNTER
Received call from pt's PCP office to schedule, unable to connect with pt, called patient back to schedule and left VM.

## 2024-10-17 NOTE — TELEPHONE ENCOUNTER
Patient wanted to inform Dr Baum that she does not have a broken elbow, and that she would like to start PT, she would like the order/referral for PT to be performed at [facility].  Patient will provide any other information needed as long as Dr Baum is ok with placing the order, please follow up with patient

## 2024-10-17 NOTE — PROGRESS NOTES
Assessment:     1. Left shoulder strain, initial encounter    2. Closed nondisplaced fracture of head of right radius with routine healing, subsequent encounter        Plan:     Problem List Items Addressed This Visit          Musculoskeletal and Integument    Left shoulder strain - Primary     Findings consistent with left shoulder strain.  Discussed findings and treatment options with the patient.  I reviewed patient's left shoulder, left elbow x-ray, and radiology report with her.  I discussed with her that her left elbow images do not demonstrate an acute radial head fracture the x-ray finding is most likely degenerative changes in nature since she has no tenderness to palpation along her radial head as well as no  pain and discomfort in her elbow with range of motion.  I discussed with her that her symptoms and exam are consistent with a left shoulder strain and I would like her to begin outpatient physical therapy.  She can continue to use ice and over-the-counter medication as needed for pain relief.  She can follow-up in approximately 6 to 8 weeks for reevaluation.  All patient's questions were answered to her satisfaction.  This note is created using dictation transcription.  It may contain typographical errors, grammatical errors, improperly dictated words, background noise and other errors.          Other Visit Diagnoses       Closed nondisplaced fracture of head of right radius with routine healing, subsequent encounter               Subjective:     Patient ID: Brianna Baum is a 74 y.o. female.  Chief Complaint:  Brianna presents today for initial evaluation left elbow and shoulder pain started 1 month ago (~9/17/2024) referred by Dr. Ladan Baum.  She is right-hand dominant, retired, and does have a history of CVA on 7/10/2024.  She states approximately 1 month ago she was taking her trash when she grabbed onto the railing to catch herself with her left arm and landed on her knees.  She denies landing on  her left elbow.  She states her pain is along the anterior and lateral aspect of her shoulder that radiates to the medial aspect of her elbow.  She states that reaching and overhead activity with her left arm does increase her symptoms.  She recently saw her family doctor on 10/10/2024 who got x-rays that revealed the radial head fracture.  She was not provided any elbow cast/splint/brace.  She denies any previous history of injury or trauma to her left upper extremity.  She denies any numbness or tingling.    Allergy:  Allergies   Allergen Reactions    Tramadol Hives     Medications:  all current active meds have been reviewed  Past Medical History:  Past Medical History:   Diagnosis Date    Arthritis     Chronic pain     Colon polyp     Diverticulitis     GERD (gastroesophageal reflux disease)     Hypertension     Vertigo      Past Surgical History:  Past Surgical History:   Procedure Laterality Date    CHOLECYSTECTOMY      CHOLECYSTECTOMY LAPAROSCOPIC N/A 12/06/2021    Procedure: SUBTOTAL CHOLECYSTECTOMY LAPAROSCOPIC;  Surgeon: Víctor Viveros MD;  Location: [facility] OR;  Service: General    COLONOSCOPY      GALLBLADDER SURGERY  12/2021    Dr. Viveros    IR STROKE ALERT  7/23/2024    IR STROKE ALERT  7/24/2024    TUBAL LIGATION       Family History:  Family History   Problem Relation Age of Onset    Stroke Mother     Diabetes Mother     Hypertension Mother     Heart disease Father     Hypertension Father     Heart disease Brother     Diabetes Brother     Hypertension Brother     Substance Abuse Neg Hx     Mental illness Neg Hx      Social History:  Social History     Substance and Sexual Activity   Alcohol Use Yes    Comment: 3 times a week     Social History     Substance and Sexual Activity   Drug Use Yes    Types: Marijuana    Comment: rarely/ last used 2 days ago     Social History     Tobacco Use   Smoking Status Never   Smokeless Tobacco Never     Review of Systems   Constitutional:  Negative for chills and  "fever.   HENT:  Negative for ear pain and sore throat.    Eyes:  Negative for pain and visual disturbance.   Respiratory:  Negative for cough and shortness of breath.    Cardiovascular:  Negative for chest pain and palpitations.   Gastrointestinal:  Negative for abdominal pain and vomiting.   Genitourinary:  Negative for dysuria and hematuria.   Musculoskeletal:  Positive for arthralgias (Left shoulder). Negative for back pain and joint swelling.   Skin:  Negative for color change and rash.   Neurological:  Negative for seizures and syncope.   Psychiatric/Behavioral: Negative.     All other systems reviewed and are negative.        Objective:  BP Readings from Last 1 Encounters:   10/17/24 118/68      Wt Readings from Last 1 Encounters:   10/17/24 78.5 kg (173 lb)      BMI:   Estimated body mass index is 28.79 kg/m² as calculated from the following:    Height as of this encounter: 5' 5\" (1.651 m).    Weight as of this encounter: 78.5 kg (173 lb).  BSA:   Estimated body surface area is 1.86 meters squared as calculated from the following:    Height as of this encounter: 5' 5\" (1.651 m).    Weight as of this encounter: 78.5 kg (173 lb).   Physical Exam  Vitals and nursing note reviewed.   Constitutional:       Appearance: Normal appearance. She is well-developed.   HENT:      Head: Normocephalic and atraumatic.      Right Ear: External ear normal.      Left Ear: External ear normal.      Nose: Nose normal.   Eyes:      Extraocular Movements: Extraocular movements intact.      Conjunctiva/sclera: Conjunctivae normal.   Pulmonary:      Effort: Pulmonary effort is normal.   Musculoskeletal:      Cervical back: Neck supple.      Comments: See HPI   Skin:     General: Skin is warm and dry.   Neurological:      Mental Status: She is alert and oriented to person, place, and time.      Deep Tendon Reflexes: Reflexes are normal and symmetric.   Psychiatric:         Mood and Affect: Mood normal.         Behavior: Behavior " normal.       Left Elbow Exam     Tenderness   The patient is experiencing no tenderness.     Range of Motion   The patient has normal left elbow ROM.    Muscle Strength   The patient has normal left elbow strength.    Tests   Varus: negative  Valgus: negative    Other   Erythema: absent  Scars: absent  Sensation: normal  Pulse: present      Left Shoulder Exam     Tenderness   Left shoulder tenderness location: Anterior.    Range of Motion   Active abduction:  90 (Pain)   External rotation:  60   Forward flexion:  160 (Pain)   Internal rotation 0 degrees:  Sacrum     Muscle Strength   Abduction: 5/5   Internal rotation: 5/5   External rotation: 5/5   Biceps: 5/5     Tests   Jacob test: negative  Cross arm: negative  Impingement: negative  Drop arm: negative    Other   Erythema: absent  Scars: absent  Sensation: normal  Pulse: present     Comments:  Pain with resistant abduction            I have personally reviewed pertinent films in PACS and my interpretation is Left elbow x-ray some irregular over the articular surface lateral radial head.  Left shoulder x-ray show no acute osseous abnormalities or significant degenerative changes.

## 2024-10-18 ENCOUNTER — HOSPITAL ENCOUNTER (INPATIENT)
Facility: HOSPITAL | Age: 74
LOS: 2 days | Discharge: HOME | DRG: 092 | End: 2024-10-21
Attending: EMERGENCY MEDICINE | Admitting: INTERNAL MEDICINE
Payer: MEDICARE

## 2024-10-18 ENCOUNTER — APPOINTMENT (EMERGENCY)
Dept: RADIOLOGY | Facility: HOSPITAL | Age: 74
DRG: 092 | End: 2024-10-18
Payer: MEDICARE

## 2024-10-18 ENCOUNTER — APPOINTMENT (EMERGENCY)
Dept: CT IMAGING | Facility: HOSPITAL | Age: 74
DRG: 092 | End: 2024-10-18
Payer: MEDICARE

## 2024-10-18 DIAGNOSIS — R53.1 GENERALIZED WEAKNESS: ICD-10-CM
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
## 2024-10-18 DIAGNOSIS — R26.2 AMBULATORY DYSFUNCTION: ICD-10-CM

## 2024-10-18 LAB
ABO GROUP BLD: NORMAL
ALBUMIN SERPL BCG-MCNC: 4.2 G/DL (ref 3.5–5)
ALP SERPL-CCNC: 74 U/L (ref 34–104)
ALT SERPL W P-5'-P-CCNC: 18 U/L (ref 7–52)
AMMONIA PLAS-SCNC: 16 UMOL/L (ref 18–72)
ANION GAP SERPL CALCULATED.3IONS-SCNC: 12 MMOL/L (ref 4–13)
APAP SERPL-MCNC: <2 UG/ML (ref 10–20)
APTT PPP: 30 SECONDS (ref 23–34)
AST SERPL W P-5'-P-CCNC: 24 U/L (ref 13–39)
ATRIAL RATE: 94 BPM
BASOPHILS # BLD AUTO: 0.02 THOUSANDS/ΜL (ref 0–0.1)
BASOPHILS NFR BLD AUTO: 0 % (ref 0–1)
BILIRUB SERPL-MCNC: 0.8 MG/DL (ref 0.2–1)
BLD GP AB SCN SERPL QL: NEGATIVE
BNP SERPL-MCNC: 114 PG/ML (ref 0–100)
BUN SERPL-MCNC: 12 MG/DL (ref 5–25)
CALCIUM SERPL-MCNC: 9.3 MG/DL (ref 8.4–10.2)
CARDIAC TROPONIN I PNL SERPL HS: 9 NG/L
CHLORIDE SERPL-SCNC: 103 MMOL/L (ref 96–108)
CK SERPL-CCNC: 85 U/L (ref 26–192)
CO2 SERPL-SCNC: 24 MMOL/L (ref 21–32)
CREAT SERPL-MCNC: 0.77 MG/DL (ref 0.6–1.3)
EOSINOPHIL # BLD AUTO: 0 THOUSAND/ΜL (ref 0–0.61)
EOSINOPHIL NFR BLD AUTO: 0 % (ref 0–6)
ERYTHROCYTE [DISTWIDTH] IN BLOOD BY AUTOMATED COUNT: 12.5 % (ref 11.6–15.1)
ETHANOL SERPL-MCNC: <10 MG/DL
GFR SERPL CREATININE-BSD FRML MDRD: 76 ML/MIN/1.73SQ M
GLUCOSE SERPL-MCNC: 111 MG/DL (ref 65–140)
HCT VFR BLD AUTO: 43.4 % (ref 34.8–46.1)
HGB BLD-MCNC: 14.4 G/DL (ref 11.5–15.4)
IMM GRANULOCYTES # BLD AUTO: 0.06 THOUSAND/UL (ref 0–0.2)
IMM GRANULOCYTES NFR BLD AUTO: 1 % (ref 0–2)
INR PPP: 1.25 (ref 0.85–1.19)
LYMPHOCYTES # BLD AUTO: 0.92 THOUSANDS/ΜL (ref 0.6–4.47)
LYMPHOCYTES NFR BLD AUTO: 9 % (ref 14–44)
MAGNESIUM SERPL-MCNC: 2 MG/DL (ref 1.9–2.7)
MCH RBC QN AUTO: 31 PG (ref 26.8–34.3)
MCHC RBC AUTO-ENTMCNC: 33.2 G/DL (ref 31.4–37.4)
MCV RBC AUTO: 93 FL (ref 82–98)
MONOCYTES # BLD AUTO: 0.63 THOUSAND/ΜL (ref 0.17–1.22)
MONOCYTES NFR BLD AUTO: 6 % (ref 4–12)
NEUTROPHILS # BLD AUTO: 8.75 THOUSANDS/ΜL (ref 1.85–7.62)
NEUTS SEG NFR BLD AUTO: 84 % (ref 43–75)
NRBC BLD AUTO-RTO: 0 /100 WBCS
P AXIS: 66 DEGREES
PLATELET # BLD AUTO: 177 THOUSANDS/UL (ref 149–390)
PMV BLD AUTO: 10.2 FL (ref 8.9–12.7)
POTASSIUM SERPL-SCNC: 3.8 MMOL/L (ref 3.5–5.3)
PR INTERVAL: 190 MS
PROT SERPL-MCNC: 6.5 G/DL (ref 6.4–8.4)
PROTHROMBIN TIME: 16.2 SECONDS (ref 12.3–15)
QRS AXIS: 14 DEGREES
QRSD INTERVAL: 74 MS
QT INTERVAL: 394 MS
QTC INTERVAL: 492 MS
RBC # BLD AUTO: 4.65 MILLION/UL (ref 3.81–5.12)
RH BLD: POSITIVE
SALICYLATES SERPL-MCNC: <5 MG/DL (ref 3–20)
SODIUM SERPL-SCNC: 139 MMOL/L (ref 135–147)
SPECIMEN EXPIRATION DATE: NORMAL
T WAVE AXIS: 67 DEGREES
TSH SERPL DL<=0.05 MIU/L-ACNC: 0.84 UIU/ML (ref 0.45–4.5)
VENTRICULAR RATE: 94 BPM
WBC # BLD AUTO: 10.38 THOUSAND/UL (ref 4.31–10.16)

## 2024-10-18 PROCEDURE — 80053 COMPREHEN METABOLIC PANEL: CPT | Performed by: EMERGENCY MEDICINE

## 2024-10-18 PROCEDURE — 85610 PROTHROMBIN TIME: CPT | Performed by: EMERGENCY MEDICINE

## 2024-10-18 PROCEDURE — 74177 CT ABD & PELVIS W/CONTRAST: CPT

## 2024-10-18 PROCEDURE — 82550 ASSAY OF CK (CPK): CPT | Performed by: EMERGENCY MEDICINE

## 2024-10-18 PROCEDURE — 80143 DRUG ASSAY ACETAMINOPHEN: CPT | Performed by: EMERGENCY MEDICINE

## 2024-10-18 PROCEDURE — 99285 EMERGENCY DEPT VISIT HI MDM: CPT | Performed by: EMERGENCY MEDICINE

## 2024-10-18 PROCEDURE — 86900 BLOOD TYPING SEROLOGIC ABO: CPT | Performed by: EMERGENCY MEDICINE

## 2024-10-18 PROCEDURE — 83735 ASSAY OF MAGNESIUM: CPT | Performed by: EMERGENCY MEDICINE

## 2024-10-18 PROCEDURE — 83880 ASSAY OF NATRIURETIC PEPTIDE: CPT | Performed by: EMERGENCY MEDICINE

## 2024-10-18 PROCEDURE — 86901 BLOOD TYPING SEROLOGIC RH(D): CPT | Performed by: EMERGENCY MEDICINE

## 2024-10-18 PROCEDURE — 85730 THROMBOPLASTIN TIME PARTIAL: CPT | Performed by: EMERGENCY MEDICINE

## 2024-10-18 PROCEDURE — 86850 RBC ANTIBODY SCREEN: CPT | Performed by: EMERGENCY MEDICINE

## 2024-10-18 PROCEDURE — 70450 CT HEAD/BRAIN W/O DYE: CPT

## 2024-10-18 PROCEDURE — 36415 COLL VENOUS BLD VENIPUNCTURE: CPT | Performed by: EMERGENCY MEDICINE

## 2024-10-18 PROCEDURE — 72125 CT NECK SPINE W/O DYE: CPT

## 2024-10-18 PROCEDURE — 82077 ASSAY SPEC XCP UR&BREATH IA: CPT | Performed by: EMERGENCY MEDICINE

## 2024-10-18 PROCEDURE — 84443 ASSAY THYROID STIM HORMONE: CPT | Performed by: EMERGENCY MEDICINE

## 2024-10-18 PROCEDURE — 96374 THER/PROPH/DIAG INJ IV PUSH: CPT

## 2024-10-18 PROCEDURE — 84484 ASSAY OF TROPONIN QUANT: CPT | Performed by: EMERGENCY MEDICINE

## 2024-10-18 PROCEDURE — 82140 ASSAY OF AMMONIA: CPT | Performed by: EMERGENCY MEDICINE

## 2024-10-18 PROCEDURE — 80179 DRUG ASSAY SALICYLATE: CPT | Performed by: EMERGENCY MEDICINE

## 2024-10-18 PROCEDURE — 71260 CT THORAX DX C+: CPT

## 2024-10-18 PROCEDURE — 71045 X-RAY EXAM CHEST 1 VIEW: CPT

## 2024-10-18 PROCEDURE — 99285 EMERGENCY DEPT VISIT HI MDM: CPT

## 2024-10-18 PROCEDURE — 85025 COMPLETE CBC W/AUTO DIFF WBC: CPT | Performed by: EMERGENCY MEDICINE

## 2024-10-18 PROCEDURE — 93005 ELECTROCARDIOGRAM TRACING: CPT

## 2024-10-18 RX ORDER — ACETAMINOPHEN 10 MG/ML
1000 INJECTION, SOLUTION INTRAVENOUS ONCE
Status: COMPLETED | OUTPATIENT
Start: 2024-10-18 | End: 2024-10-18

## 2024-10-18 RX ORDER — LIDOCAINE 50 MG/G
1 PATCH TOPICAL ONCE
Status: COMPLETED | OUTPATIENT
Start: 2024-10-18 | End: 2024-10-19

## 2024-10-18 RX ORDER — METHOCARBAMOL 500 MG/1
500 TABLET, FILM COATED ORAL ONCE
Status: COMPLETED | OUTPATIENT
Start: 2024-10-18 | End: 2024-10-18

## 2024-10-18 RX ADMIN — LIDOCAINE 1 PATCH: 50 PATCH CUTANEOUS at 23:13

## 2024-10-18 RX ADMIN — METHOCARBAMOL TABLETS 500 MG: 500 TABLET, COATED ORAL at 23:09

## 2024-10-18 RX ADMIN — ACETAMINOPHEN 1000 MG: 10 INJECTION INTRAVENOUS at 23:11

## 2024-10-18 RX ADMIN — IOHEXOL 100 ML: 350 INJECTION, SOLUTION INTRAVENOUS at 21:43

## 2024-10-19 ENCOUNTER — APPOINTMENT (INPATIENT)
Dept: RADIOLOGY | Facility: HOSPITAL | Age: 74
DRG: 092 | End: 2024-10-19
Payer: MEDICARE

## 2024-10-19 PROBLEM — R26.2 AMBULATORY DYSFUNCTION: Status: ACTIVE | Noted: 2024-10-19

## 2024-10-19 PROBLEM — D72.829 LEUKOCYTOSIS: Status: ACTIVE | Noted: 2024-10-19

## 2024-10-19 LAB
2HR DELTA HS TROPONIN: 8 NG/L
ANION GAP SERPL CALCULATED.3IONS-SCNC: 6 MMOL/L (ref 4–13)
BUN SERPL-MCNC: 13 MG/DL (ref 5–25)
CALCIUM SERPL-MCNC: 8.7 MG/DL (ref 8.4–10.2)
CARDIAC TROPONIN I PNL SERPL HS: 17 NG/L
CHLORIDE SERPL-SCNC: 104 MMOL/L (ref 96–108)
CO2 SERPL-SCNC: 29 MMOL/L (ref 21–32)
CREAT SERPL-MCNC: 0.75 MG/DL (ref 0.6–1.3)
ERYTHROCYTE [DISTWIDTH] IN BLOOD BY AUTOMATED COUNT: 12.7 % (ref 11.6–15.1)
GFR SERPL CREATININE-BSD FRML MDRD: 78 ML/MIN/1.73SQ M
GLUCOSE SERPL-MCNC: 123 MG/DL (ref 65–140)
HCT VFR BLD AUTO: 37.9 % (ref 34.8–46.1)
HGB BLD-MCNC: 12.4 G/DL (ref 11.5–15.4)
MAGNESIUM SERPL-MCNC: 2 MG/DL (ref 1.9–2.7)
MCH RBC QN AUTO: 30.5 PG (ref 26.8–34.3)
MCHC RBC AUTO-ENTMCNC: 32.7 G/DL (ref 31.4–37.4)
MCV RBC AUTO: 93 FL (ref 82–98)
PHOSPHATE SERPL-MCNC: 3.5 MG/DL (ref 2.3–4.1)
PLATELET # BLD AUTO: 175 THOUSANDS/UL (ref 149–390)
PMV BLD AUTO: 10.1 FL (ref 8.9–12.7)
POTASSIUM SERPL-SCNC: 3.1 MMOL/L (ref 3.5–5.3)
RBC # BLD AUTO: 4.07 MILLION/UL (ref 3.81–5.12)
SODIUM SERPL-SCNC: 139 MMOL/L (ref 135–147)
WBC # BLD AUTO: 7.8 THOUSAND/UL (ref 4.31–10.16)

## 2024-10-19 PROCEDURE — 73610 X-RAY EXAM OF ANKLE: CPT

## 2024-10-19 PROCEDURE — 84484 ASSAY OF TROPONIN QUANT: CPT | Performed by: EMERGENCY MEDICINE

## 2024-10-19 PROCEDURE — 85027 COMPLETE CBC AUTOMATED: CPT | Performed by: PHYSICIAN ASSISTANT

## 2024-10-19 PROCEDURE — 80048 BASIC METABOLIC PNL TOTAL CA: CPT | Performed by: PHYSICIAN ASSISTANT

## 2024-10-19 PROCEDURE — 36415 COLL VENOUS BLD VENIPUNCTURE: CPT | Performed by: EMERGENCY MEDICINE

## 2024-10-19 PROCEDURE — 99223 1ST HOSP IP/OBS HIGH 75: CPT | Performed by: INTERNAL MEDICINE

## 2024-10-19 PROCEDURE — 84100 ASSAY OF PHOSPHORUS: CPT | Performed by: PHYSICIAN ASSISTANT

## 2024-10-19 PROCEDURE — 83735 ASSAY OF MAGNESIUM: CPT | Performed by: PHYSICIAN ASSISTANT

## 2024-10-19 PROCEDURE — 72220 X-RAY EXAM SACRUM TAILBONE: CPT

## 2024-10-19 RX ORDER — ASCORBIC ACID 500 MG
1000 TABLET ORAL DAILY
Status: DISCONTINUED | OUTPATIENT
Start: 2024-10-19 | End: 2024-10-21 | Stop reason: HOSPADM

## 2024-10-19 RX ORDER — PANTOPRAZOLE SODIUM 20 MG/1
20 TABLET, DELAYED RELEASE ORAL
Status: DISCONTINUED | OUTPATIENT
Start: 2024-10-19 | End: 2024-10-21 | Stop reason: HOSPADM

## 2024-10-19 RX ORDER — POTASSIUM CHLORIDE 1500 MG/1
40 TABLET, EXTENDED RELEASE ORAL ONCE
Status: COMPLETED | OUTPATIENT
Start: 2024-10-19 | End: 2024-10-19

## 2024-10-19 RX ORDER — ACETAMINOPHEN 325 MG/1
650 TABLET ORAL EVERY 6 HOURS PRN
Status: DISCONTINUED | OUTPATIENT
Start: 2024-10-19 | End: 2024-10-21 | Stop reason: HOSPADM

## 2024-10-19 RX ORDER — MAGNESIUM HYDROXIDE/ALUMINUM HYDROXICE/SIMETHICONE 120; 1200; 1200 MG/30ML; MG/30ML; MG/30ML
30 SUSPENSION ORAL EVERY 6 HOURS PRN
Status: DISCONTINUED | OUTPATIENT
Start: 2024-10-19 | End: 2024-10-21 | Stop reason: HOSPADM

## 2024-10-19 RX ORDER — ATORVASTATIN CALCIUM 40 MG/1
40 TABLET, FILM COATED ORAL EVERY EVENING
Status: DISCONTINUED | OUTPATIENT
Start: 2024-10-19 | End: 2024-10-21 | Stop reason: HOSPADM

## 2024-10-19 RX ORDER — SENNOSIDES 8.6 MG
1 TABLET ORAL
Status: DISCONTINUED | OUTPATIENT
Start: 2024-10-19 | End: 2024-10-21 | Stop reason: HOSPADM

## 2024-10-19 RX ORDER — SODIUM CHLORIDE, SODIUM GLUCONATE, SODIUM ACETATE, POTASSIUM CHLORIDE, MAGNESIUM CHLORIDE, SODIUM PHOSPHATE, DIBASIC, AND POTASSIUM PHOSPHATE .53; .5; .37; .037; .03; .012; .00082 G/100ML; G/100ML; G/100ML; G/100ML; G/100ML; G/100ML; G/100ML
100 INJECTION, SOLUTION INTRAVENOUS CONTINUOUS
Status: DISPENSED | OUTPATIENT
Start: 2024-10-19 | End: 2024-10-19

## 2024-10-19 RX ORDER — DILTIAZEM HYDROCHLORIDE 240 MG/1
240 CAPSULE, COATED, EXTENDED RELEASE ORAL DAILY
Status: DISCONTINUED | OUTPATIENT
Start: 2024-10-19 | End: 2024-10-21 | Stop reason: HOSPADM

## 2024-10-19 RX ADMIN — APIXABAN 5 MG: 5 TABLET, FILM COATED ORAL at 17:00

## 2024-10-19 RX ADMIN — POTASSIUM CHLORIDE 40 MEQ: 1500 TABLET, EXTENDED RELEASE ORAL at 08:25

## 2024-10-19 RX ADMIN — ACETAMINOPHEN 650 MG: 325 TABLET, FILM COATED ORAL at 16:59

## 2024-10-19 RX ADMIN — ATORVASTATIN CALCIUM 40 MG: 40 TABLET, FILM COATED ORAL at 17:00

## 2024-10-19 RX ADMIN — APIXABAN 5 MG: 5 TABLET, FILM COATED ORAL at 04:40

## 2024-10-19 RX ADMIN — SODIUM CHLORIDE, SODIUM GLUCONATE, SODIUM ACETATE, POTASSIUM CHLORIDE, MAGNESIUM CHLORIDE, SODIUM PHOSPHATE, DIBASIC, AND POTASSIUM PHOSPHATE 100 ML/HR: .53; .5; .37; .037; .03; .012; .00082 INJECTION, SOLUTION INTRAVENOUS at 13:57

## 2024-10-19 RX ADMIN — OXYCODONE HYDROCHLORIDE AND ACETAMINOPHEN 1000 MG: 500 TABLET ORAL at 08:25

## 2024-10-19 RX ADMIN — SODIUM CHLORIDE, SODIUM GLUCONATE, SODIUM ACETATE, POTASSIUM CHLORIDE, MAGNESIUM CHLORIDE, SODIUM PHOSPHATE, DIBASIC, AND POTASSIUM PHOSPHATE 100 ML/HR: .53; .5; .37; .037; .03; .012; .00082 INJECTION, SOLUTION INTRAVENOUS at 04:40

## 2024-10-19 RX ADMIN — DILTIAZEM HYDROCHLORIDE 240 MG: 240 CAPSULE, EXTENDED RELEASE ORAL at 08:25

## 2024-10-19 RX ADMIN — PANTOPRAZOLE SODIUM 20 MG: 20 TABLET, DELAYED RELEASE ORAL at 08:29

## 2024-10-19 NOTE — H&P
"H&P - Hospitalist   Name: Brianna Baum 74 y.o. female I MRN: [id number]  Unit/Bed#: -01 I Date of Admission: 10/18/2024   Date of Service: 10/19/2024 I Hospital Day: 0     Assessment & Plan  Ambulatory dysfunction  Reports she fell off of the beanbag chair on her porch 10/18 evening and was able to get up on her own  Failed ambulatory trial in the ED  PT, OT, and CM consulted  Of note, she does endorse right ankle pain and there is tenderness to palpation over the lateral malleolus-obtain right ankle XR.  Trauma workup otherwise unrevealing  Leukocytosis  WBC 10.3 K on admission  No signs of acute infection at this time  Monitor off antibiotics and trend CBC  CKD (chronic kidney disease) stage 2, GFR 60-89 ml/min  Lab Results   Component Value Date    EGFR 76 10/18/2024    EGFR 65 09/23/2024    EGFR 66 09/17/2024    CREATININE 0.77 10/18/2024    CREATININE 0.87 09/23/2024    CREATININE 0.86 09/17/2024   Baseline creatinine 0.7-0.8  Creatinine on admission 0.77  Trend BMP  Atrial fibrillation/A flutter (HCC)  RC: Cardizem to 40 Mg daily  AC: Eliquis 5 Mg twice daily  Continue medications  Cerebrovascular accident (CVA) due to occlusion of left middle cerebral artery (HCC)  History of CVA in July 2024 s/p TNK and neurosurgical intervention x 2  Thought to be secondary to atrial flutter/fib off anticoagulation due to cost  CTh on admit without acute findings  Neurologically intact on admit  Continue home Eliquis      VTE Pharmacologic Prophylaxis: VTE Score: 3 Moderate Risk (Score 3-4) - Pharmacological DVT Prophylaxis Ordered: apixaban (Eliquis).  Code Status: Level 1 - Full Code   Discussion with family: Patient declined call to .     Anticipated Length of Stay: Patient will be admitted on an inpatient basis with an anticipated length of stay of greater than 2 midnights secondary to ambulatory dysfunction.    History of Present Illness   Chief Complaint: \" I fell off the beanbag chair and could " "not get up\"    Brianna Baum is a 74 y.o. female with a PMH of CVA in July 2024, a flutter/A-fib, and CKD stage II who presents with inability to get off the ground on her porch after falling out of a beanbag chair earlier this morning.  Patient reports she was lying on her porch for several hours.  On admission, she only endorses pain in her right ankle, otherwise she denies pain anywhere else.  Reports she had neck and back pain in the ED that is since resolved.  Denies any recent illnesses.  No URI-like symptoms.  Denies chest pain or dyspnea.  No fevers or chills.  No AP, NVD, or urinary symptoms..    Review of Systems   Constitutional:  Negative for chills and fever.   HENT:  Negative for congestion.    Respiratory:  Negative for cough and shortness of breath.    Cardiovascular:  Negative for chest pain and leg swelling.   Gastrointestinal:  Negative for abdominal pain, constipation, diarrhea, nausea and vomiting.   Genitourinary:  Negative for difficulty urinating, dysuria and hematuria.   Musculoskeletal:  Positive for arthralgias and gait problem.   All other systems reviewed and are negative.      Historical Information   Past Medical History:   Diagnosis Date    Arthritis     Chronic pain     Colon polyp     Diverticulitis     GERD (gastroesophageal reflux disease)     Hypertension     Vertigo      Past Surgical History:   Procedure Laterality Date    CHOLECYSTECTOMY      CHOLECYSTECTOMY LAPAROSCOPIC N/A 12/06/2021    Procedure: SUBTOTAL CHOLECYSTECTOMY LAPAROSCOPIC;  Surgeon: Víctor Viveros MD;  Location: [facility] [place] OR;  Service: General    COLONOSCOPY      GALLBLADDER SURGERY  12/2021    Dr. Viveros    IR STROKE ALERT  7/23/2024    IR STROKE ALERT  7/24/2024    TUBAL LIGATION       Social History     Tobacco Use    Smoking status: Never    Smokeless tobacco: Never   Vaping Use    Vaping status: Never Used   Substance and Sexual Activity    Alcohol use: Yes     Comment: 3 times a week    Drug use: Yes     " Types: Marijuana     Comment: rarely/ last used 2 days ago    Sexual activity: Not Currently     E-Cigarette/Vaping    E-Cigarette Use Never User      E-Cigarette/Vaping Substances     Family History   Problem Relation Age of Onset    Stroke Mother     Diabetes Mother     Hypertension Mother     Heart disease Father     Hypertension Father     Heart disease Brother     Diabetes Brother     Hypertension Brother     Substance Abuse Neg Hx     Mental illness Neg Hx      Social History:  Marital Status:    Occupation: Retired  Patient Pre-hospital Living Situation: Home, Alone  Patient Pre-hospital Level of Mobility: walks  Patient Pre-hospital Diet Restrictions: None    Meds/Allergies   I have reviewed home medications with patient personally.  Prior to Admission medications    Medication Sig Start Date End Date Taking? Authorizing Provider   acetaminophen (TYLENOL) 325 mg tablet  8/9/24  Yes Historical Provider, MD   apixaban (Eliquis) 5 mg Take 1 tablet (5 mg total) by mouth 2 (two) times a day 8/30/24  Yes LADONNA Drew   Ascorbic Acid (VITAMIN C) 1000 MG tablet Take 1,000 mg by mouth daily   Yes Historical Provider, MD   atorvastatin (LIPITOR) 40 mg tablet Take 1 tablet (40 mg total) by mouth every evening 8/30/24  Yes LADONNA Drew   Biotin 10 MG CAPS Take by mouth in the morning   Yes Historical Provider, MD   diltiazem (CARDIZEM CD) 240 mg 24 hr capsule Take 1 capsule (240 mg total) by mouth daily 9/27/24 9/22/25 Yes Rosetta Rock MD   pantoprazole (PROTONIX) 20 mg tablet Take 1 tablet (20 mg total) by mouth daily 8/30/24  Yes LADONNA Drew   clotrimazole-betamethasone (LOTRISONE) 1-0.05 % cream Apply topically 2 (two) times a day 9/5/24   LADONNA Drew     Allergies   Allergen Reactions    Tramadol Hives       Objective :  Temp:  [97.2 °F (36.2 °C)-98.1 °F (36.7 °C)] 97.2 °F (36.2 °C)  HR:  [77-99] 77  BP: (126-172)/(60-80) 139/69  Resp:  [16-24] 16  SpO2:  [93 %-99 %] 99 %  O2  Device: None (Room air)    Physical Exam  Vitals and nursing note reviewed.   Constitutional:       General: She is not in acute distress.     Appearance: Normal appearance. She is not ill-appearing.      Comments: Pleasant and conversational   HENT:      Head: Normocephalic.      Nose: Nose normal.      Mouth/Throat:      Mouth: Mucous membranes are moist.   Eyes:      Conjunctiva/sclera: Conjunctivae normal.   Cardiovascular:      Rate and Rhythm: Normal rate and regular rhythm.      Pulses: Normal pulses.   Pulmonary:      Effort: Pulmonary effort is normal.      Breath sounds: Normal breath sounds.   Abdominal:      General: Abdomen is flat.      Palpations: Abdomen is soft.   Musculoskeletal:      Cervical back: Normal range of motion.      Right lower leg: No edema.      Left lower leg: No edema.      Comments: Slight decreased range of motion of right ankle secondary to pain.  Tenderness to palpation over the lateral malleolus.   Skin:     General: Skin is warm and dry.   Neurological:      General: No focal deficit present.      Mental Status: She is alert.      Comments: Oriented to self, place, month, year, president   Psychiatric:         Mood and Affect: Mood normal.         Thought Content: Thought content normal.          Lines/Drains:  Lines/Drains/Airways       Active Status       Name Placement date Placement time Site Days    External Urinary Catheter 10/19/24  0238  -- less than 1                          Lab Results: I have reviewed the following results:  Results from last 7 days   Lab Units 10/18/24  2128   WBC Thousand/uL 10.38*   HEMOGLOBIN g/dL 14.4   HEMATOCRIT % 43.4   PLATELETS Thousands/uL 177   SEGS PCT % 84*   LYMPHO PCT % 9*   MONO PCT % 6   EOS PCT % 0     Results from last 7 days   Lab Units 10/18/24  2128   SODIUM mmol/L 139   POTASSIUM mmol/L 3.8   CHLORIDE mmol/L 103   CO2 mmol/L 24   BUN mg/dL 12   CREATININE mg/dL 0.77   ANION GAP mmol/L 12   CALCIUM mg/dL 9.3   ALBUMIN g/dL  4.2   TOTAL BILIRUBIN mg/dL 0.80   ALK PHOS U/L 74   ALT U/L 18   AST U/L 24   GLUCOSE RANDOM mg/dL 111     Results from last 7 days   Lab Units 10/18/24  2128   INR  1.25*         Lab Results   Component Value Date    HGBA1C 5.8 (H) 07/22/2024    HGBA1C 5.4 03/28/2024    HGBA1C 5.1 02/08/2016           Imaging Results Review: I reviewed radiology reports from this admission including: CT L-spine, CT chest, CT abdomen/pelvis, CT head, and CT C-spine.  Personally reviewed CXR, my interpretation is: No focal consolidation or pneumothorax.  Other Study Results Review: EKG was personally reviewed and my interpretation is: NSR with slightly prolonged QTc at 492 MS.  No acute ischemia...    Administrative Statements       ** Please Note: This note has been constructed using a voice recognition system. **

## 2024-10-19 NOTE — ED PROVIDER NOTES
"Emergency Department Trauma Note  Brianna Baum 74 y.o. female MRN: [id number]  Unit/Bed#: /-01 Encounter: [id number]      Trauma Alert: Trauma Acuity: Trauma Evaluation  Model of Arrival:   via    Trauma Team: Current Providers  Attending Provider: Abimbola Turcios DO  Attending Provider: Charlotte Tortter MD  Attending Provider: Naga Donahue MD  Registered Nurse: Tr Guerra RN  Advanced Practitioner: Fina Sparks PA-C  Registered Nurse: Shahana Moura  Licensed Practical Nurse: Jennifer Gimenez LPN  Patient Care Assistant: Crystal Jimenez  Patient Care Assistant: Denia Payne  : GRISELDA Santiago  Registered Nurse: Silvio Everett  Respiratory Therapist: RT Marilee  Licensed Practical Nurse: Jennifer Gimenez LPN  Physical Therapist: Faye Valdivia, PT  Patient Care Assistant: Rosemary Singh  Patient Care Assistant: Meenu Kennedy  Licensed Practical Nurse: Jennifer Gimenez LPN  Consultants:     None      History of Present Illness     Chief Complaint:   Chief Complaint   Patient presents with    Fall     Pt reports to ed via ems after taking nap on bean bag on her porch this morning pt went to get up and rolled off bean bag and was out in the cold since 10 am, pt reports pain in legs from sitting out in the cold     HPI:  Brianna Baum is a 74 y.o. female who presents with fall.  Mechanism:Details of Incident: Rolled off bean bag chair at 10am. Denies head strike. Injury Date: 10/18/24 Injury Time: 1000      74-year-old female with history of previous stroke on Eliquis presents for evaluation of falling off of beanbag chair around 10 AM, states that she was sitting on her porch after going out there around 9 AM fell asleep on a beanbag chair and fell backwards, states afterwards she felt generally weak and was unable to get up as her legs were \"not working.  Currently complains of upper neck pain, no lower back pain no numbness or tingling of the extremities.  " States that she has been sitting outside on the ground since 10 AM as she was unable to get back into the house.  Otherwise no current chest pain no shortness of breath no abdominal pain      Review of Systems   Constitutional:  Positive for fatigue. Negative for appetite change and fever.   HENT:  Negative for rhinorrhea and sore throat.    Eyes:  Negative for photophobia and visual disturbance.   Respiratory:  Negative for cough, chest tightness and wheezing.    Cardiovascular:  Negative for chest pain, palpitations and leg swelling.   Gastrointestinal:  Negative for abdominal distention, abdominal pain, blood in stool, constipation and diarrhea.   Genitourinary:  Negative for dysuria, flank pain, frequency, hematuria and urgency.   Musculoskeletal:  Positive for neck pain. Negative for back pain.   Skin:  Negative for rash.   Neurological:  Negative for dizziness, weakness and headaches.   All other systems reviewed and are negative.      Historical Information     Immunizations:   Immunization History   Administered Date(s) Administered    COVID-19 PFIZER VACCINE 0.3 ML IM 05/07/2021, 06/15/2021       Past Medical History:   Diagnosis Date    Arthritis     Chronic pain     Colon polyp     Diverticulitis     GERD (gastroesophageal reflux disease)     Hypertension     Vertigo        Family History   Problem Relation Age of Onset    Stroke Mother     Diabetes Mother     Hypertension Mother     Heart disease Father     Hypertension Father     Heart disease Brother     Diabetes Brother     Hypertension Brother     Substance Abuse Neg Hx     Mental illness Neg Hx      Past Surgical History:   Procedure Laterality Date    CHOLECYSTECTOMY      CHOLECYSTECTOMY LAPAROSCOPIC N/A 12/06/2021    Procedure: SUBTOTAL CHOLECYSTECTOMY LAPAROSCOPIC;  Surgeon: Víctor Viveros MD;  Location:  MAIN OR;  Service: General    COLONOSCOPY      GALLBLADDER SURGERY  12/2021    Dr. Viveros    IR STROKE ALERT  7/23/2024    IR STROKE ALERT   7/24/2024    TUBAL LIGATION       Social History     Tobacco Use    Smoking status: Never    Smokeless tobacco: Never   Vaping Use    Vaping status: Never Used   Substance Use Topics    Alcohol use: Yes     Comment: 3 times a week    Drug use: Yes     Types: Marijuana     Comment: rarely/ last used 2 days ago     E-Cigarette/Vaping    E-Cigarette Use Never User      E-Cigarette/Vaping Substances       Family History: non-contributory    Meds/Allergies   Prior to Admission Medications   Prescriptions Last Dose Informant Patient Reported? Taking?   Ascorbic Acid (VITAMIN C) 1000 MG tablet 10/18/2024 Self Yes Yes   Sig: Take 1,000 mg by mouth daily   Biotin 10 MG CAPS 10/18/2024 Self Yes Yes   Sig: Take by mouth in the morning   acetaminophen (TYLENOL) 325 mg tablet 10/18/2024 Self Yes Yes   apixaban (Eliquis) 5 mg 10/18/2024 Self No Yes   Sig: Take 1 tablet (5 mg total) by mouth 2 (two) times a day   atorvastatin (LIPITOR) 40 mg tablet 10/18/2024 Self No Yes   Sig: Take 1 tablet (40 mg total) by mouth every evening   clotrimazole-betamethasone (LOTRISONE) 1-0.05 % cream More than a month Self No No   Sig: Apply topically 2 (two) times a day   diltiazem (CARDIZEM CD) 240 mg 24 hr capsule 10/18/2024  No Yes   Sig: Take 1 capsule (240 mg total) by mouth daily   pantoprazole (PROTONIX) 20 mg tablet 10/18/2024 Self No Yes   Sig: Take 1 tablet (20 mg total) by mouth daily      Facility-Administered Medications: None       Allergies   Allergen Reactions    Tramadol Hives       PHYSICAL EXAM    PE limited by: none    Objective   Vitals:   First set: Temperature: 98.1 °F (36.7 °C) (10/18/24 2114)  Pulse: 99 (10/18/24 2114)  Respirations: 18 (10/18/24 2114)  Blood Pressure: 160/74 (10/18/24 2114)  SpO2: 97 % (10/18/24 2114)    Primary Survey:   (A) Airway: intact   (B) Breathing: cta b/l  (C) Circulation: Pulses:   normal  (D) Disabliity:  GCS Total:  15  (E) Expose:  Completed    Secondary Survey: (Click on Physical Exam tab  above)  Physical Exam  Vitals and nursing note reviewed.   Constitutional:       Appearance: She is well-developed.   HENT:      Head: Normocephalic and atraumatic.   Eyes:      Pupils: Pupils are equal, round, and reactive to light.   Cardiovascular:      Rate and Rhythm: Normal rate and regular rhythm.      Heart sounds: No murmur heard.     No friction rub. No gallop.   Pulmonary:      Effort: Pulmonary effort is normal.      Breath sounds: No wheezing or rales.   Chest:      Chest wall: No tenderness.   Abdominal:      General: There is no distension.      Palpations: Abdomen is soft. There is no mass.      Tenderness: There is no guarding or rebound.   Musculoskeletal:      Cervical back: Normal range of motion and neck supple.      Comments: There is no midline cervical thoracic or lumbar tenderness, no step-offs, no skin lesions   Skin:     General: Skin is warm and dry.   Neurological:      General: No focal deficit present.      Mental Status: She is alert and oriented to person, place, and time. Mental status is at baseline.      Motor: No weakness.      Comments: Muscle strength 5 out of 5 bilateral lower extremities, symmetrical sensation intact, palpable pedal pulses         Cervical spine cleared by clinical criteria? No (imaging required)      Invasive Devices       Peripheral Intravenous Line  Duration             Peripheral IV 10/18/24 Right Antecubital <1 day                    Lab Results:   Results Reviewed       Procedure Component Value Units Date/Time    HS Troponin I 2hr [[id number]]  (Normal) Collected: 10/19/24 0034    Lab Status: Final result Specimen: Blood from Arm, Right Updated: 10/19/24 0110     hs TnI 2hr 17 ng/L      Delta 2hr hsTnI 8 ng/L     Comprehensive metabolic panel [[id number]] Collected: 10/18/24 2128    Lab Status: Final result Specimen: Blood from Arm, Right Updated: 10/18/24 2307     Sodium 139 mmol/L      Potassium 3.8 mmol/L      Chloride 103 mmol/L      CO2 24 mmol/L       ANION GAP 12 mmol/L      BUN 12 mg/dL      Creatinine 0.77 mg/dL      Glucose 111 mg/dL      Calcium 9.3 mg/dL      AST 24 U/L      ALT 18 U/L      Alkaline Phosphatase 74 U/L      Total Protein 6.5 g/dL      Albumin 4.2 g/dL      Total Bilirubin 0.80 mg/dL      eGFR 76 ml/min/1.73sq m     Narrative:      National Kidney Disease Foundation guidelines for Chronic Kidney Disease (CKD):     Stage 1 with normal or high GFR (GFR > 90 mL/min/1.73 square meters)    Stage 2 Mild CKD (GFR = 60-89 mL/min/1.73 square meters)    Stage 3A Moderate CKD (GFR = 45-59 mL/min/1.73 square meters)    Stage 3B Moderate CKD (GFR = 30-44 mL/min/1.73 square meters)    Stage 4 Severe CKD (GFR = 15-29 mL/min/1.73 square meters)    Stage 5 End Stage CKD (GFR <15 mL/min/1.73 square meters)  Note: GFR calculation is accurate only with a steady state creatinine    CK [[id number]]  (Normal) Collected: 10/18/24 2128    Lab Status: Final result Specimen: Blood from Arm, Right Updated: 10/18/24 2307     Total CK 85 U/L     Salicylate level [[id number]]  (Normal) Collected: 10/18/24 2128    Lab Status: Final result Specimen: Blood from Arm, Right Updated: 10/18/24 2307     Salicylate Lvl <5 mg/dL     Acetaminophen level-If concentration is detectable, please discuss with medical  on call. [[id number]]  (Abnormal) Collected: 10/18/24 2128    Lab Status: Final result Specimen: Blood from Arm, Right Updated: 10/18/24 2307     Acetaminophen Level <2 ug/mL     Magnesium [[id number]]  (Normal) Collected: 10/18/24 2128    Lab Status: Final result Specimen: Blood from Arm, Right Updated: 10/18/24 2251     Magnesium 2.0 mg/dL     TSH, 3rd generation with Free T4 reflex [[id number]]  (Normal) Collected: 10/18/24 2128    Lab Status: Final result Specimen: Blood from Arm, Right Updated: 10/18/24 2207     TSH 3RD GENERATON 0.837 uIU/mL     B-Type Natriuretic Peptide(BNP) [[id number]]  (Abnormal) Collected: 10/18/24 2126    Lab Status: Final result  Specimen: Blood from Arm, Right Updated: 10/18/24 2200      pg/mL     HS Troponin 0hr (reflex protocol) [[id number]]  (Normal) Collected: 10/18/24 2128    Lab Status: Final result Specimen: Blood from Arm, Right Updated: 10/18/24 2158     hs TnI 0hr 9 ng/L     Protime-INR [[id number]]  (Abnormal) Collected: 10/18/24 2128    Lab Status: Final result Specimen: Blood from Arm, Right Updated: 10/18/24 2157     Protime 16.2 seconds      INR 1.25    Narrative:      INR Therapeutic Range    Indication                                             INR Range      Atrial Fibrillation                                               2.0-3.0  Hypercoagulable State                                    2.0.2.3  Left Ventricular Asist Device                            2.0-3.0  Mechanical Heart Valve                                  -    Aortic(with afib, MI, embolism, HF, LA enlargement,    and/or coagulopathy)                                     2.0-3.0 (2.5-3.5)     Mitral                                                             2.5-3.5  Prosthetic/Bioprosthetic Heart Valve               2.0-3.0  Venous thromboembolism (VTE: VT, PE        2.0-3.0    APTT [[id number]]  (Normal) Collected: 10/18/24 2128    Lab Status: Final result Specimen: Blood from Arm, Right Updated: 10/18/24 2157     PTT 30 seconds     Ammonia [[id number]]  (Abnormal) Collected: 10/18/24 2128    Lab Status: Final result Specimen: Blood from Arm, Right Updated: 10/18/24 2149     Ammonia 16 umol/L     Ethanol [[id number]]  (Normal) Collected: 10/18/24 2128    Lab Status: Final result Specimen: Blood from Arm, Right Updated: 10/18/24 2149     Ethanol Lvl <10 mg/dL     CBC and differential [[id number]]  (Abnormal) Collected: 10/18/24 2128    Lab Status: Final result Specimen: Blood from Arm, Right Updated: 10/18/24 2136     WBC 10.38 Thousand/uL      RBC 4.65 Million/uL      Hemoglobin 14.4 g/dL      Hematocrit 43.4 %      MCV 93 fL      MCH 31.0 pg      MCHC 33.2  g/dL      RDW 12.5 %      MPV 10.2 fL      Platelets 177 Thousands/uL      nRBC 0 /100 WBCs      Segmented % 84 %      Immature Grans % 1 %      Lymphocytes % 9 %      Monocytes % 6 %      Eosinophils Relative 0 %      Basophils Relative 0 %      Absolute Neutrophils 8.75 Thousands/µL      Absolute Immature Grans 0.06 Thousand/uL      Absolute Lymphocytes 0.92 Thousands/µL      Absolute Monocytes 0.63 Thousand/µL      Eosinophils Absolute 0.00 Thousand/µL      Basophils Absolute 0.02 Thousands/µL                    Imaging Studies:   Direct to CT: No  TRAUMA - CT head wo contrast   Final Result by Elvis Domínguez DO (10/18 2217)      No intracranial hemorrhage or calvarial fracture.                  Workstation performed: [id number]         TRAUMA - CT spine cervical wo contrast   Final Result by Elvis Domínguez DO (10/18 2217)      No cervical spine fracture or traumatic malalignment.                  Workstation performed: [id number]         TRAUMA - CT chest abdomen pelvis w contrast   Final Result by Elvis Domínguez DO (10/18 2217)      No findings of acute traumatic injury in the chest, abdomen or pelvis.               Workstation performed: [id number]         CT recon only thoracolumbar   Final Result by Elvis Domínguez DO (10/18 2217)      No fracture or traumatic subluxation.         The study was marked in EPIC for immediate notification, per trauma protocol.      Workstation performed: [id number]         XR chest portable   Final Result by Rachel Greenwood MD (10/19 1328)      No acute cardiopulmonary disease.      No acute displaced fractures.      Workstation performed: [id number]         XR ankle 3+ vw right    (Results Pending)   XR sacrum and coccyx    (Results Pending)         Procedures  Procedures         ED Course  ED Course as of 10/19/24 2129   Fri Oct 18, 2024   2310 FLU/COVID Rapid Antigen (30 min. TAT) - Preferred screening test in ED  Declined COVID swab   2310 Procedure Note: EKG  Date/Time:  10/18/24 11:11 PM   Performed by: TAMIA GALVAN  Authorized by: TAMIA GALVAN  Indications / Diagnosis: Fall  ECG reviewed by me, the ED Provider: yes   The EKG demonstrates:  Rhythm: normal sinus  Intervals: normal intervals  Axis: normal axis  QRS/Blocks: normal QRS  ST Changes:No acute ST Changes, no STD/[place].       Sat Oct 19, 2024   0029 Feeling better no acute complaints, imaging and lab work reviewed essentially unremarkable, awaiting delta troponin, will trial ambulation   0040 Patient having difficulty walking, generally weak, tired no back pain no neck pain at this point, will admit for further evaluation           Medical Decision Making  74-year-old female with generalized weakness status post fall differential diagnosis includes traumatic injury such as subdural hematoma, electrolyte abnormalities dehydration, will obtain EKG to evaluate for arrhythmia, screen for ST changes though lower suspicion for ACS given patient's no chest pain or shortness of breath.  There is no midline back pain no saddle anesthesia no leg numbness or clear weakness low suspicion for epidural hematoma    Amount and/or Complexity of Data Reviewed  Labs: ordered. Decision-making details documented in ED Course.  Radiology: ordered.    Risk  Prescription drug management.  Decision regarding hospitalization.                Disposition  [facility] Transfer: No  Final diagnoses:   Fall, initial encounter   Generalized weakness   Ambulatory dysfunction     Time reflects when diagnosis was documented in both MDM as applicable and the Disposition within this note       Time User Action Codes Description Comment    10/19/2024 12:44 AM Tamia Galvan [W19.XXXA] Fall, initial encounter     10/19/2024 12:44 AM Tamia Galvan [R53.1] Generalized weakness     10/19/2024 12:44 AM Tamia Galvan [R26.2] Ambulatory dysfunction           ED Disposition       ED Disposition   Admit    Condition   Stable    Date/Time   Sat Oct 19, 2024 12:44 AM     Comment   Case was discussed with AARON and the patient's admission status was agreed to be Admission Status: inpatient status to the service of Dr. Trotter .               Follow-up Information    None       Current Discharge Medication List        CONTINUE these medications which have NOT CHANGED    Details   acetaminophen (TYLENOL) 325 mg tablet       apixaban (Eliquis) 5 mg Take 1 tablet (5 mg total) by mouth 2 (two) times a day  Qty: 180 tablet, Refills: 1    Comments: PRICE CHECK  Associated Diagnoses: Cerebrovascular accident (CVA), unspecified mechanism (HCC)      Ascorbic Acid (VITAMIN C) 1000 MG tablet Take 1,000 mg by mouth daily      atorvastatin (LIPITOR) 40 mg tablet Take 1 tablet (40 mg total) by mouth every evening  Qty: 90 tablet, Refills: 1    Associated Diagnoses: Cerebrovascular accident (CVA), unspecified mechanism (HCC)      Biotin 10 MG CAPS Take by mouth in the morning      diltiazem (CARDIZEM CD) 240 mg 24 hr capsule Take 1 capsule (240 mg total) by mouth daily  Qty: 90 capsule, Refills: 3    Associated Diagnoses: Atrial flutter by electrocardiogram (formerly [facility])      pantoprazole (PROTONIX) 20 mg tablet Take 1 tablet (20 mg total) by mouth daily  Qty: 90 tablet, Refills: 1    Associated Diagnoses: Gastroesophageal reflux disease without esophagitis      clotrimazole-betamethasone (LOTRISONE) 1-0.05 % cream Apply topically 2 (two) times a day  Qty: 45 g, Refills: 5    Associated Diagnoses: Tinea corporis           No discharge procedures on file.    PDMP Review         Value Time User    PDMP Reviewed  Yes 7/18/2024 11:52 AM LADONNA Drew            ED Provider  Electronically Signed by           Abimbola Turcios DO  10/19/24 3147

## 2024-10-19 NOTE — CASE MANAGEMENT
Case Management Assessment & Discharge Planning Note    Patient name Brianna AVILES Fly  Location /-01 MRN [id number]  : 1950 Date 10/19/2024       Current Admission Date: 10/18/2024  Current Admission Diagnosis:Ambulatory dysfunction   Patient Active Problem List    Diagnosis Date Noted Date Diagnosed    Ambulatory dysfunction 10/19/2024     Leukocytosis 10/19/2024     Left shoulder strain 10/17/2024     Aortic atherosclerosis (HCC) 2024     Low serum cortisol level 2024     Atrial fibrillation/A flutter (HCC) 2024     Cerebrovascular accident (CVA) due to occlusion of left middle cerebral artery (HCC) 2024     Situational anxiety 2024     CKD (chronic kidney disease) stage 2, GFR 60-89 ml/min 2024     Vertigo 2022     Mass of soft tissue of hand 12/15/2021     Hypertension      Acute cholecystitis 2021     Primary osteoarthritis of left hand 2020     Varicose veins of both lower extremities without ulcer or inflammation 2020     Obesity (BMI 30.0-34.9) 2019     Chronic neck pain 2018     Chronic bilateral low back pain with bilateral sciatica 2018     Gastroesophageal reflux disease without esophagitis 2018     Mixed hyperlipidemia 2018     Urge incontinence of urine 2018       LOS (days): 0  Geometric Mean LOS (GMLOS) (days): 3  Days to GMLOS:2.6     OBJECTIVE:    Risk of Unplanned Readmission Score: 14.03         Current admission status: Inpatient       Preferred Pharmacy:   [facility]  for [place] [address]  Phone: [phone] Fax: [phone]    [facility] - [place] [address]  Phone: [phone] Fax: [phone]    [facility] - [place] - [address]  [address]  [place]  [address]  Phone: [phone] Fax: [phone]    [facility] - [place] - [address]  [address]  [place] [address]  Phone: [phone] Fax: [phone]    Primary Care Provider: LADONNA Madden    Primary Insurance: MEDICARE  Secondary Insurance:  FOR LIFE    ASSESSMENT:  Active Health Care Proxies       Zachariah Eason Health Care Representative - Son   Primary Phone: [phone] (Mobile)                 Advance Directives  Does patient have a Health Care POA?: Yes              Patient Information  Admitted from:: Home  Mental Status: Alert  During Assessment patient was accompanied by: Not accompanied during assessment  Assessment information provided by:: Patient  Primary Caregiver: Self  Support Systems: Self, Son  County of Residence: [place]  What city do you live in?: [place]  Home entry access options. Select all that apply.: Stairs  Number of steps to enter home.: 2  Do the steps have railings?: No  Type of Current Residence: [facility]  Living Arrangements: Lives Alone  Is patient a ?: No    Activities of Daily Living Prior to Admission  Functional Status: Independent  Completes ADLs independently?: Yes  Ambulates independently?: Yes  Does patient use assisted devices?: No  Does patient currently own DME?: No  Does patient have a history of Outpatient Therapy (PT/OT)?: No  Does the patient have a history of Short-Term Rehab?: No  Does patient have a history of HHC?: Yes  Does patient currently have HHC?: Yes    Current Home Health Care  Type of Current Home Care Services: Home OT, Home PT, Nurse visit  Home Health Agency Name::  ([facility])  Current Home Health Follow-Up Provider:: PCP    Patient Information Continued  Income Source: Pension/MC[employer]  Does patient have prescription coverage?: Yes  Does patient receive dialysis treatments?: No  Does patient have a history of substance abuse?: No  Does patient have a history of Mental Health  Diagnosis?: No         Means of Transportation  Means of Transport to Appts::  ([facility]      Social Determinants of Health (SDOH)      Flowsheet Row Most Recent Value   Housing Stability    In the last 12 months, was there a time when you were not able to pay the mortgage or rent on time? N   In the past 12 months, how many times have you moved where you were living? 0   Transportation Needs    In the past 12 months, has lack of transportation kept you from medical appointments or from getting medications? no   In the past 12 months, has lack of transportation kept you from meetings, work, or from getting things needed for daily living? No   Food Insecurity    Within the past 12 months, you worried that your food would run out before you got the money to buy more. Never true   Within the past 12 months, the food you bought just didn't last and you didn't have money to get more. Never true   Utilities    In the past 12 months has the electric, gas, oil, or water company threatened to shut off services in your home? No            DISCHARGE DETAILS:    Discharge planning discussed with:: Pt at bedside  [place] Point of Choice: Yes     CM contacted family/caregiver?: No- see comments (pt declined)  Were Treatment Team discharge recommendations reviewed with patient/caregiver?: Yes  Did patient/caregiver verbalize understanding of patient care needs?: Yes  Were patient/caregiver advised of the risks associated with not following Treatment Team discharge recommendations?: Yes         Requested [facility]         Home Health Agency Name::  ([facility])            Additional Comments: Met with pt at bedside to review CM role and possible discharge planning needs. Pt stated that she lives alone in a 1SH with 2 steps to enter. Pt stated that she has been independent with all ADL care prior to admission. Pt has no hx with DME or STR placement. Pt has shahid active with [facility] for  RN and PT. Pt does not drive and uses [facility] to get to appointments. Made pt aware of CM availability for ongoing discharge planning needs.Pt's son to provide transport to home.

## 2024-10-19 NOTE — ASSESSMENT & PLAN NOTE
WBC 10.3 K on admission  No signs of acute infection at this time  Monitor off antibiotics and trend CBC

## 2024-10-19 NOTE — PLAN OF CARE

## 2024-10-19 NOTE — ASSESSMENT & PLAN NOTE
Reports she fell off of the beanbag chair on her porch 10/18 evening and was able to get up on her own  Failed ambulatory trial in the ED  PT, OT, and CM consulted  Of note, she does endorse right ankle pain and there is tenderness to palpation over the lateral malleolus-obtain right ankle XR.  Trauma workup otherwise unrevealing

## 2024-10-19 NOTE — PHYSICAL THERAPY NOTE
Physical Therapy Cancellation Note       10/19/24 0731   PT Last Visit   PT Visit Date 10/19/24   Note Type   Note type Cancelled Session   Cancel Reasons Other   Additional Comments Patient presenting with ankle pain. Awaiting ankle x-ray to be completed and read before P.T. evaluation.     Faye Valdivia

## 2024-10-19 NOTE — ASSESSMENT & PLAN NOTE
Lab Results   Component Value Date    EGFR 76 10/18/2024    EGFR 65 09/23/2024    EGFR 66 09/17/2024    CREATININE 0.77 10/18/2024    CREATININE 0.87 09/23/2024    CREATININE 0.86 09/17/2024   Baseline creatinine 0.7-0.8  Creatinine on admission 0.77  Trend BMP

## 2024-10-19 NOTE — ASSESSMENT & PLAN NOTE
History of CVA in July 2024 s/p TNK and neurosurgical intervention x 2  Thought to be secondary to atrial flutter/fib off anticoagulation due to cost  CTh on admit without acute findings  Neurologically intact on admit  Continue home Eliquis

## 2024-10-20 PROCEDURE — 99232 SBSQ HOSP IP/OBS MODERATE 35: CPT

## 2024-10-20 RX ADMIN — ACETAMINOPHEN 650 MG: 325 TABLET, FILM COATED ORAL at 13:03

## 2024-10-20 RX ADMIN — APIXABAN 5 MG: 5 TABLET, FILM COATED ORAL at 08:04

## 2024-10-20 RX ADMIN — DILTIAZEM HYDROCHLORIDE 240 MG: 240 CAPSULE, EXTENDED RELEASE ORAL at 08:04

## 2024-10-20 RX ADMIN — PANTOPRAZOLE SODIUM 20 MG: 20 TABLET, DELAYED RELEASE ORAL at 08:15

## 2024-10-20 RX ADMIN — ATORVASTATIN CALCIUM 40 MG: 40 TABLET, FILM COATED ORAL at 17:44

## 2024-10-20 RX ADMIN — OXYCODONE HYDROCHLORIDE AND ACETAMINOPHEN 1000 MG: 500 TABLET ORAL at 08:04

## 2024-10-20 RX ADMIN — APIXABAN 5 MG: 5 TABLET, FILM COATED ORAL at 17:44

## 2024-10-20 NOTE — ASSESSMENT & PLAN NOTE
Lab Results   Component Value Date    EGFR 78 10/19/2024    EGFR 76 10/18/2024    EGFR 65 09/23/2024    CREATININE 0.75 10/19/2024    CREATININE 0.77 10/18/2024    CREATININE 0.87 09/23/2024   Baseline creatinine 0.7-0.8  Creatinine on admission 0.77  Trend BMP

## 2024-10-20 NOTE — PROGRESS NOTES
Progress Note - Hospitalist   Name: Brianna Baum 74 y.o. female I MRN: [id number]  Unit/Bed#: -01 I Date of Admission: 10/18/2024   Date of Service: 10/20/2024 I Hospital Day: 1    Assessment & Plan  Ambulatory dysfunction  Reports she fell off of the beanbag chair on her porch 10/18 evening and was able to get up on her own  Failed ambulatory trial in the ED  PT, OT, and CM consulted  Of note, she does endorse right ankle pain and there is tenderness to palpation over the lateral malleolus-obtain right ankle XR.  Trauma workup otherwise unrevealing  Leukocytosis  WBC 10.3 K on admission  No signs of acute infection at this time  Monitor off antibiotics and trend CBC  CKD (chronic kidney disease) stage 2, GFR 60-89 ml/min  Lab Results   Component Value Date    EGFR 78 10/19/2024    EGFR 76 10/18/2024    EGFR 65 09/23/2024    CREATININE 0.75 10/19/2024    CREATININE 0.77 10/18/2024    CREATININE 0.87 09/23/2024   Baseline creatinine 0.7-0.8  Creatinine on admission 0.77  Trend BMP  Atrial fibrillation/A flutter (HCC)  RC: Cardizem to 40 Mg daily  AC: Eliquis 5 Mg twice daily  Continue medications  Cerebrovascular accident (CVA) due to occlusion of left middle cerebral artery (HCC)  History of CVA in July 2024 s/p TNK and neurosurgical intervention x 2  Thought to be secondary to atrial flutter/fib off anticoagulation due to cost  CTh on admit without acute findings  Neurologically intact on admit  Continue home Eliquis    VTE Pharmacologic Prophylaxis: VTE Score: 3 Moderate Risk (Score 3-4) - Pharmacological DVT Prophylaxis Ordered: apixaban (Eliquis).    Mobility:   Basic Mobility Inpatient Raw Score: 15  JH-HLM Goal: 4: Move to chair/commode  JH-HLM Achieved: 6: Walk 10 steps or more  JH-HLM Goal achieved. Continue to encourage appropriate mobility.    Patient Centered Rounds: I performed bedside rounds with nursing staff today.   Discussions with Specialists or Other Care Team Provider: N/A    Education and  Discussions with Family / Patient: Patient declined call to .     Current Length of Stay: 1 day(s)  Current Patient Status: Inpatient   Certification Statement: The patient will continue to require additional inpatient hospital stay due to ambulatory dysfunction  Discharge Plan: Anticipate discharge in 48 hrs to rehab facility.    Code Status: Level 1 - Full Code    Subjective   Patient seen and examined today.  She is aware of plan of PT/OT tomorrow and then discharge planning.  She has no complaints.    Objective :  Temp:  [96.6 °F (35.9 °C)-97.2 °F (36.2 °C)] 97.2 °F (36.2 °C)  HR:  [71-80] 80  BP: (109-148)/(66-68) 109/67  SpO2:  [97 %-99 %] 99 %  O2 Device: None (Room air)    Body mass index is 29.5 kg/m².     Input and Output Summary (last 24 hours):     Intake/Output Summary (Last 24 hours) at 10/20/2024 1108  Last data filed at 10/20/2024 0801  Gross per 24 hour   Intake 550 ml   Output 215 ml   Net 335 ml       Physical Exam  Vitals and nursing note reviewed.   Constitutional:       General: She is not in acute distress.     Appearance: She is well-developed.   HENT:      Head: Normocephalic and atraumatic.   Eyes:      Conjunctiva/sclera: Conjunctivae normal.   Cardiovascular:      Rate and Rhythm: Normal rate and regular rhythm.      Heart sounds: No murmur heard.  Pulmonary:      Effort: Pulmonary effort is normal. No respiratory distress.      Breath sounds: Normal breath sounds.   Abdominal:      Palpations: Abdomen is soft.      Tenderness: There is no abdominal tenderness.   Musculoskeletal:         General: No swelling.      Cervical back: Neck supple.   Skin:     General: Skin is warm and dry.   Neurological:      Mental Status: She is alert and oriented to person, place, and time.   Psychiatric:         Mood and Affect: Mood normal.           Lines/Drains:              Lab Results: I have reviewed the following results:   Results from last 7 days   Lab Units 10/19/24  8053  10/18/24  2128   WBC Thousand/uL 7.80 10.38*   HEMOGLOBIN g/dL 12.4 14.4   HEMATOCRIT % 37.9 43.4   PLATELETS Thousands/uL 175 177   SEGS PCT %  --  84*   LYMPHO PCT %  --  9*   MONO PCT %  --  6   EOS PCT %  --  0     Results from last 7 days   Lab Units 10/19/24  0409 10/18/24  2128   SODIUM mmol/L 139 139   POTASSIUM mmol/L 3.1* 3.8   CHLORIDE mmol/L 104 103   CO2 mmol/L 29 24   BUN mg/dL 13 12   CREATININE mg/dL 0.75 0.77   ANION GAP mmol/L 6 12   CALCIUM mg/dL 8.7 9.3   ALBUMIN g/dL  --  4.2   TOTAL BILIRUBIN mg/dL  --  0.80   ALK PHOS U/L  --  74   ALT U/L  --  18   AST U/L  --  24   GLUCOSE RANDOM mg/dL 123 111     Results from last 7 days   Lab Units 10/18/24  2128   INR  1.25*                   Recent Cultures (last 7 days):         Imaging Results Review: No pertinent imaging studies reviewed.  Other Study Results Review: No additional pertinent studies reviewed.    Last 24 Hours Medication List:     Current Facility-Administered Medications:     acetaminophen (TYLENOL) tablet 650 mg, Q6H PRN    aluminum-magnesium hydroxide-simethicone (MAALOX) oral suspension 30 mL, Q6H PRN    apixaban (ELIQUIS) tablet 5 mg, BID    ascorbic acid (VITAMIN C) tablet 1,000 mg, Daily    atorvastatin (LIPITOR) tablet 40 mg, QPM    diltiazem (CARDIZEM CD) 24 hr capsule 240 mg, Daily    pantoprazole (PROTONIX) EC tablet 20 mg, Daily Before Breakfast    senna (SENOKOT) tablet 8.6 mg, HS PRN    Administrative Statements   Today, Patient Was Seen By: Oumar Victoria MD  I have spent a total time of 35 minutes in caring for this patient on the day of the visit/encounter including Counseling / Coordination of care, Documenting in the medical record, Reviewing / ordering tests, medicine, procedures  , and Obtaining or reviewing history  .    **Please Note: This note may have been constructed using a voice recognition system.**

## 2024-10-20 NOTE — PLAN OF CARE
Problem: NEUROSENSORY - ADULT  Goal: Achieves stable or improved neurological status  Description: INTERVENTIONS  - Monitor and report changes in neurological status  - Monitor vital signs such as temperature, blood pressure, glucose, and any other labs ordered   - Initiate measures to prevent increased intracranial pressure  - Monitor for seizure activity and implement precautions if appropriate      Outcome: Progressing  Goal: Achieves maximal functionality and self care  Description: INTERVENTIONS  - Monitor swallowing and airway patency with patient fatigue and changes in neurological status  - Encourage and assist patient to increase activity and self care.   - Encourage visually impaired, hearing impaired and aphasic patients to use assistive/communication devices  Outcome: Progressing     Problem: MUSCULOSKELETAL - ADULT  Goal: Maintain or return mobility to safest level of function  Description: INTERVENTIONS:  - Assess patient's ability to carry out ADLs; assess patient's baseline for ADL function and identify physical deficits which impact ability to perform ADLs (bathing, care of mouth/teeth, toileting, grooming, dressing, etc.)  - Assess/evaluate cause of self-care deficits   - Assess range of motion  - Assess patient's mobility  - Assess patient's need for assistive devices and provide as appropriate  - Encourage maximum independence but intervene and supervise when necessary  - Involve family in performance of ADLs  - Assess for home care needs following discharge   - Consider OT consult to assist with ADL evaluation and planning for discharge  - Provide patient education as appropriate  Outcome: Progressing  Goal: Maintain proper alignment of affected body part  Description: INTERVENTIONS:  - Support, maintain and protect limb and body alignment  - Provide patient/ family with appropriate education  Outcome: Progressing

## 2024-10-20 NOTE — PLAN OF CARE
Problem: Potential for Falls  Goal: Patient will remain free of falls  Description: INTERVENTIONS:  - Educate patient/family on patient safety including physical limitations  - Instruct patient to call for assistance with activity   - Consult OT/PT to assist with strengthening/mobility   - Keep Call bell within reach  - Keep bed low and locked with side rails adjusted as appropriate  - Keep care items and personal belongings within reach  - Initiate and maintain comfort rounds  - Make Fall Risk Sign visible to staff  - Offer Toileting every 2 Hours, in advance of need  - Initiate/Maintain bed/ chair alarm  - Apply yellow socks and bracelet for high fall risk patients  - Consider moving patient to room near nurses station  Outcome: Progressing     Problem: PAIN - ADULT  Goal: Verbalizes/displays adequate comfort level or baseline comfort level  Description: Interventions:  - Encourage patient to monitor pain and request assistance  - Assess pain using appropriate pain scale  - Administer analgesics based on type and severity of pain and evaluate response  - Implement non-pharmacological measures as appropriate and evaluate response  - Consider cultural and social influences on pain and pain management  - Notify physician/advanced practitioner if interventions unsuccessful or patient reports new pain  Outcome: Progressing

## 2024-10-21 ENCOUNTER — PATIENT OUTREACH (OUTPATIENT)
Dept: CASE MANAGEMENT | Facility: OTHER | Age: 74
End: 2024-10-21

## 2024-10-21 VITALS
DIASTOLIC BLOOD PRESSURE: 56 MMHG | TEMPERATURE: 96.8 F | WEIGHT: 177.25 LBS | HEART RATE: 94 BPM | SYSTOLIC BLOOD PRESSURE: 123 MMHG | OXYGEN SATURATION: 97 % | BODY MASS INDEX: 29.5 KG/M2 | RESPIRATION RATE: 16 BRPM

## 2024-10-21 LAB
ATRIAL RATE: 94 BPM
P AXIS: 66 DEGREES
PR INTERVAL: 190 MS
QRS AXIS: 14 DEGREES
QRSD INTERVAL: 74 MS
QT INTERVAL: 394 MS
QTC INTERVAL: 492 MS
T WAVE AXIS: 67 DEGREES
VENTRICULAR RATE: 94 BPM

## 2024-10-21 PROCEDURE — 97163 PT EVAL HIGH COMPLEX 45 MIN: CPT

## 2024-10-21 PROCEDURE — 93010 ELECTROCARDIOGRAM REPORT: CPT | Performed by: INTERNAL MEDICINE

## 2024-10-21 PROCEDURE — 97166 OT EVAL MOD COMPLEX 45 MIN: CPT

## 2024-10-21 PROCEDURE — 99239 HOSP IP/OBS DSCHRG MGMT >30: CPT | Performed by: HOSPITALIST

## 2024-10-21 RX ADMIN — PANTOPRAZOLE SODIUM 20 MG: 20 TABLET, DELAYED RELEASE ORAL at 05:25

## 2024-10-21 RX ADMIN — APIXABAN 5 MG: 5 TABLET, FILM COATED ORAL at 09:06

## 2024-10-21 RX ADMIN — DILTIAZEM HYDROCHLORIDE 240 MG: 240 CAPSULE, EXTENDED RELEASE ORAL at 09:07

## 2024-10-21 RX ADMIN — ACETAMINOPHEN 650 MG: 325 TABLET, FILM COATED ORAL at 03:38

## 2024-10-21 RX ADMIN — OXYCODONE HYDROCHLORIDE AND ACETAMINOPHEN 1000 MG: 500 TABLET ORAL at 09:06

## 2024-10-21 NOTE — PROGRESS NOTES
O PCM rcvd ADT alert pt is dc from hospital and son will transport home.  Pt will continue with [facility] for RN and PT services.

## 2024-10-21 NOTE — ASSESSMENT & PLAN NOTE
WBC 10.3 K on admission  No signs of acute infection at this time  Monitor off antibiotics and trend CBC - RESOLVED  Hypokalemia - repleted.

## 2024-10-21 NOTE — PHYSICAL THERAPY NOTE
"                                                                                  PHYSICAL THERAPY EVALUATION NOTE      Patient Name: Brianna Baum  Today's Date: 10/21/2024    AGE:   74 y.o.  Mrn:   [id number]  ADMIT DX:  Back injury [S39.92XA]  Generalized weakness [R53.1]  Shoulder injury [S49.90XA]  Ambulatory dysfunction [R26.2]    Past Medical History:   Diagnosis Date    Arthritis     Chronic pain     Colon polyp     Diverticulitis     GERD (gastroesophageal reflux disease)     Hypertension     Vertigo      Length Of Stay: 2  PHYSICAL THERAPY EVALUATION :   Patient's identity confirmed via 2 patient identifiers (full name and ) at start of session       10/21/24 1330   PT Last Visit   PT Visit Date 10/21/24   Note Type   Note type Evaluation   Pain Assessment   Pain Assessment Tool 0-10   Pain Score No Pain   Restrictions/Precautions   Weight Bearing Precautions Per Order No   Other Precautions Contact/isolation;Fall Risk   Home Living   Type of Home House  ([place])   Home Layout One level  (ramp to enter)   Home Equipment Walker;Cane   Prior Function   Level of [place] Independent with ADLs;Independent with functional mobility;Independent with IADLS   Lives With Alone  (Son assists prn)   Receives Help From Family   Falls in the last 6 months 1 to 4  (1)   Comments Pt receives home PT 2x/ week   General   Family/Caregiver Present No   Cognition   Overall Cognitive Status WFL   Arousal/Participation Alert   Orientation Level Oriented X4   Memory Within functional limits   Following Commands Follows one step commands with increased time or repetition   Subjective   Subjective \"The food here sucks\".   RUE Assessment   RUE Assessment WFL   LUE Assessment   LUE Assessment WFL   RLE Assessment   RLE Assessment WFL   LLE Assessment   LLE Assessment WFL   Light Touch   RLE Light Touch Grossly intact   LLE Light Touch Grossly intact   Bed Mobility   Additional Comments not assessed. Pt reports independence with " task.   Transfers   Sit to Stand 6  Modified independent   Additional items Armrests   Stand to Sit 6  Modified independent   Additional items Armrests   Ambulation/Elevation   Gait pattern   (reciprocal pattern, mild R path deviation but able to self correct)   Gait Assistance 6  Modified independent   Additional items Verbal cues   Assistive Device Rolling walker   Distance 300ft   Stair Management Assistance Not tested   Ambulation/Elevation Additional Comments pt denied need to trial stairs due to ramp to enter her home.   Balance   Static Sitting Good   Dynamic Sitting Good   Static Standing Good   Dynamic Standing Fair +   Ambulatory Fair +   Activity Tolerance   Activity Tolerance Patient tolerated treatment well   Medical Staff Made Aware MD Nicola Mckeon   Nurse Made Aware RN Molly   Assessment   Prognosis Good   Assessment Brianna Baum is a 74 y.o. Female who presents to [facility] on 10/18/24 from home after sliding off a beanbag chair, unable to get up. Imaging (-) for fx. Imp: ambulatory dysfunction. Orders for PT eval and treat received, w/ activity orders of up and out of bed as tolerated and fall precautions. Pt presents w/ comorbidities of CVA in July 2024, a flutter/A-fib, and CKD stage II. At baseline, pt mobilizes independently w/ a cane, and reports 1 fall in the last 6 months. Upon evaluation, pt demonstrated modified independence for transfers and ambulation using a RW. Pt reports she is mobilizing at her baseline and is anxious to return home. The patient's AM-PAC Basic Mobility Inpatient Short Form Raw Score is 23. A Raw score of greater than 17 suggests the patient may benefit from discharge to home. Please also refer to the recommendation of the Physical Therapist for safe discharge planning. Based on current status, Level 3 rehab intensity is recommended at time of discharge, resuming her home PT services. At this time, pt has no further acute PT needs and is D/C from PT services.   Barriers to  Discharge None   Goals   Patient Goals to go home   Plan   PT Frequency   (D/C PT)   Discharge Recommendation   Rehab Resource Intensity Level, PT III (Minimum Resource Intensity)   AM-PAC Basic Mobility Inpatient   Turning in Flat Bed Without Bedrails 4   Lying on Back to Sitting on Edge of Flat Bed Without Bedrails 4   Moving Bed to Chair 4   Standing Up From Chair Using Arms 4   Walk in Room 4   Climb 3-5 Stairs With Railing 3   Basic Mobility Inpatient Raw Score 23   Basic Mobility Standardized Score 50.88   [facility] Highest Level Of Mobility   -HLM Goal 7: Walk 25 feet or more   -HLM Achieved 8: Walk 250 feet ot more   End of Consult   Patient Position at End of Consult Bedside chair;Bed/Chair alarm activated;All needs within reach       The patient's AM-PAC Basic Mobility Inpatient Short Form Raw Score is 23, Standardized Score is 50.88. A standardized score greater than 38.32 (raw score of 16) suggests the patient may benefit from discharge to home which may not coincide with above PT recommendations. However please refer to therapist recommendation for discharge planning given other factors that may influence destination.    Jonas Cooper, PT

## 2024-10-21 NOTE — PLAN OF CARE
Problem: Potential for Falls  Goal: Patient will remain free of falls  Description: INTERVENTIONS:  - Educate patient/family on patient safety including physical limitations  - Instruct patient to call for assistance with activity   - Consult OT/PT to assist with strengthening/mobility   - Keep Call bell within reach  - Keep bed low and locked with side rails adjusted as appropriate  - Keep care items and personal belongings within reach  - Initiate and maintain comfort rounds  - Make Fall Risk Sign visible to staff  - Offer Toileting every 2 Hours, in advance of need  - Initiate/Maintain bed alarm  - Obtain necessary fall risk management equipment:   - Apply yellow socks and bracelet for high fall risk patients  - Consider moving patient to room near nurses station  Outcome: Progressing     Problem: PAIN - ADULT  Goal: Verbalizes/displays adequate comfort level or baseline comfort level  Description: Interventions:  - Encourage patient to monitor pain and request assistance  - Assess pain using appropriate pain scale  - Administer analgesics based on type and severity of pain and evaluate response  - Implement non-pharmacological measures as appropriate and evaluate response  - Consider cultural and social influences on pain and pain management  - Notify physician/advanced practitioner if interventions unsuccessful or patient reports new pain  Outcome: Progressing     Problem: INFECTION - ADULT  Goal: Absence or prevention of progression during hospitalization  Description: INTERVENTIONS:  - Assess and monitor for signs and symptoms of infection  - Monitor lab/diagnostic results  - Monitor all insertion sites, i.e. indwelling lines, tubes, and drains  - Monitor endotracheal if appropriate and nasal secretions for changes in amount and color  - [place] appropriate cooling/warming therapies per order  - Administer medications as ordered  - Instruct and encourage patient and family to use good hand hygiene  technique  - Identify and instruct in appropriate isolation precautions for identified infection/condition  Outcome: Progressing     Problem: SAFETY ADULT  Goal: Maintain or return to baseline ADL function  Description: INTERVENTIONS:  -  Assess patient's ability to carry out ADLs; assess patient's baseline for ADL function and identify physical deficits which impact ability to perform ADLs (bathing, care of mouth/teeth, toileting, grooming, dressing, etc.)  - Assess/evaluate cause of self-care deficits   - Assess range of motion  - Assess patient's mobility; develop plan if impaired  - Assess patient's need for assistive devices and provide as appropriate  - Encourage maximum independence but intervene and supervise when necessary  - Involve family in performance of ADLs  - Assess for home care needs following discharge   - Consider OT consult to assist with ADL evaluation and planning for discharge  - Provide patient education as appropriate  Outcome: Progressing  Goal: Maintains/Returns to pre admission functional level  Description: INTERVENTIONS:  - Perform AM-PAC 6 Click Basic Mobility/ Daily Activity assessment daily.  - Set and communicate daily mobility goal to care team and patient/family/caregiver.   - Collaborate with rehabilitation services on mobility goals if consulted  - Perform Range of Motion 3 times a day.  - Reposition patient every 2 hours.  - Dangle patient 2 times a day  - Stand patient 2 times a day  - Ambulate patient 2 times a day  - Out of bed to chair 2 times a day   - Out of bed for meals 2 times a day  - Out of bed for toileting  - Record patient progress and toleration of activity level   Outcome: Progressing

## 2024-10-21 NOTE — ASSESSMENT & PLAN NOTE
Reports she fell off of the beanbag chair on her porch 10/18 evening and was able to get up on her own  Failed ambulatory trial in the ED  PT, OT - cleared pt  XR of foot/spine unremarkable.

## 2024-10-21 NOTE — OCCUPATIONAL THERAPY NOTE
Occupational Therapy Evaluation     Patient Name: Brianna Baum  Today's Date: 10/21/2024  Problem List  Principal Problem:    Ambulatory dysfunction  Active Problems:    CKD (chronic kidney disease) stage 2, GFR 60-89 ml/min    Cerebrovascular accident (CVA) due to occlusion of left middle cerebral artery (HCC)    Atrial fibrillation/A flutter (HCC)    Leukocytosis    Past Medical History  Past Medical History:   Diagnosis Date    Arthritis     Chronic pain     Colon polyp     Diverticulitis     GERD (gastroesophageal reflux disease)     Hypertension     Vertigo      Past Surgical History  Past Surgical History:   Procedure Laterality Date    CHOLECYSTECTOMY      CHOLECYSTECTOMY LAPAROSCOPIC N/A 12/06/2021    Procedure: SUBTOTAL CHOLECYSTECTOMY LAPAROSCOPIC;  Surgeon: Víctor Viveros MD;  Location:  MAIN OR;  Service: General    COLONOSCOPY      GALLBLADDER SURGERY  12/2021    Dr. Viveros    IR STROKE ALERT  7/23/2024    IR STROKE ALERT  7/24/2024    TUBAL LIGATION               10/21/24 1345   OT Last Visit   OT Visit Date 10/21/24   Note Type   Note type Evaluation   Pain Assessment   Pain Assessment Tool Sanchez-Baker FACES   Pain Score No Pain   Restrictions/Precautions   Weight Bearing Precautions Per Order No   Other Precautions Chair Alarm;Bed Alarm   Home Living   Type of Home House   Home Layout Ramped entrance;One level  (ramp v. 2 GREGORY)   Home Equipment Walker;Cane   Additional Comments Typically does not use DME   Prior Function   Level of [place] Independent with ADLs;Independent with functional mobility   Lives With Alone   Receives Help From Family  (Son)   IADLs Family/Friend/Other provides transportation   Falls in the last 6 months 1 to 4  (1? Pt reports she was sitting in bean bag chair on porch from morning into night & could not get up - ED notes report fall out of bean bag)   Lifestyle   Autonomy IND with ADLs, no DME, (-) drive   Reciprocal Relationships Son assists PRN - removing rodriguez  "bag chair   General   Additional Pertinent History H/o CVA July 2024   Family/Caregiver Present No   Subjective   Subjective \"I better be going home\"   ADL   Eating Assistance 7  Independent   Grooming Assistance 7  Independent   UB Bathing Assistance 7  Independent   LB Bathing Assistance 7  Independent   UB Dressing Assistance 7  Independent   LB Dressing Assistance 7  Independent   Toileting Assistance  7  Independent   Bed Mobility   Supine to Sit Unable to assess   Additional Comments Pt received mobilizing unit halls   Transfers   Sit to Stand 6  Modified independent   Additional items Armrests   Stand to Sit 6  Modified independent   Additional items Armrests   Functional Mobility   Functional Mobility 6  Modified independent   Additional Comments Further than functional household distance   Additional items Rolling walker   Balance   Static Sitting Good   Dynamic Sitting Good   Static Standing Good   Dynamic Standing Good   Ambulatory Fair +   Activity Tolerance   Activity Tolerance Patient tolerated treatment well   Medical Staff Made Aware PT MD Nicola Mcdaniel, CM Noy   Nurse Made Aware RN Molly   RUE Assessment   RUE Assessment WFL   LUE Assessment   LUE Assessment WFL   Cognition   Overall Cognitive Status WFL   Arousal/Participation Alert   Attention Within functional limits   Orientation Level Oriented X4   Memory Within functional limits   Following Commands Follows one step commands with increased time or repetition   Assessment   Prognosis Good   Assessment Pt is a 74 y.o. female seen for OT evaluation at [facility], admitted 10/18/2024 w/ Ambulatory dysfunction. OT completed expanded review of pt's medical and social history. Comorbidities affecting pt's functional performance at time of assessment include: recent L MCA CVA, a-fib, leukocytosis, chronic neck & back pain, L shoulder strain, situational anxiety. Prior to admission, pt was living IND in a 1SH with 2 steps vs a ramp to enter. Pt " was IND with ADLs & mobility. Upon evaluation, pt presents to OT IND with ADLS & Mod I with a RW. The patient's raw score on the AM-PAC Daily Activity inpatient short form is 24, standardized score is 57.54, greater than 39.4. Patients at this level are likely to benefit from DC to home. Please refer to the recommendation of the Occupational Therapist for safe DC planning. At this time, OT recommendations at time of discharge are no OT needs. No further acute OT needs indicated at this time - Recommend pt continue to be OOB for meals, ambulation to/from BR, perform self care tasks, and mobility in hallway with nursing. D/C from OT caseload with above recommendations.   Goals   Patient Goals Pt wants to go home today   Plan   OT Frequency Eval only   Discharge Recommendation   Rehab Resource Intensity Level, OT No post-acute rehabilitation needs   AM-PAC Daily Activity Inpatient   Lower Body Dressing 4   Bathing 4   Toileting 4   Upper Body Dressing 4   Grooming 4   Eating 4   Daily Activity Raw Score 24   Daily Activity Standardized Score (Calc for Raw Score >=11) 57.54   AM-PAC Applied Cognition Inpatient   Following a Speech/Presentation 4   Understanding Ordinary Conversation 4   Taking Medications 4   Remembering Where Things Are Placed or Put Away 4   Remembering List of 4-5 Errands 4   Taking Care of Complicated Tasks 4   Applied Cognition Raw Score 24   Applied Cognition Standardized Score 62.21   End of Consult   Education Provided Yes   Patient Position at End of Consult Bedside chair;Bed/Chair alarm activated;All needs within reach   Nurse Communication Nurse aware of consult     Juana Giron OTR/L

## 2024-10-21 NOTE — DISCHARGE SUMMARY
Discharge Summary - Hospitalist   Name: Brianna Baum 74 y.o. female I MRN: [id number]  Unit/Bed#: -01 I Date of Admission: 10/18/2024   Date of Service: 10/21/2024 I Hospital Day: 2     Assessment & Plan  Ambulatory dysfunction  Reports she fell off of the beanbag chair on her porch 10/18 evening and was able to get up on her own  Failed ambulatory trial in the ED  PT, OT - cleared pt  XR of foot/spine unremarkable.   Leukocytosis  WBC 10.3 K on admission  No signs of acute infection at this time  Monitor off antibiotics and trend CBC - RESOLVED  Hypokalemia - repleted.  CKD (chronic kidney disease) stage 2, GFR 60-89 ml/min  Lab Results   Component Value Date    EGFR 78 10/19/2024    EGFR 76 10/18/2024    EGFR 65 09/23/2024    CREATININE 0.75 10/19/2024    CREATININE 0.77 10/18/2024    CREATININE 0.87 09/23/2024   Baseline creatinine 0.7-0.8  Creatinine on admission 0.77  Trend BMP  Atrial fibrillation/A flutter (HCC)  RC: Cardizem to 40 Mg daily  AC: Eliquis 5 Mg twice daily  Continue medications  Cerebrovascular accident (CVA) due to occlusion of left middle cerebral artery (HCC)  History of CVA in July 2024 s/p TNK and neurosurgical intervention x 2  Thought to be secondary to atrial flutter/fib off anticoagulation due to cost  CTh on admit without acute findings  Neurologically intact on admit  Continue home [facility] Course:     Brianna Baum is a 74 y.o. female patient who originally presented to the hospital on   Admission Orders (From admission, onward)       Ordered        10/19/24 0104  INPATIENT ADMISSION  Once                         due to ambulatory dysfunction. Pt does not have focal neurological deficits or complaints at the time of release from the hospital. Pt was seen by PT/OT and does not require inpatient rehabilitation. Pt has no medical complaints at time of discharge.    Please see above list of diagnoses and related plan for additional information.     Physical Exam:    GEN:  No acute distress, comfortable  HEEENT: No JVD, PERRLA, no scleral icterus  RESP: Lungs clear to auscultation bilaterally  CV: RRR, +s1/s2   ABD: SOFT NON TENDER, POSITIVE BOWEL SOUNDS, NO DISTENTION  PSYCH: CALM  NEURO: Mentation baseline, NO FOCAL DEFICITS  SKIN: NO RASH  EXTREM: NO EDEMA    CONSULTING PROVIDERS   IP CONSULT TO CASE MANAGEMENT    PROCEDURES PERFORMED  * No surgery found *    RADIOLOGY RESULTS  XR sacrum and coccyx    Result Date: 10/21/2024  Narrative: XR SACRUM AND COCCYX INDICATION: sacral bruise, fall. COMPARISON: None FINDINGS: No evidence of an acute fracture. Sacral arcuate lines are maintained. Symmetric sacroiliac joints. Normal pubic symphysis alignment. Unremarkable visualized lower lumbar spine.     Impression: No acute osseous abnormality. Computerized Assisted Algorithm (CAA) may have been used to analyze all applicable images. Workstation performed: [id number]     XR ankle 3+ vw right    Result Date: 10/21/2024  Narrative: XR ANKLE 3+ VW RIGHT INDICATION: right ankle pain and tenderness over the lateral malleolus s/p fall. COMPARISON: 2/25/2022 FINDINGS: No acute fracture or dislocation. No significant degenerative changes. No lytic or blastic osseous lesion. Unremarkable soft tissues.     Impression: No acute osseous abnormality. Computerized Assisted Algorithm (CAA) may have been used to analyze all applicable images. Workstation performed: [id number]     XR chest portable    Result Date: 10/19/2024  Narrative: XR CHEST PORTABLE INDICATION: Acute Resp Failure. Status post fall. COMPARISON: CXR 9/17/2024, chest CT 9/18/2024. FINDINGS: Clear lungs. No pneumothorax or pleural effusion. Normal cardiomediastinal silhouette. Skeleton normal for age.  No acute displaced fractures. Normal upper abdomen.     Impression: No acute cardiopulmonary disease. No acute displaced fractures. Workstation performed: [id number]     TRAUMA - CT chest abdomen pelvis w contrast    Result Date:  10/18/2024  Narrative: CT CHEST, ABDOMEN AND PELVIS WITH IV CONTRAST INDICATION: TRAUMA. COMPARISON: Multiple priors most recently same day chest radiograph TECHNIQUE: CT examination of the chest, abdomen and pelvis was performed. Multiplanar 2D reformatted images were created from the source data. This examination, like all CT scans performed in the [facility], was performed utilizing techniques to minimize radiation dose exposure, including the use of iterative reconstruction and automated exposure control. Radiation dose length product (DLP) for this visit: 1160.41 mGy-cm IV Contrast: 100 mL of iohexol (OMNIPAQUE) Enteric Contrast: Not administered. FINDINGS: CHEST LUNGS: No focal consolidation or suspicious mass. Small pulmonary nodules are stable since 2008, benign. Right lower lobe calcified granuloma. PLEURA: Unremarkable. HEART/GREAT VESSELS: Heart is unremarkable for patient's age. No thoracic aortic aneurysm. MEDIASTINUM AND KAVON: Small hiatal hernia. No mediastinal or hilar lymphadenopathy. CHEST WALL AND LOWER NECK: Unremarkable. ABDOMEN LIVER/BILIARY TREE: Unremarkable. GALLBLADDER: Post cholecystectomy. SPLEEN: Unremarkable. PANCREAS: Unremarkable. ADRENAL GLANDS: Unremarkable. KIDNEYS/URETERS: No hydronephrosis or urinary tract calculi. Subcentimeter hypoattenuating renal lesion(s), too small to characterize but statistically likely benign, which do not warrant follow-up (Radiology June 2019). STOMACH AND BOWEL: Duodenal diverticulum. Diverticulosis coli. APPENDIX: Normal. ABDOMINOPELVIC CAVITY: No ascites. No pneumoperitoneum. No lymphadenopathy. VESSELS: Atherosclerosis without abdominal aortic aneurysm. PELVIS REPRODUCTIVE ORGANS: Unremarkable for patient's age. URINARY BLADDER: Unremarkable. ABDOMINAL WALL/INGUINAL REGIONS: Unremarkable. BONES: No acute fracture or suspicious osseous lesion. Spinal degenerative changes.     Impression: No findings of acute traumatic injury in the  chest, abdomen or pelvis. Workstation performed: [id number]     TRAUMA - CT spine cervical wo contrast    Result Date: 10/18/2024  Narrative: CT CERVICAL SPINE - WITHOUT CONTRAST INDICATION:   TRAUMA. COMPARISON: Multiple priors most recently 7/24/2024 TECHNIQUE:  CT examination of the cervical spine was performed without intravenous contrast.  Contiguous axial images were obtained. Multiplanar 2D reformatted images were created from the source data. Radiation dose length product (DLP) for this visit:  373.35 mGy-cm .  This examination, like all CT scans performed in the [facility], was performed utilizing techniques to minimize radiation dose exposure, including the use of iterative  reconstruction and automated exposure control. IMAGE QUALITY:  Diagnostic. FINDINGS: ALIGNMENT:  There is straightening of normal cervical lordosis.  No subluxation or compression deformity. VERTEBRAE:  No fracture. DEGENERATIVE CHANGES:  Mild multilevel cervical degenerative changes are noted without critical central canal stenosis. PREVERTEBRAL AND PARASPINAL SOFT TISSUES: Unremarkable THORACIC INLET:  Normal.     Impression: No cervical spine fracture or traumatic malalignment. Workstation performed: [id number]     TRAUMA - CT head wo contrast    Result Date: 10/18/2024  Narrative: CT BRAIN - WITHOUT CONTRAST INDICATION:   TRAUMA. COMPARISON: Multiple priors most recently 7/27/2024 TECHNIQUE:  CT examination of the brain was performed.  Multiplanar 2D reformatted images were created from the source data. Radiation dose length product (DLP) for this visit:  892.86 mGy-cm .  This examination, like all CT scans performed in the [facility], was performed utilizing techniques to minimize radiation dose exposure, including the use of iterative  reconstruction and automated exposure control. IMAGE QUALITY:  Diagnostic. FINDINGS: PARENCHYMA: Decreased attenuation is noted in periventricular and subcortical  white matter demonstrating an appearance that is statistically most likely to represent moderate microangiopathic change. No CT signs of acute infarction.  No intracranial mass, mass effect or midline shift.  No acute parenchymal hemorrhage. Chronic infarction left basal ganglia. VENTRICLES AND EXTRA-AXIAL SPACES: Ventricles and extra-axial CSF spaces are prominent commensurate with the degree of volume loss.  No hydrocephalus.  No acute extra-axial hemorrhage. VISUALIZED ORBITS: Normal visualized orbits. PARANASAL SINUSES: Normal visualized paranasal sinuses. CALVARIUM AND EXTRACRANIAL SOFT TISSUES: Normal.     Impression: No intracranial hemorrhage or calvarial fracture. Workstation performed: [employer]     CT recon only thoracolumbar    Result Date: 10/18/2024  Narrative: CT THORACIC AND LUMBAR SPINE INDICATION:   fall. COMPARISON: Multiple priors most recently CT 2/23/2024 TECHNIQUE: Axial CT examination of the thoracic and lumbar spine was obtained utilizing reconstructed images from CT of the chest abdomen and pelvis performed the same day. Images were reformatted in the sagittal and coronal planes. This examination, like all CT scans performed in the [facility], was performed utilizing techniques to minimize radiation dose exposure, including the use of iterative reconstruction and automated exposure control. FINDINGS: ALIGNMENT: No traumatic subluxation. Mild mid thoracic dextrocurvature. VERTEBRAE:  No fracture.  No acute osseous abnormality. DEGENERATIVE CHANGES: Mild multilevel degenerative disc disease. PREVERTEBRAL AND PARASPINAL SOFT TISSUES: Normal. OTHER: Unremarkable     Impression: No fracture or traumatic subluxation. The study was marked in EPIC for immediate notification, per trauma protocol. Workstation performed: [employer]     XR shoulder 2+ vw left    Result Date: 10/16/2024  Narrative: LEFT SHOULDER INDICATION:   Repeated falls. Pain in right upper arm. COMPARISON:  None.  "VIEWS:  XR SHOULDER 2+ VW LEFT FINDINGS: There is no acute fracture or dislocation. No significant degenerative changes. No lytic or blastic osseous lesion. Soft tissues are unremarkable.     Impression: No acute osseous abnormality. Electronically signed: 10/16/2024 06:51 PM Jhon Pryor MD    XR elbow 3+ vw left    Result Date: 10/16/2024  Narrative: LEFT ELBOW INDICATION:   Repeated falls. Pain in right upper arm. COMPARISON:  None. VIEWS:  XR ELBOW 3+ VW LEFT Images: 4 FINDINGS: Nondisplaced radial head fracture. Small joint effusion. No significant degenerative changes. No lytic or blastic osseous lesion. Soft tissues are unremarkable.     Impression: Nondisplaced radial head fracture. Electronically signed: 10/16/2024 11:23 AM Leandro Bowles MPH,MD      LABS  Results from last 7 days   Lab Units 10/19/24  0409 10/18/24  2128   WBC Thousand/uL 7.80 10.38*   HEMOGLOBIN g/dL 12.4 14.4   HEMATOCRIT % 37.9 43.4   MCV fL 93 93   PLATELETS Thousands/uL 175 177   INR   --  1.25*     Results from last 7 days   Lab Units 10/19/24  0409 10/18/24  2128   SODIUM mmol/L 139 139   POTASSIUM mmol/L 3.1* 3.8   CHLORIDE mmol/L 104 103   CO2 mmol/L 29 24   BUN mg/dL 13 12   CREATININE mg/dL 0.75 0.77   CALCIUM mg/dL 8.7 9.3   ALBUMIN g/dL  --  4.2   TOTAL BILIRUBIN mg/dL  --  0.80   ALK PHOS U/L  --  74   ALT U/L  --  18   AST U/L  --  24   EGFR ml/min/1.73sq m 78 76   GLUCOSE RANDOM mg/dL 123 111     Results from last 7 days   Lab Units 10/19/24  0034 10/18/24  2128   HS TNI 0HR ng/L  --  9   HS TNI 2HR ng/L 17  --                       Results from last 7 days   Lab Units 10/18/24  2128   TSH 3RD GENERATON uIU/mL 0.837               Cultures:         Invalid input(s): \"URIBILINOGEN\"                Condition at Discharge:  good      Discharge instructions/Information to patient and family:   See after visit summary for information provided to patient and family.  The above hospital course, results, incidental findings, need " for follow up was discussed in detail with the patient and/or family.    Provisions for Follow-Up Care:  See after visit summary for information related to follow-up care and any pertinent home health orders.      Disposition:     Home       Discharge Statement:  I spent 33 minutes discharging the patient. This time was spent on the day of discharge. I had direct contact with the patient on the day of discharge. Greater than 50% of the total time was spent examining patient, answering all patient questions, arranging and discussing plan of care with patient as well as directly providing post-discharge instructions.  Additional time then spent on discharge activities.    Discharge Medications:  See after visit summary for reconciled discharge medications provided to patient and family.      ** Please Note: This note has been constructed using a voice recognition system **

## 2024-10-22 ENCOUNTER — TELEPHONE (OUTPATIENT)
Dept: CARDIOLOGY CLINIC | Facility: CLINIC | Age: 74
End: 2024-10-22

## 2024-10-22 ENCOUNTER — TRANSITIONAL CARE MANAGEMENT (OUTPATIENT)
Dept: FAMILY MEDICINE CLINIC | Facility: HOSPITAL | Age: 74
End: 2024-10-22

## 2024-10-22 ENCOUNTER — CLINICAL SUPPORT (OUTPATIENT)
Dept: CARDIOLOGY CLINIC | Facility: CLINIC | Age: 74
End: 2024-10-22
Payer: MEDICARE

## 2024-10-22 DIAGNOSIS — I63.512 CEREBROVASCULAR ACCIDENT (CVA) DUE TO OCCLUSION OF LEFT MIDDLE CEREBRAL ARTERY (HCC): ICD-10-CM

## 2024-10-22 DIAGNOSIS — I48.92 ATRIAL FLUTTER BY ELECTROCARDIOGRAM (HCC): ICD-10-CM

## 2024-10-22 PROCEDURE — 93248 EXT ECG>7D<15D REV&INTERPJ: CPT | Performed by: INTERNAL MEDICINE

## 2024-10-22 NOTE — TELEPHONE ENCOUNTER
Carli FIELDS from I rhythm called to report - Rapid  Afib at 174 bpm for 60 sec. On 10/08 @ 9:17 AM. Pg 22 Strip 15.

## 2024-10-23 ENCOUNTER — PATIENT OUTREACH (OUTPATIENT)
Dept: CASE MANAGEMENT | Facility: OTHER | Age: 74
End: 2024-10-23

## 2024-10-23 DIAGNOSIS — R26.2 AMBULATORY DYSFUNCTION: Primary | ICD-10-CM

## 2024-10-23 DIAGNOSIS — I63.9 CEREBROVASCULAR ACCIDENT (CVA), UNSPECIFIED MECHANISM (HCC): ICD-10-CM

## 2024-10-23 NOTE — PROGRESS NOTES
OP CM called to pt to follow up after discharge.  Pt states she does not have any home care.  Pt did have [facility] coming out for RN and PT but states they stopped coming prior to admission.  Pt slid out of her bean bag chair and states she tried to stand up and slid onto the floor.  Pt states she was on the floor from 10am to 830pm.  Pt states it was a terrible experience.  Pt does have Life Alert but states she was not wearing her bracelet but now she has it back on again.  Pt states she had [facility] in the past and would like their services again.  Referral placed through [employer] and Secure Chat sent to PCP to place order.      Pt states her friend Adrian visits her and helps her at home.  Pts son comes every Friday to Sunday.  Pts son cooks and cleans for pt.  Pt states she has 13 cats at home.      Explained home health waiver and pt states she is over income.  Pts hsb was a Vet so did discuss Vet [employer]ist again and gave pt the number again.  Explained to pt even if she feels she does not need additional assistance at home it would be good to call and see if she is eligible for any benefits.  Pt uses [facility] to get to [facility].      OP CM will remain available.

## 2024-10-28 ENCOUNTER — TELEPHONE (OUTPATIENT)
Age: 74
End: 2024-10-28

## 2024-10-28 ENCOUNTER — OFFICE VISIT (OUTPATIENT)
Dept: FAMILY MEDICINE CLINIC | Facility: HOSPITAL | Age: 74
End: 2024-10-28
Payer: MEDICARE

## 2024-10-28 VITALS
HEIGHT: 65 IN | WEIGHT: 170 LBS | SYSTOLIC BLOOD PRESSURE: 128 MMHG | BODY MASS INDEX: 28.32 KG/M2 | DIASTOLIC BLOOD PRESSURE: 68 MMHG | HEART RATE: 76 BPM | OXYGEN SATURATION: 95 %

## 2024-10-28 DIAGNOSIS — N18.2 CKD (CHRONIC KIDNEY DISEASE) STAGE 2, GFR 60-89 ML/MIN: ICD-10-CM

## 2024-10-28 DIAGNOSIS — M15.0 PRIMARY OSTEOARTHRITIS INVOLVING MULTIPLE JOINTS: ICD-10-CM

## 2024-10-28 DIAGNOSIS — E87.6 HYPOKALEMIA: ICD-10-CM

## 2024-10-28 DIAGNOSIS — Z76.89 ENCOUNTER FOR SUPPORT AND COORDINATION OF TRANSITION OF CARE: Primary | ICD-10-CM

## 2024-10-28 DIAGNOSIS — I48.0 PAROXYSMAL ATRIAL FIBRILLATION (HCC): ICD-10-CM

## 2024-10-28 DIAGNOSIS — I63.512 CEREBROVASCULAR ACCIDENT (CVA) DUE TO OCCLUSION OF LEFT MIDDLE CEREBRAL ARTERY (HCC): ICD-10-CM

## 2024-10-28 DIAGNOSIS — S46.912D STRAIN OF LEFT SHOULDER, SUBSEQUENT ENCOUNTER: ICD-10-CM

## 2024-10-28 DIAGNOSIS — R26.2 AMBULATORY DYSFUNCTION: ICD-10-CM

## 2024-10-28 PROBLEM — D72.829 LEUKOCYTOSIS: Status: RESOLVED | Noted: 2024-10-19 | Resolved: 2024-10-28

## 2024-10-28 PROCEDURE — 99496 TRANSJ CARE MGMT HIGH F2F 7D: CPT | Performed by: NURSE PRACTITIONER

## 2024-10-28 NOTE — ASSESSMENT & PLAN NOTE
History of A-fib/flutter associated with recent CVA in July 2024.  Currently in sinus rhythm with adherence to diltiazem to 40 mg p.o. daily.  She denies any chest pressure pain dyspnea.  Followed by cardiology.

## 2024-10-28 NOTE — ASSESSMENT & PLAN NOTE
Impaired gait since CVA in July 2024 with repeated falls.  Most recent fall requiring hospitalization as she fell off a beanbag chair on her porch and was unable to get up on her own.  She was hospitalized from 10/18/2024 to 10/21/2024.  Diagnostics negative for any acute process, PT OT cleared patient for discharge.  She does have persistent weakness and gait disturbance requiring quad cane.  That along with her left shoulder muscle strain.  [facility] home PT/OT will be starting today.  She is also wearing a life alert bracelet and considering moving in with her son as she currently lives by herself.

## 2024-10-28 NOTE — PROGRESS NOTES
[facility]   Farzaneh DOUGHERTY    Assessment/Plan:   1. Encounter for support and coordination of transition of care  2. Cerebrovascular accident (CVA) due to occlusion of left middle cerebral artery (HCC)  Assessment & Plan:  Recent CVA in July 2024 likely result of a flutter/A-fib.  Rehospitalized 10/18/2024 to 10/21/2024 due to fall/ambulatory dysfunction.  CAT scan of the head stable without acute findings.  Adherent to diltiazem 240 mg p.o. daily, atorvastatin 40 mg p.o. daily, Eliquis 5 mg p.o. twice daily.  Will be restarting PT OT services through [facility].  3. Paroxysmal atrial fibrillation (HCC)  Assessment & Plan:  History of A-fib/flutter associated with recent CVA in July 2024.  Currently in sinus rhythm with adherence to diltiazem to 40 mg p.o. daily.  She denies any chest pressure pain dyspnea.  Followed by cardiology.  4. CKD (chronic kidney disease) stage 2, GFR 60-89 ml/min  Assessment & Plan:  Lab Results   Component Value Date    EGFR 78 10/19/2024    EGFR 76 10/18/2024    EGFR 65 09/23/2024    CREATININE 0.75 10/19/2024    CREATININE 0.77 10/18/2024    CREATININE 0.87 09/23/2024   Kidney function stable however mild hypokalemia with recent hospitalization which was repleted.  Will recheck BMP.  Continue to optimize hydration/nutrition and avoid nephrotoxic agents.  Orders:  -     Basic metabolic panel; Future  5. Hypokalemia  -     Basic metabolic panel; Future  6. Primary osteoarthritis involving multiple joints  -     Diclofenac Sodium (VOLTAREN) 1 %; Apply 2 g topically 4 (four) times a day  7. Strain of left shoulder, subsequent encounter  Assessment & Plan:  Had a fall earlier this month in her garage while carrying trash out.  Was holding onto a doorknob and went down on her knees with pain in left upper extremity.  Evaluation per Ortho with left shoulder strain.  X-ray did demonstrate degenerative changes versus acute radial head fracture.  She was advised  to use Tylenol and ice however admits she is not icing area as she is always cold.  We discussed and she is agreeable to trialing Voltaren gel.  8. Ambulatory dysfunction  Assessment & Plan:  Impaired gait since CVA in July 2024 with repeated falls.  Most recent fall requiring hospitalization as she fell off a beanbag chair on her porch and was unable to get up on her own.  She was hospitalized from 10/18/2024 to 10/21/2024.  Diagnostics negative for any acute process, PT OT cleared patient for discharge.  She does have persistent weakness and gait disturbance requiring quad cane.  That along with her left shoulder muscle strain.  [facility] PT/OT will be starting today.  She is also wearing a life alert bracelet and considering moving in with her son as she currently lives by herself.      Repeat BMP  Start using voltaren gel- apply to painful joints up to 4 times daily  Return in about 3 months (around 1/28/2025).  Patient may call or return to office with any questions or concerns.     ______________________________________________________________________  Subjective:     Patient ID: Brianna Baum is a 74 y.o. female.  Brianna Baum  Chief Complaint   Patient presents with    Transition of Care [facility]     Hospital d/c     Here for TCM.  Impaired gait dysfunction since CVA in July 2024.  Was hospitalized from 10/18/2024 to 10/21/2024 after falling out of beanbag chair on her porch with inability to get up independently.  Diagnostics negative as inpatient and therapy services cleared her for discharge.  [facility] will be starting today.  I have concerns considering she continues to live alone however she has a life alert bracelet now.  She does not drive but she has a supportive son as well as friends.  She denies any further falls since hospital discharge.  She denies any dyspnea/chest pressure pain.  Headaches have improved greatly.  She continues to struggle with left shoulder  strain.  She has not been icing the site as she is always cold.  She is down 14 pounds in the last 6 months some of it intentional some of it unintentional likely due to multiple hospitalizations.  She refuses influenza/COVID/RSV vaccination despite recommendations.               The following portions of the patient's history were reviewed and updated as appropriate: allergies, current medications, past family history, past medical history, past social history, past surgical history, and problem list.    Review of Systems   Constitutional:  Positive for unexpected weight change. Negative for activity change, appetite change, chills, fatigue and fever.   HENT: Negative.  Negative for congestion, ear pain, postnasal drip and sinus pain.    Eyes:  Positive for visual disturbance.   Respiratory: Negative.  Negative for cough, chest tightness and shortness of breath.    Cardiovascular: Negative.  Negative for chest pain, palpitations and leg swelling.   Gastrointestinal: Negative.  Negative for blood in stool, constipation and diarrhea.   Endocrine: Negative.    Genitourinary: Negative.  Negative for difficulty urinating, dysuria and hematuria.   Musculoskeletal:  Positive for arthralgias and gait problem.        Using quad cane.   Skin: Negative.    Allergic/Immunologic: Negative.  Negative for immunocompromised state.   Neurological:  Positive for facial asymmetry and weakness. Negative for dizziness, speech difficulty and light-headedness.   Hematological: Negative.    Psychiatric/Behavioral: Negative.  Negative for sleep disturbance.          Objective:      Vitals:    10/28/24 1138   BP: 128/68   Pulse: 76   SpO2: 95%      Physical Exam  Vitals and nursing note reviewed.   Constitutional:       Appearance: Normal appearance.   HENT:      Head: Normocephalic and atraumatic.      Right Ear: Tympanic membrane, ear canal and external ear normal.      Left Ear: Tympanic membrane, ear canal and external ear normal.       Nose: Nose normal.      Mouth/Throat:      Mouth: Mucous membranes are moist.      Pharynx: Oropharynx is clear.   Eyes:      Extraocular Movements: Extraocular movements intact.      Conjunctiva/sclera: Conjunctivae normal.      Pupils: Pupils are equal, round, and reactive to light.   Cardiovascular:      Rate and Rhythm: Normal rate and regular rhythm.      Pulses: Normal pulses.      Heart sounds: Normal heart sounds.   Pulmonary:      Effort: Pulmonary effort is normal.      Breath sounds: Normal breath sounds.   Abdominal:      General: Bowel sounds are normal.      Palpations: Abdomen is soft.   Musculoskeletal:         General: Normal range of motion.      Left shoulder: Tenderness present.      Cervical back: Normal range of motion and neck supple.   Skin:     General: Skin is warm and dry.   Neurological:      General: No focal deficit present.      Mental Status: She is alert and oriented to person, place, and time.      Cranial Nerves: Facial asymmetry present.      Gait: Gait abnormal.      Comments: Mild MILY weakness.  Ambulating with quad cane.   Psychiatric:         Mood and Affect: Mood normal.         Speech: Speech normal.         Behavior: Behavior is slowed. Behavior is cooperative.         Thought Content: Thought content normal.         Judgment: Judgment normal.      Comments: Improving but persistent forgetfulness         TCM Call       Date and time call was made  10/22/2024  9:16 AM    Patient was hospitialized at  [facility]    Date of Admission  10/18/24    Date of discharge  10/21/24    Diagnosis  Ambulatory dysfunction    Disposition  Home    Were the patients medications reviewed and updated  Yes    Current Symptoms  --  A bit disoriented          TCM Call       Post hospital issues  None    Should patient be enrolled in anticoag monitoring?  No    Scheduled for follow up?  Yes    Did you obtain your prescribed medications  Yes    Do you need help managing your  "prescriptions or medications  No    Is transportation to your appointment needed  No    I have advised the patient to call PCP with any new or worsening symptoms  Sia VILLA MA    Living Arrangements  Alone    Support System  Family; Friends; Neighboors    The type of support provided  Emotional; Physical    Are you recieving home care services  Yes    Types of home care services  Nurse visit    Comments  spoke to pt.  doing ok, but sore.  meds reviewed--chart current.  Pt does not want to come in for f/u, she said she is following up with Dr Viveros--surgeon.  has VN coming in.  If she decides to come in she will call and schedule TCM f/u.  I did reiterate to pt that our doctors do like to see their pts in f/u post hospital stay, but she again said that she will think about it.  dk             Portions of the record may have been created with voice recognition software. Occasional wrong word or \"sound alike\" substitutions may have occurred due to the inherent limitations of voice recognition software. Please review the chart carefully and recognize, using context, where substitutions/typographical errors may have occurred.       "

## 2024-10-28 NOTE — ASSESSMENT & PLAN NOTE
Lab Results   Component Value Date    EGFR 78 10/19/2024    EGFR 76 10/18/2024    EGFR 65 09/23/2024    CREATININE 0.75 10/19/2024    CREATININE 0.77 10/18/2024    CREATININE 0.87 09/23/2024   Kidney function stable however mild hypokalemia with recent hospitalization which was repleted.  Will recheck BMP.  Continue to optimize hydration/nutrition and avoid nephrotoxic agents.

## 2024-10-28 NOTE — ASSESSMENT & PLAN NOTE
Had a fall earlier this month in her garage while carrying trash out.  Was holding onto a doorknob and went down on her knees with pain in left upper extremity.  Evaluation per Ortho with left shoulder strain.  X-ray did demonstrate degenerative changes versus acute radial head fracture.  She was advised to use Tylenol and ice however admits she is not icing area as she is always cold.  We discussed and she is agreeable to trialing Voltaren gel.

## 2024-10-28 NOTE — ASSESSMENT & PLAN NOTE
Recent CVA in July 2024 likely result of a flutter/A-fib.  Rehospitalized 10/18/2024 to 10/21/2024 due to fall/ambulatory dysfunction.  CAT scan of the head stable without acute findings.  Adherent to diltiazem 240 mg p.o. daily, atorvastatin 40 mg p.o. daily, Eliquis 5 mg p.o. twice daily.  Will be restarting PT OT services through [facility].

## 2024-10-28 NOTE — TELEPHONE ENCOUNTER
Received call from Frank with OT at [facility]. She wanted to know if there are any restrictions. Please advise OT. If she does not answer leave PREETI.

## 2024-11-01 ENCOUNTER — TELEPHONE (OUTPATIENT)
Dept: UROLOGY | Facility: CLINIC | Age: 74
End: 2024-11-01

## 2024-11-01 NOTE — TELEPHONE ENCOUNTER
Verbally confirmed the rescheduled appointment information with the patient:    Date: 11/25/2024     Arrival Time: 10:45 AM     Visit Type: NEW PATIENT [facility]      Provider: LADONNA Newton Department: [facility] [place]   Address provided to the patient:  Department Phone: [phone]   Notes: NP-Cystitis

## 2024-11-12 ENCOUNTER — OFFICE VISIT (OUTPATIENT)
Dept: NEUROLOGY | Facility: CLINIC | Age: 74
End: 2024-11-12
Payer: MEDICARE

## 2024-11-12 VITALS
BODY MASS INDEX: 28.46 KG/M2 | HEIGHT: 65 IN | WEIGHT: 170.8 LBS | SYSTOLIC BLOOD PRESSURE: 122 MMHG | DIASTOLIC BLOOD PRESSURE: 80 MMHG

## 2024-11-12 DIAGNOSIS — N39.41 URGE INCONTINENCE OF URINE: ICD-10-CM

## 2024-11-12 DIAGNOSIS — R26.2 AMBULATORY DYSFUNCTION: ICD-10-CM

## 2024-11-12 DIAGNOSIS — M25.512 LEFT SHOULDER PAIN: ICD-10-CM

## 2024-11-12 DIAGNOSIS — I10 PRIMARY HYPERTENSION: ICD-10-CM

## 2024-11-12 DIAGNOSIS — I65.22 LEFT CAROTID STENOSIS: ICD-10-CM

## 2024-11-12 DIAGNOSIS — E78.2 MIXED HYPERLIPIDEMIA: Chronic | ICD-10-CM

## 2024-11-12 DIAGNOSIS — M79.604 RIGHT LEG PAIN: ICD-10-CM

## 2024-11-12 DIAGNOSIS — Z86.73 HISTORY OF STROKE: Primary | ICD-10-CM

## 2024-11-12 DIAGNOSIS — I48.0 PAROXYSMAL ATRIAL FIBRILLATION (HCC): ICD-10-CM

## 2024-11-12 PROCEDURE — G2211 COMPLEX E/M VISIT ADD ON: HCPCS | Performed by: PSYCHIATRY & NEUROLOGY

## 2024-11-12 PROCEDURE — 99214 OFFICE O/P EST MOD 30 MIN: CPT | Performed by: PSYCHIATRY & NEUROLOGY

## 2024-11-12 NOTE — PROGRESS NOTES
Ambulatory Visit  Name: Brianna Baum      : 1950      MRN: [id number]  Encounter Provider: Anthony Jimenes MD  Encounter Date: 2024   Encounter department: NEUROLOGY [facility]    Assessment & Plan  Primary hypertension  Continue management per PCP       Paroxysmal atrial fibrillation (HCC)  Continue management per PCP/Cardiology       Mixed hyperlipidemia  Continue management per PCP       Ambulatory dysfunction  Assess R Leg pain for possible DVT   Orders:     VAS VENOUS DUPLEX - LOWER LIMB BILATERAL; Future    History of stroke  Residual ambulatory dysfunction due to R sided weakness persistent after stroke that has significantly improved with PT.    Continue management of risk factors per PCP/cardiology   Continue Eliquis for prevention       Urge incontinence of urine  Pt endorses urinating 4-5x per night since the stroke and she must get to the bathroom immediately or she looses control of her bladder  Endorses UTI sxs beginning this morning including burning, foul smell, and cloudy urine.   Orders:    Ambulatory Referral to Urology; Future    Right leg pain  Pain occurred directly after stroke and has been slowly improving with PT.  PT endorses pt tenderness toward the distal aspect of the hamstrings.   Assess for poss DVT and continue PT and symptomatic management  Orders:     VAS VENOUS DUPLEX - LOWER LIMB BILATERAL; Future    Left carotid stenosis  Repeat VAS carotid in 2025  Continue AC therapy  Orders:    VAS carotid complete study; Future    Left shoulder pain  Assess after pt used arm to catch herself on the doorway while falling. Pt has pain limiting ROM and strength as well as point tenderness on the shoulder. Assess for poss bony etiologies.   Continue PT  Consider ortho consult if pain worsens/does not resolve    Orders:    XR shoulder 2+ vw left; Future      Patient Instructions   Stroke: Brianna Presents for a follow-up evaluation for her recent stroke which  was likely result of atrial fibrillation.  She did not endorse new strokelike symptoms.  She suffered from 1 mechanical fall at which time she wrenched her left shoulder.  She does endorse ongoing significant issues with urge incontinence of urine and right lower extremity leg pain.  -For ongoing stroke prevention she should continue her combination of Eliquis with appropriate blood pressure and glycemic control  -She does have mild carotid stenosis on the left-hand side that was discovered at the time of her stroke.  This should be reevaluated with an ultrasound likely 6 months from the time of her original stroke which is coming up.  I will provide her with a prescription to have the artery checked  -I would like for her to see the urologist in the office in terms of the frequent urination, incontinence/urge incontinence.  She also complains of some symptoms today that could be concerning for urinary tract infection.  For this I would like her to contact her primary care team or to consider seeing urgent care at her discretion  -She has pain in the back of the right leg with some point tenderness in the region of the distal hamstrings.  I would like for her to have a Doppler ultrasound to ensure that there is no DVT.  -She is already undergoing physical therapy for the left shoulder and we will request a shoulder x-ray to ensure there is no bony involvement in her injury    I will plan for her to return to the office in 4 months time to see one of the stroke advanced practice providers.  If she were to have strokelike symptoms such as sudden painless loss of vision or double vision, difficulty speaking or swallowing, vertigo room spinning that does not quickly or sudden weakness/numbness/loss of coordination affecting 1 side of the face or body she should proceed by ambulance to the nearest emergency room immediately.  While taking Eliquis she should avoid aspirin/ibuprofen, use Voltaren gel only very sparingly,  and if she were to fall and strike her head or suddenly experience the worst headache of her life she should likewise proceed by ambulance to the nearest emergency room.   History of Present Illness   Brianna Baum is a 75yo female here to f/u after L MCA stroke on 7/22/24 with afib not on AC as the cause which manifested as near syncope and R sided weakness affecting her gait and caused her to be hospitalized on 10/18/24 and 10/21/24 due to falls/ambulatory dysfunction. PT has been working with home PT/OT 3x weekly and has noticed a significant increase in strength on her R side. She endorses that she has been experiencing enuresis, urgency, and frequency 4-5x per night since her stroke. She has an appt with urology on 11/25/24 and was encouraged to message her PCP or go to urgent care for dysuria she began experiencing this morning to r/o UTI. She endorses a near fall about 2mo ago where she tripped while taking out the garbage and caught herself with her L arm by grabbing onto the doorframe. Since then, she has endorsed L shoulder pain limiting her ROM and strength that has not improved. Additionally, she endorses R thigh pain that has been present since after the stroke that has improved with PT but occasionally is painful enough that it causes her to take more time standing and walking. She localizes this pain to her distal hamstring area. She denies any stroke like sxs in the interim and denies any depressed or anxious mood since her stroke.     Pt is to continue with PT/OT to continue to improve her strength and gait. She is to continue Eliquis for AC for stroke prevention. She will follow with cardiology/PCP for afib eval and treatment. She will get an xray of the shoulder completed to evaluate the pain for any bony etiologies and f/u with PCP if pain does not improve or worsens. She will get a VAS US of her R LE to assess thigh pain for poss DVT and f/u with PCP if pain does not improve or worsens. Pt will  "meet with urology on 11/25/24 to assess urinary complaints.      CVA/TIA-like Symptoms  Associated symptoms include myalgias.     Review of Systems   Genitourinary:  Positive for dysuria, enuresis, frequency and urgency.   Musculoskeletal:  Positive for gait problem and myalgias.   Neurological:  Positive for light-headedness.   Psychiatric/Behavioral:  Negative for dysphoric mood.      I have personally reviewed the MA's review of systems and made changes as necessary.      Objective     /80 (BP Location: Right arm, Patient Position: Sitting, Cuff Size: Large)   Ht 5' 5\" (1.651 m)   Wt 77.5 kg (170 lb 12.8 oz)   BMI 28.42 kg/m²     Physical Exam  Constitutional:       Appearance: Normal appearance.   HENT:      Head: Normocephalic and atraumatic.   Eyes:      Extraocular Movements: Extraocular movements intact and EOM normal.      Pupils: Pupils are equal, round, and reactive to light.   Musculoskeletal:      Right shoulder: Normal.      Left shoulder: Tenderness present. No swelling, deformity or laceration. Decreased range of motion. Decreased strength.      Right upper leg: Tenderness present. No swelling, deformity, lacerations or bony tenderness.      Left upper leg: Normal.   Skin:     General: Skin is warm and dry.   Neurological:      Mental Status: She is alert.      Cranial Nerves: Cranial nerve deficit present.      Sensory: No sensory deficit.      Motor: Weakness present.      Coordination: Coordination normal. Finger-Nose-Finger Test normal.      Gait: Gait abnormal.   Psychiatric:         Mood and Affect: Mood normal.         Speech: Speech normal.         Behavior: Behavior normal.         Thought Content: Thought content normal.         Judgment: Judgment normal.       Neurologic Exam     Mental Status   Attention: normal. Concentration: normal.   Speech: speech is normal   Level of consciousness: alert  Knowledge: good.     Cranial Nerves     CN II   Visual fields full to confrontation. "     CN III, IV, VI   Pupils are equal, round, and reactive to light.  Extraocular motions are normal.     CN V   Facial sensation intact.     CN VII   Facial expression full, symmetric.     CN VIII   CN VIII normal.     CN IX, X   CN IX normal.   CN X normal.     CN XII   CN XII normal.     Motor Exam   Muscle bulk: normal    Strength   Strength 5/5 except as noted. Strength on R side 2/5 in shoulder shrug  4/5 on rest of R side.     Sensory Exam   Light touch normal.     Gait, Coordination, and Reflexes     Coordination   Finger to nose coordination: normalUsing cane for assistance due to R thigh pain.          Administrative Statements   I have spent a total time of 60 minutes in caring for this patient on the day of the visit/encounter including Instructions for management, Patient and family education, Importance of tx compliance, Risk factor reductions, Counseling / Coordination of care, Documenting in the medical record, Reviewing / ordering tests, medicine, procedures  , and Obtaining or reviewing history  .

## 2024-11-12 NOTE — PATIENT INSTRUCTIONS
Stroke: Brianna Presents for a follow-up evaluation for her recent stroke which was likely result of atrial fibrillation.  She did not endorse new strokelike symptoms.  She suffered from 1 mechanical fall at which time she wrenched her left shoulder.  She does endorse ongoing significant issues with urge incontinence of urine and right lower extremity leg pain.  -For ongoing stroke prevention she should continue her combination of Eliquis with appropriate blood pressure and glycemic control  -She does have mild carotid stenosis on the left-hand side that was discovered at the time of her stroke.  This should be reevaluated with an ultrasound likely 6 months from the time of her original stroke which is coming up.  I will provide her with a prescription to have the artery checked  -I would like for her to see the urologist in the office in terms of the frequent urination, incontinence/urge incontinence.  She also complains of some symptoms today that could be concerning for urinary tract infection.  For this I would like her to contact her primary care team or to consider seeing urgent care at her discretion  -She has pain in the back of the right leg with some point tenderness in the region of the distal hamstrings.  I would like for her to have a Doppler ultrasound to ensure that there is no DVT.  -She is already undergoing physical therapy for the left shoulder and we will request a shoulder x-ray to ensure there is no bony involvement in her injury    I will plan for her to return to the office in 4 months time to see one of the stroke advanced practice providers.  If she were to have strokelike symptoms such as sudden painless loss of vision or double vision, difficulty speaking or swallowing, vertigo room spinning that does not quickly or sudden weakness/numbness/loss of coordination affecting 1 side of the face or body she should proceed by ambulance to the nearest emergency room immediately.  While taking  Eliquis she should avoid aspirin/ibuprofen, use Voltaren gel only very sparingly, and if she were to fall and strike her head or suddenly experience the worst headache of her life she should likewise proceed by ambulance to the nearest emergency room.

## 2024-11-12 NOTE — PROGRESS NOTES
Ambulatory Visit  Name: Brianna Baum      : 1950      MRN: [id number]  Encounter Provider: Anthony Jimenes MD  Encounter Date: 2024   Encounter department: NEUROLOGY [facility]    Assessment & Plan  History of stroke  Patient Instructions   Stroke: Brianna Presents for a follow-up evaluation for her recent stroke which was likely result of atrial fibrillation.  She did not endorse new strokelike symptoms.  She suffered from 1 mechanical fall at which time she wrenched her left shoulder.  She does endorse ongoing significant issues with urge incontinence of urine and right lower extremity leg pain.  -For ongoing stroke prevention she should continue her combination of Eliquis with appropriate blood pressure and glycemic control  -She does have mild carotid stenosis on the left-hand side that was discovered at the time of her stroke.  This should be reevaluated with an ultrasound likely 6 months from the time of her original stroke which is coming up.  I will provide her with a prescription to have the artery checked  -I would like for her to see the urologist in the office in terms of the frequent urination, incontinence/urge incontinence.  She also complains of some symptoms today that could be concerning for urinary tract infection.  For this I would like her to contact her primary care team or to consider seeing urgent care at her discretion  -She has pain in the back of the right leg with some point tenderness in the region of the distal hamstrings.  I would like for her to have a Doppler ultrasound to ensure that there is no DVT.  -She is already undergoing physical therapy for the left shoulder and we will request a shoulder x-ray to ensure there is no bony involvement in her injury    I will plan for her to return to the office in 4 months time to see one of the stroke advanced practice providers.  If she were to have strokelike symptoms such as sudden painless loss of vision or  double vision, difficulty speaking or swallowing, vertigo room spinning that does not quickly or sudden weakness/numbness/loss of coordination affecting 1 side of the face or body she should proceed by ambulance to the nearest emergency room immediately.  While taking Eliquis she should avoid aspirin/ibuprofen, use Voltaren gel only very sparingly, and if she were to fall and strike her head or suddenly experience the worst headache of her life she should likewise proceed by ambulance to the nearest emergency room.         Primary hypertension         Paroxysmal atrial fibrillation (HCC)         Mixed hyperlipidemia         Ambulatory dysfunction    Orders:     VAS VENOUS DUPLEX - LOWER LIMB BILATERAL; Future    Urge incontinence of urine    Orders:    Ambulatory Referral to Urology; Future    Right leg pain    Orders:     VAS VENOUS DUPLEX - LOWER LIMB BILATERAL; Future    Left carotid stenosis    Orders:    VAS carotid complete study; Future    Left shoulder pain    Orders:    XR shoulder 2+ vw left; Future        History of Present Illness   CVA/TIA-like Symptoms  Pertinent negatives include no fatigue, fever, headaches, myalgias, nausea, neck pain, numbness, rash, vomiting or weakness.     I reviewed, personally confirmed, and agree with the history as documented by the medical student.  Documentation reflects my recommended edits    Brianna Baum is a 73yo female here to f/u after L MCA stroke on 7/22/24 with afib not on AC as the cause which manifested as near syncope and R sided weakness affecting her gait and caused her to be hospitalized on 10/18/24 and 10/21/24 due to falls/ambulatory dysfunction. PT has been working with home PT/OT 3x weekly and has noticed a significant increase in strength on her R side. She endorses that she has been experiencing enuresis, urgency, and frequency 4-5x per night since her stroke. She has an appt with urology on 11/25/24 and was encouraged to message her PCP or go to urgent  care for dysuria she began experiencing this morning to r/o UTI. She endorses a near fall about 2mo ago where she tripped while taking out the garbage and caught herself with her L arm by grabbing onto the doorframe. Since then, she has endorsed L shoulder pain limiting her ROM and strength that has not improved. Additionally, she endorses R thigh pain that has been present since after the stroke that has improved with PT but occasionally is painful enough that it causes her to take more time standing and walking. She localizes this pain to her distal hamstring area. She denies any stroke like sxs in the interim and denies any depressed or anxious mood since her stroke.      Pt is to continue with PT/OT to continue to improve her strength and gait. She is to continue Eliquis for AC for stroke prevention. She will follow with cardiology/PCP for afib eval and treatment. She will get an xray of the shoulder completed to evaluate the pain for any bony etiologies and f/u with PCP if pain does not improve or worsens. She will get a VAS US of her R LE to assess thigh pain for poss DVT and f/u with PCP if pain does not improve or worsens. Pt will meet with urology on 11/25/24 to assess urinary complaints.       CVA/TIA-like Symptoms  Associated symptoms include myalgias.      Review of Systems   Genitourinary:  Positive for dysuria, enuresis, frequency and urgency.   Musculoskeletal:  Positive for gait problem and myalgias.   Neurological:  Positive for light-headedness.   Psychiatric/Behavioral:  Negative for dysphoric mood.       I have personally reviewed the MA's review of systems and made changes as necessary.                  Review of Systems   Constitutional:  Negative for appetite change, fatigue and fever.   HENT: Negative.  Negative for hearing loss, tinnitus, trouble swallowing and voice change.    Eyes: Negative.  Negative for photophobia, pain and visual disturbance.   Respiratory: Negative.  Negative for  "shortness of breath.    Cardiovascular: Negative.  Negative for palpitations.   Gastrointestinal: Negative.  Negative for nausea and vomiting.   Endocrine: Negative.  Negative for cold intolerance.   Genitourinary: Negative.  Negative for dysuria, frequency and urgency.   Musculoskeletal:  Negative for back pain, gait problem, myalgias, neck pain and neck stiffness.   Skin: Negative.  Negative for rash.   Allergic/Immunologic: Negative.    Neurological: Negative.  Negative for dizziness, tremors, seizures, syncope, facial asymmetry, speech difficulty, weakness, light-headedness, numbness and headaches.   Hematological: Negative.  Does not bruise/bleed easily.   Psychiatric/Behavioral: Negative.  Negative for confusion, hallucinations and sleep disturbance.      I have personally reviewed the MA's review of systems and made changes as necessary.      Objective     /80 (BP Location: Right arm, Patient Position: Sitting, Cuff Size: Large)   Ht 5' 5\" (1.651 m)   Wt 77.5 kg (170 lb 12.8 oz)   BMI 28.42 kg/m²     Physical Exam  Neurologic Exam    I was present for, directly observed, and agree with the exam as documented by the medical student.  Documentation below reflects my recommended edits.    Physical Exam  Constitutional:       Appearance: Normal appearance.   HENT:      Head: Normocephalic and atraumatic.   Eyes:      Extraocular Movements: Extraocular movements intact and EOM normal.      Pupils: Pupils are equal, round, and reactive to light.   Musculoskeletal:      Right shoulder: Normal.      Left shoulder: Tenderness present. No swelling, deformity or laceration. Decreased range of motion. Decreased strength.      Right upper leg: Tenderness present. No swelling, deformity, lacerations or bony tenderness.      Left upper leg: Normal.   Skin:     General: Skin is warm and dry.   Neurological:      Mental Status: She is alert.      Cranial Nerves: Cranial nerve deficit present.      Sensory: No sensory " deficit.      Motor: Weakness present.      Coordination: Coordination normal. Finger-Nose-Finger Test normal.      Gait: Gait abnormal.   Psychiatric:         Mood and Affect: Mood normal.         Speech: Speech normal.         Behavior: Behavior normal.         Thought Content: Thought content normal.         Judgment: Judgment normal.         Neurologic Exam      Mental Status   Attention: normal. Concentration: normal.   Speech: speech is normal   Level of consciousness: alert  Knowledge: good.      Cranial Nerves      CN II   Visual fields full to confrontation.      CN III, IV, VI   Pupils are equal, round, and reactive to light.  Extraocular motions are normal.      CN V   Facial sensation intact.      CN VII   Facial expression full, symmetric.      CN VIII   CN VIII normal.      CN IX, X   CN IX normal.   CN X normal.      CN XII   CN XII normal.      Motor Exam   Muscle bulk: normal     Strength   Strength 5/5 except as noted. Strength on R side 2/5 in shoulder shrug  4/5 on rest of R side.      Sensory Exam   Light touch normal.      Gait, Coordination, and Reflexes      Coordination   Finger to nose coordination: normalUsing cane for assistance due to R thigh pain.

## 2024-11-13 NOTE — ASSESSMENT & PLAN NOTE
Residual ambulatory dysfunction due to R sided weakness persistent after stroke that has significantly improved with PT.    Continue management of risk factors per PCP/cardiology   Continue Eliquis for prevention

## 2024-11-13 NOTE — ASSESSMENT & PLAN NOTE
Pt endorses urinating 4-5x per night since the stroke and she must get to the bathroom immediately or she looses control of her bladder  Endorses UTI sxs beginning this morning including burning, foul smell, and cloudy urine.   Orders:    Ambulatory Referral to Urology; Future

## 2024-11-13 NOTE — ASSESSMENT & PLAN NOTE
Assess R Leg pain for possible DVT   Orders:     VAS VENOUS DUPLEX - LOWER LIMB BILATERAL; Future

## 2024-11-13 NOTE — ASSESSMENT & PLAN NOTE
Repeat VAS carotid in Jan 2025  Continue AC therapy  Orders:    VAS carotid complete study; Future

## 2024-11-14 ENCOUNTER — PATIENT OUTREACH (OUTPATIENT)
Dept: CASE MANAGEMENT | Facility: OTHER | Age: 74
End: 2024-11-14

## 2024-11-14 NOTE — PROGRESS NOTES
OP CM called to pt to follow up with referral to [facility].  Pt states she did start services back with [facility] for PT/OT.  Pt denies any other needs at this time.

## 2024-11-22 ENCOUNTER — HOSPITAL ENCOUNTER (OUTPATIENT)
Dept: NON INVASIVE DIAGNOSTICS | Age: 74
Discharge: HOME | End: 2024-11-22
Payer: MEDICARE

## 2024-11-22 DIAGNOSIS — M79.604 RIGHT LEG PAIN: ICD-10-CM

## 2024-11-22 DIAGNOSIS — R26.2 AMBULATORY DYSFUNCTION: ICD-10-CM

## 2024-11-22 PROCEDURE — 93970 EXTREMITY STUDY: CPT | Performed by: SURGERY

## 2024-11-22 PROCEDURE — 93970 EXTREMITY STUDY: CPT

## 2024-11-22 NOTE — PROGRESS NOTES
Name: Brianna Baum      : 1950      MRN: [id number]  Encounter Provider: LADONNA Newton  Encounter Date: 2024   Encounter department: [facility]  :  Assessment & Plan  Urge incontinence of urine  Patient reports nocturia 7x/night & urge incontinence  She has to wear pads most days, although symptoms are worse at night   She reports a history of UTI's, none recently-last urine culture documented was in   She is unable to provide a urine sample in the office today as she voided prior to coming in   PVR 0ml   She denies burning, dysuria, flank/abdominal pain, or hematuria   Recommend increasing hydration, decreasing bladder irritants, and limiting fluids to ~2 hours prior to bedtime   Patient reports drinking peach nectar which she thinks makes her void a lot- advised to cut this out for a few weeks and see if it makes a difference   I recommend pelvic floor physical therapy but patient is not interested d/t inability to drive and having to pay for transportation to all appts.   Will trial Vesicare x1 month and follow up in office   Side effects discussed with patient   If no relief, would try DDAVP  Orders:    Ambulatory Referral to Urology    POCT Measure PVR    solifenacin (VESICARE) 10 MG tablet; Take 1 tablet (10 mg total) by mouth daily      History of Present Illness   Brianna Baum is a 74 y.o. female who presents in consultation for nocturia 7x/night & urge incontinence. She was referred to our office by her PCP, Dr. Baum and her neurologist, Dr. Jimenes for recurrent UTI's, urinary frequency, and urge incontinence. Last positive urine cultures on file are from February and - both positive for E.coli.   CT C/A/P on 10/18/24 unremarkable- no kidney stones, hydronephrosis, bladder unremarkable. Small benign renal lesions.   She reports waking up constantly at night to void and having urge incontinence, having to wear pads. She denies any  recent UTI's, burning, dysuria, flank/abdominal pain, or hematuria.       Review of Systems   Constitutional:  Negative for chills, diaphoresis, fatigue and fever.   Respiratory:  Negative for cough and shortness of breath.    Cardiovascular:  Negative for chest pain and palpitations.   Gastrointestinal:  Negative for abdominal pain, nausea and vomiting.   Genitourinary:  Positive for frequency and urgency. Negative for decreased urine volume, difficulty urinating, dysuria, flank pain, hematuria and pelvic pain.        Nocturia 7x/night   Musculoskeletal:  Negative for back pain, gait problem and myalgias.   Skin:  Negative for pallor and wound.   Neurological:  Negative for dizziness, light-headedness and numbness.     Past Medical History   Past Medical History:   Diagnosis Date    Arthritis     Chronic pain     Colon polyp     Diverticulitis     GERD (gastroesophageal reflux disease)     Hypertension     Vertigo      Past Surgical History:   Procedure Laterality Date    CHOLECYSTECTOMY      CHOLECYSTECTOMY LAPAROSCOPIC N/A 12/06/2021    Procedure: SUBTOTAL CHOLECYSTECTOMY LAPAROSCOPIC;  Surgeon: Víctor Viveros MD;  Location: [facility] OR;  Service: General    COLONOSCOPY      GALLBLADDER SURGERY  12/2021    Dr. Viveros    IR STROKE ALERT  7/23/2024    IR STROKE ALERT  7/24/2024    TUBAL LIGATION       Family History   Problem Relation Age of Onset    Stroke Mother     Diabetes Mother     Hypertension Mother     Heart disease Father     Hypertension Father     Heart disease Brother     Diabetes Brother     Hypertension Brother     Substance Abuse Neg Hx     Mental illness Neg Hx       reports that she has never smoked. She has never used smokeless tobacco. She reports that she does not currently use alcohol. She reports that she does not currently use drugs after having used the following drugs: Marijuana.  Current Outpatient Medications on File Prior to Visit   Medication Sig Dispense Refill    acetaminophen  "(TYLENOL) 325 mg tablet       apixaban (Eliquis) 5 mg Take 1 tablet (5 mg total) by mouth 2 (two) times a day 180 tablet 1    Ascorbic Acid (VITAMIN C) 1000 MG tablet Take 1,000 mg by mouth daily      atorvastatin (LIPITOR) 40 mg tablet Take 1 tablet (40 mg total) by mouth every evening 90 tablet 1    Biotin 10 MG CAPS Take by mouth in the morning      Diclofenac Sodium (VOLTAREN) 1 % Apply 2 g topically 4 (four) times a day 350 g 7    diltiazem (CARDIZEM CD) 240 mg 24 hr capsule Take 1 capsule (240 mg total) by mouth daily 90 capsule 3    pantoprazole (PROTONIX) 20 mg tablet Take 1 tablet (20 mg total) by mouth daily 90 tablet 1    clotrimazole-betamethasone (LOTRISONE) 1-0.05 % cream Apply topically 2 (two) times a day (Patient not taking: Reported on 11/12/2024) 45 g 5     No current facility-administered medications on file prior to visit.     Allergies   Allergen Reactions    Tramadol Hives      Objective   /84 (BP Location: Left arm, Patient Position: Sitting, Cuff Size: Adult)   Pulse 83   Ht 5' 5\" (1.651 m)   Wt 77.7 kg (171 lb 6.4 oz)   SpO2 99%   BMI 28.52 kg/m²     Physical Exam  Constitutional:       Appearance: Normal appearance.   HENT:      Head: Normocephalic and atraumatic.   Eyes:      Conjunctiva/sclera: Conjunctivae normal.   Pulmonary:      Effort: Pulmonary effort is normal. No respiratory distress.   Musculoskeletal:         General: Normal range of motion.      Cervical back: Normal range of motion.   Skin:     General: Skin is warm and dry.   Neurological:      General: No focal deficit present.      Mental Status: She is alert and oriented to person, place, and time.   Psychiatric:         Mood and Affect: Mood normal.         Behavior: Behavior normal.       Results  Lab Results   Component Value Date    GLUCOSE 93 04/23/2014    CALCIUM 8.7 10/19/2024     04/23/2014    K 3.1 (L) 10/19/2024    CO2 29 10/19/2024     10/19/2024    BUN 13 10/19/2024    CREATININE 0.75 " 10/19/2024     Lab Results   Component Value Date    WBC 7.80 10/19/2024    HGB 12.4 10/19/2024    HCT 37.9 10/19/2024    MCV 93 10/19/2024     10/19/2024

## 2024-11-25 ENCOUNTER — OFFICE VISIT (OUTPATIENT)
Dept: UROLOGY | Facility: HOSPITAL | Age: 74
End: 2024-11-25

## 2024-11-25 VITALS
OXYGEN SATURATION: 99 % | WEIGHT: 171.4 LBS | BODY MASS INDEX: 28.56 KG/M2 | HEIGHT: 65 IN | SYSTOLIC BLOOD PRESSURE: 132 MMHG | DIASTOLIC BLOOD PRESSURE: 84 MMHG | HEART RATE: 83 BPM

## 2024-11-25 DIAGNOSIS — R35.1 NOCTURIA: ICD-10-CM

## 2024-11-25 DIAGNOSIS — N39.41 URGE INCONTINENCE OF URINE: Primary | ICD-10-CM

## 2024-11-25 LAB — POST-VOID RESIDUAL VOLUME, ML POC: 0 ML

## 2024-11-25 RX ORDER — SOLIFENACIN SUCCINATE 10 MG/1
10 TABLET, FILM COATED ORAL DAILY
Qty: 30 TABLET | Refills: 0 | Status: SHIPPED | OUTPATIENT
Start: 2024-11-25

## 2024-11-25 NOTE — ASSESSMENT & PLAN NOTE
Patient Instructions   Stroke: Brianna Presents for a follow-up evaluation for her recent stroke which was likely result of atrial fibrillation.  She did not endorse new strokelike symptoms.  She suffered from 1 mechanical fall at which time she wrenched her left shoulder.  She does endorse ongoing significant issues with urge incontinence of urine and right lower extremity leg pain.  -For ongoing stroke prevention she should continue her combination of Eliquis with appropriate blood pressure and glycemic control  -She does have mild carotid stenosis on the left-hand side that was discovered at the time of her stroke.  This should be reevaluated with an ultrasound likely 6 months from the time of her original stroke which is coming up.  I will provide her with a prescription to have the artery checked  -I would like for her to see the urologist in the office in terms of the frequent urination, incontinence/urge incontinence.  She also complains of some symptoms today that could be concerning for urinary tract infection.  For this I would like her to contact her primary care team or to consider seeing urgent care at her discretion  -She has pain in the back of the right leg with some point tenderness in the region of the distal hamstrings.  I would like for her to have a Doppler ultrasound to ensure that there is no DVT.  -She is already undergoing physical therapy for the left shoulder and we will request a shoulder x-ray to ensure there is no bony involvement in her injury    I will plan for her to return to the office in 4 months time to see one of the stroke advanced practice providers.  If she were to have strokelike symptoms such as sudden painless loss of vision or double vision, difficulty speaking or swallowing, vertigo room spinning that does not quickly or sudden weakness/numbness/loss of coordination affecting 1 side of the face or body she should proceed by ambulance to the nearest emergency room  immediately.  While taking Eliquis she should avoid aspirin/ibuprofen, use Voltaren gel only very sparingly, and if she were to fall and strike her head or suddenly experience the worst headache of her life she should likewise proceed by ambulance to the nearest emergency room.

## 2024-11-25 NOTE — ASSESSMENT & PLAN NOTE
Patient reports nocturia 7x/night & urge incontinence  She has to wear pads most days, although symptoms are worse at night   She reports a history of UTI's, none recently-last urine culture documented was in March, 2024  She is unable to provide a urine sample in the office today as she voided prior to coming in   PVR 0ml   She denies burning, dysuria, flank/abdominal pain, or hematuria   Recommend increasing hydration, decreasing bladder irritants, and limiting fluids to ~2 hours prior to bedtime   Patient reports drinking peach nectar which she thinks makes her void a lot- advised to cut this out for a few weeks and see if it makes a difference   I recommend pelvic floor physical therapy but patient is not interested d/t inability to drive and having to pay for transportation to all appts.   Will trial Vesicare x1 month and follow up in office   Side effects discussed with patient   If no relief, would try DDAVP  Orders:    Ambulatory Referral to Urology    POCT Measure PVR    solifenacin (VESICARE) 10 MG tablet; Take 1 tablet (10 mg total) by mouth daily

## 2024-11-30 ENCOUNTER — HOSPITAL ENCOUNTER (EMERGENCY)
Facility: HOSPITAL | Age: 74
Discharge: HOME | End: 2024-11-30
Attending: EMERGENCY MEDICINE | Admitting: EMERGENCY MEDICINE
Payer: MEDICARE

## 2024-11-30 ENCOUNTER — APPOINTMENT (EMERGENCY)
Dept: CT IMAGING | Facility: HOSPITAL | Age: 74
End: 2024-11-30
Payer: MEDICARE

## 2024-11-30 ENCOUNTER — APPOINTMENT (OUTPATIENT)
Dept: RADIOLOGY | Facility: HOSPITAL | Age: 74
End: 2024-11-30
Payer: MEDICARE

## 2024-11-30 VITALS
BODY MASS INDEX: 27.66 KG/M2 | DIASTOLIC BLOOD PRESSURE: 54 MMHG | WEIGHT: 166 LBS | OXYGEN SATURATION: 96 % | RESPIRATION RATE: 15 BRPM | HEIGHT: 65 IN | HEART RATE: 76 BPM | TEMPERATURE: 97.5 F | SYSTOLIC BLOOD PRESSURE: 113 MMHG

## 2024-11-30 DIAGNOSIS — R55 SYNCOPE: Primary | ICD-10-CM

## 2024-11-30 DIAGNOSIS — I48.0 PAROXYSMAL ATRIAL FIBRILLATION (HCC): ICD-10-CM

## 2024-11-30 LAB
2HR DELTA HS TROPONIN: 0 NG/L
ALBUMIN SERPL BCG-MCNC: 4 G/DL (ref 3.5–5)
ALP SERPL-CCNC: 68 U/L (ref 34–104)
ALT SERPL W P-5'-P-CCNC: 15 U/L (ref 7–52)
ANION GAP SERPL CALCULATED.3IONS-SCNC: 6 MMOL/L (ref 4–13)
APTT PPP: 27 SECONDS (ref 23–34)
AST SERPL W P-5'-P-CCNC: 17 U/L (ref 13–39)
ATRIAL RATE: 74 BPM
BASOPHILS # BLD AUTO: 0.02 THOUSANDS/ΜL (ref 0–0.1)
BASOPHILS NFR BLD AUTO: 0 % (ref 0–1)
BILIRUB SERPL-MCNC: 0.33 MG/DL (ref 0.2–1)
BUN SERPL-MCNC: 17 MG/DL (ref 5–25)
CALCIUM SERPL-MCNC: 9.2 MG/DL (ref 8.4–10.2)
CARDIAC TROPONIN I PNL SERPL HS: 3 NG/L (ref ?–50)
CARDIAC TROPONIN I PNL SERPL HS: 3 NG/L (ref ?–50)
CHLORIDE SERPL-SCNC: 106 MMOL/L (ref 96–108)
CO2 SERPL-SCNC: 32 MMOL/L (ref 21–32)
CREAT SERPL-MCNC: 1.01 MG/DL (ref 0.6–1.3)
EOSINOPHIL # BLD AUTO: 0.1 THOUSAND/ΜL (ref 0–0.61)
EOSINOPHIL NFR BLD AUTO: 1 % (ref 0–6)
ERYTHROCYTE [DISTWIDTH] IN BLOOD BY AUTOMATED COUNT: 12.7 % (ref 11.6–15.1)
GFR SERPL CREATININE-BSD FRML MDRD: 54 ML/MIN/1.73SQ M
GLUCOSE SERPL-MCNC: 141 MG/DL (ref 65–140)
HCT VFR BLD AUTO: 40 % (ref 34.8–46.1)
HGB BLD-MCNC: 13.1 G/DL (ref 11.5–15.4)
IMM GRANULOCYTES # BLD AUTO: 0.03 THOUSAND/UL (ref 0–0.2)
IMM GRANULOCYTES NFR BLD AUTO: 0 % (ref 0–2)
INR PPP: 1.19 (ref 0.85–1.19)
LYMPHOCYTES # BLD AUTO: 2.63 THOUSANDS/ΜL (ref 0.6–4.47)
LYMPHOCYTES NFR BLD AUTO: 37 % (ref 14–44)
MAGNESIUM SERPL-MCNC: 2 MG/DL (ref 1.9–2.7)
MCH RBC QN AUTO: 31 PG (ref 26.8–34.3)
MCHC RBC AUTO-ENTMCNC: 32.8 G/DL (ref 31.4–37.4)
MCV RBC AUTO: 95 FL (ref 82–98)
MONOCYTES # BLD AUTO: 0.46 THOUSAND/ΜL (ref 0.17–1.22)
MONOCYTES NFR BLD AUTO: 6 % (ref 4–12)
NEUTROPHILS # BLD AUTO: 3.96 THOUSANDS/ΜL (ref 1.85–7.62)
NEUTS SEG NFR BLD AUTO: 56 % (ref 43–75)
NRBC BLD AUTO-RTO: 0 /100 WBCS
P AXIS: 71 DEGREES
PLATELET # BLD AUTO: 191 THOUSANDS/UL (ref 149–390)
PMV BLD AUTO: 9.9 FL (ref 8.9–12.7)
POTASSIUM SERPL-SCNC: 4.3 MMOL/L (ref 3.5–5.3)
PR INTERVAL: 180 MS
PROT SERPL-MCNC: 6.5 G/DL (ref 6.4–8.4)
PROTHROMBIN TIME: 15.6 SECONDS (ref 12.3–15)
QRS AXIS: 8 DEGREES
QRSD INTERVAL: 76 MS
QT INTERVAL: 402 MS
QTC INTERVAL: 446 MS
RBC # BLD AUTO: 4.23 MILLION/UL (ref 3.81–5.12)
SODIUM SERPL-SCNC: 144 MMOL/L (ref 135–147)
T WAVE AXIS: 46 DEGREES
VENTRICULAR RATE: 74 BPM
WBC # BLD AUTO: 7.2 THOUSAND/UL (ref 4.31–10.16)

## 2024-11-30 PROCEDURE — 80053 COMPREHEN METABOLIC PANEL: CPT | Performed by: EMERGENCY MEDICINE

## 2024-11-30 PROCEDURE — 85730 THROMBOPLASTIN TIME PARTIAL: CPT | Performed by: EMERGENCY MEDICINE

## 2024-11-30 PROCEDURE — 36415 COLL VENOUS BLD VENIPUNCTURE: CPT | Performed by: EMERGENCY MEDICINE

## 2024-11-30 PROCEDURE — 70450 CT HEAD/BRAIN W/O DYE: CPT

## 2024-11-30 PROCEDURE — 99285 EMERGENCY DEPT VISIT HI MDM: CPT

## 2024-11-30 PROCEDURE — 85025 COMPLETE CBC W/AUTO DIFF WBC: CPT | Performed by: EMERGENCY MEDICINE

## 2024-11-30 PROCEDURE — 99285 EMERGENCY DEPT VISIT HI MDM: CPT | Performed by: EMERGENCY MEDICINE

## 2024-11-30 PROCEDURE — 85610 PROTHROMBIN TIME: CPT | Performed by: EMERGENCY MEDICINE

## 2024-11-30 PROCEDURE — 93005 ELECTROCARDIOGRAM TRACING: CPT

## 2024-11-30 PROCEDURE — 84484 ASSAY OF TROPONIN QUANT: CPT | Performed by: EMERGENCY MEDICINE

## 2024-11-30 PROCEDURE — 83735 ASSAY OF MAGNESIUM: CPT | Performed by: EMERGENCY MEDICINE

## 2024-11-30 PROCEDURE — 71045 X-RAY EXAM CHEST 1 VIEW: CPT

## 2024-11-30 PROCEDURE — 93010 ELECTROCARDIOGRAM REPORT: CPT | Performed by: INTERNAL MEDICINE

## 2024-11-30 NOTE — DISCHARGE INSTRUCTIONS
Called patient and left message You have been seen for evaluation after an episode of passing out. Please continue your home medication regimen. Return to the emergency department if you develop worsening palpitations, chest pain, trouble breathing or any other symptoms of concern. Please follow up with your PCP and cardiology by calling the number provided.

## 2024-11-30 NOTE — ED CARE HANDOFF
Emergency Department Sign Out Note        Sign out and transfer of care from Dr. Randall. See Separate Emergency Department note.     The patient, Brianna Baum, was evaluated by the previous provider for syncope.    Workup Completed:  Labs unremarkable. Patient currently asymptomatic.  CTH pending.    ED Course / Workup Pending (followup):  CTH negative.  Patient to follow up with PCP and cardiology      HEART Risk Score      Flowsheet Row Most Recent Value   Heart Score Risk Calculator    History 0 Filed at: 11/30/2024 0416   ECG 0 Filed at: 11/30/2024 0416   Age 2 Filed at: 11/30/2024 0416   Risk Factors 2 Filed at: 11/30/2024 0416   Troponin 0 Filed at: 11/30/2024 0416   HEART Score 4 Filed at: 11/30/2024 0416                                       Procedures  Medical Decision Making          Disposition  Final diagnoses:   Syncope   Paroxysmal atrial fibrillation (HCC)     Time reflects when diagnosis was documented in both MDM as applicable and the Disposition within this note       Time User Action Codes Description Comment    11/30/2024  6:20 AM Bro Randall [R55] Syncope     11/30/2024  6:20 AM Bro Randall [I48.0] Paroxysmal atrial fibrillation (HCC)           ED Disposition       ED Disposition   Discharge    Condition   Stable    Date/Time   Sat Nov 30, 2024 0720    Comment   Brianna Baum discharge to home/self care.                   Follow-up Information       Follow up With Specialties Details Why Contact Info Additional Information    [facility] Schedule an appointment as soon as possible for a visit  To make appointment for reevaluation in 3-5 days. [address]  [address]  [facility] [address]  [phone]       [facility] Schedule an appointment as soon as possible for a visit  To make appointment for reevaluation in 3-5 days. [address]  [address]  [place] [address] [phone] [facility] [place], [address] [facility] [address], [place], [place], [address] [phone]          Patient's Medications   Discharge Prescriptions    No medications on file     No discharge procedures on file.       ED Provider  Electronically Signed by     Ada Zepeda MD  11/30/24 2028

## 2024-11-30 NOTE — ED PROVIDER NOTES
Time reflects when diagnosis was documented in both MDM as applicable and the Disposition within this note       Time User Action Codes Description Comment    11/30/2024  6:20 AM Bro Randall Add [R55] Syncope     11/30/2024  6:20 AM Bro Randall Add [I48.0] Paroxysmal atrial fibrillation (HCC)           ED Disposition       ED Disposition   Discharge    Condition   Stable    Date/Time   Sat Nov 30, 2024  7:20 AM    Comment   Brianna A Fly discharge to home/self care.                   Assessment & Plan       Medical Decision Making    74 y.o. female presenting for evaluation after a syncopal episode.  Will order CBC, CMP, troponin, EKG, CXR to evaluate for anemia, electrolyte abnormality, FOREIGN, arrhythmia, ACS, PTX.  Will obtain CT of head as patient is at risk for intracranial hemorrhage.  No subjective or objective neck pain. Do not suspect cervical spine fracture.  No pelvic tenderness or instability on exam, do not suspect pelvic fracture.   CT C/A/P 10/18/24 reviewed. No evidence of thoracic or abdominal aortic aneurysm. Therefore do not suspect aortic dissection or AAA.  Patient anticoagulated on eliquis, therefore do not suspect PE.    Reassessment: VSS, patient well appearing. Patient denies any complaints on repeat evaluation.  Specifically denying chest pain, abdominal pain or back pain.  EKG and telemetry in ED showing NSR without findings to suggest arrhythmia.  No evidence of ACS, anemia or electrolyte abnormality.  Suspect episode this evening vagal episode vs. Paroxysmal arrhythmia.    Care signed out to Dr. Zepeda pending CT head.  Likely discharge if CT head without acute abnormality and patient ambulatory without issue.    Amount and/or Complexity of Data Reviewed  Labs: ordered. Decision-making details documented in ED Course.  Radiology: ordered and independent interpretation performed.        ED Course as of 11/30/24 2221   Sat Nov 30, 2024   0330 Procedure Note: EKG  Date/Time:  11/30/24 3:30 AM   Interpreted by: Bro Randall DO  Indications / Diagnosis: Syncope  ECG reviewed by me, the ED Provider: yes   The EKG demonstrates:  Rhythm: normal sinus rhythm 74 BPM  Intervals: Normal OR and QT intervals  Axis: Normal axis  QRS/Blocks: Normal QRS  ST Changes: No acute ST/T waves changes. No GREGORY. No TWI.   0344 Hemoglobin: 13.1   0403 hs TnI 0hr: 3       Medications - No data to display    ED Risk Strat Scores   HEART Risk Score      Flowsheet Row Most Recent Value   Heart Score Risk Calculator    History 0 Filed at: 11/30/2024 0416   ECG 0 Filed at: 11/30/2024 0416   Age 2 Filed at: 11/30/2024 0416   Risk Factors 2 Filed at: 11/30/2024 0416   Troponin 0 Filed at: 11/30/2024 0416   HEART Score 4 Filed at: 11/30/2024 0416                               SBIRT 22yo+      Flowsheet Row Most Recent Value   Initial Alcohol Screen: US AUDIT-C     1. How often do you have a drink containing alcohol? 0 Filed at: 11/30/2024 0339   2. How many drinks containing alcohol do you have on a typical day you are drinking?  0 Filed at: 11/30/2024 0339   3a. Male UNDER 65: How often do you have five or more drinks on one occasion? 0 Filed at: 11/30/2024 0339   3b. FEMALE Any Age, or MALE 65+: How often do you have 4 or more drinks on one occassion? 0 Filed at: 11/30/2024 0339   Audit-C Score 0 Filed at: 11/30/2024 0339   ANGELA: How many times in the past year have you...    Used an illegal drug or used a prescription medication for non-medical reasons? Never Filed at: 11/30/2024 0339                            History of Present Illness       Chief Complaint   Patient presents with    Syncope     EMS stated that pt is from home. EMS stated that pt was feeling weak and then passed out while on toilet. Pt states that she stood up and hit the wall/toilet. Pt unsure of hitting head. Pt states that she fell backwards. Pt does take a blood thinner        Past Medical History:   Diagnosis Date    Arthritis     Chronic  pain     Colon polyp     Diverticulitis     GERD (gastroesophageal reflux disease)     Hypertension     Vertigo       Past Surgical History:   Procedure Laterality Date    CHOLECYSTECTOMY      CHOLECYSTECTOMY LAPAROSCOPIC N/A 12/06/2021    Procedure: SUBTOTAL CHOLECYSTECTOMY LAPAROSCOPIC;  Surgeon: Víctor Viveros MD;  Location:  MAIN OR;  Service: General    COLONOSCOPY      GALLBLADDER SURGERY  12/2021    Dr. Viveros    IR STROKE ALERT  7/23/2024    IR STROKE ALERT  7/24/2024    TUBAL LIGATION        Family History   Problem Relation Age of Onset    Stroke Mother     Diabetes Mother     Hypertension Mother     Heart disease Father     Hypertension Father     Heart disease Brother     Diabetes Brother     Hypertension Brother     Substance Abuse Neg Hx     Mental illness Neg Hx       Social History     Tobacco Use    Smoking status: Never    Smokeless tobacco: Never   Vaping Use    Vaping status: Never Used   Substance Use Topics    Alcohol use: Yes     Comment: 3 times a week    Drug use: Yes     Types: Marijuana     Comment: rarely/ last used 2 days ago      E-Cigarette/Vaping    E-Cigarette Use Never User       E-Cigarette/Vaping Substances    Nicotine No     THC No     CBD No     Flavoring No     Other No     Unknown No       I have reviewed and agree with the history as documented.     Brianna Baum is a 74 y.o. year old female with PMH of aortic stenosis, PAF on eliquis, HTN presenting to the [facility] ED for evaluation after a syncopal episode. Patient noticed congestion and cough yesterday though otherwise in normal state of health. This evening she went to bathroom and while sitting on toilet developed onset of palpitations similar to those she has experienced with atrial fibrillation. The patient attempted to stand up however became lightheaded and slumped back onto toilet. Patient believes she passed out during the episode. She is uncertain if she hit her head. At time of evaluation in ED patient feels  "\"woozy\" though denies chest pain or dyspnea. No headache, neck pain or weakness/numbness in extremities. Patient has been compliant with previously prescribed eliquis s/p CVA. She denies blood in stool or other signs of bleeding.      History provided by:  Medical records and patient   used: No    Syncope  Associated symptoms: palpitations    Associated symptoms: no chest pain, no confusion, no fever, no headaches, no nausea, no shortness of breath, no vomiting and no weakness        Review of Systems   Constitutional:  Negative for chills and fever.   HENT:  Negative for congestion and rhinorrhea.    Respiratory:  Negative for cough and shortness of breath.    Cardiovascular:  Positive for palpitations and syncope. Negative for chest pain and leg swelling.   Gastrointestinal:  Negative for abdominal distention, abdominal pain, blood in stool, diarrhea, nausea and vomiting.   Endocrine: Negative for polyuria.   Genitourinary:  Negative for difficulty urinating, dysuria, flank pain and hematuria.   Musculoskeletal:  Negative for arthralgias, back pain and neck pain.   Skin:  Negative for wound.   Neurological:  Positive for syncope and light-headedness. Negative for weakness, numbness and headaches.   Psychiatric/Behavioral:  Negative for behavioral problems and confusion.    All other systems reviewed and are negative.          Objective       ED Triage Vitals   Temperature Pulse Blood Pressure Respirations SpO2 Patient Position - Orthostatic VS   11/30/24 0328 11/30/24 0328 11/30/24 0328 11/30/24 0328 11/30/24 0328 11/30/24 0723   97.5 °F (36.4 °C) 75 128/57 18 98 % Lying      Temp Source Heart Rate Source BP Location FiO2 (%) Pain Score    11/30/24 0328 11/30/24 0328 -- -- --    Temporal Monitor         Vitals      Date and Time Temp Pulse SpO2 Resp BP Pain Score FACES Pain Rating User   11/30/24 0723 -- 76 96 % 15 113/54 -- -- KP   11/30/24 0700 -- 74 96 % 15 114/56 -- -- MB   11/30/24 0630 -- " 74 96 % 16 111/55 -- -- KM   11/30/24 0600 -- 71 97 % 14 130/61 -- -- KM   11/30/24 0530 -- 74 97 % 14 127/60 -- -- KM   11/30/24 0500 -- 73 99 % 15 125/60 -- -- KM   11/30/24 0430 -- 74 100 % 15 123/60 -- -- KM   11/30/24 0328 97.5 °F (36.4 °C) 75 98 % 18 128/57 -- -- LD            Physical Exam  Vitals and nursing note reviewed.   Constitutional:       General: She is not in acute distress.     Appearance: Normal appearance. She is well-developed. She is not ill-appearing, toxic-appearing or diaphoretic.   HENT:      Head: Normocephalic and atraumatic. No Hahn's sign, abrasion, contusion, right periorbital erythema, left periorbital erythema or laceration.      Right Ear: External ear normal.      Left Ear: External ear normal.      Nose: No congestion or rhinorrhea.      Right Nostril: No epistaxis.      Left Nostril: No epistaxis.      Mouth/Throat:      Mouth: No lacerations.   Eyes:      General:         Right eye: No discharge.         Left eye: No discharge.      Pupils: Pupils are equal, round, and reactive to light.   Cardiovascular:      Rate and Rhythm: Normal rate and regular rhythm.   Pulmonary:      Effort: Pulmonary effort is normal. No accessory muscle usage or respiratory distress.      Breath sounds: Normal breath sounds. No stridor. No decreased breath sounds, wheezing, rhonchi or rales.   Chest:      Chest wall: No tenderness or edema.   Abdominal:      General: Abdomen is flat. Bowel sounds are normal. There is no distension.      Palpations: Abdomen is soft.      Tenderness: There is no abdominal tenderness. There is no right CVA tenderness, left CVA tenderness, guarding or rebound.   Musculoskeletal:      Right shoulder: No tenderness. Normal range of motion.      Left shoulder: No tenderness. Normal range of motion.      Right upper arm: No tenderness.      Left upper arm: No tenderness.      Right elbow: No swelling. No tenderness.      Left elbow: No swelling. No tenderness.      Right  forearm: No swelling, tenderness or bony tenderness.      Left forearm: No swelling, tenderness or bony tenderness.      Right wrist: No swelling, deformity, tenderness, bony tenderness or snuff box tenderness.      Left wrist: No swelling, deformity, tenderness, bony tenderness or snuff box tenderness.      Right hand: No tenderness or bony tenderness. Normal strength. Normal sensation.      Left hand: No tenderness or bony tenderness. Normal strength. Normal sensation.      Cervical back: Normal range of motion and neck supple. No rigidity, tenderness or bony tenderness.      Thoracic back: No bony tenderness.      Lumbar back: No bony tenderness.      Right hip: No deformity or bony tenderness.      Left hip: No deformity or bony tenderness.      Right upper leg: No tenderness.      Left upper leg: No tenderness.      Right knee: No swelling. No tenderness.      Left knee: No swelling. No tenderness.      Right lower leg: No tenderness or bony tenderness. No edema.      Left lower leg: No tenderness or bony tenderness. No edema.      Right ankle: No tenderness.      Left ankle: No tenderness.      Right foot: No tenderness.      Left foot: No tenderness.   Skin:     Capillary Refill: Capillary refill takes less than 2 seconds.      Findings: No rash.   Neurological:      General: No focal deficit present.      Mental Status: She is alert and oriented to person, place, and time. Mental status is at baseline.      GCS: GCS eye subscore is 4. GCS verbal subscore is 5. GCS motor subscore is 6.      Cranial Nerves: No dysarthria or facial asymmetry.      Sensory: Sensation is intact.      Motor: Motor function is intact.      Comments: 5/5 strength b/l UE/LE.  Sensation grossly intact throughout.     Psychiatric:         Mood and Affect: Mood normal.         Behavior: Behavior normal.         Results Reviewed       Procedure Component Value Units Date/Time    HS Troponin I 2hr [[id number]]  (Normal) Collected: 11/30/24  0534    Lab Status: Final result Specimen: Blood from Arm, Left Updated: 11/30/24 0603     hs TnI 2hr 3 ng/L      Delta 2hr hsTnI 0 ng/L     HS Troponin 0hr (reflex protocol) [[id number]]  (Normal) Collected: 11/30/24 0331    Lab Status: Final result Specimen: Blood from Arm, Left Updated: 11/30/24 0403     hs TnI 0hr 3 ng/L     Comprehensive metabolic panel [[id number]]  (Abnormal) Collected: 11/30/24 0331    Lab Status: Final result Specimen: Blood from Arm, Left Updated: 11/30/24 0401     Sodium 144 mmol/L      Potassium 4.3 mmol/L      Chloride 106 mmol/L      CO2 32 mmol/L      ANION GAP 6 mmol/L      BUN 17 mg/dL      Creatinine 1.01 mg/dL      Glucose 141 mg/dL      Calcium 9.2 mg/dL      AST 17 U/L      ALT 15 U/L      Alkaline Phosphatase 68 U/L      Total Protein 6.5 g/dL      Albumin 4.0 g/dL      Total Bilirubin 0.33 mg/dL      eGFR 54 ml/min/1.73sq m     Narrative:      National Kid[employer] Disease Foundation guidelines for Chronic Kidney Disease (CKD):     Stage 1 with normal or high GFR (GFR > 90 mL/min/1.73 square meters)    Stage 2 Mild CKD (GFR = 60-89 mL/min/1.73 square meters)    Stage 3A Moderate CKD (GFR = 45-59 mL/min/1.73 square meters)    Stage 3B Moderate CKD (GFR = 30-44 mL/min/1.73 square meters)    Stage 4 Severe CKD (GFR = 15-29 mL/min/1.73 square meters)    Stage 5 End Stage CKD (GFR <15 mL/min/1.73 square meters)  Note: GFR calculation is accurate only with a steady state creatinine    Magnesium [[id number]]  (Normal) Collected: 11/30/24 0331    Lab Status: Final result Specimen: Blood from Arm, Left Updated: 11/30/24 0401     Magnesium 2.0 mg/dL     Protime-INR [[id number]]  (Abnormal) Collected: 11/30/24 0331    Lab Status: Final result Specimen: Blood from Arm, Left Updated: 11/30/24 0355     Protime 15.6 seconds      INR 1.19    Narrative:      INR Therapeutic Range    Indication                                             INR Range      Atrial Fibrillation                                                2.0-3.0  Hypercoagulable State                                    2.0.2.3  Left Ventricular Asist Device                            2.0-3.0  Mechanical Heart Valve                                  -    Aortic(with afib, MI, embolism, HF, LA enlargement,    and/or coagulopathy)                                     2.0-3.0 (2.5-3.5)     Mitral                                                             2.5-3.5  Prosthetic/Bioprosthetic Heart Valve               2.0-3.0  Venous thromboembolism (VTE: VT, PE        2.0-3.0    APTT [[id number]]  (Normal) Collected: 11/30/24 0331    Lab Status: Final result Specimen: Blood from Arm, Left Updated: 11/30/24 0355     PTT 27 seconds     CBC and differential [[id number]] Collected: 11/30/24 0331    Lab Status: Final result Specimen: Blood from Arm, Left Updated: 11/30/24 0340     WBC 7.20 Thousand/uL      RBC 4.23 Million/uL      Hemoglobin 13.1 g/dL      Hematocrit 40.0 %      MCV 95 fL      MCH 31.0 pg      MCHC 32.8 g/dL      RDW 12.7 %      MPV 9.9 fL      Platelets 191 Thousands/uL      nRBC 0 /100 WBCs      Segmented % 56 %      Immature Grans % 0 %      Lymphocytes % 37 %      Monocytes % 6 %      Eosinophils Relative 1 %      Basophils Relative 0 %      Absolute Neutrophils 3.96 Thousands/µL      Absolute Immature Grans 0.03 Thousand/uL      Absolute Lymphocytes 2.63 Thousands/µL      Absolute Monocytes 0.46 Thousand/µL      Eosinophils Absolute 0.10 Thousand/µL      Basophils Absolute 0.02 Thousands/µL             CT head without contrast   Final Interpretation by Sai Rose MD (11/30 0715)      No acute intracranial abnormality.  Stable chronic microangiopathic changes within the brain. Areas of remote ischemia are also unchanged.                  Workstation performed: [id number]         XR chest portable   ED Interpretation by Bro Randall DO (11/30 0345)   No PTX. No visible rib fractures. No acute cardiopulmonary disease.      Final  Interpretation by Clement Rossi MD (11/30 0739)      No acute cardiopulmonary disease.            Workstation performed: [id number]             Procedures    ED Medication and Procedure Management   Prior to Admission Medications   Prescriptions Last Dose Informant Patient Reported? Taking?   Ascorbic Acid (VITAMIN C) 1000 MG tablet  Self Yes No   Sig: Take 1,000 mg by mouth daily   Biotin 10 MG CAPS  Self Yes No   Sig: Take by mouth in the morning   Diclofenac Sodium (VOLTAREN) 1 %  Self No No   Sig: Apply 2 g topically 4 (four) times a day   acetaminophen (TYLENOL) 325 mg tablet  Self Yes No   apixaban (Eliquis) 5 mg  Self No No   Sig: Take 1 tablet (5 mg total) by mouth 2 (two) times a day   atorvastatin (LIPITOR) 40 mg tablet  Self No No   Sig: Take 1 tablet (40 mg total) by mouth every evening   clotrimazole-betamethasone (LOTRISONE) 1-0.05 % cream  Self No No   Sig: Apply topically 2 (two) times a day   Patient not taking: Reported on 11/12/2024   diltiazem (CARDIZEM CD) 240 mg 24 hr capsule  Self No No   Sig: Take 1 capsule (240 mg total) by mouth daily   pantoprazole (PROTONIX) 20 mg tablet  Self No No   Sig: Take 1 tablet (20 mg total) by mouth daily   solifenacin (VESICARE) 10 MG tablet   No No   Sig: Take 1 tablet (10 mg total) by mouth daily      Facility-Administered Medications: None     Discharge Medication List as of 11/30/2024  7:20 AM        CONTINUE these medications which have NOT CHANGED    Details   acetaminophen (TYLENOL) 325 mg tablet Historical Med      apixaban (Eliquis) 5 mg Take 1 tablet (5 mg total) by mouth 2 (two) times a day, Starting Fri 8/30/2024, Normal      Ascorbic Acid (VITAMIN C) 1000 MG tablet Take 1,000 mg by mouth daily, Historical Med      atorvastatin (LIPITOR) 40 mg tablet Take 1 tablet (40 mg total) by mouth every evening, Starting Fri 8/30/2024, Normal      Biotin 10 MG CAPS Take by mouth in the morning, Historical Med      clotrimazole-betamethasone (LOTRISONE)  1-0.05 % cream Apply topically 2 (two) times a day, Starting Thu 9/5/2024, Normal      Diclofenac Sodium (VOLTAREN) 1 % Apply 2 g topically 4 (four) times a day, Starting Mon 10/28/2024, Normal      diltiazem (CARDIZEM CD) 240 mg 24 hr capsule Take 1 capsule (240 mg total) by mouth daily, Starting Fri 9/27/2024, Until Mon 9/22/2025, Normal      pantoprazole (PROTONIX) 20 mg tablet Take 1 tablet (20 mg total) by mouth daily, Starting Fri 8/30/2024, Normal      solifenacin (VESICARE) 10 MG tablet Take 1 tablet (10 mg total) by mouth daily, Starting Mon 11/25/2024, Normal           No discharge procedures on file.  ED SEPSIS DOCUMENTATION   Time reflects when diagnosis was documented in both MDM as applicable and the Disposition within this note       Time User Action Codes Description Comment    11/30/2024  6:20 AM Bro Randall [R55] Syncope     11/30/2024  6:20 AM Bro Randall [I48.0] Paroxysmal atrial fibrillation (HCC)                  Bro Randall, DO  11/30/24 9409

## 2024-12-02 ENCOUNTER — PATIENT OUTREACH (OUTPATIENT)
Dept: CASE MANAGEMENT | Facility: OTHER | Age: 74
End: 2024-12-02

## 2024-12-02 NOTE — PROGRESS NOTES
OP CM rcvd ADT alert pt was dc from  ED for syncope.  Pt does have Life Alert bracelet.   Pt is being followed by [facility] for PT/OT.  OP CM to remain available.

## 2024-12-04 ENCOUNTER — TELEPHONE (OUTPATIENT)
Age: 74
End: 2024-12-04

## 2024-12-04 NOTE — TELEPHONE ENCOUNTER
Left message at [facility] request to schedule either in person or virtual visit    Please reach out and schedule in person or virtual.  Thanks!

## 2024-12-04 NOTE — TELEPHONE ENCOUNTER
[facility] PT called in as pt during session with OT pt pulled a dear tick off of her. she did have a rash consistent with lymes. this went away about 2 to 3 weeks ago. however she is showing significant signs of fatigue, dizziness. they advised her to call us however she has not. They are hoping that PCP can call her herself, as pt respects her opinion. Please advise.

## 2024-12-05 NOTE — TELEPHONE ENCOUNTER
Gena from [facility] called to follow up from message, relayed Farzaneh's message.  Called patient again to call office back to schedule virtual or in office appointment.    Thank you

## 2024-12-09 ENCOUNTER — OFFICE VISIT (OUTPATIENT)
Dept: FAMILY MEDICINE CLINIC | Facility: HOSPITAL | Age: 74
End: 2024-12-09
Payer: MEDICARE

## 2024-12-09 VITALS
HEIGHT: 65 IN | HEART RATE: 74 BPM | WEIGHT: 174 LBS | SYSTOLIC BLOOD PRESSURE: 118 MMHG | BODY MASS INDEX: 28.99 KG/M2 | OXYGEN SATURATION: 96 % | DIASTOLIC BLOOD PRESSURE: 70 MMHG

## 2024-12-09 DIAGNOSIS — M54.42 CHRONIC BILATERAL LOW BACK PAIN WITH BILATERAL SCIATICA: ICD-10-CM

## 2024-12-09 DIAGNOSIS — G89.29 CHRONIC BILATERAL LOW BACK PAIN WITH BILATERAL SCIATICA: ICD-10-CM

## 2024-12-09 DIAGNOSIS — I48.0 PAROXYSMAL ATRIAL FIBRILLATION (HCC): ICD-10-CM

## 2024-12-09 DIAGNOSIS — S70.361A TICK BITE OF RIGHT THIGH, INITIAL ENCOUNTER: Primary | ICD-10-CM

## 2024-12-09 DIAGNOSIS — I10 PRIMARY HYPERTENSION: ICD-10-CM

## 2024-12-09 DIAGNOSIS — M54.41 CHRONIC BILATERAL LOW BACK PAIN WITH BILATERAL SCIATICA: ICD-10-CM
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
## 2024-12-09 PROBLEM — K81.0 ACUTE CHOLECYSTITIS: Status: RESOLVED | Noted: 2021-12-05 | Resolved: 2024-12-09

## 2024-12-09 PROBLEM — E66.811 OBESITY (BMI 30.0-34.9): Chronic | Status: RESOLVED | Noted: 2019-01-02 | Resolved: 2024-12-09

## 2024-12-09 PROCEDURE — 99214 OFFICE O/P EST MOD 30 MIN: CPT | Performed by: NURSE PRACTITIONER

## 2024-12-09 PROCEDURE — G2211 COMPLEX E/M VISIT ADD ON: HCPCS | Performed by: NURSE PRACTITIONER

## 2024-12-09 RX ORDER — HYDROCODONE BITARTRATE AND ACETAMINOPHEN 5; 325 MG/1; MG/1
1 TABLET ORAL EVERY 8 HOURS PRN
Qty: 90 TABLET | Refills: 0 | Status: SHIPPED | OUTPATIENT
Start: 2024-12-09

## 2024-12-09 RX ORDER — DOXYCYCLINE HYCLATE 100 MG
100 TABLET ORAL 2 TIMES DAILY
Qty: 28 TABLET | Refills: 0 | Status: SHIPPED | OUTPATIENT
Start: 2024-12-09 | End: 2024-12-23

## 2024-12-09 NOTE — ASSESSMENT & PLAN NOTE
Here for further evaluation of possible Lyme's.  She was noted to have a tick embedded in her right upper thigh 1 month ago.  She felt she got the tick completely out however she is not sure how long it was present.  Post tick bite she did have erythema migrans which has since disappeared.  She has generalized fatigue and malaise and polyarthritic pain.  She was seen in the ER on 11/30/2020 for for syncope; workup unremarkable and discharged home stable.  She denies any further syncopal episodes.  She does report intermittent dizziness independent of position change.  She denies sensation of room spinning.  She does admit she could improve her hydration and nutrition.  She continues to wear her life alert bracelet as she lives alone.  She has excellent support through her son as well as friends.    -Will treat with doxycycline

## 2024-12-09 NOTE — ASSESSMENT & PLAN NOTE
History of chronic intermittent lower back pain exacerbated by presumed Lyme's disease.  She takes Norco as needed; PDMP reviewed no red flags.  Will refill low-dose discussed that she is not to take this medication with her medical marijuana.  She was advised not to drink alcohol.

## 2024-12-09 NOTE — ASSESSMENT & PLAN NOTE
History of PAF/flutter likely because of recent CVA in July 2024.  Denies chest pressure pain dyspnea however does note rare palpitations.  She attributes this to not optimizing her hydration as it resolves with drinking fluid.  S1-S2 regular rate and rhythm in office today.  She is adherent to her Cardizem to 40 mg p.o. daily.  She is followed closely by cardiology.

## 2024-12-09 NOTE — PROGRESS NOTES
[facility]   Farzaneh DOUGHERTY    Assessment/Plan:   1. Tick bite of right thigh, initial encounter  Assessment & Plan:  Here for further evaluation of possible Lyme's.  She was noted to have a tick embedded in her right upper thigh 1 month ago.  She felt she got the tick completely out however she is not sure how long it was present.  Post tick bite she did have erythema migrans which has since disappeared.  She has generalized fatigue and malaise and polyarthritic pain.  She was seen in the ER on 11/30/2020 for for syncope; workup unremarkable and discharged home stable.  She denies any further syncopal episodes.  She does report intermittent dizziness independent of position change.  She denies sensation of room spinning.  She does admit she could improve her hydration and nutrition.  She continues to wear her life alert bracelet as she lives alone.  She has excellent support through her son as well as friends.    -Will treat with doxycycline  Orders:  -     doxycycline hyclate (VIBRA-TABS) 100 mg tablet; Take 1 tablet (100 mg total) by mouth 2 (two) times a day for 14 days  2. Paroxysmal atrial fibrillation (HCC)  Assessment & Plan:  History of PAF/flutter likely because of recent CVA in July 2024.  Denies chest pressure pain dyspnea however does note rare palpitations.  She attributes this to not optimizing her hydration as it resolves with drinking fluid.  S1-S2 regular rate and rhythm in office today.  She is adherent to her Cardizem to 40 mg p.o. daily.  She is followed closely by cardiology.  3. Primary hypertension  4. Chronic bilateral low back pain with bilateral sciatica  Assessment & Plan:  History of chronic intermittent lower back pain exacerbated by presumed Lyme's disease.  She takes Norco as needed; PDMP reviewed no red flags.  Will refill low-dose discussed that she is not to take this medication with her medical marijuana.  She was advised not to drink alcohol.  Orders:  -      HYDROcodone-acetaminophen (Norco) 5-325 mg per tablet; Take 1 tablet by mouth every 8 (eight) hours as needed for pain Max Daily Amount: 3 tablets      Return in about 3 months (around 3/9/2025) for Recheck.  Patient may call or return to office with any questions or concerns.     ______________________________________________________________________  Subjective:     Patient ID: Brianna Baum is a 74 y.o. female.  Brianna Baum  Chief Complaint   Patient presents with    Follow-up     PT was told by two therapists that she's not herself - she thinks it from her tick bite.      HPI         The following portions of the patient's history were reviewed and updated as appropriate: allergies, current medications, past family history, past medical history, past social history, past surgical history, and problem list.    Review of Systems   Constitutional:  Positive for fatigue. Negative for activity change, appetite change, chills, fever and unexpected weight change.   HENT: Negative.  Negative for congestion, ear pain, postnasal drip and sinus pain.    Eyes:  Positive for visual disturbance.        Chronic since CVA   Respiratory: Negative.  Negative for cough and shortness of breath.    Cardiovascular: Negative.  Negative for chest pain and leg swelling.   Gastrointestinal: Negative.  Negative for constipation and diarrhea.   Endocrine: Negative.    Genitourinary: Negative.  Negative for dysuria.   Musculoskeletal:  Positive for arthralgias, back pain and gait problem.   Skin:  Positive for rash.   Allergic/Immunologic: Negative.  Negative for immunocompromised state.   Neurological:  Positive for dizziness, facial asymmetry and weakness. Negative for syncope, speech difficulty and light-headedness.   Hematological: Negative.    Psychiatric/Behavioral:  Positive for decreased concentration.          Objective:      Vitals:    12/09/24 1028   BP: 118/70   Pulse: 74   SpO2: 96%      Physical Exam  Vitals and nursing note  "reviewed.   Constitutional:       Appearance: Normal appearance.   HENT:      Head: Normocephalic and atraumatic.      Right Ear: Tympanic membrane, ear canal and external ear normal.      Left Ear: Tympanic membrane, ear canal and external ear normal.      Nose: Nose normal.      Mouth/Throat:      Mouth: Mucous membranes are moist.      Pharynx: Oropharynx is clear.   Eyes:      Extraocular Movements: Extraocular movements intact.      Conjunctiva/sclera: Conjunctivae normal.      Pupils: Pupils are equal, round, and reactive to light.   Cardiovascular:      Rate and Rhythm: Normal rate and regular rhythm.      Pulses: Normal pulses.      Heart sounds: Normal heart sounds.   Pulmonary:      Effort: Pulmonary effort is normal.      Breath sounds: Normal breath sounds.   Abdominal:      General: Bowel sounds are normal.      Palpations: Abdomen is soft.   Musculoskeletal:         General: Normal range of motion.      Left shoulder: Bony tenderness present.      Cervical back: Normal range of motion and neck supple.      Lumbar back: Bony tenderness present.   Skin:     General: Skin is warm and dry.      Findings: No rash.   Neurological:      General: No focal deficit present.      Mental Status: She is alert and oriented to person, place, and time.      GCS: GCS eye subscore is 4. GCS verbal subscore is 5. GCS motor subscore is 6.      Cranial Nerves: Facial asymmetry present.      Motor: Weakness present.      Gait: Gait abnormal.      Comments: Ambulates with quad cane.  Mild MILY weakness.Mild MILY weakness.  Ambulates with quad cane.     Psychiatric:         Mood and Affect: Mood normal.         Behavior: Behavior is slowed. Behavior is cooperative.         Thought Content: Thought content normal.         Judgment: Judgment normal.           Portions of the record may have been created with voice recognition software. Occasional wrong word or \"sound alike\" substitutions may have occurred due to the inherent " limitations of voice recognition software. Please review the chart carefully and recognize, using context, where substitutions/typographical errors may have occurred.        independent

## 2024-12-19 NOTE — PROGRESS NOTES
"Name: Brianna Baum      : 1950      MRN: [id number]  Encounter Provider: LADONNA Newton  Encounter Date: 2024   Encounter department: [facility]  :  Assessment & Plan  Urge incontinence of urine  Patient currently managed on Vesicare 10mg  She reports improvement in her symptoms  Only getting up max 3x/night to void rather than 7-8, sometimes she doesn't get up at all during the night   She denies dysuria, urgency, flank/abdominal pain, or gross hematuria   Continue Vesicare 10mg daily   Follow up in 1 year       History of Present Illness   Brianna Baum is a 74 y.o. female who presents for follow up for urge incontinence. She was started on Vesicare 10mg 1 month ago. She reports improvement in her symptoms since starting Vesicare. She now reports waking up max 3x/night, sometimes none at all, rather than waking up 7-8x/night. She is very pleased with her current voiding pattern. She denies dysuria, burning, urgency, flank/abdominal pain or gross hematuria.       Review of Systems   Constitutional:  Negative for chills, diaphoresis, fatigue and fever.   Respiratory:  Negative for cough and shortness of breath.    Cardiovascular:  Negative for chest pain and palpitations.   Gastrointestinal:  Negative for abdominal pain, nausea and vomiting.   Genitourinary:  Negative for decreased urine volume, difficulty urinating, dysuria, flank pain, frequency, hematuria, pelvic pain and urgency.        Nocturia 0-3x/night   Musculoskeletal:  Negative for back pain, gait problem and myalgias.   Skin:  Negative for pallor and wound.   Neurological:  Negative for dizziness, weakness, light-headedness and numbness.     Objective   /78 (BP Location: Left arm, Patient Position: Sitting, Cuff Size: Adult)   Pulse 75   Ht 5' 5\" (1.651 m)   Wt 78.9 kg (174 lb)   SpO2 96%   BMI 28.96 kg/m²     Physical Exam  Constitutional:       Appearance: Normal appearance.   HENT:      Head: " Normocephalic and atraumatic.   Eyes:      Conjunctiva/sclera: Conjunctivae normal.   Pulmonary:      Effort: Pulmonary effort is normal. No respiratory distress.   Musculoskeletal:         General: Normal range of motion.      Cervical back: Normal range of motion.   Skin:     General: Skin is warm and dry.   Neurological:      General: No focal deficit present.      Mental Status: She is alert and oriented to person, place, and time.   Psychiatric:         Mood and Affect: Mood normal.         Behavior: Behavior normal.        Results  Lab Results   Component Value Date    GLUCOSE 93 04/23/2014    CALCIUM 9.2 11/30/2024     04/23/2014    K 4.3 11/30/2024    CO2 32 11/30/2024     11/30/2024    BUN 17 11/30/2024    CREATININE 1.01 11/30/2024     Lab Results   Component Value Date    WBC 7.20 11/30/2024    HGB 13.1 11/30/2024    HCT 40.0 11/30/2024    MCV 95 11/30/2024     11/30/2024

## 2024-12-24 DIAGNOSIS — N39.41 URGE INCONTINENCE OF URINE: ICD-10-CM

## 2024-12-24 DIAGNOSIS — R35.1 NOCTURIA: ICD-10-CM

## 2024-12-24 RX ORDER — SOLIFENACIN SUCCINATE 10 MG/1
10 TABLET, FILM COATED ORAL DAILY
Qty: 30 TABLET | Refills: 5 | Status: SHIPPED | OUTPATIENT
Start: 2024-12-24

## 2024-12-26 ENCOUNTER — OFFICE VISIT (OUTPATIENT)
Dept: UROLOGY | Facility: HOSPITAL | Age: 74
End: 2024-12-26
Payer: MEDICARE

## 2024-12-26 VITALS
BODY MASS INDEX: 28.99 KG/M2 | HEIGHT: 65 IN | DIASTOLIC BLOOD PRESSURE: 78 MMHG | HEART RATE: 75 BPM | OXYGEN SATURATION: 96 % | SYSTOLIC BLOOD PRESSURE: 122 MMHG | WEIGHT: 174 LBS

## 2024-12-26 DIAGNOSIS — R35.1 NOCTURIA: ICD-10-CM

## 2024-12-26 DIAGNOSIS — N39.41 URGE INCONTINENCE OF URINE: Primary | ICD-10-CM

## 2024-12-26 PROCEDURE — 99213 OFFICE O/P EST LOW 20 MIN: CPT

## 2024-12-26 NOTE — ASSESSMENT & PLAN NOTE
Patient currently managed on Vesicare 10mg  She reports improvement in her symptoms  Only getting up max 3x/night to void rather than 7-8, sometimes she doesn't get up at all during the night   She denies dysuria, urgency, flank/abdominal pain, or gross hematuria   Continue Vesicare 10mg daily   Follow up in 1 year

## 2025-02-26 DIAGNOSIS — I63.9 CEREBROVASCULAR ACCIDENT (CVA), UNSPECIFIED MECHANISM (HCC): ICD-10-CM

## 2025-02-27 RX ORDER — ATORVASTATIN CALCIUM 40 MG/1
40 TABLET, FILM COATED ORAL EVERY EVENING
Qty: 90 TABLET | Refills: 1 | Status: SHIPPED | OUTPATIENT
Start: 2025-02-27

## 2025-03-10 ENCOUNTER — TELEPHONE (OUTPATIENT)
Dept: NEUROLOGY | Facility: CLINIC | Age: 75
End: 2025-03-10

## 2025-03-10 DIAGNOSIS — I63.9 CEREBROVASCULAR ACCIDENT (CVA), UNSPECIFIED MECHANISM (HCC): ICD-10-CM

## 2025-03-10 NOTE — TELEPHONE ENCOUNTER
Called and LVM for pt regarding r/s 04/15 appt due to provider not being in office. Offered pt 04/14 open slots. Advised pt to call back.    If pt calls back in, please offer her held slots on 04/14 btwn 8:30AM-10AM and then let me know which she chooses! Thank you!

## 2025-03-11 ENCOUNTER — HOME HEALTH ADMISSION (OUTPATIENT)
Dept: HOME HEALTH SERVICES | Facility: HOME HEALTHCARE | Age: 75
End: 2025-03-11
Payer: MEDICARE

## 2025-03-11 ENCOUNTER — HOME CARE VISIT (OUTPATIENT)
Dept: HOME HEALTH SERVICES | Facility: HOME HEALTHCARE | Age: 75
End: 2025-03-11

## 2025-03-11 ENCOUNTER — OFFICE VISIT (OUTPATIENT)
Dept: FAMILY MEDICINE CLINIC | Facility: HOSPITAL | Age: 75
End: 2025-03-11
Payer: MEDICARE

## 2025-03-11 VITALS
OXYGEN SATURATION: 97 % | DIASTOLIC BLOOD PRESSURE: 78 MMHG | SYSTOLIC BLOOD PRESSURE: 126 MMHG | BODY MASS INDEX: 28.96 KG/M2 | HEART RATE: 68 BPM | WEIGHT: 174 LBS

## 2025-03-11 DIAGNOSIS — E55.9 VITAMIN D DEFICIENCY: ICD-10-CM

## 2025-03-11 DIAGNOSIS — E53.8 B12 DEFICIENCY: ICD-10-CM

## 2025-03-11 DIAGNOSIS — Z86.73 HISTORY OF STROKE: ICD-10-CM

## 2025-03-11 DIAGNOSIS — R26.2 AMBULATORY DYSFUNCTION: ICD-10-CM

## 2025-03-11 DIAGNOSIS — I10 PRIMARY HYPERTENSION: ICD-10-CM

## 2025-03-11 DIAGNOSIS — I48.0 PAROXYSMAL ATRIAL FIBRILLATION (HCC): Primary | ICD-10-CM

## 2025-03-11 PROBLEM — S70.361A TICK BITE OF RIGHT THIGH: Status: RESOLVED | Noted: 2024-12-09 | Resolved: 2025-03-11
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
## 2025-03-11 PROCEDURE — G2211 COMPLEX E/M VISIT ADD ON: HCPCS | Performed by: NURSE PRACTITIONER

## 2025-03-11 PROCEDURE — 99214 OFFICE O/P EST MOD 30 MIN: CPT | Performed by: NURSE PRACTITIONER

## 2025-03-11 RX ORDER — APIXABAN 5 MG/1
5 TABLET, FILM COATED ORAL 2 TIMES DAILY
Qty: 180 TABLET | Refills: 3 | Status: SHIPPED | OUTPATIENT
Start: 2025-03-11

## 2025-03-11 NOTE — ASSESSMENT & PLAN NOTE
History of hypertension controlled with Cardizem 180 mg p.o. daily.  Denies chest pressure pain palpitation shortness of breath dizziness nor lightheadedness.  Is noted to have lower extremity edema today which is atypical. Admits she could hydrate better and has been sedentary this winter.  Working on increasing physical activity and optimizing hydration and nutrition. Followed by cardiology      7/2024 Echo:  LV normal size and wall thickness with LVEF 55%.  Normal systolic and diastolic function.  Let atrium moderately dilated with mild MR/TR/NH.

## 2025-03-11 NOTE — PROGRESS NOTES
[facility]   Farzaneh DOUGHERTY    Assessment/Plan:   1. Paroxysmal atrial fibrillation (HCC)  Assessment & Plan:  History of PAF/flutter currently in sinus rhythm with medication adherence to Cardizem 240 mg p.o. daily as well as Eliquis 5 mg twice daily.  Denies chest pain pressure palpitation shortness of breath lightheadedness nor dizziness.  No bruising.  Followed by cardiology.  2. Primary hypertension  Assessment & Plan:  History of hypertension controlled with Cardizem 180 mg p.o. daily.  Denies chest pressure pain palpitation shortness of breath dizziness nor lightheadedness.  Is noted to have lower extremity edema today which is atypical. Admits she could hydrate better and has been sedentary this winter.  Working on increasing physical activity and optimizing hydration and nutrition. Followed by cardiology      7/2024 Echo:  LV normal size and wall thickness with LVEF 55%.  Normal systolic and diastolic function.  Let atrium moderately dilated with mild MR/TR/NH.         3. History of stroke  Assessment & Plan:  History of CVA with ongoing right lower extremity pain.  Denies other neurologic/strokelike symptoms including change in vision, dysarthria, weakness numbness or change in mentation.  Doppler completed in November 2024 negative for DVT.  She continues medical management of her PAF with Cardizem, statin and Eliquis.  There is lower extremity edema right greater than left on today's assessment with negative Homans +DP/PT pulses.  Admits she has been more sedentary over the winter and will be increasing her physical activity now that the weather is warmer.  Also endorses poor dietary choices and not optimizing her hydration.  -Will refer back to PT for strengthening.  Advised optimizing hydration nutrition and increasing physical activity.    4. Ambulatory dysfunction  -     Referral to Home Health- [facility]; Future  5. B12 deficiency  -     Vitamin B12; Future  6. Vitamin D  deficiency  -     Vitamin D 25 hydroxy; Future      Optimize hydration and nutrition and increase physical activity.    Referral to physical therapy.   Check B12 and vitamin D  Return after May 14th 2025, for Annual Medicare Wellness Visit.  Patient may call or return to office with any questions or concerns.     ______________________________________________________________________  Subjective:     Patient ID: Brianna Baum is a 74 y.o. female.  Brianna Baum  Chief Complaint   Patient presents with    Follow-up     Complaining of right leg pain off/on. Gives out at times.     Ongoing right medial lower leg pain CVA.  No longer using walker or cane as recommended.  Denies any falls.  Admits to being sedentary over the winter but now that the weather is warmer she will be increasing her physical activity.      11/2024 doppler  Impression:  RIGHT LOWER LIMB:  No evidence of acute or chronic deep vein thrombosis.  No evidence of superficial thrombophlebitis noted.  Doppler evaluation shows a normal response to augmentation maneuvers.  Popliteal, posterior tibial and anterior tibial arterial Doppler waveforms are  biphasic.     LEFT LOWER LIMB:  No evidence of acute or chronic deep vein thrombosis.  No evidence of superficial thrombophlebitis noted.  Doppler evaluation shows a normal response to augmentation maneuvers.  Popliteal, posterior tibial and anterior tibial arterial Doppler waveforms are  triphasic.            Depression Screening and Follow-up Plan: Patient was screened for depression during today's encounter. They screened negative with a PHQ-2 score of 0.      Urinary Incontinence Plan of Care: counseling topics discussed: practice Kegel (pelvic floor strengthening) exercises, use restroom every 2 hours, limit alcohol, caffeine, spicy foods, and acidic foods, keeping a bladder diary, limiting fluid intake 3-4 hours before bed, preventing constipation, taking fluid pills at a time when you can get to  bathroom easily and limiting fluid intake to 60 oz. per day.         The following portions of the patient's history were reviewed and updated as appropriate: allergies, current medications, past family history, past medical history, past social history, past surgical history, and problem list.    Review of Systems   Constitutional: Negative.  Negative for activity change, appetite change, chills, fatigue, fever and unexpected weight change.   HENT: Negative.  Negative for congestion, ear pain, postnasal drip, sinus pain and trouble swallowing.    Eyes:  Positive for visual disturbance.        Chronic since CVA unchanged.   Respiratory: Negative.  Negative for cough, chest tightness and shortness of breath.    Cardiovascular:  Positive for leg swelling. Negative for chest pain and palpitations.   Gastrointestinal: Negative.  Negative for abdominal pain, constipation and diarrhea.   Endocrine: Negative.    Genitourinary: Negative.  Negative for decreased urine volume, difficulty urinating and dysuria.   Musculoskeletal:  Positive for gait problem and myalgias.   Skin: Negative.    Allergic/Immunologic: Negative.  Negative for immunocompromised state.   Neurological:  Positive for headaches. Negative for dizziness and light-headedness.        Ongoing occasional HA since CVA- managed well with tylenol.      Hematological: Negative.    Psychiatric/Behavioral:  Positive for decreased concentration and sleep disturbance.         Sleep disturbance due to nocturia- improved with vesicare.          Objective:      Vitals:    03/11/25 1112   BP: 126/78   Pulse: 68   SpO2: 97%      Physical Exam  Vitals and nursing note reviewed.   Constitutional:       General: She is awake.      Appearance: Normal appearance. She is overweight.   HENT:      Head: Normocephalic and atraumatic.      Right Ear: Tympanic membrane, ear canal and external ear normal.      Left Ear: Tympanic membrane, ear canal and external ear normal.      Nose:  "Nose normal.      Mouth/Throat:      Mouth: Mucous membranes are moist.      Pharynx: Oropharynx is clear.   Eyes:      Extraocular Movements: Extraocular movements intact.      Conjunctiva/sclera: Conjunctivae normal.      Pupils: Pupils are equal, round, and reactive to light.   Cardiovascular:      Rate and Rhythm: Normal rate and regular rhythm.      Pulses: Normal pulses.      Heart sounds: Normal heart sounds.   Pulmonary:      Effort: Pulmonary effort is normal.      Breath sounds: Normal breath sounds.   Abdominal:      General: Bowel sounds are normal.      Palpations: Abdomen is soft.   Musculoskeletal:         General: Normal range of motion.      Cervical back: Normal range of motion and neck supple.      Right lower leg: Tenderness present. Edema present.      Left lower leg: Edema present.        Legs:       Comments: +1 RLE and trace LLE edema with legs dependent.  Negative homans.   Tenderness when palpating medial aspect of lower leg \"burning sensation\"   Skin:     General: Skin is warm and dry.   Neurological:      General: No focal deficit present.      Mental Status: She is alert and oriented to person, place, and time.      Motor: Weakness present.      Gait: Gait abnormal.      Comments: Right hemiparesis   Psychiatric:         Mood and Affect: Mood normal.         Speech: Speech normal.         Behavior: Behavior normal. Behavior is cooperative.         Thought Content: Thought content normal.         Judgment: Judgment normal.           Portions of the record may have been created with voice recognition software. Occasional wrong word or \"sound alike\" substitutions may have occurred due to the inherent limitations of voice recognition software. Please review the chart carefully and recognize, using context, where substitutions/typographical errors may have occurred.       "

## 2025-03-11 NOTE — ASSESSMENT & PLAN NOTE
History of CVA with ongoing right lower extremity pain.  Denies other neurologic/strokelike symptoms including change in vision, dysarthria, weakness numbness or change in mentation.  Doppler completed in November 2024 negative for DVT.  She continues medical management of her PAF with Cardizem, statin and Eliquis.  There is lower extremity edema right greater than left on today's assessment with negative Homans +DP/PT pulses.  Admits she has been more sedentary over the winter and will be increasing her physical activity now that the weather is warmer.  Also endorses poor dietary choices and not optimizing her hydration.  -Will refer back to PT for strengthening.  Advised optimizing hydration nutrition and increasing physical activity.

## 2025-03-11 NOTE — PATIENT INSTRUCTIONS
Optimize hydration and nutrition and increase physical activity.    Referral to physical therapy.   Check B12 and vitamin D

## 2025-03-11 NOTE — ASSESSMENT & PLAN NOTE
History of PAF/flutter currently in sinus rhythm with medication adherence to Cardizem 240 mg p.o. daily as well as Eliquis 5 mg twice daily.  Denies chest pain pressure palpitation shortness of breath lightheadedness nor dizziness.  No bruising.  Followed by cardiology.

## 2025-03-12 ENCOUNTER — HOME CARE VISIT (OUTPATIENT)
Dept: HOME HEALTH SERVICES | Facility: HOME HEALTHCARE | Age: 75
End: 2025-03-12
Payer: MEDICARE

## 2025-03-12 VITALS
OXYGEN SATURATION: 97 % | RESPIRATION RATE: 17 BRPM | SYSTOLIC BLOOD PRESSURE: 118 MMHG | TEMPERATURE: 97.6 F | HEART RATE: 72 BPM | DIASTOLIC BLOOD PRESSURE: 70 MMHG | WEIGHT: 177.25 LBS | BODY MASS INDEX: 29.5 KG/M2

## 2025-03-12 PROCEDURE — 10330081 VN NO-PAY CLAIM PROCEDURE

## 2025-03-12 PROCEDURE — 400013 VN SOC

## 2025-03-12 PROCEDURE — G0151 HHCP-SERV OF PT,EA 15 MIN: HCPCS

## 2025-03-12 NOTE — CASE COMMUNICATION
Medication discrepancies or Major drug interactions: None  This report is informational only, no response is needed  [facility] has Admitted your patient to Home Health service with the following disciplines: PT  Patient stated goals of care: To be able to ambulate outdoors to access transit bus  Potential barriers to goal achievement: Patient lives alone and has limited support from son on Friday afternoons  Primary focus of home h ealt care:Musculoskeletal  Anticipated visit pattern and next visit date: 1wk1, 2wk2. Next PT visit to be scheduled by primary PT for next week  Thank you for allowing us to participate in the care of your patient.

## 2025-03-14 ENCOUNTER — HOME CARE VISIT (OUTPATIENT)
Dept: HOME HEALTH SERVICES | Facility: HOME HEALTHCARE | Age: 75
End: 2025-03-14
Payer: MEDICARE

## 2025-03-14 PROCEDURE — G0321 AUDIO-ONLY HHS: HCPCS

## 2025-03-17 ENCOUNTER — HOME CARE VISIT (OUTPATIENT)
Dept: HOME HEALTH SERVICES | Facility: HOME HEALTHCARE | Age: 75
End: 2025-03-17
Payer: MEDICARE

## 2025-03-17 VITALS — DIASTOLIC BLOOD PRESSURE: 70 MMHG | SYSTOLIC BLOOD PRESSURE: 124 MMHG

## 2025-03-17 PROCEDURE — G0180 MD CERTIFICATION HHA PATIENT: HCPCS | Performed by: NURSE PRACTITIONER

## 2025-03-17 PROCEDURE — G0151 HHCP-SERV OF PT,EA 15 MIN: HCPCS

## 2025-03-17 NOTE — TELEPHONE ENCOUNTER
Called again and LVM for pt regarding r/s 04/15 appt due to provider not being in office. Offered 04/14 @ 9:30AM in [facility] office. Advised pt if this new appt time and date does not work, to call back and r/s.

## 2025-03-18 ENCOUNTER — HOME CARE VISIT (OUTPATIENT)
Dept: HOME HEALTH SERVICES | Facility: HOME HEALTHCARE | Age: 75
End: 2025-03-18
Payer: MEDICARE

## 2025-03-19 ENCOUNTER — HOME CARE VISIT (OUTPATIENT)
Dept: HOME HEALTH SERVICES | Facility: HOME HEALTHCARE | Age: 75
End: 2025-03-19
Payer: MEDICARE

## 2025-03-19 VITALS — DIASTOLIC BLOOD PRESSURE: 72 MMHG | SYSTOLIC BLOOD PRESSURE: 120 MMHG

## 2025-03-19 PROCEDURE — G0151 HHCP-SERV OF PT,EA 15 MIN: HCPCS

## 2025-03-24 ENCOUNTER — HOME CARE VISIT (OUTPATIENT)
Dept: HOME HEALTH SERVICES | Facility: HOME HEALTHCARE | Age: 75
End: 2025-03-24
Payer: MEDICARE

## 2025-03-24 VITALS — DIASTOLIC BLOOD PRESSURE: 70 MMHG | SYSTOLIC BLOOD PRESSURE: 120 MMHG

## 2025-03-24 PROCEDURE — G0151 HHCP-SERV OF PT,EA 15 MIN: HCPCS

## 2025-03-26 ENCOUNTER — HOME CARE VISIT (OUTPATIENT)
Dept: HOME HEALTH SERVICES | Facility: HOME HEALTHCARE | Age: 75
End: 2025-03-26
Payer: MEDICARE

## 2025-03-26 VITALS — DIASTOLIC BLOOD PRESSURE: 64 MMHG | SYSTOLIC BLOOD PRESSURE: 132 MMHG

## 2025-03-26 PROCEDURE — G0151 HHCP-SERV OF PT,EA 15 MIN: HCPCS

## 2025-03-28 ENCOUNTER — NURSE TRIAGE (OUTPATIENT)
Age: 75
End: 2025-03-28

## 2025-03-28 NOTE — TELEPHONE ENCOUNTER
"FOLLOW UP: Offered several appointments in office today, patient declined as she does not drive and cannot have anyone drive her. [facility]    REASON FOR CONVERSATION: Urinary Frequency    SYMPTOMS: see below    OTHER: Has been taking AZO with no relief    DISPOSITION: See Today in Office    Reason for Disposition   Age > 50 years    Answer Assessment - Initial Assessment Questions  1. SEVERITY: \"How bad is the pain?\"  (e.g., Scale 1-10; mild, moderate, or severe)      Only when urinating, x 3 days    3. PATTERN: \"Is pain present every time you urinate or just sometimes?\"       Every time  4. ONSET: \"When did the painful urination start?\"       3 days ago  5. FEVER: \"Do you have a fever?\" If Yes, ask: \"What is your temperature, how was it measured, and when did it start?\"      no    7. CAUSE: \"What do you think is causing the painful urination?\"  (e.g., UTI, scratch, Herpes sore)      UTI  8. OTHER SYMPTOMS: \"Do you have any other symptoms?\" (e.g., blood in urine, flank pain, genital sores, urgency, vaginal discharge)      urgency    Protocols used: Urination Pain - Female-Adult-[place]    "

## 2025-04-01 DIAGNOSIS — N30.00 ACUTE CYSTITIS WITHOUT HEMATURIA: Primary | ICD-10-CM

## 2025-04-01 RX ORDER — CEPHALEXIN 500 MG/1
500 CAPSULE ORAL 2 TIMES DAILY
Qty: 14 CAPSULE | Refills: 0 | Status: SHIPPED | OUTPATIENT
Start: 2025-04-01 | End: 2025-04-08

## 2025-04-01 NOTE — TELEPHONE ENCOUNTER
Spoke to PT - didn't go to urgent care - she's in terrible pain - states that she thinks her pussy is falling out.    Trying to find a way that she could get into the office - unless you could send medication to her pharmacy.

## 2025-05-03 NOTE — PROGRESS NOTES
Assessment/Plan:     Diagnosis ICD-10-CM Associated Orders   1. Ringworm  B35.9 clotrimazole-betamethasone (LOTRISONE) 1-0.05 % cream      2. Localized edema  R60.0 furosemide (LASIX) 20 mg tablet      3. Mixed hyperlipidemia  E78.2 CBC and differential     Comprehensive metabolic panel     Lipid Panel with Direct LDL reflex      4. Stage 3a chronic kidney disease (HCC)  N18.31 CBC and differential     Comprehensive metabolic panel      5. Chronic bilateral low back pain with bilateral sciatica  M54.42 HYDROcodone-acetaminophen (Norco) 5-325 mg per tablet    M54.41     G89.29       6. Primary hypertension  I10       7. Pedal edema  R60.0 Echo complete w/ contrast if indicated          Problem List Items Addressed This Visit          Cardiovascular and Mediastinum    Hypertension    Relevant Medications    furosemide (LASIX) 20 mg tablet       Nervous and Auditory    Chronic bilateral low back pain with bilateral sciatica    Relevant Medications    HYDROcodone-acetaminophen (Norco) 5-325 mg per tablet       Genitourinary    Stage 3a chronic kidney disease ([address])     Lab Results   Component Value Date    EGFR 59 10/10/2022    EGFR 68 12/07/2021    EGFR 85 12/06/2021    CREATININE 0.96 10/10/2022    CREATININE 0.86 12/07/2021    CREATININE 0.72 12/06/2021   Will  repeat labs         Relevant Medications    furosemide (LASIX) 20 mg tablet    Other Relevant Orders    CBC and differential    Comprehensive metabolic panel       Other    Mixed hyperlipidemia (Chronic)    Relevant Orders    CBC and differential    Comprehensive metabolic panel    Lipid Panel with Direct LDL reflex     Other Visit Diagnoses       Ringworm    -  Primary    Relevant Medications    clotrimazole-betamethasone (LOTRISONE) 1-0.05 % cream    Localized edema        Relevant Medications    furosemide (LASIX) 20 mg tablet    Pedal edema        Relevant Orders    Echo complete w/ contrast if indicated              No follow-ups on file.       Subjective: Writer RN called and gave patient daughter Yakelin ([phone]) an update on patient status.    Patient ID: Fanny Beach is a 68 y.o. female    Here for special visit- has had leg rash for past week or so- has 17 outdoor cats and 2 indoor cats( she rescued them)   She stopped taking hctz as it had warning not to be in sun and she has  a pool and spends her time in  sun. Will give rx for furosemide which she took left over from her - he  2 years ago  Has leg edema- will have her get echo done- we had ordered 2 years ago but in midst of  dying so she never did it. Dating a 52year old male and enjoying going out to dance and companionship     Hx lung nodules          The following portions of the patient's history were reviewed and updated as appropriate: allergies, current medications and problem list.     Review of Systems   Constitutional:  Negative for fatigue. HENT:  Negative for congestion. Respiratory:  Negative for shortness of breath. Cardiovascular:  Negative for chest pain and palpitations. Musculoskeletal:  Positive for back pain. Uses vicodin bid   Now has bilateral knee pain - likes to dance-   All other systems reviewed and are negative.         Objective:      Current Outpatient Medications:     Ascorbic Acid (VITAMIN C) 1000 MG tablet, Take 1,000 mg by mouth daily, Disp: , Rfl:     Biotin 10 MG CAPS, Take by mouth in the morning, Disp: , Rfl:     celecoxib (CeleBREX) 200 mg capsule, Take 1 capsule (200 mg total) by mouth daily, Disp: 90 capsule, Rfl: 2    clotrimazole-betamethasone (LOTRISONE) 1-0.05 % cream, Apply topically 2 (two) times a day, Disp: 45 g, Rfl: 1    esomeprazole (NexIUM) 40 MG capsule, TAKE 1 CAPSULE DAILY, Disp: 90 capsule, Rfl: 3    furosemide (LASIX) 20 mg tablet, Take 1 tablet (20 mg total) by mouth daily, Disp: 30 tablet, Rfl: 5    HYDROcodone-acetaminophen (Norco) 5-325 mg per tablet, Take 1 tablet by mouth every 8 (eight) hours as needed for pain Max Daily Amount: 3 tablets, Disp: 90 tablet, Rfl: 0    Olive Leaf Extract 500 MG CAPS, Take by mouth in the morning, Disp: , Rfl:     Blood pressure 132/80, pulse 91, height 5' 5.5" (1.664 m), weight 84.2 kg (185 lb 9.6 oz), SpO2 97 %. Physical Exam  Vitals and nursing note reviewed. Constitutional:       General: She is not in acute distress. HENT:      Head: Normocephalic. Right Ear: Tympanic membrane normal. There is no impacted cerumen. Left Ear: Tympanic membrane normal.      Nose: No congestion. Mouth/Throat:      Pharynx: No posterior oropharyngeal erythema. Eyes:      General:         Right eye: No discharge. Left eye: No discharge. Cardiovascular:      Rate and Rhythm: Normal rate and regular rhythm. Heart sounds: No murmur heard. Pulmonary:      Breath sounds: No wheezing or rhonchi. Abdominal:      Palpations: Abdomen is soft. Tenderness: There is no abdominal tenderness. Musculoskeletal:      Cervical back: No rigidity. Skin:     Findings: Rash present. Comments: 2 ring  lesions on right lower leg posteriorally- no driaange   Neurological:      General: No focal deficit present. Mental Status: She is alert. Psychiatric:         Mood and Affect: Mood normal.         Thought Content:  Thought content normal.         Judgment: Judgment normal.

## 2025-05-09 DIAGNOSIS — G89.29 CHRONIC BILATERAL LOW BACK PAIN WITH BILATERAL SCIATICA: ICD-10-CM

## 2025-05-09 DIAGNOSIS — M54.41 CHRONIC BILATERAL LOW BACK PAIN WITH BILATERAL SCIATICA: ICD-10-CM

## 2025-05-09 DIAGNOSIS — M54.42 CHRONIC BILATERAL LOW BACK PAIN WITH BILATERAL SCIATICA: ICD-10-CM

## 2025-05-09 RX ORDER — HYDROCODONE BITARTRATE AND ACETAMINOPHEN 5; 325 MG/1; MG/1
1 TABLET ORAL EVERY 8 HOURS PRN
Qty: 90 TABLET | Refills: 0 | Status: SHIPPED | OUTPATIENT
Start: 2025-05-09

## 2025-05-09 NOTE — TELEPHONE ENCOUNTER
Reason for call:   [x] Refill   [] Prior Auth  [x] Other: [facility]     Office:   [x] PCP/Provider - Farzaneh You, /[facility]   [] Specialty/Provider -     Medication:     HYDROcodone-acetaminophen (Norco) 5-325 mg per tablet       Dose/Frequency: : Take 1 tablet by mouth every 8 (eight) hours as needed for pain Max Daily Amount: 3 tablets,     Quantity: 90    Pharmacy: [facility] - [address]        [facility] Pharmacy   Does the patient have enough for 10 days?   [] Yes   [x] No - Send as HP to POD

## 2025-05-16 ENCOUNTER — OFFICE VISIT (OUTPATIENT)
Dept: FAMILY MEDICINE CLINIC | Facility: HOSPITAL | Age: 75
End: 2025-05-16
Payer: MEDICARE

## 2025-05-16 VITALS
SYSTOLIC BLOOD PRESSURE: 130 MMHG | OXYGEN SATURATION: 97 % | WEIGHT: 178.2 LBS | DIASTOLIC BLOOD PRESSURE: 80 MMHG | BODY MASS INDEX: 29.65 KG/M2 | HEART RATE: 71 BPM

## 2025-05-16 DIAGNOSIS — Z00.00 MEDICARE ANNUAL WELLNESS VISIT, SUBSEQUENT: Primary | ICD-10-CM

## 2025-05-16 DIAGNOSIS — F11.20 OPIOID DEPENDENCE, UNCOMPLICATED (HCC): ICD-10-CM

## 2025-05-16 DIAGNOSIS — Z78.0 POSTMENOPAUSAL: ICD-10-CM

## 2025-05-16 DIAGNOSIS — I10 PRIMARY HYPERTENSION: ICD-10-CM

## 2025-05-16 DIAGNOSIS — R26.2 AMBULATORY DYSFUNCTION: ICD-10-CM

## 2025-05-16 DIAGNOSIS — B37.2 CANDIDAL INTERTRIGO: ICD-10-CM

## 2025-05-16 PROCEDURE — G0439 PPPS, SUBSEQ VISIT: HCPCS | Performed by: NURSE PRACTITIONER

## 2025-05-16 RX ORDER — NYSTATIN 100000 U/G
CREAM TOPICAL 2 TIMES DAILY
Qty: 15 G | Refills: 0 | Status: SHIPPED | OUTPATIENT
Start: 2025-05-16 | End: 2025-05-16

## 2025-05-16 RX ORDER — NYSTATIN 100000 U/G
CREAM TOPICAL 2 TIMES DAILY
Qty: 15 G | Refills: 0 | Status: SHIPPED | OUTPATIENT
Start: 2025-05-16

## 2025-05-16 NOTE — PATIENT INSTRUCTIONS
Please schedule dentist and eye exam.   Get shingles vaccine at the pharmacy.   Schedule DXA  Nystatin cream twice daily to rash for 2 weeks.

## 2025-05-16 NOTE — ASSESSMENT & PLAN NOTE
Rare use of Norco when the back pain not easily managed with Tylenol.  PDMP reviewed no red flags.  Continue current medical management.

## 2025-05-16 NOTE — PROGRESS NOTES
Name: Brianna Baum      : 1950      MRN: [id number]  Encounter Provider: LADONNA Madden  Encounter Date: 2025   Encounter department: [facility] [address]  :  Assessment & Plan  Medicare annual wellness visit, subsequent         Primary hypertension  Hx HTN with adherence to Cardizem 240 mg p.o. daily.  Denies chest pressure pain palpitation shortness of breath dizziness nor lightheadedness.  Does have occasional headache unchanged since CVA managed with Tylenol.  Kidney function stable in the fall .  Followed closely by cardiology.         Candidal intertrigo  Periumbilical fungal rash-will treat with nystatin    Orders:    nystatin (MYCOSTATIN) cream; Apply topically 2 (two) times a day    Ambulatory dysfunction  Fell yesterday, denies head strike.  Landed on the trash she was carrying.  Had neighbor get her up.  Denies injry.  Did not seek care at that time.  Assessment at baseline.  Reviewed safety measures including use of assistive device at all times, asking for assistance in seeking emergent services if fall due to Eliquis with verbalized understanding.         Opioid dependence, uncomplicated (HCC)  Rare use of Norco when the back pain not easily managed with Tylenol.  PDMP reviewed no red flags.  Continue current medical management.         Postmenopausal    Orders:    DXA bone density spine hip and pelvis; Future       Preventive health issues were discussed with patient, and age appropriate screening tests were ordered as noted in patient's After Visit Summary. Personalized health advice and appropriate referrals for health education or preventive services given if needed, as noted in patient's After Visit Summary.    History of Present Illness     Here for AWV.  Upset about recent argument with son.         Patient Care Team:  LADONNA Drew as PCP - General (Family Medicine)    Review of Systems   Constitutional: Negative.  Negative for activity  change, appetite change, chills, fatigue, fever and unexpected weight change.   HENT: Negative.  Negative for congestion, ear pain, postnasal drip, sinus pain and trouble swallowing.    Eyes:  Positive for visual disturbance.        No change since CVA   Respiratory: Negative.  Negative for cough, chest tightness, shortness of breath and wheezing.    Cardiovascular: Negative.  Negative for chest pain and leg swelling.   Gastrointestinal: Negative.  Negative for abdominal pain, anal bleeding, blood in stool, constipation and diarrhea.   Endocrine: Negative.    Genitourinary: Negative.  Negative for difficulty urinating, dysuria, hematuria and vaginal bleeding.   Musculoskeletal:  Positive for gait problem and myalgias.   Skin:  Positive for rash.   Allergic/Immunologic: Negative.  Negative for immunocompromised state.   Neurological:  Positive for headaches. Negative for dizziness and light-headedness.        Occasional HA unchanged since CVA-managed with tylenol.    Hematological: Negative.    Psychiatric/Behavioral: Negative.  Negative for sleep disturbance.         Upset about son     Medical History Reviewed by provider this encounter:  Tobacco  Allergies  Meds  Problems  Med Hx  Surg Hx  Fam Hx       Annual Wellness Visit Questionnaire   Brianna is here for her Subsequent Wellness visit.     Health Risk Assessment:   Patient rates overall health as good. Patient feels that their physical health rating is slightly better. Patient is dissatisfied with their life. Eyesight was rated as slightly worse. Hearing was rated as same. Patient feels that their emotional and mental health rating is slightly worse. Patients states they are never, rarely angry. Patient states they are often unusually tired/fatigued. Pain experienced in the last 7 days has been some. Patient's pain rating has been 6/10. Patient states that she has experienced no weight loss or gain in last 6 months.     Depression Screening:   PHQ-2  Score: 0      Fall Risk Screening:   In the past year, patient has experienced: history of falling in past year    Number of falls: 1  Injured during fall?: No    Feels unsteady when standing or walking?: Yes    Worried about falling?: No      Urinary Incontinence Screening:   Patient has leaked urine accidently in the last six months.     Home Safety:  Patient does not have trouble with stairs inside or outside of their home. Patient has working smoke alarms and has working carbon monoxide detector. Home safety hazards include: none.     Nutrition:   Current diet is Regular.     Medications:   Patient is currently taking over-the-counter supplements. OTC medications include: see medication list. Patient is able to manage medications.     Activities of Daily Living (ADLs)/Instrumental Activities of Daily Living (IADLs):   Walk and transfer into and out of bed and chair?: Yes  Dress and groom yourself?: Yes    Bathe or shower yourself?: Yes    Feed yourself? Yes  Do your laundry/housekeeping?: Yes  Manage your money, pay your bills and track your expenses?: Yes  Make your own meals?: Yes    Do your own shopping?: Yes    Previous Hospitalizations:   Any hospitalizations or ED visits within the last 12 months?: Yes    How many hospitalizations have you had in the last year?: 1-2    Preventive Screenings      Cardiovascular Screening:    General: Screening Not Indicated and History Lipid Disorder      Diabetes Screening:     General: Screening Current      Colorectal Cancer Screening:     General: Screening Current      Cervical Cancer Screening:    General: Screening Not Indicated      Lung Cancer Screening:     General: Screening Not Indicated    Immunizations:  - Immunizations due: Zoster (Shingrix)    Screening, Brief Intervention, and Referral to Treatment (SBIRT)     Screening    Typical number of drinks in a week: 2    Single Item Drug Screening:  How often have you used an illegal drug (including marijuana) or a  prescription medication for non-medical reasons in the past year? never    Single Item Drug Screen Score: 0  Interpretation: Negative screen for possible drug use disorder    Social Drivers of Health     Financial Resource Strain: Low Risk  (12/1/2022)    Overall Financial Resource Strain (CARDIA)     Difficulty of Paying Living Expenses: Not hard at all   Food Insecurity: No Food Insecurity (5/16/2025)    Nursing - Inadequate Food Risk Classification     Worried About Running Out of Food in the Last Year: Never true     Ran Out of Food in the Last Year: Never true   Transportation Needs: No Transportation Needs (5/16/2025)    PRAPARE - Transportation     Lack of Transportation (Medical): No     Lack of Transportation (Non-Medical): No   Housing Stability: Low Risk  (5/16/2025)    Housing Stability Vital Sign     Unable to Pay for Housing in the Last Year: No     Number of Times Moved in the Last Year: 0     Homeless in the Last Year: No   Utilities: Not At Risk (5/16/2025)    [facility] Utilities     Threatened with loss of utilities: No     No results found.    Objective   /80 (BP Location: Left arm, Patient Position: Sitting, Cuff Size: Standard)   Pulse 71   Wt 80.8 kg (178 lb 3.2 oz)   SpO2 97%   BMI 29.65 kg/m²     Physical Exam  Vitals and nursing note reviewed.   Constitutional:       General: She is awake. She is not in acute distress.     Appearance: She is well-developed.   HENT:      Head: Normocephalic and atraumatic.      Right Ear: Tympanic membrane normal.      Left Ear: Tympanic membrane normal.      Nose: Nose normal.      Mouth/Throat:      Mouth: Mucous membranes are moist.      Dentition: Abnormal dentition.      Comments: Upper dentures    Eyes:      Conjunctiva/sclera: Conjunctivae normal.       Cardiovascular:      Rate and Rhythm: Normal rate and regular rhythm.      Heart sounds: No murmur heard.  Pulmonary:      Effort: Pulmonary effort is normal. No respiratory distress.      Breath  sounds: Normal breath sounds.   Abdominal:      Palpations: Abdomen is soft.      Tenderness: There is no abdominal tenderness.     Musculoskeletal:         General: No swelling.      Cervical back: Neck supple.      Right lower leg: Tenderness present. No edema.      Left lower leg: No edema.      Comments: Ongoing chronic neuralgia RLE unchanged. Negative homans.      Skin:     General: Skin is warm and dry.      Capillary Refill: Capillary refill takes less than 2 seconds.      Findings: Rash present.      Comments: Varicosities BLE  Umbilical fungal rash     Neurological:      Mental Status: She is alert.      Sensory: Sensory deficit present.      Gait: Gait abnormal.      Comments: Can be forgetful.  A/Ox4.   Mild right hemiparesis ambulating with SPC steady gait.    Psychiatric:         Attention and Perception: Attention and perception normal.         Mood and Affect: Mood and affect normal.         Speech: Speech normal.         Behavior: Behavior normal. Behavior is cooperative.         Thought Content: Thought content normal.

## 2025-05-16 NOTE — ASSESSMENT & PLAN NOTE
Periumbilical fungal rash-will treat with nystatin    Orders:    nystatin (MYCOSTATIN) cream; Apply topically 2 (two) times a day

## 2025-05-16 NOTE — ASSESSMENT & PLAN NOTE
Hx HTN with adherence to Cardizem 240 mg p.o. daily.  Denies chest pressure pain palpitation shortness of breath dizziness nor lightheadedness.  Does have occasional headache unchanged since CVA managed with Tylenol.  Kidney function stable in the fall 2024.  Followed closely by cardiology.

## 2025-05-16 NOTE — ASSESSMENT & PLAN NOTE
Fell yesterday, denies head strike.  Landed on the trash she was carrying.  Had neighbor get her up.  Denies injry.  Did not seek care at that time.  Assessment at baseline.  Reviewed safety measures including use of assistive device at all times, asking for assistance in seeking emergent services if fall due to Eliquis with verbalized understanding.

## 2025-06-06 ENCOUNTER — NURSE TRIAGE (OUTPATIENT)
Age: 75
End: 2025-06-06

## 2025-06-06 NOTE — TELEPHONE ENCOUNTER
"REASON FOR CONVERSATION: Leg Swelling    SYMPTOMS: BLE swelling. More on left than right, for the past couple of days. Denies redness, warmth, or pain. Denies CP or SOB. See triage below    OTHER HEALTH INFORMATION: n/a    PROTOCOL DISPOSITION: Discuss with Provider and Call Back Patient (overriding See Today in Office)    CARE ADVICE PROVIDED: Pt requesting water pill prescription. Advised Pt should be examined by  a Provider first. No same day appointments seen. Urgent Care not appropriate. Informed Pt will review symptoms with PCP and advise further within 1 hour. Pt agreeable with plan.     PRACTICE FOLLOW-UP: Please review symptoms with PCP and advise Pt further within 1 hour.      Reason for Disposition   MODERATE swelling of both ankles (e.g., swelling extends up to the knees) AND new-onset or getting worse    Answer Assessment - Initial Assessment Questions  1. ONSET: \"When did the swelling start?\" (e.g., minutes, hours, days)      Past couple of days    2. LOCATION: \"What part of the leg is swollen?\"  \"Are both legs swollen or just one leg?\"      Bilateral ankles up to knee more so on LLE than RLE    3. SEVERITY: \"How bad is the swelling?\" (e.g., localized; mild, moderate, severe)      Moderate/Severe. Pt explains that he ankles look very \"fat\" and feel achey/heavy    4. REDNESS: \"Does the swelling look red or infected?\"      Denies    5. PAIN: \"Is the swelling painful to touch?\" If Yes, ask: \"How painful is it?\"   (Scale 1-10; mild, moderate or severe)      Denies    6. FEVER: \"Do you have a fever?\" If Yes, ask: \"What is it, how was it measured, and when did it start?\"       Does not report    7. CAUSE: \"What do you think is causing the leg swelling?\"      Unsure, possibly the heat    8. MEDICAL HISTORY: \"Do you have a history of blood clots (e.g., DVT), cancer, heart failure, kidney disease, or liver failure?\"      CKD, AFIB    9. RECURRENT SYMPTOM: \"Have you had leg swelling before?\" If Yes, ask: \"When " "was the last time?\" \"What happened that time?\"      Pt reports this has happened before and she was started on a water pill    10. OTHER SYMPTOMS: \"Do you have any other symptoms?\" (e.g., chest pain, difficulty breathing)        Denies    Protocols used: Leg Swelling and Edema-Adult-[place]    "

## 2025-06-10 DIAGNOSIS — N39.41 URGE INCONTINENCE OF URINE: ICD-10-CM

## 2025-06-10 DIAGNOSIS — R35.1 NOCTURIA: ICD-10-CM

## 2025-06-10 RX ORDER — SOLIFENACIN SUCCINATE 10 MG/1
10 TABLET, FILM COATED ORAL DAILY
Qty: 30 TABLET | Refills: 5 | Status: SHIPPED | OUTPATIENT
Start: 2025-06-10

## 2025-06-11 ENCOUNTER — TELEPHONE (OUTPATIENT)
Age: 75
End: 2025-06-11

## 2025-06-11 NOTE — TELEPHONE ENCOUNTER
Patient called stating the solifenacin (VESICARE) 10 MG tablet  are not doing anything for her. She is still getting up 4-5 times a night to urinate. Please advise.

## 2025-06-13 NOTE — ADDENDUM NOTE
Addended by: MIGUELINA HOUSTNO on: 8/16/2024 01:36 PM     Modules accepted: Orders     Rashes/allergy, food

## 2025-07-25 ENCOUNTER — HOSPITAL ENCOUNTER (INPATIENT)
Facility: HOSPITAL | Age: 75
LOS: 3 days | Discharge: NURSING, REHAB OR LONG-TERM CARE | DRG: 690 | End: 2025-07-29
Attending: EMERGENCY MEDICINE | Admitting: FAMILY MEDICINE
Payer: MEDICARE

## 2025-07-25 ENCOUNTER — APPOINTMENT (EMERGENCY)
Dept: CT IMAGING | Facility: HOSPITAL | Age: 75
DRG: 690 | End: 2025-07-25
Payer: MEDICARE

## 2025-07-25 ENCOUNTER — APPOINTMENT (EMERGENCY)
Dept: RADIOLOGY | Facility: HOSPITAL | Age: 75
DRG: 690 | End: 2025-07-25
Payer: MEDICARE

## 2025-07-25 DIAGNOSIS — R55 SYNCOPE: Primary | ICD-10-CM

## 2025-07-25 DIAGNOSIS — N39.0 URINARY TRACT INFECTION: ICD-10-CM

## 2025-07-25 LAB
ABO GROUP BLD: NORMAL
ALBUMIN SERPL BCG-MCNC: 4.4 G/DL (ref 3.5–5)
ALP SERPL-CCNC: 67 U/L (ref 34–104)
ALT SERPL W P-5'-P-CCNC: 15 U/L (ref 7–52)
ANION GAP SERPL CALCULATED.3IONS-SCNC: 10 MMOL/L (ref 4–13)
APTT PPP: 28 SECONDS (ref 23–34)
AST SERPL W P-5'-P-CCNC: 22 U/L (ref 13–39)
BACTERIA UR QL AUTO: ABNORMAL /HPF
BASOPHILS # BLD AUTO: 0.01 THOUSANDS/ÂΜL (ref 0–0.1)
BASOPHILS NFR BLD AUTO: 0 % (ref 0–1)
BILIRUB SERPL-MCNC: 0.78 MG/DL (ref 0.2–1)
BILIRUB UR QL STRIP: NEGATIVE
BLD GP AB SCN SERPL QL: NEGATIVE
BUN SERPL-MCNC: 12 MG/DL (ref 5–25)
CALCIUM SERPL-MCNC: 9.5 MG/DL (ref 8.4–10.2)
CARDIAC TROPONIN I PNL SERPL HS: 8 NG/L (ref ?–50)
CHLORIDE SERPL-SCNC: 102 MMOL/L (ref 96–108)
CK SERPL-CCNC: 93 U/L (ref 26–192)
CLARITY UR: ABNORMAL
CO2 SERPL-SCNC: 27 MMOL/L (ref 21–32)
COLOR UR: YELLOW
CREAT SERPL-MCNC: 0.79 MG/DL (ref 0.6–1.3)
EOSINOPHIL # BLD AUTO: 0 THOUSAND/ÂΜL (ref 0–0.61)
EOSINOPHIL NFR BLD AUTO: 0 % (ref 0–6)
ERYTHROCYTE [DISTWIDTH] IN BLOOD BY AUTOMATED COUNT: 11.9 % (ref 11.6–15.1)
GFR SERPL CREATININE-BSD FRML MDRD: 73 ML/MIN/1.73SQ M
GLUCOSE SERPL-MCNC: 135 MG/DL (ref 65–140)
GLUCOSE UR STRIP-MCNC: NEGATIVE MG/DL
HCT VFR BLD AUTO: 43.3 % (ref 34.8–46.1)
HGB BLD-MCNC: 14.5 G/DL (ref 11.5–15.4)
HGB UR QL STRIP.AUTO: ABNORMAL
IMM GRANULOCYTES # BLD AUTO: 0.07 THOUSAND/UL (ref 0–0.2)
IMM GRANULOCYTES NFR BLD AUTO: 1 % (ref 0–2)
INR PPP: 1.24 (ref 0.85–1.19)
KETONES UR STRIP-MCNC: ABNORMAL MG/DL
LEUKOCYTE ESTERASE UR QL STRIP: ABNORMAL
LYMPHOCYTES # BLD AUTO: 0.83 THOUSANDS/ÂΜL (ref 0.6–4.47)
LYMPHOCYTES NFR BLD AUTO: 7 % (ref 14–44)
MCH RBC QN AUTO: 31.3 PG (ref 26.8–34.3)
MCHC RBC AUTO-ENTMCNC: 33.5 G/DL (ref 31.4–37.4)
MCV RBC AUTO: 93 FL (ref 82–98)
MONOCYTES # BLD AUTO: 0.65 THOUSAND/ÂΜL (ref 0.17–1.22)
MONOCYTES NFR BLD AUTO: 6 % (ref 4–12)
NEUTROPHILS # BLD AUTO: 9.82 THOUSANDS/ÂΜL (ref 1.85–7.62)
NEUTS SEG NFR BLD AUTO: 86 % (ref 43–75)
NITRITE UR QL STRIP: NEGATIVE
NON-SQ EPI CELLS URNS QL MICRO: ABNORMAL /HPF
NRBC BLD AUTO-RTO: 0 /100 WBCS
PH UR STRIP.AUTO: 7.5 [PH]
PLATELET # BLD AUTO: 188 THOUSANDS/UL (ref 149–390)
PMV BLD AUTO: 10 FL (ref 8.9–12.7)
POTASSIUM SERPL-SCNC: 3.7 MMOL/L (ref 3.5–5.3)
PROT SERPL-MCNC: 7.1 G/DL (ref 6.4–8.4)
PROT UR STRIP-MCNC: ABNORMAL MG/DL
PROTHROMBIN TIME: 16.1 SECONDS (ref 12.3–15)
RBC # BLD AUTO: 4.64 MILLION/UL (ref 3.81–5.12)
RBC #/AREA URNS AUTO: ABNORMAL /HPF
RH BLD: POSITIVE
SODIUM SERPL-SCNC: 139 MMOL/L (ref 135–147)
SP GR UR STRIP.AUTO: 1.02 (ref 1–1.03)
SPECIMEN EXPIRATION DATE: NORMAL
UROBILINOGEN UR STRIP-ACNC: <2 MG/DL
WBC # BLD AUTO: 11.38 THOUSAND/UL (ref 4.31–10.16)
WBC #/AREA URNS AUTO: ABNORMAL /HPF

## 2025-07-25 PROCEDURE — 84484 ASSAY OF TROPONIN QUANT: CPT | Performed by: EMERGENCY MEDICINE

## 2025-07-25 PROCEDURE — 80053 COMPREHEN METABOLIC PANEL: CPT | Performed by: EMERGENCY MEDICINE

## 2025-07-25 PROCEDURE — 36415 COLL VENOUS BLD VENIPUNCTURE: CPT | Performed by: EMERGENCY MEDICINE

## 2025-07-25 PROCEDURE — 82550 ASSAY OF CK (CPK): CPT | Performed by: EMERGENCY MEDICINE

## 2025-07-25 PROCEDURE — 72170 X-RAY EXAM OF PELVIS: CPT

## 2025-07-25 PROCEDURE — 96361 HYDRATE IV INFUSION ADD-ON: CPT

## 2025-07-25 PROCEDURE — 81001 URINALYSIS AUTO W/SCOPE: CPT | Performed by: EMERGENCY MEDICINE

## 2025-07-25 PROCEDURE — 87186 SC STD MICRODIL/AGAR DIL: CPT | Performed by: EMERGENCY MEDICINE

## 2025-07-25 PROCEDURE — 85025 COMPLETE CBC W/AUTO DIFF WBC: CPT | Performed by: EMERGENCY MEDICINE

## 2025-07-25 PROCEDURE — 93005 ELECTROCARDIOGRAM TRACING: CPT

## 2025-07-25 PROCEDURE — 99285 EMERGENCY DEPT VISIT HI MDM: CPT | Performed by: EMERGENCY MEDICINE

## 2025-07-25 PROCEDURE — 96360 HYDRATION IV INFUSION INIT: CPT

## 2025-07-25 PROCEDURE — 85610 PROTHROMBIN TIME: CPT | Performed by: EMERGENCY MEDICINE

## 2025-07-25 PROCEDURE — 85730 THROMBOPLASTIN TIME PARTIAL: CPT | Performed by: EMERGENCY MEDICINE

## 2025-07-25 PROCEDURE — 70450 CT HEAD/BRAIN W/O DYE: CPT

## 2025-07-25 PROCEDURE — 71260 CT THORAX DX C+: CPT

## 2025-07-25 PROCEDURE — 99285 EMERGENCY DEPT VISIT HI MDM: CPT

## 2025-07-25 PROCEDURE — 74177 CT ABD & PELVIS W/CONTRAST: CPT

## 2025-07-25 PROCEDURE — 86900 BLOOD TYPING SEROLOGIC ABO: CPT | Performed by: EMERGENCY MEDICINE

## 2025-07-25 PROCEDURE — 86850 RBC ANTIBODY SCREEN: CPT | Performed by: EMERGENCY MEDICINE

## 2025-07-25 PROCEDURE — 72125 CT NECK SPINE W/O DYE: CPT

## 2025-07-25 PROCEDURE — 86901 BLOOD TYPING SEROLOGIC RH(D): CPT | Performed by: EMERGENCY MEDICINE

## 2025-07-25 PROCEDURE — 71045 X-RAY EXAM CHEST 1 VIEW: CPT

## 2025-07-25 PROCEDURE — 87077 CULTURE AEROBIC IDENTIFY: CPT | Performed by: EMERGENCY MEDICINE

## 2025-07-25 PROCEDURE — 87086 URINE CULTURE/COLONY COUNT: CPT | Performed by: EMERGENCY MEDICINE

## 2025-07-25 RX ORDER — CEFTRIAXONE 1 G/50ML
1000 INJECTION, SOLUTION INTRAVENOUS ONCE
Status: DISCONTINUED | OUTPATIENT
Start: 2025-07-26 | End: 2025-07-25

## 2025-07-25 RX ADMIN — IOHEXOL 100 ML: 350 INJECTION, SOLUTION INTRAVENOUS at 22:09

## 2025-07-25 RX ADMIN — SODIUM CHLORIDE 1000 ML: 0.9 INJECTION, SOLUTION INTRAVENOUS at 22:26

## 2025-07-26 PROBLEM — N30.90 CYSTITIS: Status: ACTIVE | Noted: 2025-07-26

## 2025-07-26 LAB
2HR DELTA HS TROPONIN: 8 NG/L
4HR DELTA HS TROPONIN: 6 NG/L
ANION GAP SERPL CALCULATED.3IONS-SCNC: 9 MMOL/L (ref 4–13)
BUN SERPL-MCNC: 11 MG/DL (ref 5–25)
CALCIUM SERPL-MCNC: 8.7 MG/DL (ref 8.4–10.2)
CARDIAC TROPONIN I PNL SERPL HS: 14 NG/L (ref ?–50)
CARDIAC TROPONIN I PNL SERPL HS: 16 NG/L (ref ?–50)
CHLORIDE SERPL-SCNC: 105 MMOL/L (ref 96–108)
CO2 SERPL-SCNC: 24 MMOL/L (ref 21–32)
CREAT SERPL-MCNC: 0.67 MG/DL (ref 0.6–1.3)
ERYTHROCYTE [DISTWIDTH] IN BLOOD BY AUTOMATED COUNT: 11.9 % (ref 11.6–15.1)
GFR SERPL CREATININE-BSD FRML MDRD: 86 ML/MIN/1.73SQ M
GLUCOSE SERPL-MCNC: 117 MG/DL (ref 65–140)
HCT VFR BLD AUTO: 38.9 % (ref 34.8–46.1)
HGB BLD-MCNC: 13.3 G/DL (ref 11.5–15.4)
MAGNESIUM SERPL-MCNC: 2.1 MG/DL (ref 1.9–2.7)
MCH RBC QN AUTO: 31.6 PG (ref 26.8–34.3)
MCHC RBC AUTO-ENTMCNC: 34.2 G/DL (ref 31.4–37.4)
MCV RBC AUTO: 92 FL (ref 82–98)
PHOSPHATE SERPL-MCNC: 3.3 MG/DL (ref 2.3–4.1)
PLATELET # BLD AUTO: 170 THOUSANDS/UL (ref 149–390)
PMV BLD AUTO: 9.9 FL (ref 8.9–12.7)
POTASSIUM SERPL-SCNC: 3.3 MMOL/L (ref 3.5–5.3)
RBC # BLD AUTO: 4.21 MILLION/UL (ref 3.81–5.12)
SODIUM SERPL-SCNC: 138 MMOL/L (ref 135–147)
WBC # BLD AUTO: 10.32 THOUSAND/UL (ref 4.31–10.16)

## 2025-07-26 PROCEDURE — 84484 ASSAY OF TROPONIN QUANT: CPT | Performed by: PHYSICIAN ASSISTANT

## 2025-07-26 PROCEDURE — 85027 COMPLETE CBC AUTOMATED: CPT | Performed by: PHYSICIAN ASSISTANT

## 2025-07-26 PROCEDURE — 80048 BASIC METABOLIC PNL TOTAL CA: CPT | Performed by: PHYSICIAN ASSISTANT

## 2025-07-26 PROCEDURE — 83735 ASSAY OF MAGNESIUM: CPT | Performed by: PHYSICIAN ASSISTANT

## 2025-07-26 PROCEDURE — 99223 1ST HOSP IP/OBS HIGH 75: CPT | Performed by: PHYSICIAN ASSISTANT

## 2025-07-26 PROCEDURE — 84100 ASSAY OF PHOSPHORUS: CPT | Performed by: PHYSICIAN ASSISTANT

## 2025-07-26 RX ORDER — ASCORBIC ACID 500 MG
1000 TABLET ORAL DAILY
Status: DISCONTINUED | OUTPATIENT
Start: 2025-07-27 | End: 2025-07-26

## 2025-07-26 RX ORDER — DILTIAZEM HYDROCHLORIDE 120 MG/1
240 CAPSULE, COATED, EXTENDED RELEASE ORAL DAILY
Status: DISCONTINUED | OUTPATIENT
Start: 2025-07-27 | End: 2025-07-26

## 2025-07-26 RX ORDER — ASCORBIC ACID 500 MG
1000 TABLET ORAL DAILY
Status: DISCONTINUED | OUTPATIENT
Start: 2025-07-26 | End: 2025-07-29 | Stop reason: HOSPADM

## 2025-07-26 RX ORDER — PANTOPRAZOLE SODIUM 20 MG/1
20 TABLET, DELAYED RELEASE ORAL
Status: DISCONTINUED | OUTPATIENT
Start: 2025-07-26 | End: 2025-07-29 | Stop reason: HOSPADM

## 2025-07-26 RX ORDER — OXYBUTYNIN CHLORIDE 5 MG/1
10 TABLET, EXTENDED RELEASE ORAL DAILY
Status: DISCONTINUED | OUTPATIENT
Start: 2025-07-26 | End: 2025-07-29 | Stop reason: HOSPADM

## 2025-07-26 RX ORDER — HYDROCODONE BITARTRATE AND ACETAMINOPHEN 5; 325 MG/1; MG/1
1 TABLET ORAL EVERY 8 HOURS PRN
Refills: 0 | Status: DISCONTINUED | OUTPATIENT
Start: 2025-07-26 | End: 2025-07-29 | Stop reason: HOSPADM

## 2025-07-26 RX ORDER — ATORVASTATIN CALCIUM 40 MG/1
40 TABLET, FILM COATED ORAL EVERY EVENING
Status: DISCONTINUED | OUTPATIENT
Start: 2025-07-26 | End: 2025-07-29 | Stop reason: HOSPADM

## 2025-07-26 RX ORDER — DILTIAZEM HYDROCHLORIDE 120 MG/1
240 CAPSULE, COATED, EXTENDED RELEASE ORAL DAILY
Status: DISCONTINUED | OUTPATIENT
Start: 2025-07-26 | End: 2025-07-29 | Stop reason: HOSPADM

## 2025-07-26 RX ORDER — OXYBUTYNIN CHLORIDE 5 MG/1
10 TABLET, EXTENDED RELEASE ORAL DAILY
Status: DISCONTINUED | OUTPATIENT
Start: 2025-07-27 | End: 2025-07-26

## 2025-07-26 RX ORDER — OXYCODONE AND ACETAMINOPHEN 5; 325 MG/1; MG/1
1 TABLET ORAL EVERY 8 HOURS PRN
Refills: 0 | Status: DISCONTINUED | OUTPATIENT
Start: 2025-07-26 | End: 2025-07-26

## 2025-07-26 RX ORDER — SENNOSIDES 8.6 MG
1 TABLET ORAL
Status: DISCONTINUED | OUTPATIENT
Start: 2025-07-26 | End: 2025-07-29 | Stop reason: HOSPADM

## 2025-07-26 RX ORDER — SODIUM CHLORIDE, SODIUM GLUCONATE, SODIUM ACETATE, POTASSIUM CHLORIDE, MAGNESIUM CHLORIDE, SODIUM PHOSPHATE, DIBASIC, AND POTASSIUM PHOSPHATE .53; .5; .37; .037; .03; .012; .00082 G/100ML; G/100ML; G/100ML; G/100ML; G/100ML; G/100ML; G/100ML
75 INJECTION, SOLUTION INTRAVENOUS CONTINUOUS
Status: DISPENSED | OUTPATIENT
Start: 2025-07-26 | End: 2025-07-26

## 2025-07-26 RX ORDER — MAGNESIUM HYDROXIDE/ALUMINUM HYDROXICE/SIMETHICONE 120; 1200; 1200 MG/30ML; MG/30ML; MG/30ML
30 SUSPENSION ORAL EVERY 6 HOURS PRN
Status: DISCONTINUED | OUTPATIENT
Start: 2025-07-26 | End: 2025-07-29 | Stop reason: HOSPADM

## 2025-07-26 RX ORDER — ATORVASTATIN CALCIUM 40 MG/1
40 TABLET, FILM COATED ORAL EVERY EVENING
Status: DISCONTINUED | OUTPATIENT
Start: 2025-07-27 | End: 2025-07-26

## 2025-07-26 RX ORDER — ONDANSETRON 2 MG/ML
4 INJECTION INTRAMUSCULAR; INTRAVENOUS EVERY 6 HOURS PRN
Status: DISCONTINUED | OUTPATIENT
Start: 2025-07-26 | End: 2025-07-29 | Stop reason: HOSPADM

## 2025-07-26 RX ORDER — ACETAMINOPHEN 325 MG/1
650 TABLET ORAL EVERY 6 HOURS PRN
Status: DISCONTINUED | OUTPATIENT
Start: 2025-07-26 | End: 2025-07-29 | Stop reason: HOSPADM

## 2025-07-26 RX ADMIN — DILTIAZEM HYDROCHLORIDE 240 MG: 120 CAPSULE, COATED, EXTENDED RELEASE ORAL at 09:35

## 2025-07-26 RX ADMIN — HYDROCODONE BITARTRATE AND ACETAMINOPHEN 1 TABLET: 5; 325 TABLET ORAL at 21:07

## 2025-07-26 RX ADMIN — ACETAMINOPHEN 650 MG: 325 TABLET ORAL at 01:23

## 2025-07-26 RX ADMIN — OXYBUTYNIN CHLORIDE 10 MG: 5 TABLET, EXTENDED RELEASE ORAL at 09:35

## 2025-07-26 RX ADMIN — SODIUM CHLORIDE, SODIUM GLUCONATE, SODIUM ACETATE, POTASSIUM CHLORIDE, MAGNESIUM CHLORIDE, SODIUM PHOSPHATE, DIBASIC, AND POTASSIUM PHOSPHATE 75 ML/HR: .53; .5; .37; .037; .03; .012; .00082 INJECTION, SOLUTION INTRAVENOUS at 05:53

## 2025-07-26 RX ADMIN — PANTOPRAZOLE SODIUM 20 MG: 20 TABLET, DELAYED RELEASE ORAL at 05:55

## 2025-07-26 RX ADMIN — APIXABAN 5 MG: 5 TABLET, FILM COATED ORAL at 17:26

## 2025-07-26 RX ADMIN — ERTAPENEM SODIUM 1000 MG: 1 INJECTION INTRAMUSCULAR; INTRAVENOUS at 01:20

## 2025-07-26 RX ADMIN — OXYCODONE HYDROCHLORIDE AND ACETAMINOPHEN 1000 MG: 500 TABLET ORAL at 09:35

## 2025-07-26 RX ADMIN — ACETAMINOPHEN 650 MG: 325 TABLET ORAL at 19:51

## 2025-07-26 RX ADMIN — APIXABAN 5 MG: 5 TABLET, FILM COATED ORAL at 09:35

## 2025-07-26 RX ADMIN — ATORVASTATIN CALCIUM 40 MG: 40 TABLET, FILM COATED ORAL at 17:26

## 2025-07-26 RX ADMIN — APIXABAN 5 MG: 5 TABLET, FILM COATED ORAL at 01:23

## 2025-07-26 RX ADMIN — ACETAMINOPHEN 650 MG: 325 TABLET ORAL at 13:36

## 2025-07-27 LAB
ANION GAP SERPL CALCULATED.3IONS-SCNC: 5 MMOL/L (ref 4–13)
ATRIAL RATE: 87 BPM
BASOPHILS # BLD AUTO: 0.02 THOUSANDS/ÂΜL (ref 0–0.1)
BASOPHILS NFR BLD AUTO: 0 % (ref 0–1)
BUN SERPL-MCNC: 11 MG/DL (ref 5–25)
CALCIUM SERPL-MCNC: 8.4 MG/DL (ref 8.4–10.2)
CHLORIDE SERPL-SCNC: 108 MMOL/L (ref 96–108)
CO2 SERPL-SCNC: 28 MMOL/L (ref 21–32)
CREAT SERPL-MCNC: 0.73 MG/DL (ref 0.6–1.3)
EOSINOPHIL # BLD AUTO: 0.06 THOUSAND/ÂΜL (ref 0–0.61)
EOSINOPHIL NFR BLD AUTO: 1 % (ref 0–6)
ERYTHROCYTE [DISTWIDTH] IN BLOOD BY AUTOMATED COUNT: 12.1 % (ref 11.6–15.1)
GFR SERPL CREATININE-BSD FRML MDRD: 80 ML/MIN/1.73SQ M
GLUCOSE SERPL-MCNC: 99 MG/DL (ref 65–140)
HCT VFR BLD AUTO: 38.7 % (ref 34.8–46.1)
HGB BLD-MCNC: 12.8 G/DL (ref 11.5–15.4)
IMM GRANULOCYTES # BLD AUTO: 0.02 THOUSAND/UL (ref 0–0.2)
IMM GRANULOCYTES NFR BLD AUTO: 0 % (ref 0–2)
LYMPHOCYTES # BLD AUTO: 2.05 THOUSANDS/ÂΜL (ref 0.6–4.47)
LYMPHOCYTES NFR BLD AUTO: 33 % (ref 14–44)
MCH RBC QN AUTO: 31.5 PG (ref 26.8–34.3)
MCHC RBC AUTO-ENTMCNC: 33.1 G/DL (ref 31.4–37.4)
MCV RBC AUTO: 95 FL (ref 82–98)
MONOCYTES # BLD AUTO: 0.5 THOUSAND/ÂΜL (ref 0.17–1.22)
MONOCYTES NFR BLD AUTO: 8 % (ref 4–12)
NEUTROPHILS # BLD AUTO: 3.52 THOUSANDS/ÂΜL (ref 1.85–7.62)
NEUTS SEG NFR BLD AUTO: 58 % (ref 43–75)
NRBC BLD AUTO-RTO: 0 /100 WBCS
P AXIS: 67 DEGREES
PLATELET # BLD AUTO: 153 THOUSANDS/UL (ref 149–390)
PMV BLD AUTO: 9.9 FL (ref 8.9–12.7)
POTASSIUM SERPL-SCNC: 3.5 MMOL/L (ref 3.5–5.3)
PR INTERVAL: 184 MS
QRS AXIS: 9 DEGREES
QRSD INTERVAL: 82 MS
QT INTERVAL: 400 MS
QTC INTERVAL: 481 MS
RBC # BLD AUTO: 4.06 MILLION/UL (ref 3.81–5.12)
SODIUM SERPL-SCNC: 141 MMOL/L (ref 135–147)
T WAVE AXIS: 51 DEGREES
VENTRICULAR RATE: 87 BPM
WBC # BLD AUTO: 6.17 THOUSAND/UL (ref 4.31–10.16)

## 2025-07-27 PROCEDURE — 85025 COMPLETE CBC W/AUTO DIFF WBC: CPT | Performed by: FAMILY MEDICINE

## 2025-07-27 PROCEDURE — 93010 ELECTROCARDIOGRAM REPORT: CPT | Performed by: INTERNAL MEDICINE

## 2025-07-27 PROCEDURE — 80048 BASIC METABOLIC PNL TOTAL CA: CPT | Performed by: FAMILY MEDICINE

## 2025-07-27 PROCEDURE — 99232 SBSQ HOSP IP/OBS MODERATE 35: CPT | Performed by: FAMILY MEDICINE

## 2025-07-27 RX ADMIN — OXYBUTYNIN CHLORIDE 10 MG: 5 TABLET, EXTENDED RELEASE ORAL at 08:18

## 2025-07-27 RX ADMIN — OXYCODONE HYDROCHLORIDE AND ACETAMINOPHEN 1000 MG: 500 TABLET ORAL at 08:18

## 2025-07-27 RX ADMIN — PANTOPRAZOLE SODIUM 20 MG: 20 TABLET, DELAYED RELEASE ORAL at 04:10

## 2025-07-27 RX ADMIN — HYDROCODONE BITARTRATE AND ACETAMINOPHEN 1 TABLET: 5; 325 TABLET ORAL at 04:10

## 2025-07-27 RX ADMIN — ATORVASTATIN CALCIUM 40 MG: 40 TABLET, FILM COATED ORAL at 17:36

## 2025-07-27 RX ADMIN — APIXABAN 5 MG: 5 TABLET, FILM COATED ORAL at 17:36

## 2025-07-27 RX ADMIN — ACETAMINOPHEN 650 MG: 325 TABLET ORAL at 18:26

## 2025-07-27 RX ADMIN — ERTAPENEM SODIUM 1000 MG: 1 INJECTION INTRAMUSCULAR; INTRAVENOUS at 00:19

## 2025-07-27 RX ADMIN — APIXABAN 5 MG: 5 TABLET, FILM COATED ORAL at 08:19

## 2025-07-27 RX ADMIN — DILTIAZEM HYDROCHLORIDE 240 MG: 120 CAPSULE, COATED, EXTENDED RELEASE ORAL at 08:19

## 2025-07-28 ENCOUNTER — APPOINTMENT (INPATIENT)
Dept: NON INVASIVE DIAGNOSTICS | Facility: HOSPITAL | Age: 75
DRG: 690 | End: 2025-07-28
Attending: PHYSICIAN ASSISTANT
Payer: MEDICARE

## 2025-07-28 LAB
ANION GAP SERPL CALCULATED.3IONS-SCNC: 5 MMOL/L (ref 4–13)
AORTIC ROOT: 3.4 CM
AORTIC VALVE MEAN VELOCITY: 8.4 M/S
ASCENDING AORTA: 2.9 CM
AV AREA BY CONTINUOUS VTI: 2.1 CM2
AV AREA PEAK VELOCITY: 1.7 CM2
AV LVOT MEAN GRADIENT: 1 MMHG
AV LVOT PEAK GRADIENT: 1 MMHG
AV MEAN PRESS GRAD SYS DOP V1V2: 3 MMHG
AV ORIFICE AREA US: 2.08 CM2
AV PEAK GRADIENT: 5 MMHG
AV VELOCITY RATIO: 0.6
AV VMAX SYS DOP: 1.17 M/S
BACTERIA UR CULT: ABNORMAL
BASOPHILS # BLD AUTO: 0.03 THOUSANDS/ÂΜL (ref 0–0.1)
BASOPHILS NFR BLD AUTO: 0 % (ref 0–1)
BSA FOR ECHO PROCEDURE: 1.86 M2
BUN SERPL-MCNC: 13 MG/DL (ref 5–25)
CALCIUM SERPL-MCNC: 8.8 MG/DL (ref 8.4–10.2)
CHLORIDE SERPL-SCNC: 105 MMOL/L (ref 96–108)
CO2 SERPL-SCNC: 29 MMOL/L (ref 21–32)
CREAT SERPL-MCNC: 0.76 MG/DL (ref 0.6–1.3)
DOP CALC AO VTI: 26.3 CM
DOP CALC LVOT AREA: 3.46 CM2
DOP CALC LVOT CARDIAC INDEX: 1.95 L/MIN/M2
DOP CALC LVOT CARDIAC OUTPUT: 3.63 L/MIN
DOP CALC LVOT DIAMETER: 2.1 CM
DOP CALC LVOT PEAK VEL VTI: 15.77 CM
DOP CALC LVOT PEAK VEL: 0.59 M/S
DOP CALC LVOT STROKE INDEX: 29 ML/M2
DOP CALC LVOT STROKE VOLUME: 54
E WAVE DECELERATION TIME: 239 MS
E/A RATIO: 0.76
EOSINOPHIL # BLD AUTO: 0.09 THOUSAND/ÂΜL (ref 0–0.61)
EOSINOPHIL NFR BLD AUTO: 1 % (ref 0–6)
ERYTHROCYTE [DISTWIDTH] IN BLOOD BY AUTOMATED COUNT: 11.9 % (ref 11.6–15.1)
FRACTIONAL SHORTENING: 38 (ref 28–44)
GFR SERPL CREATININE-BSD FRML MDRD: 76 ML/MIN/1.73SQ M
GLUCOSE SERPL-MCNC: 101 MG/DL (ref 65–140)
HCT VFR BLD AUTO: 38.2 % (ref 34.8–46.1)
HGB BLD-MCNC: 12.8 G/DL (ref 11.5–15.4)
IMM GRANULOCYTES # BLD AUTO: 0.03 THOUSAND/UL (ref 0–0.2)
IMM GRANULOCYTES NFR BLD AUTO: 0 % (ref 0–2)
INTERVENTRICULAR SEPTUM IN DIASTOLE (PARASTERNAL SHORT AXIS VIEW): 0.9 CM
INTERVENTRICULAR SEPTUM: 0.9 CM (ref 0.6–1.1)
LAAS-AP2: 17.8 CM2
LAAS-AP4: 15.8 CM2
LEFT ATRIUM SIZE: 3.5 CM
LEFT ATRIUM VOLUME (MOD BIPLANE): 47 ML
LEFT ATRIUM VOLUME INDEX (MOD BIPLANE): 25.3 ML/M2
LEFT INTERNAL DIMENSION IN SYSTOLE: 2.8 CM (ref 2.1–4)
LEFT VENTRICLE DIASTOLIC VOLUME (MOD BIPLANE): 59 ML
LEFT VENTRICLE DIASTOLIC VOLUME INDEX (MOD BIPLANE): 31.7 ML/M2
LEFT VENTRICLE SYSTOLIC VOLUME (MOD BIPLANE): 24 ML
LEFT VENTRICLE SYSTOLIC VOLUME INDEX (MOD BIPLANE): 12.9 ML/M2
LEFT VENTRICULAR INTERNAL DIMENSION IN DIASTOLE: 4.5 CM (ref 3.5–6)
LEFT VENTRICULAR POSTERIOR WALL IN END DIASTOLE: 1 CM
LEFT VENTRICULAR STROKE VOLUME: 62 ML
LV EF BIPLANE MOD: 60 %
LV EF US.2D.A4C+ESTIMATED: 67 %
LVSV (TEICH): 62 ML
LYMPHOCYTES # BLD AUTO: 2 THOUSANDS/ÂΜL (ref 0.6–4.47)
LYMPHOCYTES NFR BLD AUTO: 30 % (ref 14–44)
MCH RBC QN AUTO: 31.7 PG (ref 26.8–34.3)
MCHC RBC AUTO-ENTMCNC: 33.5 G/DL (ref 31.4–37.4)
MCV RBC AUTO: 95 FL (ref 82–98)
MONOCYTES # BLD AUTO: 0.63 THOUSAND/ÂΜL (ref 0.17–1.22)
MONOCYTES NFR BLD AUTO: 9 % (ref 4–12)
MV E'TISSUE VEL-LAT: 5 CM/S
MV E'TISSUE VEL-SEP: 7 CM/S
MV PEAK A VEL: 0.72 M/S
MV PEAK E VEL: 55 CM/S
MV STENOSIS PRESSURE HALF TIME: 69 MS
MV VALVE AREA P 1/2 METHOD: 3.2
NEUTROPHILS # BLD AUTO: 3.93 THOUSANDS/ÂΜL (ref 1.85–7.62)
NEUTS SEG NFR BLD AUTO: 60 % (ref 43–75)
NRBC BLD AUTO-RTO: 0 /100 WBCS
PLATELET # BLD AUTO: 165 THOUSANDS/UL (ref 149–390)
PMV BLD AUTO: 10.3 FL (ref 8.9–12.7)
POTASSIUM SERPL-SCNC: 3.6 MMOL/L (ref 3.5–5.3)
RBC # BLD AUTO: 4.04 MILLION/UL (ref 3.81–5.12)
RIGHT ATRIUM AREA SYSTOLE A4C: 17.7 CM2
RIGHT VENTRICLE ID DIMENSION: 3.9 CM
SL CV LEFT ATRIUM LENGTH A2C: 4.7 CM
SL CV LV EF: 60
SL CV PED ECHO LEFT VENTRICLE DIASTOLIC VOLUME (MOD BIPLANE) 2D: 92 ML
SL CV PED ECHO LEFT VENTRICLE SYSTOLIC VOLUME (MOD BIPLANE) 2D: 30 ML
SODIUM SERPL-SCNC: 139 MMOL/L (ref 135–147)
TR MAX PG: 27 MMHG
TR PEAK VELOCITY: 2.6 M/S
TRICUSPID ANNULAR PLANE SYSTOLIC EXCURSION: 2.2 CM
TRICUSPID VALVE PEAK REGURGITATION VELOCITY: 2.58 M/S
WBC # BLD AUTO: 6.71 THOUSAND/UL (ref 4.31–10.16)

## 2025-07-28 PROCEDURE — 97535 SELF CARE MNGMENT TRAINING: CPT

## 2025-07-28 PROCEDURE — 93306 TTE W/DOPPLER COMPLETE: CPT | Performed by: INTERNAL MEDICINE

## 2025-07-28 PROCEDURE — 80048 BASIC METABOLIC PNL TOTAL CA: CPT | Performed by: FAMILY MEDICINE

## 2025-07-28 PROCEDURE — 85025 COMPLETE CBC W/AUTO DIFF WBC: CPT | Performed by: FAMILY MEDICINE

## 2025-07-28 PROCEDURE — 97167 OT EVAL HIGH COMPLEX 60 MIN: CPT

## 2025-07-28 PROCEDURE — 99232 SBSQ HOSP IP/OBS MODERATE 35: CPT | Performed by: INTERNAL MEDICINE

## 2025-07-28 PROCEDURE — 93306 TTE W/DOPPLER COMPLETE: CPT

## 2025-07-28 PROCEDURE — 97116 GAIT TRAINING THERAPY: CPT

## 2025-07-28 PROCEDURE — 97163 PT EVAL HIGH COMPLEX 45 MIN: CPT

## 2025-07-28 RX ORDER — SODIUM CHLORIDE 9 MG/ML
75 INJECTION, SOLUTION INTRAVENOUS CONTINUOUS
Status: DISCONTINUED | OUTPATIENT
Start: 2025-07-28 | End: 2025-07-29

## 2025-07-28 RX ADMIN — ERTAPENEM SODIUM 1000 MG: 1 INJECTION INTRAMUSCULAR; INTRAVENOUS at 00:02

## 2025-07-28 RX ADMIN — OXYCODONE HYDROCHLORIDE AND ACETAMINOPHEN 1000 MG: 500 TABLET ORAL at 08:13

## 2025-07-28 RX ADMIN — OXYBUTYNIN CHLORIDE 10 MG: 5 TABLET, EXTENDED RELEASE ORAL at 08:13

## 2025-07-28 RX ADMIN — SODIUM CHLORIDE 75 ML/HR: 0.9 INJECTION, SOLUTION INTRAVENOUS at 16:43

## 2025-07-28 RX ADMIN — ATORVASTATIN CALCIUM 40 MG: 40 TABLET, FILM COATED ORAL at 18:35

## 2025-07-28 RX ADMIN — APIXABAN 5 MG: 5 TABLET, FILM COATED ORAL at 08:13

## 2025-07-28 RX ADMIN — DILTIAZEM HYDROCHLORIDE 240 MG: 120 CAPSULE, COATED, EXTENDED RELEASE ORAL at 08:13

## 2025-07-28 RX ADMIN — ACETAMINOPHEN 650 MG: 325 TABLET ORAL at 02:00

## 2025-07-28 RX ADMIN — PANTOPRAZOLE SODIUM 20 MG: 20 TABLET, DELAYED RELEASE ORAL at 05:28

## 2025-07-28 RX ADMIN — ERTAPENEM SODIUM 1000 MG: 1 INJECTION INTRAMUSCULAR; INTRAVENOUS at 23:16

## 2025-07-28 RX ADMIN — APIXABAN 5 MG: 5 TABLET, FILM COATED ORAL at 18:35

## 2025-07-28 RX ADMIN — HYDROCODONE BITARTRATE AND ACETAMINOPHEN 1 TABLET: 5; 325 TABLET ORAL at 05:28

## 2025-07-29 VITALS
HEIGHT: 65 IN | TEMPERATURE: 97.6 F | BODY MASS INDEX: 28.98 KG/M2 | HEART RATE: 74 BPM | WEIGHT: 173.94 LBS | SYSTOLIC BLOOD PRESSURE: 129 MMHG | RESPIRATION RATE: 18 BRPM | DIASTOLIC BLOOD PRESSURE: 62 MMHG | OXYGEN SATURATION: 93 %

## 2025-07-29 LAB
ANION GAP SERPL CALCULATED.3IONS-SCNC: 6 MMOL/L (ref 4–13)
BASOPHILS # BLD AUTO: 0.02 THOUSANDS/ÂΜL (ref 0–0.1)
BASOPHILS NFR BLD AUTO: 0 % (ref 0–1)
BUN SERPL-MCNC: 13 MG/DL (ref 5–25)
CALCIUM SERPL-MCNC: 8.6 MG/DL (ref 8.4–10.2)
CHLORIDE SERPL-SCNC: 106 MMOL/L (ref 96–108)
CO2 SERPL-SCNC: 28 MMOL/L (ref 21–32)
CREAT SERPL-MCNC: 0.75 MG/DL (ref 0.6–1.3)
EOSINOPHIL # BLD AUTO: 0.06 THOUSAND/ÂΜL (ref 0–0.61)
EOSINOPHIL NFR BLD AUTO: 1 % (ref 0–6)
ERYTHROCYTE [DISTWIDTH] IN BLOOD BY AUTOMATED COUNT: 11.9 % (ref 11.6–15.1)
GFR SERPL CREATININE-BSD FRML MDRD: 78 ML/MIN/1.73SQ M
GLUCOSE SERPL-MCNC: 112 MG/DL (ref 65–140)
HCT VFR BLD AUTO: 38 % (ref 34.8–46.1)
HGB BLD-MCNC: 12.8 G/DL (ref 11.5–15.4)
IMM GRANULOCYTES # BLD AUTO: 0.02 THOUSAND/UL (ref 0–0.2)
IMM GRANULOCYTES NFR BLD AUTO: 0 % (ref 0–2)
LYMPHOCYTES # BLD AUTO: 1.77 THOUSANDS/ÂΜL (ref 0.6–4.47)
LYMPHOCYTES NFR BLD AUTO: 28 % (ref 14–44)
MCH RBC QN AUTO: 32.2 PG (ref 26.8–34.3)
MCHC RBC AUTO-ENTMCNC: 33.7 G/DL (ref 31.4–37.4)
MCV RBC AUTO: 96 FL (ref 82–98)
MONOCYTES # BLD AUTO: 0.56 THOUSAND/ÂΜL (ref 0.17–1.22)
MONOCYTES NFR BLD AUTO: 9 % (ref 4–12)
NEUTROPHILS # BLD AUTO: 3.8 THOUSANDS/ÂΜL (ref 1.85–7.62)
NEUTS SEG NFR BLD AUTO: 62 % (ref 43–75)
NRBC BLD AUTO-RTO: 0 /100 WBCS
PLATELET # BLD AUTO: 154 THOUSANDS/UL (ref 149–390)
PMV BLD AUTO: 9.6 FL (ref 8.9–12.7)
POTASSIUM SERPL-SCNC: 4.1 MMOL/L (ref 3.5–5.3)
RBC # BLD AUTO: 3.98 MILLION/UL (ref 3.81–5.12)
SODIUM SERPL-SCNC: 140 MMOL/L (ref 135–147)
WBC # BLD AUTO: 6.23 THOUSAND/UL (ref 4.31–10.16)

## 2025-07-29 PROCEDURE — 85025 COMPLETE CBC W/AUTO DIFF WBC: CPT | Performed by: INTERNAL MEDICINE

## 2025-07-29 PROCEDURE — 99239 HOSP IP/OBS DSCHRG MGMT >30: CPT | Performed by: INTERNAL MEDICINE

## 2025-07-29 PROCEDURE — 80048 BASIC METABOLIC PNL TOTAL CA: CPT | Performed by: INTERNAL MEDICINE

## 2025-07-29 RX ORDER — CEFUROXIME AXETIL 500 MG/1
500 TABLET ORAL EVERY 12 HOURS SCHEDULED
Start: 2025-07-29 | End: 2025-08-02

## 2025-07-29 RX ADMIN — SODIUM CHLORIDE 75 ML/HR: 0.9 INJECTION, SOLUTION INTRAVENOUS at 06:25

## 2025-07-29 RX ADMIN — OXYBUTYNIN CHLORIDE 10 MG: 5 TABLET, EXTENDED RELEASE ORAL at 08:52

## 2025-07-29 RX ADMIN — OXYCODONE HYDROCHLORIDE AND ACETAMINOPHEN 1000 MG: 500 TABLET ORAL at 08:52

## 2025-07-29 RX ADMIN — PANTOPRAZOLE SODIUM 20 MG: 20 TABLET, DELAYED RELEASE ORAL at 06:07

## 2025-07-29 RX ADMIN — DILTIAZEM HYDROCHLORIDE 240 MG: 120 CAPSULE, COATED, EXTENDED RELEASE ORAL at 08:52

## 2025-07-29 RX ADMIN — APIXABAN 5 MG: 5 TABLET, FILM COATED ORAL at 08:52

## 2025-07-29 RX ADMIN — HYDROCODONE BITARTRATE AND ACETAMINOPHEN 1 TABLET: 5; 325 TABLET ORAL at 02:58

## 2025-07-30 ENCOUNTER — NURSING HOME VISIT (OUTPATIENT)
Dept: FAMILY MEDICINE CLINIC | Facility: CLINIC | Age: 75
End: 2025-07-30
Payer: MEDICARE

## 2025-07-30 DIAGNOSIS — N30.90 CYSTITIS: ICD-10-CM

## 2025-07-30 DIAGNOSIS — R55 SYNCOPE AND COLLAPSE: Primary | ICD-10-CM

## 2025-07-30 DIAGNOSIS — R26.2 AMBULATORY DYSFUNCTION: ICD-10-CM

## 2025-07-30 DIAGNOSIS — I48.0 PAROXYSMAL ATRIAL FIBRILLATION (HCC): ICD-10-CM

## 2025-07-30 DIAGNOSIS — I10 PRIMARY HYPERTENSION: ICD-10-CM

## 2025-07-30 DIAGNOSIS — N18.2 CKD (CHRONIC KIDNEY DISEASE) STAGE 2, GFR 60-89 ML/MIN: ICD-10-CM

## 2025-07-30 DIAGNOSIS — F11.20 OPIOID DEPENDENCE, UNCOMPLICATED (HCC): ICD-10-CM

## 2025-07-30 DIAGNOSIS — Z86.73 HISTORY OF STROKE: ICD-10-CM

## 2025-07-30 PROCEDURE — 99305 1ST NF CARE MODERATE MDM 35: CPT | Performed by: FAMILY MEDICINE

## 2025-08-01 ENCOUNTER — NURSING HOME VISIT (OUTPATIENT)
Dept: FAMILY MEDICINE CLINIC | Facility: CLINIC | Age: 75
End: 2025-08-01
Payer: MEDICARE

## 2025-08-01 DIAGNOSIS — R26.2 AMBULATORY DYSFUNCTION: ICD-10-CM

## 2025-08-01 DIAGNOSIS — F11.20 OPIOID DEPENDENCE, UNCOMPLICATED (HCC): ICD-10-CM

## 2025-08-01 DIAGNOSIS — R55 SYNCOPE AND COLLAPSE: Primary | ICD-10-CM

## 2025-08-01 DIAGNOSIS — Z86.73 HISTORY OF STROKE: ICD-10-CM

## 2025-08-01 DIAGNOSIS — I48.0 PAROXYSMAL ATRIAL FIBRILLATION (HCC): ICD-10-CM

## 2025-08-01 DIAGNOSIS — N30.90 CYSTITIS: ICD-10-CM

## 2025-08-01 PROCEDURE — 99308 SBSQ NF CARE LOW MDM 20: CPT | Performed by: FAMILY MEDICINE

## 2025-08-05 ENCOUNTER — NURSING HOME VISIT (OUTPATIENT)
Dept: FAMILY MEDICINE CLINIC | Facility: CLINIC | Age: 75
End: 2025-08-05
Payer: MEDICARE

## 2025-08-05 DIAGNOSIS — S46.912D STRAIN OF LEFT SHOULDER, SUBSEQUENT ENCOUNTER: ICD-10-CM

## 2025-08-05 DIAGNOSIS — F11.20 OPIOID DEPENDENCE, UNCOMPLICATED (HCC): ICD-10-CM

## 2025-08-05 DIAGNOSIS — R26.2 AMBULATORY DYSFUNCTION: ICD-10-CM

## 2025-08-05 DIAGNOSIS — R55 SYNCOPE AND COLLAPSE: Primary | ICD-10-CM

## 2025-08-05 DIAGNOSIS — N30.90 CYSTITIS: ICD-10-CM

## 2025-08-05 DIAGNOSIS — I48.0 PAROXYSMAL ATRIAL FIBRILLATION (HCC): ICD-10-CM

## 2025-08-05 PROCEDURE — 99308 SBSQ NF CARE LOW MDM 20: CPT | Performed by: FAMILY MEDICINE

## 2025-08-08 ENCOUNTER — NURSING HOME VISIT (OUTPATIENT)
Dept: FAMILY MEDICINE CLINIC | Facility: CLINIC | Age: 75
End: 2025-08-08
Payer: MEDICARE

## 2025-08-08 ENCOUNTER — TELEPHONE (OUTPATIENT)
Age: 75
End: 2025-08-08

## 2025-08-08 DIAGNOSIS — I48.0 PAROXYSMAL ATRIAL FIBRILLATION (HCC): ICD-10-CM

## 2025-08-08 DIAGNOSIS — M54.42 CHRONIC BILATERAL LOW BACK PAIN WITH BILATERAL SCIATICA: ICD-10-CM

## 2025-08-08 DIAGNOSIS — R26.2 AMBULATORY DYSFUNCTION: ICD-10-CM

## 2025-08-08 DIAGNOSIS — M54.41 CHRONIC BILATERAL LOW BACK PAIN WITH BILATERAL SCIATICA: ICD-10-CM

## 2025-08-08 DIAGNOSIS — N30.90 CYSTITIS: ICD-10-CM

## 2025-08-08 DIAGNOSIS — G89.29 CHRONIC BILATERAL LOW BACK PAIN WITH BILATERAL SCIATICA: ICD-10-CM

## 2025-08-08 DIAGNOSIS — Z86.73 HISTORY OF STROKE: ICD-10-CM

## 2025-08-08 DIAGNOSIS — R55 SYNCOPE AND COLLAPSE: Primary | ICD-10-CM

## 2025-08-08 PROCEDURE — 99315 NF DSCHRG MGMT 30 MIN/LESS: CPT | Performed by: FAMILY MEDICINE

## (undated) DEVICE — TROCAR: Brand: KII FIOS FIRST ENTRY

## (undated) DEVICE — IRRIG ENDO FLO TUBING

## (undated) DEVICE — PMI DISPOSABLE PUNCTURE CLOSURE DEVICE / SUTURE GRASPER: Brand: PMI

## (undated) DEVICE — BLUE HEAT SCOPE WARMER

## (undated) DEVICE — VIAL DECANTER

## (undated) DEVICE — SUT MONOCRYL 4-0 PS-2 27 IN Y426H

## (undated) DEVICE — CHLORAPREP HI-LITE 26ML ORANGE

## (undated) DEVICE — ENDOPATH 5MM CURVED SCISSORS WITH MONOPOLAR CAUTERY: Brand: ENDOPATH

## (undated) DEVICE — GLOVE INDICATOR PI UNDERGLOVE SZ 6.5 BLUE

## (undated) DEVICE — TISSUE RETRIEVAL SYSTEM: Brand: INZII RETRIEVAL SYSTEM

## (undated) DEVICE — TROCAR: Brand: KII® SLEEVE

## (undated) DEVICE — GLOVE SRG BIOGEL 7

## (undated) DEVICE — GLOVE PI ULTRA TOUCH SZ.6.5

## (undated) DEVICE — HARMONIC ACE 5MM DIAMETER SHEARS 36CM SHAFT LENGTH + ADAPTIVE TISSUE TECHNOLOGY FOR USE WITH GENERATOR G11: Brand: HARMONIC ACE

## (undated) DEVICE — SUT VICRYL 0 UR-6 27 IN J603H

## (undated) DEVICE — INTENDED FOR TISSUE SEPARATION, AND OTHER PROCEDURES THAT REQUIRE A SHARP SURGICAL BLADE TO PUNCTURE OR CUT.: Brand: BARD-PARKER SAFETY BLADES SIZE 11, STERILE

## (undated) DEVICE — PENCIL ELECTROSURG E-Z CLEAN -0035H

## (undated) DEVICE — ADHESIVE SKIN HIGH VISCOSITY EXOFIN 1ML

## (undated) DEVICE — SYRINGE 10ML LL

## (undated) DEVICE — ELECTRODE LAP L WIRE E-Z CLEAN 33CM -0100

## (undated) DEVICE — SCD SEQUENTIAL COMPRESSION COMFORT SLEEVE MEDIUM KNEE LENGTH: Brand: KENDALL SCD

## (undated) DEVICE — INSUFFLATION TUBING PRIMFLO

## (undated) DEVICE — DRAPE EQUIPMENT RF WAND

## (undated) DEVICE — ANTIBACTERIAL UNDYED BRAIDED (POLYGLACTIN 910), SYNTHETIC ABSORBABLE SUTURE: Brand: COATED VICRYL

## (undated) DEVICE — LIGAMAX 5 MM ENDOSCOPIC MULTIPLE CLIP APPLIER: Brand: LIGAMAX

## (undated) DEVICE — GLOVE INDICATOR PI UNDERGLOVE SZ 7 BLUE

## (undated) DEVICE — JACKSON-PRATT 100CC BULB RESERVOIR: Brand: CARDINAL HEALTH

## (undated) DEVICE — PAD GROUNDING ADULT

## (undated) DEVICE — GLOVE SRG BIOGEL 7.5

## (undated) DEVICE — TUBING SMOKE EVAC W/FILTRATION DEVICE PLUMEPORT ACTIV

## (undated) DEVICE — GLOVE SRG BIOGEL 6.5

## (undated) DEVICE — ELECTRODE BLADE MOD E-Z CLEAN 2.5IN 6.4CM -0012M

## (undated) DEVICE — ALLENTOWN LAP CHOLE APP PACK: Brand: CARDINAL HEALTH

## (undated) DEVICE — ENDOPATH PNEUMONEEDLE INSUFFLATION NEEDLES WITH LUER LOCK CONNECTORS 120MM: Brand: ENDOPATH

## (undated) DEVICE — JP CHAN DRN SIL HUBLESS 15FR W/TRO: Brand: CARDINAL HEALTH

## (undated) DEVICE — SUT ETHILON 3-0 PS-1 18 IN 1663G